# Patient Record
Sex: FEMALE | Race: WHITE | NOT HISPANIC OR LATINO | Employment: OTHER | URBAN - NONMETROPOLITAN AREA
[De-identification: names, ages, dates, MRNs, and addresses within clinical notes are randomized per-mention and may not be internally consistent; named-entity substitution may affect disease eponyms.]

---

## 2017-01-10 ENCOUNTER — TRANSCRIBE ORDERS (OUTPATIENT)
Dept: ADMINISTRATIVE | Facility: HOSPITAL | Age: 52
End: 2017-01-10

## 2017-01-10 DIAGNOSIS — R97.0 ELEVATED CEA: ICD-10-CM

## 2017-01-10 DIAGNOSIS — C50.212 MALIGNANT NEOPLASM OF UPPER-INNER QUADRANT OF LEFT FEMALE BREAST (HCC): Primary | ICD-10-CM

## 2017-01-11 ENCOUNTER — OFFICE VISIT (OUTPATIENT)
Dept: PRIMARY CARE CLINIC | Age: 52
End: 2017-01-11
Payer: MEDICARE

## 2017-01-11 ENCOUNTER — TELEPHONE (OUTPATIENT)
Dept: PRIMARY CARE CLINIC | Age: 52
End: 2017-01-11

## 2017-01-11 VITALS
BODY MASS INDEX: 32.02 KG/M2 | HEART RATE: 100 BPM | WEIGHT: 204 LBS | OXYGEN SATURATION: 98 % | HEIGHT: 67 IN | DIASTOLIC BLOOD PRESSURE: 86 MMHG | RESPIRATION RATE: 20 BRPM | SYSTOLIC BLOOD PRESSURE: 138 MMHG

## 2017-01-11 DIAGNOSIS — F33.1 MAJOR DEPRESSIVE DISORDER, RECURRENT EPISODE, MODERATE (HCC): Primary | ICD-10-CM

## 2017-01-11 DIAGNOSIS — F33.2 MAJOR DEPRESSIVE DISORDER, RECURRENT SEVERE WITHOUT PSYCHOTIC FEATURES (HCC): ICD-10-CM

## 2017-01-11 PROCEDURE — 99214 OFFICE O/P EST MOD 30 MIN: CPT | Performed by: FAMILY MEDICINE

## 2017-01-11 RX ORDER — LORAZEPAM 1 MG/1
1 TABLET ORAL EVERY 8 HOURS PRN
Qty: 90 TABLET | Refills: 1 | Status: ON HOLD | OUTPATIENT
Start: 2017-01-11 | End: 2017-01-20 | Stop reason: HOSPADM

## 2017-01-11 RX ORDER — FAMOTIDINE 20 MG/1
20 TABLET, FILM COATED ORAL 2 TIMES DAILY
COMMUNITY
End: 2018-02-16 | Stop reason: SDUPTHER

## 2017-01-11 RX ORDER — ONDANSETRON 4 MG/1
4 TABLET, FILM COATED ORAL EVERY 8 HOURS PRN
Qty: 30 TABLET | Refills: 0 | Status: SHIPPED | OUTPATIENT
Start: 2017-01-11 | End: 2017-02-03 | Stop reason: SDUPTHER

## 2017-01-16 ENCOUNTER — HOSPITAL ENCOUNTER (INPATIENT)
Age: 52
LOS: 4 days | Discharge: HOME | DRG: 885 | End: 2017-01-20
Attending: EMERGENCY MEDICINE | Admitting: PSYCHIATRY & NEUROLOGY
Payer: MEDICARE

## 2017-01-16 DIAGNOSIS — F32.A DEPRESSION, UNSPECIFIED DEPRESSION TYPE: Primary | ICD-10-CM

## 2017-01-16 LAB
ALBUMIN SERPL-MCNC: 4.6 G/DL (ref 3.5–5.2)
ALP BLD-CCNC: 118 U/L (ref 35–104)
ALT SERPL-CCNC: 16 U/L (ref 5–33)
AMPHETAMINE SCREEN, URINE: NEGATIVE
ANION GAP SERPL CALCULATED.3IONS-SCNC: 17 MMOL/L (ref 7–19)
AST SERPL-CCNC: 19 U/L (ref 5–32)
BARBITURATE SCREEN URINE: NEGATIVE
BASOPHILS ABSOLUTE: 0 K/UL (ref 0–0.2)
BASOPHILS RELATIVE PERCENT: 0.3 % (ref 0–1)
BENZODIAZEPINE SCREEN, URINE: POSITIVE
BILIRUB SERPL-MCNC: 0.4 MG/DL (ref 0.2–1.2)
BUN BLDV-MCNC: 6 MG/DL (ref 6–20)
CALCIUM SERPL-MCNC: 9.7 MG/DL (ref 8.6–10)
CANNABINOID SCREEN URINE: POSITIVE
CHLORIDE BLD-SCNC: 99 MMOL/L (ref 98–111)
CO2: 23 MMOL/L (ref 22–29)
COCAINE METABOLITE SCREEN URINE: NEGATIVE
CREAT SERPL-MCNC: 0.5 MG/DL (ref 0.5–0.9)
EOSINOPHILS ABSOLUTE: 0.1 K/UL (ref 0–0.6)
EOSINOPHILS RELATIVE PERCENT: 0.6 % (ref 0–5)
ETHANOL: <10 MG/DL (ref 0–0.08)
GFR NON-AFRICAN AMERICAN: >60
GLOBULIN: 3.3 G/DL
GLUCOSE BLD-MCNC: 138 MG/DL (ref 74–109)
HCG(URINE) PREGNANCY TEST: NEGATIVE
HCT VFR BLD CALC: 47.3 % (ref 37–47)
HEMOGLOBIN: 16.4 G/DL (ref 12–16)
LYMPHOCYTES ABSOLUTE: 3 K/UL (ref 1.1–4.5)
LYMPHOCYTES RELATIVE PERCENT: 24.9 % (ref 20–40)
Lab: ABNORMAL
MCH RBC QN AUTO: 29.9 PG (ref 27–31)
MCHC RBC AUTO-ENTMCNC: 34.7 G/DL (ref 33–37)
MCV RBC AUTO: 86.3 FL (ref 81–99)
MONOCYTES ABSOLUTE: 0.9 K/UL (ref 0–0.9)
MONOCYTES RELATIVE PERCENT: 7.7 % (ref 0–10)
NEUTROPHILS ABSOLUTE: 7.9 K/UL (ref 1.5–7.5)
NEUTROPHILS RELATIVE PERCENT: 66.5 % (ref 50–65)
OPIATE SCREEN URINE: NEGATIVE
PDW BLD-RTO: 14.4 % (ref 11.5–14.5)
PLATELET # BLD: 379 K/UL (ref 130–400)
PMV BLD AUTO: 10.2 FL (ref 7.4–10.4)
POTASSIUM SERPL-SCNC: 3.4 MMOL/L (ref 3.5–5)
RBC # BLD: 5.48 M/UL (ref 4.2–5.4)
SODIUM BLD-SCNC: 139 MMOL/L (ref 136–145)
TOTAL PROTEIN: 7.9 G/DL (ref 6.6–8.7)
WBC # BLD: 11.9 K/UL (ref 4.8–10.8)

## 2017-01-16 PROCEDURE — 93005 ELECTROCARDIOGRAM TRACING: CPT

## 2017-01-16 PROCEDURE — 36415 COLL VENOUS BLD VENIPUNCTURE: CPT

## 2017-01-16 PROCEDURE — 81025 URINE PREGNANCY TEST: CPT

## 2017-01-16 PROCEDURE — 85025 COMPLETE CBC W/AUTO DIFF WBC: CPT

## 2017-01-16 PROCEDURE — 80053 COMPREHEN METABOLIC PANEL: CPT

## 2017-01-16 PROCEDURE — 6370000000 HC RX 637 (ALT 250 FOR IP): Performed by: PSYCHIATRY & NEUROLOGY

## 2017-01-16 PROCEDURE — 6360000002 HC RX W HCPCS: Performed by: EMERGENCY MEDICINE

## 2017-01-16 PROCEDURE — 99284 EMERGENCY DEPT VISIT MOD MDM: CPT | Performed by: EMERGENCY MEDICINE

## 2017-01-16 PROCEDURE — 1240000000 HC EMOTIONAL WELLNESS R&B

## 2017-01-16 PROCEDURE — G0480 DRUG TEST DEF 1-7 CLASSES: HCPCS

## 2017-01-16 PROCEDURE — 99285 EMERGENCY DEPT VISIT HI MDM: CPT

## 2017-01-16 PROCEDURE — 96372 THER/PROPH/DIAG INJ SC/IM: CPT

## 2017-01-16 PROCEDURE — 80307 DRUG TEST PRSMV CHEM ANLYZR: CPT

## 2017-01-16 RX ORDER — ONDANSETRON 4 MG/1
4 TABLET, FILM COATED ORAL EVERY 8 HOURS PRN
Status: DISCONTINUED | OUTPATIENT
Start: 2017-01-16 | End: 2017-01-20 | Stop reason: HOSPADM

## 2017-01-16 RX ORDER — FLUTICASONE PROPIONATE 50 MCG
1 SPRAY, SUSPENSION (ML) NASAL DAILY
Status: DISCONTINUED | OUTPATIENT
Start: 2017-01-16 | End: 2017-01-20 | Stop reason: HOSPADM

## 2017-01-16 RX ORDER — CYCLOBENZAPRINE HCL 10 MG
10 TABLET ORAL 2 TIMES DAILY PRN
Status: DISCONTINUED | OUTPATIENT
Start: 2017-01-16 | End: 2017-01-20 | Stop reason: HOSPADM

## 2017-01-16 RX ORDER — HALOPERIDOL 5 MG/ML
5 INJECTION INTRAMUSCULAR EVERY 6 HOURS PRN
Status: DISCONTINUED | OUTPATIENT
Start: 2017-01-16 | End: 2017-01-18

## 2017-01-16 RX ORDER — LETROZOLE 2.5 MG/1
2.5 TABLET, FILM COATED ORAL DAILY
Status: DISCONTINUED | OUTPATIENT
Start: 2017-01-16 | End: 2017-01-20 | Stop reason: HOSPADM

## 2017-01-16 RX ORDER — GABAPENTIN 300 MG/1
300 CAPSULE ORAL 2 TIMES DAILY
Status: DISCONTINUED | OUTPATIENT
Start: 2017-01-16 | End: 2017-01-20 | Stop reason: HOSPADM

## 2017-01-16 RX ORDER — PANTOPRAZOLE SODIUM 40 MG/1
40 TABLET, DELAYED RELEASE ORAL
Status: DISCONTINUED | OUTPATIENT
Start: 2017-01-17 | End: 2017-01-20 | Stop reason: HOSPADM

## 2017-01-16 RX ORDER — ALBUTEROL SULFATE 90 UG/1
2 AEROSOL, METERED RESPIRATORY (INHALATION) EVERY 6 HOURS PRN
Status: DISCONTINUED | OUTPATIENT
Start: 2017-01-16 | End: 2017-01-20 | Stop reason: HOSPADM

## 2017-01-16 RX ORDER — MAGNESIUM HYDROXIDE/ALUMINUM HYDROXICE/SIMETHICONE 120; 1200; 1200 MG/30ML; MG/30ML; MG/30ML
30 SUSPENSION ORAL PRN
Status: DISCONTINUED | OUTPATIENT
Start: 2017-01-16 | End: 2017-01-20 | Stop reason: HOSPADM

## 2017-01-16 RX ORDER — FAMOTIDINE 20 MG/1
20 TABLET, FILM COATED ORAL 2 TIMES DAILY
Status: DISCONTINUED | OUTPATIENT
Start: 2017-01-16 | End: 2017-01-20 | Stop reason: HOSPADM

## 2017-01-16 RX ORDER — LORAZEPAM 1 MG/1
1 TABLET ORAL EVERY 8 HOURS PRN
Status: DISCONTINUED | OUTPATIENT
Start: 2017-01-16 | End: 2017-01-18

## 2017-01-16 RX ORDER — IBUPROFEN 400 MG/1
400 TABLET ORAL EVERY 6 HOURS PRN
Status: DISCONTINUED | OUTPATIENT
Start: 2017-01-16 | End: 2017-01-20 | Stop reason: HOSPADM

## 2017-01-16 RX ORDER — LORATADINE 10 MG/1
10 CAPSULE, LIQUID FILLED ORAL DAILY
Status: DISCONTINUED | OUTPATIENT
Start: 2017-01-16 | End: 2017-01-16 | Stop reason: CLARIF

## 2017-01-16 RX ORDER — ACETAMINOPHEN 325 MG/1
650 TABLET ORAL EVERY 4 HOURS PRN
Status: DISCONTINUED | OUTPATIENT
Start: 2017-01-16 | End: 2017-01-20 | Stop reason: HOSPADM

## 2017-01-16 RX ORDER — LORAZEPAM 2 MG/ML
1 INJECTION INTRAMUSCULAR EVERY 6 HOURS PRN
Status: DISCONTINUED | OUTPATIENT
Start: 2017-01-16 | End: 2017-01-18

## 2017-01-16 RX ORDER — CETIRIZINE HYDROCHLORIDE 10 MG/1
10 TABLET ORAL DAILY
Status: DISCONTINUED | OUTPATIENT
Start: 2017-01-16 | End: 2017-01-20 | Stop reason: HOSPADM

## 2017-01-16 RX ORDER — LORAZEPAM 2 MG/ML
1 INJECTION INTRAMUSCULAR ONCE
Status: COMPLETED | OUTPATIENT
Start: 2017-01-16 | End: 2017-01-16

## 2017-01-16 RX ORDER — TRAZODONE HYDROCHLORIDE 50 MG/1
50 TABLET ORAL NIGHTLY PRN
Status: DISCONTINUED | OUTPATIENT
Start: 2017-01-16 | End: 2017-01-18

## 2017-01-16 RX ADMIN — FAMOTIDINE 20 MG: 20 TABLET ORAL at 21:46

## 2017-01-16 RX ADMIN — LORAZEPAM 1 MG: 2 INJECTION INTRAMUSCULAR; INTRAVENOUS at 16:30

## 2017-01-16 RX ADMIN — GABAPENTIN 300 MG: 300 CAPSULE ORAL at 21:46

## 2017-01-16 RX ADMIN — TRAZODONE HYDROCHLORIDE 50 MG: 50 TABLET ORAL at 21:46

## 2017-01-16 RX ADMIN — ALBUTEROL SULFATE 2 PUFF: 90 AEROSOL, METERED RESPIRATORY (INHALATION) at 23:06

## 2017-01-16 ASSESSMENT — SLEEP AND FATIGUE QUESTIONNAIRES
DO YOU USE A SLEEP AID: YES
RESTFUL SLEEP: NO
DIFFICULTY ARISING: YES
AVERAGE NUMBER OF SLEEP HOURS: 5
DO YOU HAVE DIFFICULTY SLEEPING: YES
DIFFICULTY FALLING ASLEEP: YES
DIFFICULTY STAYING ASLEEP: YES
SLEEP PATTERN: DIFFICULTY FALLING ASLEEP;DIFFICULTY ARISING

## 2017-01-16 ASSESSMENT — LIFESTYLE VARIABLES: HISTORY_ALCOHOL_USE: NO

## 2017-01-16 ASSESSMENT — PAIN SCALES - GENERAL: PAINLEVEL_OUTOF10: 10

## 2017-01-16 ASSESSMENT — ENCOUNTER SYMPTOMS
RHINORRHEA: 0
SHORTNESS OF BREATH: 0
NAUSEA: 1
ABDOMINAL PAIN: 0
SORE THROAT: 0
BACK PAIN: 0
VOMITING: 1
DIARRHEA: 0

## 2017-01-16 ASSESSMENT — PAIN DESCRIPTION - PAIN TYPE: TYPE: CHRONIC PAIN

## 2017-01-17 PROBLEM — Z63.4 GRIEF AT LOSS OF CHILD: Status: ACTIVE | Noted: 2017-01-17

## 2017-01-17 PROBLEM — F43.21 GRIEF AT LOSS OF CHILD: Status: ACTIVE | Noted: 2017-01-17

## 2017-01-17 PROBLEM — R45.851 SUICIDAL IDEATION: Status: ACTIVE | Noted: 2017-01-17

## 2017-01-17 PROCEDURE — 90792 PSYCH DIAG EVAL W/MED SRVCS: CPT | Performed by: NURSE PRACTITIONER

## 2017-01-17 PROCEDURE — 6370000000 HC RX 637 (ALT 250 FOR IP): Performed by: FAMILY MEDICINE

## 2017-01-17 PROCEDURE — 1240000000 HC EMOTIONAL WELLNESS R&B

## 2017-01-17 PROCEDURE — 6370000000 HC RX 637 (ALT 250 FOR IP): Performed by: PSYCHIATRY & NEUROLOGY

## 2017-01-17 RX ORDER — CLONIDINE HYDROCHLORIDE 0.1 MG/1
0.1 TABLET ORAL EVERY 4 HOURS PRN
Status: DISCONTINUED | OUTPATIENT
Start: 2017-01-17 | End: 2017-01-18

## 2017-01-17 RX ADMIN — TRAZODONE HYDROCHLORIDE 50 MG: 50 TABLET ORAL at 21:15

## 2017-01-17 RX ADMIN — FAMOTIDINE 20 MG: 20 TABLET ORAL at 21:15

## 2017-01-17 RX ADMIN — SERTRALINE HYDROCHLORIDE 150 MG: 50 TABLET ORAL at 08:48

## 2017-01-17 RX ADMIN — GABAPENTIN 300 MG: 300 CAPSULE ORAL at 21:15

## 2017-01-17 RX ADMIN — FAMOTIDINE 20 MG: 20 TABLET ORAL at 08:48

## 2017-01-17 RX ADMIN — LETROZOLE 2.5 MG: 2.5 TABLET, FILM COATED ORAL at 11:07

## 2017-01-17 RX ADMIN — CETIRIZINE HYDROCHLORIDE 10 MG: 10 TABLET ORAL at 08:48

## 2017-01-17 RX ADMIN — PANTOPRAZOLE SODIUM 40 MG: 40 TABLET, DELAYED RELEASE ORAL at 08:48

## 2017-01-17 RX ADMIN — ALBUTEROL SULFATE 2 PUFF: 90 AEROSOL, METERED RESPIRATORY (INHALATION) at 14:22

## 2017-01-17 RX ADMIN — GABAPENTIN 300 MG: 300 CAPSULE ORAL at 08:48

## 2017-01-17 RX ADMIN — CLONIDINE HYDROCHLORIDE 0.1 MG: 0.1 TABLET ORAL at 13:28

## 2017-01-17 RX ADMIN — LORAZEPAM 1 MG: 1 TABLET ORAL at 21:48

## 2017-01-17 ASSESSMENT — PATIENT HEALTH QUESTIONNAIRE - PHQ9: SUM OF ALL RESPONSES TO PHQ QUESTIONS 1-9: 20

## 2017-01-18 LAB
ALBUMIN SERPL-MCNC: 4 G/DL (ref 3.5–5.2)
ALP BLD-CCNC: 99 U/L (ref 35–104)
ALT SERPL-CCNC: 13 U/L (ref 5–33)
ANION GAP SERPL CALCULATED.3IONS-SCNC: 18 MMOL/L (ref 7–19)
AST SERPL-CCNC: 17 U/L (ref 5–32)
BILIRUB SERPL-MCNC: 0.5 MG/DL (ref 0.2–1.2)
BUN BLDV-MCNC: 8 MG/DL (ref 6–20)
CALCIUM SERPL-MCNC: 9.3 MG/DL (ref 8.6–10)
CHLORIDE BLD-SCNC: 98 MMOL/L (ref 98–111)
CO2: 25 MMOL/L (ref 22–29)
CREAT SERPL-MCNC: 0.6 MG/DL (ref 0.5–0.9)
GFR NON-AFRICAN AMERICAN: >60
GLOBULIN: 2.8 G/DL
GLUCOSE BLD-MCNC: 134 MG/DL (ref 74–109)
HBA1C MFR BLD: 6 %
HCT VFR BLD CALC: 46.7 % (ref 37–47)
HEMOGLOBIN: 16 G/DL (ref 12–16)
MCH RBC QN AUTO: 29.9 PG (ref 27–31)
MCHC RBC AUTO-ENTMCNC: 34.3 G/DL (ref 33–37)
MCV RBC AUTO: 87.3 FL (ref 81–99)
PDW BLD-RTO: 14.3 % (ref 11.5–14.5)
PLATELET # BLD: 302 K/UL (ref 130–400)
PMV BLD AUTO: 10.3 FL (ref 7.4–10.4)
POTASSIUM SERPL-SCNC: 3.2 MMOL/L (ref 3.5–5)
RBC # BLD: 5.35 M/UL (ref 4.2–5.4)
SODIUM BLD-SCNC: 141 MMOL/L (ref 136–145)
TOTAL PROTEIN: 6.8 G/DL (ref 6.6–8.7)
TSH SERPL DL<=0.05 MIU/L-ACNC: 1.82 UIU/ML (ref 0.27–4.2)
VITAMIN B-12: 432 PG/ML (ref 211–946)
VITAMIN D 25-HYDROXY: 15.7 NG/ML
WBC # BLD: 10.3 K/UL (ref 4.8–10.8)

## 2017-01-18 PROCEDURE — 94660 CPAP INITIATION&MGMT: CPT

## 2017-01-18 PROCEDURE — 6370000000 HC RX 637 (ALT 250 FOR IP): Performed by: NURSE PRACTITIONER

## 2017-01-18 PROCEDURE — 82607 VITAMIN B-12: CPT

## 2017-01-18 PROCEDURE — 99231 SBSQ HOSP IP/OBS SF/LOW 25: CPT | Performed by: NURSE PRACTITIONER

## 2017-01-18 PROCEDURE — 83036 HEMOGLOBIN GLYCOSYLATED A1C: CPT

## 2017-01-18 PROCEDURE — 1240000000 HC EMOTIONAL WELLNESS R&B

## 2017-01-18 PROCEDURE — 82306 VITAMIN D 25 HYDROXY: CPT

## 2017-01-18 PROCEDURE — 36415 COLL VENOUS BLD VENIPUNCTURE: CPT

## 2017-01-18 PROCEDURE — 84443 ASSAY THYROID STIM HORMONE: CPT

## 2017-01-18 PROCEDURE — 6370000000 HC RX 637 (ALT 250 FOR IP): Performed by: PSYCHIATRY & NEUROLOGY

## 2017-01-18 PROCEDURE — 85027 COMPLETE CBC AUTOMATED: CPT

## 2017-01-18 PROCEDURE — 80053 COMPREHEN METABOLIC PANEL: CPT

## 2017-01-18 PROCEDURE — 6370000000 HC RX 637 (ALT 250 FOR IP): Performed by: FAMILY MEDICINE

## 2017-01-18 RX ORDER — POTASSIUM CHLORIDE 20 MEQ/1
40 TABLET, EXTENDED RELEASE ORAL ONCE
Status: COMPLETED | OUTPATIENT
Start: 2017-01-18 | End: 2017-01-18

## 2017-01-18 RX ORDER — CLONIDINE HYDROCHLORIDE 0.1 MG/1
0.1 TABLET ORAL EVERY 4 HOURS PRN
Status: DISCONTINUED | OUTPATIENT
Start: 2017-01-18 | End: 2017-01-20 | Stop reason: HOSPADM

## 2017-01-18 RX ORDER — HYDROXYZINE PAMOATE 25 MG/1
25 CAPSULE ORAL 3 TIMES DAILY PRN
Status: DISCONTINUED | OUTPATIENT
Start: 2017-01-18 | End: 2017-01-20 | Stop reason: HOSPADM

## 2017-01-18 RX ORDER — LANOLIN ALCOHOL/MO/W.PET/CERES
6 CREAM (GRAM) TOPICAL NIGHTLY PRN
Status: DISCONTINUED | OUTPATIENT
Start: 2017-01-18 | End: 2017-01-20 | Stop reason: HOSPADM

## 2017-01-18 RX ORDER — ERGOCALCIFEROL 1.25 MG/1
50000 CAPSULE ORAL WEEKLY
Status: DISCONTINUED | OUTPATIENT
Start: 2017-01-18 | End: 2017-01-20 | Stop reason: HOSPADM

## 2017-01-18 RX ADMIN — FAMOTIDINE 20 MG: 20 TABLET ORAL at 21:19

## 2017-01-18 RX ADMIN — LETROZOLE 2.5 MG: 2.5 TABLET, FILM COATED ORAL at 12:33

## 2017-01-18 RX ADMIN — SERTRALINE HYDROCHLORIDE 150 MG: 50 TABLET ORAL at 09:09

## 2017-01-18 RX ADMIN — CETIRIZINE HYDROCHLORIDE 10 MG: 10 TABLET ORAL at 09:09

## 2017-01-18 RX ADMIN — CLONIDINE HYDROCHLORIDE 0.1 MG: 0.1 TABLET ORAL at 21:19

## 2017-01-18 RX ADMIN — MELATONIN 3 MG ORAL TABLET 6 MG: 3 TABLET ORAL at 21:18

## 2017-01-18 RX ADMIN — IBUPROFEN 400 MG: 400 TABLET, FILM COATED ORAL at 12:33

## 2017-01-18 RX ADMIN — ALBUTEROL SULFATE 2 PUFF: 90 AEROSOL, METERED RESPIRATORY (INHALATION) at 07:16

## 2017-01-18 RX ADMIN — ALBUTEROL SULFATE 2 PUFF: 90 AEROSOL, METERED RESPIRATORY (INHALATION) at 22:27

## 2017-01-18 RX ADMIN — GABAPENTIN 300 MG: 300 CAPSULE ORAL at 21:19

## 2017-01-18 RX ADMIN — FAMOTIDINE 20 MG: 20 TABLET ORAL at 09:09

## 2017-01-18 RX ADMIN — ERGOCALCIFEROL 50000 UNITS: 1.25 CAPSULE ORAL at 21:19

## 2017-01-18 RX ADMIN — POTASSIUM CHLORIDE 40 MEQ: 20 TABLET, EXTENDED RELEASE ORAL at 21:19

## 2017-01-18 RX ADMIN — NAPHAZOLINE HYDROCHLORIDE AND PHENIRAMINE MALEATE 2 DROP: .25; 3 SOLUTION/ DROPS OPHTHALMIC at 12:33

## 2017-01-18 RX ADMIN — FLUTICASONE PROPIONATE 1 SPRAY: 50 SPRAY, METERED NASAL at 12:33

## 2017-01-18 RX ADMIN — PANTOPRAZOLE SODIUM 40 MG: 40 TABLET, DELAYED RELEASE ORAL at 05:15

## 2017-01-18 RX ADMIN — LORAZEPAM 1 MG: 1 TABLET ORAL at 05:15

## 2017-01-18 RX ADMIN — GABAPENTIN 300 MG: 300 CAPSULE ORAL at 09:09

## 2017-01-18 ASSESSMENT — PAIN SCALES - GENERAL: PAINLEVEL_OUTOF10: 8

## 2017-01-19 PROCEDURE — 6370000000 HC RX 637 (ALT 250 FOR IP): Performed by: PSYCHIATRY & NEUROLOGY

## 2017-01-19 PROCEDURE — 94660 CPAP INITIATION&MGMT: CPT

## 2017-01-19 PROCEDURE — 6370000000 HC RX 637 (ALT 250 FOR IP): Performed by: FAMILY MEDICINE

## 2017-01-19 PROCEDURE — 1240000000 HC EMOTIONAL WELLNESS R&B

## 2017-01-19 PROCEDURE — 6370000000 HC RX 637 (ALT 250 FOR IP): Performed by: NURSE PRACTITIONER

## 2017-01-19 PROCEDURE — 6360000002 HC RX W HCPCS: Performed by: PSYCHIATRY & NEUROLOGY

## 2017-01-19 RX ORDER — LISINOPRIL 10 MG/1
10 TABLET ORAL DAILY
Status: DISCONTINUED | OUTPATIENT
Start: 2017-01-19 | End: 2017-01-20 | Stop reason: HOSPADM

## 2017-01-19 RX ADMIN — LISINOPRIL 10 MG: 10 TABLET ORAL at 19:54

## 2017-01-19 RX ADMIN — MELATONIN 3 MG ORAL TABLET 6 MG: 3 TABLET ORAL at 22:18

## 2017-01-19 RX ADMIN — SERTRALINE HYDROCHLORIDE 150 MG: 50 TABLET ORAL at 08:59

## 2017-01-19 RX ADMIN — FAMOTIDINE 20 MG: 20 TABLET ORAL at 22:15

## 2017-01-19 RX ADMIN — PANTOPRAZOLE SODIUM 40 MG: 40 TABLET, DELAYED RELEASE ORAL at 06:34

## 2017-01-19 RX ADMIN — GABAPENTIN 300 MG: 300 CAPSULE ORAL at 08:59

## 2017-01-19 RX ADMIN — FLUTICASONE PROPIONATE 1 SPRAY: 50 SPRAY, METERED NASAL at 16:49

## 2017-01-19 RX ADMIN — HYDROXYZINE PAMOATE 25 MG: 25 CAPSULE ORAL at 02:46

## 2017-01-19 RX ADMIN — GABAPENTIN 300 MG: 300 CAPSULE ORAL at 22:15

## 2017-01-19 RX ADMIN — LETROZOLE 2.5 MG: 2.5 TABLET, FILM COATED ORAL at 09:56

## 2017-01-19 RX ADMIN — HYDROXYZINE PAMOATE 25 MG: 25 CAPSULE ORAL at 22:15

## 2017-01-19 RX ADMIN — FAMOTIDINE 20 MG: 20 TABLET ORAL at 09:00

## 2017-01-19 RX ADMIN — CETIRIZINE HYDROCHLORIDE 10 MG: 10 TABLET ORAL at 08:59

## 2017-01-19 RX ADMIN — CLONIDINE HYDROCHLORIDE 0.1 MG: 0.1 TABLET ORAL at 17:51

## 2017-01-19 RX ADMIN — CLONIDINE HYDROCHLORIDE 0.1 MG: 0.1 TABLET ORAL at 21:41

## 2017-01-19 RX ADMIN — ONDANSETRON HYDROCHLORIDE 4 MG: 4 TABLET, FILM COATED ORAL at 21:33

## 2017-01-19 RX ADMIN — ALBUTEROL SULFATE 2 PUFF: 90 AEROSOL, METERED RESPIRATORY (INHALATION) at 06:47

## 2017-01-19 RX ADMIN — NAPHAZOLINE HYDROCHLORIDE AND PHENIRAMINE MALEATE 2 DROP: .25; 3 SOLUTION/ DROPS OPHTHALMIC at 09:56

## 2017-01-20 VITALS
RESPIRATION RATE: 24 BRPM | HEART RATE: 94 BPM | WEIGHT: 198.01 LBS | TEMPERATURE: 97.6 F | DIASTOLIC BLOOD PRESSURE: 75 MMHG | BODY MASS INDEX: 31.08 KG/M2 | SYSTOLIC BLOOD PRESSURE: 150 MMHG | HEIGHT: 67 IN | OXYGEN SATURATION: 96 %

## 2017-01-20 LAB
EKG P AXIS: 63 DEGREES
EKG P-R INTERVAL: 164 MS
EKG Q-T INTERVAL: 368 MS
EKG QRS DURATION: 78 MS
EKG QTC CALCULATION (BAZETT): 441 MS
EKG T AXIS: 38 DEGREES

## 2017-01-20 PROCEDURE — 99238 HOSP IP/OBS DSCHRG MGMT 30/<: CPT | Performed by: NURSE PRACTITIONER

## 2017-01-20 PROCEDURE — 5130000000 HC BRIDGE APPOINTMENT

## 2017-01-20 PROCEDURE — 6370000000 HC RX 637 (ALT 250 FOR IP): Performed by: PSYCHIATRY & NEUROLOGY

## 2017-01-20 PROCEDURE — 6370000000 HC RX 637 (ALT 250 FOR IP): Performed by: FAMILY MEDICINE

## 2017-01-20 RX ORDER — LISINOPRIL 10 MG/1
10 TABLET ORAL DAILY
Qty: 30 TABLET | Refills: 0 | Status: SHIPPED | OUTPATIENT
Start: 2017-01-20 | End: 2017-02-14 | Stop reason: SDUPTHER

## 2017-01-20 RX ORDER — LANOLIN ALCOHOL/MO/W.PET/CERES
6 CREAM (GRAM) TOPICAL NIGHTLY PRN
Qty: 30 TABLET | Refills: 0 | Status: SHIPPED | OUTPATIENT
Start: 2017-01-20 | End: 2018-02-16 | Stop reason: ALTCHOICE

## 2017-01-20 RX ORDER — ERGOCALCIFEROL (VITAMIN D2) 1250 MCG
50000 CAPSULE ORAL WEEKLY
Qty: 5 CAPSULE | Refills: 0 | Status: SHIPPED | OUTPATIENT
Start: 2017-01-20 | End: 2017-02-06 | Stop reason: SDUPTHER

## 2017-01-20 RX ORDER — HYDROXYZINE PAMOATE 25 MG/1
25 CAPSULE ORAL 3 TIMES DAILY PRN
Qty: 42 CAPSULE | Refills: 0 | Status: SHIPPED | OUTPATIENT
Start: 2017-01-20 | End: 2017-01-25

## 2017-01-20 RX ADMIN — LETROZOLE 2.5 MG: 2.5 TABLET, FILM COATED ORAL at 09:16

## 2017-01-20 RX ADMIN — FAMOTIDINE 20 MG: 20 TABLET ORAL at 09:12

## 2017-01-20 RX ADMIN — NAPHAZOLINE HYDROCHLORIDE AND PHENIRAMINE MALEATE 2 DROP: .25; 3 SOLUTION/ DROPS OPHTHALMIC at 09:14

## 2017-01-20 RX ADMIN — CETIRIZINE HYDROCHLORIDE 10 MG: 10 TABLET ORAL at 09:12

## 2017-01-20 RX ADMIN — LISINOPRIL 10 MG: 10 TABLET ORAL at 09:12

## 2017-01-20 RX ADMIN — SERTRALINE HYDROCHLORIDE 150 MG: 50 TABLET ORAL at 09:12

## 2017-01-20 RX ADMIN — ALBUTEROL SULFATE 2 PUFF: 90 AEROSOL, METERED RESPIRATORY (INHALATION) at 09:13

## 2017-01-20 RX ADMIN — PANTOPRAZOLE SODIUM 40 MG: 40 TABLET, DELAYED RELEASE ORAL at 06:23

## 2017-01-20 RX ADMIN — GABAPENTIN 300 MG: 300 CAPSULE ORAL at 09:12

## 2017-01-23 ASSESSMENT — ENCOUNTER SYMPTOMS
SHORTNESS OF BREATH: 0
COUGH: 0

## 2017-01-25 ENCOUNTER — OFFICE VISIT (OUTPATIENT)
Dept: GASTROENTEROLOGY | Age: 52
End: 2017-01-25
Payer: MEDICARE

## 2017-01-25 ENCOUNTER — TELEPHONE (OUTPATIENT)
Dept: GASTROENTEROLOGY | Age: 52
End: 2017-01-25

## 2017-01-25 VITALS
OXYGEN SATURATION: 97 % | HEART RATE: 103 BPM | WEIGHT: 195.8 LBS | BODY MASS INDEX: 30.73 KG/M2 | HEIGHT: 67 IN | SYSTOLIC BLOOD PRESSURE: 118 MMHG | DIASTOLIC BLOOD PRESSURE: 80 MMHG

## 2017-01-25 DIAGNOSIS — Z12.11 ENCOUNTER FOR SCREENING COLONOSCOPY: Primary | ICD-10-CM

## 2017-01-25 DIAGNOSIS — Z86.010 PERSONAL HISTORY OF COLONIC POLYPS: ICD-10-CM

## 2017-01-25 PROBLEM — Z86.0100 PERSONAL HISTORY OF COLONIC POLYPS: Status: ACTIVE | Noted: 2017-01-25

## 2017-01-25 PROCEDURE — 99214 OFFICE O/P EST MOD 30 MIN: CPT | Performed by: NURSE PRACTITIONER

## 2017-01-25 RX ORDER — SERTRALINE HYDROCHLORIDE 100 MG/1
TABLET, FILM COATED ORAL
COMMUNITY
Start: 2017-01-21 | End: 2017-02-14 | Stop reason: SDUPTHER

## 2017-01-25 ASSESSMENT — ENCOUNTER SYMPTOMS
SORE THROAT: 1
COUGH: 0
PHOTOPHOBIA: 0
DIARRHEA: 1
NAUSEA: 0
ABDOMINAL DISTENTION: 0
CONSTIPATION: 0
CHOKING: 0
VOMITING: 0
WHEEZING: 0
BLOOD IN STOOL: 0
RECTAL PAIN: 0
ABDOMINAL PAIN: 0
ANAL BLEEDING: 0
BACK PAIN: 1

## 2017-02-02 ENCOUNTER — APPOINTMENT (OUTPATIENT)
Dept: NUCLEAR MEDICINE | Facility: HOSPITAL | Age: 52
End: 2017-02-02
Attending: INTERNAL MEDICINE

## 2017-02-02 ENCOUNTER — APPOINTMENT (OUTPATIENT)
Dept: CT IMAGING | Facility: HOSPITAL | Age: 52
End: 2017-02-02
Attending: INTERNAL MEDICINE

## 2017-02-03 ENCOUNTER — TELEPHONE (OUTPATIENT)
Dept: PRIMARY CARE CLINIC | Age: 52
End: 2017-02-03

## 2017-02-06 RX ORDER — ONDANSETRON 4 MG/1
TABLET, FILM COATED ORAL
Qty: 30 TABLET | Refills: 0 | Status: SHIPPED | OUTPATIENT
Start: 2017-02-06 | End: 2017-03-21 | Stop reason: SDUPTHER

## 2017-02-06 RX ORDER — ERGOCALCIFEROL (VITAMIN D2) 1250 MCG
50000 CAPSULE ORAL WEEKLY
Qty: 5 CAPSULE | Refills: 3 | Status: SHIPPED | OUTPATIENT
Start: 2017-02-06 | End: 2017-03-21 | Stop reason: SDUPTHER

## 2017-02-06 RX ORDER — HYDROXYZINE PAMOATE 25 MG/1
25 CAPSULE ORAL 3 TIMES DAILY PRN
Qty: 90 CAPSULE | Refills: 3 | Status: SHIPPED | OUTPATIENT
Start: 2017-02-06 | End: 2017-02-20

## 2017-02-14 DIAGNOSIS — Z76.0 MEDICATION REFILL: Primary | ICD-10-CM

## 2017-02-14 RX ORDER — SERTRALINE HYDROCHLORIDE 100 MG/1
50 TABLET, FILM COATED ORAL 3 TIMES DAILY
Qty: 90 TABLET | Refills: 3 | Status: SHIPPED | OUTPATIENT
Start: 2017-02-14 | End: 2018-02-16 | Stop reason: ALTCHOICE

## 2017-02-14 RX ORDER — LISINOPRIL 10 MG/1
10 TABLET ORAL DAILY
Qty: 30 TABLET | Refills: 3 | Status: SHIPPED | OUTPATIENT
Start: 2017-02-14 | End: 2017-03-21 | Stop reason: SDUPTHER

## 2017-02-28 RX ORDER — GABAPENTIN 300 MG/1
CAPSULE ORAL
Qty: 360 CAPSULE | Refills: 3 | Status: SHIPPED | OUTPATIENT
Start: 2017-02-28 | End: 2018-08-08 | Stop reason: ALTCHOICE

## 2017-03-20 ENCOUNTER — OFFICE VISIT (OUTPATIENT)
Dept: URGENT CARE | Age: 52
End: 2017-03-20
Payer: MEDICARE

## 2017-03-20 VITALS
DIASTOLIC BLOOD PRESSURE: 72 MMHG | TEMPERATURE: 97.9 F | SYSTOLIC BLOOD PRESSURE: 105 MMHG | HEART RATE: 128 BPM | OXYGEN SATURATION: 98 % | WEIGHT: 202 LBS | RESPIRATION RATE: 18 BRPM | BODY MASS INDEX: 31.64 KG/M2

## 2017-03-20 DIAGNOSIS — Z63.4 GRIEF AT LOSS OF CHILD: ICD-10-CM

## 2017-03-20 DIAGNOSIS — F33.1 MAJOR DEPRESSIVE DISORDER, RECURRENT EPISODE, MODERATE (HCC): ICD-10-CM

## 2017-03-20 DIAGNOSIS — R52 BODY ACHES: ICD-10-CM

## 2017-03-20 DIAGNOSIS — J01.40 ACUTE NON-RECURRENT PANSINUSITIS: Primary | ICD-10-CM

## 2017-03-20 DIAGNOSIS — F43.21 GRIEF AT LOSS OF CHILD: ICD-10-CM

## 2017-03-20 LAB
INFLUENZA A ANTIBODY: NEGATIVE
INFLUENZA B ANTIBODY: NEGATIVE

## 2017-03-20 PROCEDURE — 87804 INFLUENZA ASSAY W/OPTIC: CPT | Performed by: NURSE PRACTITIONER

## 2017-03-20 PROCEDURE — 99213 OFFICE O/P EST LOW 20 MIN: CPT | Performed by: NURSE PRACTITIONER

## 2017-03-20 PROCEDURE — 96372 THER/PROPH/DIAG INJ SC/IM: CPT | Performed by: NURSE PRACTITIONER

## 2017-03-20 RX ORDER — DEXAMETHASONE SODIUM PHOSPHATE 10 MG/ML
10 INJECTION INTRAMUSCULAR; INTRAVENOUS ONCE
Status: COMPLETED | OUTPATIENT
Start: 2017-03-20 | End: 2017-03-20

## 2017-03-20 RX ORDER — AMOXICILLIN AND CLAVULANATE POTASSIUM 875; 125 MG/1; MG/1
1 TABLET, FILM COATED ORAL EVERY 12 HOURS
Qty: 20 TABLET | Refills: 0 | Status: SHIPPED | OUTPATIENT
Start: 2017-03-20 | End: 2017-03-30

## 2017-03-20 RX ADMIN — DEXAMETHASONE SODIUM PHOSPHATE 10 MG: 10 INJECTION INTRAMUSCULAR; INTRAVENOUS at 14:06

## 2017-03-20 SDOH — SOCIAL STABILITY - SOCIAL INSECURITY: DISSAPEARANCE AND DEATH OF FAMILY MEMBER: Z63.4

## 2017-03-20 ASSESSMENT — ENCOUNTER SYMPTOMS
WHEEZING: 1
SORE THROAT: 0
COUGH: 1
SINUS PRESSURE: 1

## 2017-03-21 DIAGNOSIS — Z76.0 MEDICATION REFILL: ICD-10-CM

## 2017-03-21 RX ORDER — HYDROXYZINE PAMOATE 25 MG/1
25 CAPSULE ORAL 3 TIMES DAILY
Qty: 270 CAPSULE | Refills: 3 | Status: SHIPPED | OUTPATIENT
Start: 2017-03-21 | End: 2018-02-16 | Stop reason: SDUPTHER

## 2017-03-21 RX ORDER — CYCLOBENZAPRINE HCL 10 MG
10 TABLET ORAL 2 TIMES DAILY PRN
Qty: 180 TABLET | Refills: 3 | Status: SHIPPED | OUTPATIENT
Start: 2017-03-21 | End: 2018-10-29 | Stop reason: SDUPTHER

## 2017-03-21 RX ORDER — LISINOPRIL 10 MG/1
10 TABLET ORAL DAILY
Qty: 90 TABLET | Refills: 3 | Status: SHIPPED | OUTPATIENT
Start: 2017-03-21 | End: 2018-02-16 | Stop reason: SDUPTHER

## 2017-03-21 RX ORDER — LETROZOLE 2.5 MG/1
2.5 TABLET, FILM COATED ORAL DAILY
Qty: 90 TABLET | Refills: 3 | Status: SHIPPED | OUTPATIENT
Start: 2017-03-21 | End: 2018-02-16 | Stop reason: ALTCHOICE

## 2017-03-21 RX ORDER — FLUTICASONE PROPIONATE 50 MCG
1 SPRAY, SUSPENSION (ML) NASAL DAILY
Qty: 3 BOTTLE | Refills: 3 | Status: SHIPPED | OUTPATIENT
Start: 2017-03-21 | End: 2018-02-16 | Stop reason: SDUPTHER

## 2017-03-21 RX ORDER — OMEPRAZOLE 20 MG/1
20 CAPSULE, DELAYED RELEASE ORAL DAILY
Qty: 90 CAPSULE | Refills: 3 | Status: SHIPPED | OUTPATIENT
Start: 2017-03-21 | End: 2018-02-16 | Stop reason: SDUPTHER

## 2017-03-21 RX ORDER — ERGOCALCIFEROL (VITAMIN D2) 1250 MCG
50000 CAPSULE ORAL WEEKLY
Qty: 12 CAPSULE | Refills: 3 | Status: SHIPPED | OUTPATIENT
Start: 2017-03-21 | End: 2018-02-16 | Stop reason: SDUPTHER

## 2017-03-21 RX ORDER — HYDROXYZINE PAMOATE 25 MG/1
1 CAPSULE ORAL 3 TIMES DAILY
COMMUNITY
Start: 2017-03-15 | End: 2017-03-21 | Stop reason: SDUPTHER

## 2017-03-21 RX ORDER — ONDANSETRON 4 MG/1
TABLET, FILM COATED ORAL
Qty: 90 TABLET | Refills: 3 | Status: SHIPPED | OUTPATIENT
Start: 2017-03-21 | End: 2020-11-24

## 2018-02-08 ENCOUNTER — TELEPHONE (OUTPATIENT)
Dept: PRIMARY CARE CLINIC | Age: 53
End: 2018-02-08

## 2018-02-09 DIAGNOSIS — Z76.0 MEDICATION REFILL: ICD-10-CM

## 2018-02-10 RX ORDER — GABAPENTIN 300 MG/1
CAPSULE ORAL
Qty: 360 CAPSULE | Refills: 1 | OUTPATIENT
Start: 2018-02-10 | End: 2018-05-11

## 2018-02-10 RX ORDER — LISINOPRIL 10 MG/1
TABLET ORAL
Qty: 90 TABLET | Refills: 3 | OUTPATIENT
Start: 2018-02-10

## 2018-02-10 RX ORDER — LETROZOLE 2.5 MG/1
TABLET, FILM COATED ORAL
Qty: 90 TABLET | Refills: 3 | OUTPATIENT
Start: 2018-02-10

## 2018-02-10 RX ORDER — OMEPRAZOLE 20 MG/1
CAPSULE, DELAYED RELEASE ORAL
Qty: 90 CAPSULE | Refills: 3 | OUTPATIENT
Start: 2018-02-10

## 2018-02-10 RX ORDER — HYDROXYZINE PAMOATE 25 MG/1
CAPSULE ORAL
Qty: 270 CAPSULE | Refills: 3 | OUTPATIENT
Start: 2018-02-10

## 2018-02-16 ENCOUNTER — OFFICE VISIT (OUTPATIENT)
Dept: PRIMARY CARE CLINIC | Age: 53
End: 2018-02-16
Payer: MEDICARE

## 2018-02-16 ENCOUNTER — HOSPITAL ENCOUNTER (OUTPATIENT)
Dept: GENERAL RADIOLOGY | Age: 53
Discharge: HOME | End: 2018-02-16
Payer: MEDICARE

## 2018-02-16 VITALS
HEART RATE: 98 BPM | SYSTOLIC BLOOD PRESSURE: 114 MMHG | OXYGEN SATURATION: 95 % | WEIGHT: 220 LBS | RESPIRATION RATE: 18 BRPM | DIASTOLIC BLOOD PRESSURE: 84 MMHG | BODY MASS INDEX: 34.53 KG/M2 | HEIGHT: 67 IN | TEMPERATURE: 98 F

## 2018-02-16 DIAGNOSIS — G89.29 CHRONIC CHEST WALL PAIN: ICD-10-CM

## 2018-02-16 DIAGNOSIS — Z90.13 S/P MASTECTOMY, BILATERAL: Primary | ICD-10-CM

## 2018-02-16 DIAGNOSIS — M62.838 MUSCLE SPASM: ICD-10-CM

## 2018-02-16 DIAGNOSIS — Z76.0 MEDICATION REFILL: ICD-10-CM

## 2018-02-16 DIAGNOSIS — E55.9 VITAMIN D DEFICIENCY: ICD-10-CM

## 2018-02-16 DIAGNOSIS — G62.0 CHEMOTHERAPY-INDUCED NEUROPATHY (HCC): ICD-10-CM

## 2018-02-16 DIAGNOSIS — J45.20 INTERMITTENT ASTHMA WITHOUT COMPLICATION, UNSPECIFIED ASTHMA SEVERITY: ICD-10-CM

## 2018-02-16 DIAGNOSIS — J30.89 NON-SEASONAL ALLERGIC RHINITIS DUE TO OTHER ALLERGIC TRIGGER, UNSPECIFIED CHRONICITY: ICD-10-CM

## 2018-02-16 DIAGNOSIS — K21.9 GASTROESOPHAGEAL REFLUX DISEASE WITHOUT ESOPHAGITIS: ICD-10-CM

## 2018-02-16 DIAGNOSIS — F41.9 ANXIETY: ICD-10-CM

## 2018-02-16 DIAGNOSIS — J30.2 CHRONIC SEASONAL ALLERGIC RHINITIS DUE TO OTHER ALLERGEN: ICD-10-CM

## 2018-02-16 DIAGNOSIS — R07.89 CHRONIC CHEST WALL PAIN: ICD-10-CM

## 2018-02-16 DIAGNOSIS — T45.1X5A CHEMOTHERAPY-INDUCED NEUROPATHY (HCC): ICD-10-CM

## 2018-02-16 DIAGNOSIS — Z90.13 S/P MASTECTOMY, BILATERAL: ICD-10-CM

## 2018-02-16 PROCEDURE — 71101 X-RAY EXAM UNILAT RIBS/CHEST: CPT

## 2018-02-16 PROCEDURE — 1036F TOBACCO NON-USER: CPT | Performed by: NURSE PRACTITIONER

## 2018-02-16 PROCEDURE — G8417 CALC BMI ABV UP PARAM F/U: HCPCS | Performed by: NURSE PRACTITIONER

## 2018-02-16 PROCEDURE — 3017F COLORECTAL CA SCREEN DOC REV: CPT | Performed by: NURSE PRACTITIONER

## 2018-02-16 PROCEDURE — 99214 OFFICE O/P EST MOD 30 MIN: CPT | Performed by: NURSE PRACTITIONER

## 2018-02-16 PROCEDURE — G8427 DOCREV CUR MEDS BY ELIG CLIN: HCPCS | Performed by: NURSE PRACTITIONER

## 2018-02-16 PROCEDURE — G8484 FLU IMMUNIZE NO ADMIN: HCPCS | Performed by: NURSE PRACTITIONER

## 2018-02-16 RX ORDER — ALBUTEROL SULFATE 90 UG/1
1 AEROSOL, METERED RESPIRATORY (INHALATION) EVERY 6 HOURS PRN
Qty: 18 G | Refills: 2 | Status: SHIPPED | OUTPATIENT
Start: 2018-02-16 | End: 2020-06-10

## 2018-02-16 RX ORDER — HYDROXYZINE PAMOATE 25 MG/1
25 CAPSULE ORAL 3 TIMES DAILY
Qty: 270 CAPSULE | Refills: 2 | Status: SHIPPED | OUTPATIENT
Start: 2018-02-16 | End: 2018-05-17

## 2018-02-16 RX ORDER — LORATADINE 10 MG/1
10 TABLET ORAL DAILY
Qty: 90 TABLET | Refills: 2 | Status: SHIPPED | OUTPATIENT
Start: 2018-02-16 | End: 2018-05-17

## 2018-02-16 RX ORDER — ASCORBIC ACID 500 MG
500 TABLET ORAL DAILY
COMMUNITY
End: 2018-12-11

## 2018-02-16 RX ORDER — METHOCARBAMOL 500 MG/1
500 TABLET, FILM COATED ORAL 4 TIMES DAILY
Qty: 120 TABLET | Refills: 0 | Status: SHIPPED | OUTPATIENT
Start: 2018-02-16 | End: 2018-02-16 | Stop reason: SDUPTHER

## 2018-02-16 RX ORDER — ERGOCALCIFEROL (VITAMIN D2) 1250 MCG
50000 CAPSULE ORAL WEEKLY
Qty: 6 CAPSULE | Refills: 2 | Status: SHIPPED | OUTPATIENT
Start: 2018-02-16 | End: 2019-11-22 | Stop reason: ALTCHOICE

## 2018-02-16 RX ORDER — FAMOTIDINE 20 MG/1
20 TABLET, FILM COATED ORAL 2 TIMES DAILY
Qty: 90 TABLET | Refills: 2 | Status: SHIPPED | OUTPATIENT
Start: 2018-02-16 | End: 2018-05-29 | Stop reason: SDUPTHER

## 2018-02-16 RX ORDER — BUPROPION HYDROCHLORIDE 100 MG/1
100 TABLET, EXTENDED RELEASE ORAL DAILY
COMMUNITY
Start: 2018-01-11 | End: 2020-05-27 | Stop reason: DRUGHIGH

## 2018-02-16 RX ORDER — LETROZOLE 2.5 MG/1
2.5 TABLET, FILM COATED ORAL DAILY
COMMUNITY
End: 2020-03-10 | Stop reason: SDUPTHER

## 2018-02-16 RX ORDER — LISINOPRIL 10 MG/1
10 TABLET ORAL DAILY
Qty: 90 TABLET | Refills: 2 | Status: SHIPPED | OUTPATIENT
Start: 2018-02-16 | End: 2018-10-17 | Stop reason: SDUPTHER

## 2018-02-16 RX ORDER — METHOCARBAMOL 500 MG/1
500 TABLET, FILM COATED ORAL 4 TIMES DAILY
Qty: 120 TABLET | Refills: 0 | Status: SHIPPED | OUTPATIENT
Start: 2018-02-16 | End: 2018-03-18

## 2018-02-16 RX ORDER — OMEPRAZOLE 20 MG/1
20 CAPSULE, DELAYED RELEASE ORAL DAILY
Qty: 90 CAPSULE | Refills: 2 | Status: SHIPPED | OUTPATIENT
Start: 2018-02-16 | End: 2018-10-17 | Stop reason: SDUPTHER

## 2018-02-16 RX ORDER — FLUTICASONE PROPIONATE 50 MCG
1 SPRAY, SUSPENSION (ML) NASAL DAILY
Qty: 3 BOTTLE | Refills: 3 | Status: SHIPPED | OUTPATIENT
Start: 2018-02-16 | End: 2019-03-18 | Stop reason: SDUPTHER

## 2018-02-16 RX ORDER — MELOXICAM 7.5 MG/1
7.5 TABLET ORAL DAILY
COMMUNITY
Start: 2018-02-12 | End: 2019-02-14 | Stop reason: ALTCHOICE

## 2018-02-16 RX ORDER — DESVENLAFAXINE 100 MG/1
100 TABLET, EXTENDED RELEASE ORAL DAILY
COMMUNITY
End: 2021-06-01 | Stop reason: ALTCHOICE

## 2018-02-16 NOTE — PROGRESS NOTES
Comment: Smokes Ciggerelos now,1-2 a day.  Alcohol use No        Family History   Problem Relation Age of Onset    Diabetes Mother     Cancer Mother      breast    Heart Disease Mother     High Blood Pressure Mother     Colon Cancer Neg Hx     Colon Polyps Neg Hx     Esophageal Cancer Neg Hx     Liver Cancer Neg Hx     Liver Disease Neg Hx     Rectal Cancer Neg Hx     Stomach Cancer Neg Hx        Current Outpatient Prescriptions   Medication Sig Dispense Refill    letrozole (FEMARA) 2.5 MG tablet Take 2.5 mg by mouth daily      Calcium Carbonate-Vitamin D (CALCIUM 600+D PO) Take by mouth      desvenlafaxine succinate (PRISTIQ) 50 MG TB24 extended release tablet Take 50 mg by mouth daily      meloxicam (MOBIC) 7.5 MG tablet       vitamin C (ASCORBIC ACID) 500 MG tablet Take 500 mg by mouth daily      buPROPion (WELLBUTRIN SR) 100 MG extended release tablet       lisinopril (PRINIVIL;ZESTRIL) 10 MG tablet Take 1 tablet by mouth daily 90 tablet 2    ergocalciferol (ERGOCALCIFEROL) 31307 units capsule Take 1 capsule by mouth once a week for 6 days 6 capsule 2    famotidine (PEPCID) 20 MG tablet Take 1 tablet by mouth 2 times daily 90 tablet 2    fluticasone (FLONASE) 50 MCG/ACT nasal spray 1 spray by Nasal route daily 3 Bottle 3    hydrOXYzine (VISTARIL) 25 MG capsule Take 1 capsule by mouth 3 times daily 270 capsule 2    loratadine (CLARITIN) 10 MG tablet Take 1 tablet by mouth daily 90 tablet 2    omeprazole (PRILOSEC) 20 MG delayed release capsule Take 1 capsule by mouth daily 90 capsule 2    albuterol sulfate HFA (VENTOLIN HFA) 108 (90 Base) MCG/ACT inhaler Inhale 1 puff into the lungs every 6 hours as needed for Wheezing or Shortness of Breath 18 g 2    methocarbamol (ROBAXIN) 500 MG tablet Take 1 tablet by mouth 4 times daily 120 tablet 0    cyclobenzaprine (FLEXERIL) 10 MG tablet Take 1 tablet by mouth 2 times daily as needed for Muscle spasms Indications: ON HOLD SINCE OCTOBER. VERIFIED BY MOE KEENE,  tablet 3    ondansetron (ZOFRAN) 4 MG tablet TAKE ONE TABLET BY MOUTH EVERY 8 HOURS AS NEEDED FOR NAUSEA AND VOMITING 90 tablet 3    gabapentin (NEURONTIN) 300 MG capsule TAKE 1 CAPSULE IN THE MORNING, 1 CAPSULE AT NOON, AND 2 CAPSULES IN THE EVENING 360 capsule 3    naphazoline-pheniramine (NAPHCON-A) 0.025-0.3 % ophthalmic solution Place 2 drops into both eyes 4 times daily Indications: NONE ON RECORD FROM [facility]. VERIFIED BY MOE KEENE RN        No current facility-administered medications for this visit. Allergies   Allergen Reactions    Tape Cain Sane Tape]      Surgical tape       Lab Review not applicable      Subjective:      Review of Systems   Constitutional: Negative for activity change, appetite change, fatigue, fever and unexpected weight change. HENT: Negative for congestion, ear pain, nosebleeds, rhinorrhea, sore throat, trouble swallowing and voice change. Eyes: Negative for redness and visual disturbance. Respiratory: Negative for cough, chest tightness, shortness of breath and wheezing. Cardiovascular: Negative for chest pain, palpitations and leg swelling. Gastrointestinal: Negative for abdominal pain, blood in stool, constipation, diarrhea, nausea and vomiting. Endocrine: Negative for polydipsia, polyphagia and polyuria. Genitourinary: Negative for dysuria, frequency and urgency. Musculoskeletal: Negative for myalgias. Skin: Negative for rash and wound. Neurological: Negative for dizziness, speech difficulty, light-headedness and headaches. Psychiatric/Behavioral: Negative for agitation, confusion, self-injury and suicidal ideas. The patient is not nervous/anxious. Objective:     Physical Exam   Constitutional: She is oriented to person, place, and time. She appears well-developed and well-nourished. No distress. HENT:   Head: Normocephalic and atraumatic.    Right Ear: External ear normal.   Left Ear: External ear normal.   Nose: Nose normal.   Mouth/Throat: Oropharynx is clear and moist. No oropharyngeal exudate. Eyes: Conjunctivae are normal. Pupils are equal, round, and reactive to light. Right eye exhibits no discharge. Left eye exhibits no discharge. Neck: Normal range of motion. Neck supple. Cardiovascular: Normal rate, regular rhythm, normal heart sounds and intact distal pulses. No murmur heard. Pulmonary/Chest: Effort normal and breath sounds normal. No stridor. No respiratory distress. She has no wheezes. She has no rales. She exhibits no tenderness. Abdominal: Soft. Bowel sounds are normal. She exhibits no distension. There is no tenderness. Musculoskeletal: Normal range of motion. She exhibits no edema, tenderness or deformity. Neurological: She is alert and oriented to person, place, and time. She has normal reflexes. No cranial nerve deficit. Coordination normal.   Skin: Skin is warm and dry. No rash noted. She is not diaphoretic. No erythema. Psychiatric: She has a normal mood and affect. Her behavior is normal. Thought content normal.   Nursing note and vitals reviewed. /84   Pulse 98   Temp 98 °F (36.7 °C) (Temporal)   Resp 18   Ht 5' 7\" (1.702 m)   Wt 220 lb (99.8 kg)   SpO2 95%   BMI 34.46 kg/m²         Assessment:     1. S/P mastectomy, bilateral  XR RIBS RIGHT INCLUDE CHEST (MIN 3 VIEWS)   2. Medication refill  lisinopril (PRINIVIL;ZESTRIL) 10 MG tablet   3. Chronic chest wall pain  XR RIBS RIGHT INCLUDE CHEST (MIN 3 VIEWS)   4. Chemotherapy-induced neuropathy ([address]     5. Vitamin D deficiency  ergocalciferol (ERGOCALCIFEROL) 06227 units capsule   6. Gastroesophageal reflux disease without esophagitis  famotidine (PEPCID) 20 MG tablet    omeprazole (PRILOSEC) 20 MG delayed release capsule   7. Chronic seasonal allergic rhinitis due to other allergen  fluticasone (FLONASE) 50 MCG/ACT nasal spray   8. Anxiety  hydrOXYzine (VISTARIL) 25 MG capsule   9.

## 2018-02-19 ASSESSMENT — ENCOUNTER SYMPTOMS
RHINORRHEA: 0
DIARRHEA: 0
TROUBLE SWALLOWING: 0
BLOOD IN STOOL: 0
ABDOMINAL PAIN: 0
CONSTIPATION: 0
SORE THROAT: 0
SHORTNESS OF BREATH: 0
COUGH: 0
CHEST TIGHTNESS: 0
WHEEZING: 0
EYE REDNESS: 0
VOICE CHANGE: 0
NAUSEA: 0
VOMITING: 0

## 2018-02-20 ENCOUNTER — TELEPHONE (OUTPATIENT)
Dept: PRIMARY CARE CLINIC | Age: 53
End: 2018-02-20

## 2018-03-16 ENCOUNTER — OFFICE VISIT (OUTPATIENT)
Dept: PRIMARY CARE CLINIC | Age: 53
End: 2018-03-16
Payer: MEDICARE

## 2018-03-16 ENCOUNTER — CARE COORDINATION (OUTPATIENT)
Dept: CARE COORDINATION | Age: 53
End: 2018-03-16

## 2018-03-16 VITALS
HEIGHT: 67 IN | TEMPERATURE: 98.6 F | HEART RATE: 88 BPM | SYSTOLIC BLOOD PRESSURE: 130 MMHG | RESPIRATION RATE: 18 BRPM | DIASTOLIC BLOOD PRESSURE: 94 MMHG | WEIGHT: 224 LBS | BODY MASS INDEX: 35.16 KG/M2

## 2018-03-16 DIAGNOSIS — Z12.12 SCREENING FOR COLORECTAL CANCER: ICD-10-CM

## 2018-03-16 DIAGNOSIS — Z72.89 OTHER PROBLEMS RELATED TO LIFESTYLE: ICD-10-CM

## 2018-03-16 DIAGNOSIS — Z13.220 SCREENING FOR HYPERLIPIDEMIA: ICD-10-CM

## 2018-03-16 DIAGNOSIS — F39 MOOD DISORDER (HCC): ICD-10-CM

## 2018-03-16 DIAGNOSIS — Z00.00 ROUTINE GENERAL MEDICAL EXAMINATION AT A HEALTH CARE FACILITY: Primary | ICD-10-CM

## 2018-03-16 DIAGNOSIS — R45.89 DIFFICULTY COPING: ICD-10-CM

## 2018-03-16 DIAGNOSIS — F43.21 GRIEF AT LOSS OF CHILD: ICD-10-CM

## 2018-03-16 DIAGNOSIS — Z13.1 SCREENING FOR DIABETES MELLITUS (DM): ICD-10-CM

## 2018-03-16 DIAGNOSIS — Z13.6 SCREENING FOR CARDIOVASCULAR CONDITION: ICD-10-CM

## 2018-03-16 DIAGNOSIS — Z63.4 GRIEF AT LOSS OF CHILD: ICD-10-CM

## 2018-03-16 DIAGNOSIS — Z12.11 SCREENING FOR COLORECTAL CANCER: ICD-10-CM

## 2018-03-16 DIAGNOSIS — F99 MENTAL DISORDER: ICD-10-CM

## 2018-03-16 PROCEDURE — 93000 ELECTROCARDIOGRAM COMPLETE: CPT | Performed by: NURSE PRACTITIONER

## 2018-03-16 PROCEDURE — G0472 HEP C SCREEN HIGH RISK/OTHER: HCPCS | Performed by: NURSE PRACTITIONER

## 2018-03-16 PROCEDURE — G0438 PPPS, INITIAL VISIT: HCPCS | Performed by: NURSE PRACTITIONER

## 2018-03-16 SDOH — SOCIAL STABILITY - SOCIAL INSECURITY: DISSAPEARANCE AND DEATH OF FAMILY MEMBER: Z63.4

## 2018-03-16 ASSESSMENT — LIFESTYLE VARIABLES: HOW OFTEN DO YOU HAVE A DRINK CONTAINING ALCOHOL: 0

## 2018-03-16 ASSESSMENT — ANXIETY QUESTIONNAIRES
GAD7 TOTAL SCORE: 4
GAD7 TOTAL SCORE: 4

## 2018-03-16 NOTE — CARE COORDINATION
ASKED BY RADHA BYRD, TO MEET WITH HER PATIENT TO DISCUSS THE ISSUES KEEPING HER FROM SEEING HER THERAPIST AT [facility] ON A WEEKLY BASIS. PATIENT CAME TO MY OFFICE. CONVERSATION QUICKLY WENT FROM WHY SHE COULD NOT SEE A THERAPIST TO THE REASON WHY SHE CANNOT USE HER CPAP MACHINE. PATIENT STATED SHE HAS ALLERGIES AND CANNOT BREATHE THROUGH HER NOSE. SHE SAID SHE CAN'T USE HER CPAP. I TOLD THE PATIENT THAT SHE COULD BREATHE THROUGH HER MOUTH UNTIL SHE FELL ASLEEP. SHE WOULD NATURALLY START BREATHING THROUGH HER NOSE. SHE SAID SHE COULDN'T DO IT.  I INFORMED THE PATIENT THAT SLEEP APNEA CAN BE LIFE THREATENING, IN ADDITION TO BEING HARMFUL TO THE BODY AND MENTAL HEALTH.  STRONGLY SUGGESTED SHE START USING HER CPAP THIS EVENING. I TOOK CONTROL OF THE CONVERSATION. HAD A OMAR CONVERSATION ABOUT HER NEED TO SEE HER THERAPIST WEEKLY OR EVERY TWO WEEKS AT THE MOST. PATIENT HAS HAD TO LIVE WITH SEVERAL ISSUES IN HER LIFE:    - DEATH OF HER MOTHER  - DEATH OF HER SON  - 'S DEPRESSION OVER DEATH OF SON  - MOLESTATION FROM HER STEP-FATHER  - ASSUMING THE ROLE OF MOTHER TO YOUNGER SIBLINGS  - BARRIOS WITH BREAST CANCER  - CHRONIC PAIN  - DAUGHTER HAS CHRONIC PAIN  - FINANCIAL STRUGGLES    PATIENT CRIED DURING MOST OF OUR VISIT. SHE STATED THE ONLY HAPPINESS SHE FINDS IS BEING WITH HER GRANDCHILDREN AND DOG. ASKED PATIENT IF SHE HAS THOUGHT OF HURTING HERSELF OR OTHERS. SHE STATED NO.  SHE SPOKE OF Jehovah's witness AND BELIEVING IN [place]. SHE SAID SHE COULD NEVER HARM HERSELF OR OTHERS. PATIENT STATED SHE CANNOT SEE A THERAPIST OFTEN BECAUSE IT COSTS HER MONEY. SHE STATED SHE DOES NOT HAVE ANY. THE HOUSEHOLD DEBT IS TOO MUCH FOR THEIR MONTHLY INCOME. ASKED IF SHE WERE ABLE TO FREE MONEY WOULD SHE SEE A THERAPIST. SHE STATED IT WOULD DEPEND ON HOW MUCH SHE HAD.     PROVIDED THE PATIENT WITH THE NAMES AND PHONE NUMBERS TO [address] [place] [address] AND [employer] [facility]. HIGHLIGHTED THE INFORMATION IN A [facility] QUICK REFERENCE GUIDE. ASKED THE PATIENT TO COLLECT INFORMATION REGARDING THEIR MONTHLY INCOME ALONG WITH NAMES, ADDRESSES, PHONE NUMBERS, ACCOUNT NUMBERS, BALANCES DUE, MONTHLY PAYMENTS, ETC BEFORE CALL THESE AGENCIES. SHE VERBALIZED UNDERSTANDING. TOLD THE PATIENT I WANTED TO ASK [employer] TO ASSIGN A  TO WORK WITH HER. SHE SAID SHE HAD A MAN CALL HER TO CHECK ON HER BUT HE MADE HER EMOTIONS WORSE. SHE REFUSED TO TAKE HIS CALLS. ASKED THE PATIENT IF SHE WOULD ATTEND SUPPORT GROUPS. SHE SAID NO; SHE STATED SHE DOES NOT LIKE TO BE CRITICIZED BY OTHER ATTENDEES. PATIENT SAID SHE CRIES OFTEN BECAUSE SHE HAS A \"BIG HEART\". ASKED THE PATIENT ABOUT [facility]. SHE SAID SHE HAS NOT BEEN IN A COUPLE OF MONTHS AS SHE WAS TEARFUL AT [address] [facility]. SHE DOES NOT WANT TO BE JUDGED. ASKED HER IF SHE WOULD SPEAK WITH HER . SHE SAID HE IS A MAN. SHE COULD NOT DO THAT. SHE SAID THE ONLY MAN SHE WANTS IN HER LIFE IS HER , ALMAS VAZQUEZ. PATIENT STATED ALMAS'S DEPRESSION IS WORSE THAN HERS. ASKED HER IF HE WOULD GO TO THERAPY OR SUPPORT GROUPS. SHE SAID HE KNOWS ABOUT THEM AND WOULD GO IF HE WANTED TO GO, BUT HE DOES NOT. SUGGESTED VOLUNTEERING AS A WAY FOR THE PATIENT TO FEEL BETTER ABOUT LIFE. SHE SAID SHE DOESN'T LIKE TO BE WITH PEOPLE. SUGGESTED SHE LOOK AT VOLUNTEERING AT [facility]. SHE SAID SHE LIKES TO BE HOME, AND HER DOG KNOWS WHEN SHE DOESN'T FEEL GOOD. SHE WANTS TO BE WITH HER DOG. GAVE THE PATIENT MY BUSINESS CARD. ASKED HER TO CALL ME IF SHE NEEDS FURTHER ASSISTANCE. REMINDED HER TO CALL THE TWO ORGANIZATIONS LISTED ABOVE. SHE VERBALIZED UNDERSTANDING. OBSERVATION NOTE:  THE PATIENT WAS VERY QUICK TO TELL ME WHY SHE COULD NOT DO THE SUGGESTIONS I HAD FOR HER. SHE PLACED BLAME ON ALL ISSUES LISTED ABOVE.   IT WAS DIFFICULT COMMUNICATING WITH THE

## 2018-03-16 NOTE — PROGRESS NOTES
R leg & ankle fracture due to fall repair-Dr Geno Hong at [facility]    UPPER GASTROINTESTINAL ENDOSCOPY  6/3/2016    w/empiric dilation to 50 [place] (16mm)-Nafisa Galicia (-)       Family History   Problem Relation Age of Onset    Diabetes Mother     Cancer Mother      breast    Heart Disease Mother     High Blood Pressure Mother     Colon Cancer Neg Hx     Colon Polyps Neg Hx     Esophageal Cancer Neg Hx     Liver Cancer Neg Hx     Liver Disease Neg Hx     Rectal Cancer Neg Hx     Stomach Cancer Neg Hx        CareTeam (Including outside providers/suppliers regularly involved in providing care):   Patient Care Team:  Charles Blood MD as PCP - General (Family Medicine)  Yesi Davey MD as Referring Physician (Hematology and Oncology)  Nadia Vance MD as Consulting Physician (Gastroenterology)  RADHA Maya (Family Nurse Practitioner)  Charles Blood MD as Referring Physician (Family Medicine)    Wt Readings from Last 3 Encounters:   03/16/18 224 lb (101.6 kg)   02/16/18 220 lb (99.8 kg)   03/20/17 202 lb (91.6 kg)     Vitals:    03/16/18 1001   BP: (!) 130/94   Pulse: 88   Resp: 18   Temp: 98.6 °F (37 °C)   TempSrc: Temporal   Weight: 224 lb (101.6 kg)   Height: 5' 7\" (1.702 m)       General Appearance: alert and oriented to person, place and time, well developed and well- nourished, in no acute distress  Skin: warm and dry, no rash or erythema  Head: normocephalic and atraumatic  Eyes: pupils equal, round, and reactive to light, extraocular eye movements intact, conjunctivae normal  ENT: tympanic membrane, external ear and ear canal normal bilaterally, nose without deformity, nasal mucosa and turbinates normal without polyps  Neck: supple and non-tender without mass, no thyromegaly or thyroid nodules, no cervical lymphadenopathy  Pulmonary/Chest: clear to auscultation bilaterally- no wheezes, rales or rhonchi, normal air movement, no respiratory distress  Cardiovascular: normal rate, regular rhythm, normal S1 and S2, no murmurs, rubs, clicks, or gallops, distal pulses intact, no carotid bruits  Abdomen: soft, non-tender, non-distended, normal bowel sounds, no masses or organomegaly  Extremities: no cyanosis, clubbing or edema  Musculoskeletal: normal range of motion, no joint swelling, deformity or tenderness  Neurologic: reflexes normal and symmetric, no cranial nerve deficit, gait, coordination and speech normal    Patient's complete Health Risk Assessment and screening values have been reviewed and are found in Flowsheets. The following problems were reviewed today and where indicated follow up appointments were made and/or referrals ordered. Positive Risk Factor Screenings with Interventions:             General Health:  General  In general, how would you say your health is?: Fair  In the past 7 days, have you experienced any of the following?: (!) New or Increased Pain  General Health Risk Interventions:  · Poor self-assessment of health status: referred for Care Coordination  · Pain issues: patient declines any further evaluation/treatment for this issue  · Fatigue: patient declines any further evaluation/treatment for this issue  · Loneliness: referred for Care Coordination  · Social isolation: referred for Care Coordination  · Stress: counseling/psychotherapy referral ordered for further evaluation/treatment  · Inadequate social/emotional support: referred for Care Coordination    Health Habits/Nutrition:  Health Habits/Nutrition  Do you exercise for at least 20 minutes 2-3 times per week?: (!) No  Have you lost any weight without trying in the past 3 months?: (!) Yes  Have you seen a dentist within the past year?: (!) No  Body mass index is 35.08 kg/m².   Health Habits/Nutrition Interventions:  · Inadequate physical activity:  patient is not ready to increase his/her physical activity level at this time  · Nutritional issues:  educational materials to promote weight loss provided, psychotherapy referral ordered, patient advised to follow-up in this office for further evaluation and treatment within 1 month(s), patient is not ready to address his/her nutritional/weight issues at this time  · Dental exam overdue:  patient encouraged to make appointment with his/her dentist    Hearing/Vision:  Hearing/Vision  Do you or your family notice any trouble with your hearing?: No  Do you have difficulty driving, watching TV, or doing any of your daily activities because of your eyesight?: No  Have you had an eye exam within the past year?: (!) No  Hearing/Vision Interventions:  · None indicated    Safety:  Safety  Do you have working smoke detectors?: Yes  Have all throw rugs been removed or fastened?: Yes  Do you have non-slip mats in all bathtubs?: (!) No  Do all of your stairways have a railing or banister?: Yes  Are your doorways, halls and stairs free of clutter?: Yes  Do you always fasten your seatbelt when you are in a car?: Yes  Safety Interventions:  · Home safety tips provided  · Referred for Care Coordination  · Referred to [facility]    ADL:  ADLs  In the past 7 days, did you need help from others to perform any of the following everyday activities?: (!) Bathing  In the past 7 days, did you need help from others to take care of any of the following?: (!) Taking Medications, Food Preparation  ADL Interventions:  · Referred for Care Coordination  · Referred to [facility]      Personalized Preventive Plan   Current Health Maintenance Status  Immunization History   Administered Date(s) Administered    Influenza Virus Vaccine 11/01/2016        Health Maintenance   Topic Date Due    Hepatitis C screen  1965    HIV screen  03/06/1979    DTaP/Tdap/Td vaccine (1 - Tdap) 03/06/1983    Pneumococcal med risk (1 of 1 - PPSV23) 03/06/1983    Lipid screen  03/06/2004    Shingles Vaccine (1 of 2 - 2 Dose Series) 03/06/2014    Colon cancer screen colonoscopy  02/05/2017    Flu vaccine (1) 09/01/2017    A1C test (Diabetic or Prediabetic)  01/18/2018    Potassium monitoring  01/18/2018    Creatinine monitoring  01/18/2018     Recommendations for Preventive Services Due: see orders. Recommended screening schedule for the next 5-10 years is provided to the patient in written form: see Patient Instructions/AVS.    Obesity Counseling: Assessed behavioral health risks and factors affecting choice of behavior. Suggested weight control approaches, including dietary changes behavioral modification and follow up plan. Provided educational and support documentation. Time spent (minutes): 15    Cardiovascular Disease Risk Counseling: Assessed the patient's risk to develop cardiovascular disease and reviewed main risk factors. Reviewed steps to reduce disease risk including:   · Quitting tobacco use, reducing amount smoked, or not starting the habit  · Making healthy food choices  · Being physically active and gradualy increasing activity levels   · Reduce weight and determine a healthy BMI goal  · Monitor blood pressure and treat if higher than 140/90 mmHg  · Maintain blood total cholesterol levels under 5 mmol/l or 190 mg/dl  · Maintain LDL cholesterol levels under 3.0 mmol/l or 115 mg/dl   · Control blood glucose levels  · Consider taking aspirin (75 mg daily), once blood pressure is controlled   Provided a follow up plan.   Time spent (minutes): 15

## 2018-03-26 ENCOUNTER — TELEPHONE (OUTPATIENT)
Dept: PRIMARY CARE CLINIC | Age: 53
End: 2018-03-26

## 2018-04-19 ENCOUNTER — OFFICE VISIT (OUTPATIENT)
Dept: PRIMARY CARE CLINIC | Age: 53
End: 2018-04-19
Payer: MEDICARE

## 2018-04-19 VITALS
HEIGHT: 67 IN | TEMPERATURE: 97.9 F | OXYGEN SATURATION: 97 % | WEIGHT: 232.6 LBS | BODY MASS INDEX: 36.51 KG/M2 | SYSTOLIC BLOOD PRESSURE: 124 MMHG | DIASTOLIC BLOOD PRESSURE: 82 MMHG | HEART RATE: 112 BPM

## 2018-04-19 DIAGNOSIS — F32.A DEPRESSION, UNSPECIFIED DEPRESSION TYPE: ICD-10-CM

## 2018-04-19 DIAGNOSIS — F41.9 ANXIETY: Primary | ICD-10-CM

## 2018-04-19 DIAGNOSIS — Z23 NEED FOR PROPHYLACTIC VACCINATION AGAINST STREPTOCOCCUS PNEUMONIAE (PNEUMOCOCCUS): ICD-10-CM

## 2018-04-19 DIAGNOSIS — Z13.220 SCREENING FOR HYPERLIPIDEMIA: ICD-10-CM

## 2018-04-19 LAB — HBA1C MFR BLD: 6.2 %

## 2018-04-19 PROCEDURE — 1036F TOBACCO NON-USER: CPT | Performed by: NURSE PRACTITIONER

## 2018-04-19 PROCEDURE — 90732 PPSV23 VACC 2 YRS+ SUBQ/IM: CPT | Performed by: NURSE PRACTITIONER

## 2018-04-19 PROCEDURE — G8417 CALC BMI ABV UP PARAM F/U: HCPCS | Performed by: NURSE PRACTITIONER

## 2018-04-19 PROCEDURE — 99213 OFFICE O/P EST LOW 20 MIN: CPT | Performed by: NURSE PRACTITIONER

## 2018-04-19 PROCEDURE — G8427 DOCREV CUR MEDS BY ELIG CLIN: HCPCS | Performed by: NURSE PRACTITIONER

## 2018-04-19 PROCEDURE — 3017F COLORECTAL CA SCREEN DOC REV: CPT | Performed by: NURSE PRACTITIONER

## 2018-04-19 PROCEDURE — G0009 ADMIN PNEUMOCOCCAL VACCINE: HCPCS | Performed by: NURSE PRACTITIONER

## 2018-04-19 RX ORDER — AMITRIPTYLINE HYDROCHLORIDE 10 MG/1
10 TABLET, FILM COATED ORAL
COMMUNITY
Start: 2018-04-11 | End: 2019-11-22 | Stop reason: ALTCHOICE

## 2018-04-19 RX ORDER — BUSPIRONE HYDROCHLORIDE 5 MG/1
TABLET ORAL
COMMUNITY
Start: 2018-04-11 | End: 2018-08-08 | Stop reason: SDUPTHER

## 2018-04-19 ASSESSMENT — ENCOUNTER SYMPTOMS
CHEST TIGHTNESS: 0
RHINORRHEA: 0
VOMITING: 0
TROUBLE SWALLOWING: 0
EYE REDNESS: 0
ABDOMINAL PAIN: 0
WHEEZING: 0
CONSTIPATION: 0
BLOOD IN STOOL: 0
SHORTNESS OF BREATH: 0
COUGH: 0
DIARRHEA: 0
VOICE CHANGE: 0
SORE THROAT: 0
NAUSEA: 0

## 2018-04-19 ASSESSMENT — LIFESTYLE VARIABLES: HOW OFTEN DO YOU HAVE A DRINK CONTAINING ALCOHOL: 0

## 2018-04-19 ASSESSMENT — ANXIETY QUESTIONNAIRES: GAD7 TOTAL SCORE: 6

## 2018-05-29 DIAGNOSIS — K21.9 GASTROESOPHAGEAL REFLUX DISEASE WITHOUT ESOPHAGITIS: ICD-10-CM

## 2018-05-30 RX ORDER — FAMOTIDINE 20 MG/1
TABLET, FILM COATED ORAL
Qty: 180 TABLET | Refills: 2 | Status: SHIPPED | OUTPATIENT
Start: 2018-05-30 | End: 2019-08-14 | Stop reason: ALTCHOICE

## 2018-06-05 ENCOUNTER — HOSPITAL ENCOUNTER (OUTPATIENT)
Dept: ULTRASOUND IMAGING | Age: 53
Discharge: HOME | End: 2018-06-05
Payer: MEDICARE

## 2018-06-05 DIAGNOSIS — R94.5 ABNORMAL RESULTS OF LIVER FUNCTION STUDIES: ICD-10-CM

## 2018-06-05 PROCEDURE — 76705 ECHO EXAM OF ABDOMEN: CPT

## 2018-08-08 ENCOUNTER — OFFICE VISIT (OUTPATIENT)
Dept: PRIMARY CARE CLINIC | Age: 53
End: 2018-08-08
Payer: MEDICARE

## 2018-08-08 VITALS
DIASTOLIC BLOOD PRESSURE: 103 MMHG | TEMPERATURE: 96.6 F | WEIGHT: 228.4 LBS | BODY MASS INDEX: 35.85 KG/M2 | HEART RATE: 116 BPM | OXYGEN SATURATION: 98 % | HEIGHT: 67 IN | SYSTOLIC BLOOD PRESSURE: 153 MMHG

## 2018-08-08 DIAGNOSIS — Z13.31 POSITIVE DEPRESSION SCREENING: ICD-10-CM

## 2018-08-08 DIAGNOSIS — F39 MOOD DISORDER (HCC): ICD-10-CM

## 2018-08-08 DIAGNOSIS — F41.9 ANXIETY: Primary | ICD-10-CM

## 2018-08-08 DIAGNOSIS — G62.9 NEUROPATHY: ICD-10-CM

## 2018-08-08 DIAGNOSIS — F43.21 GRIEF: ICD-10-CM

## 2018-08-08 PROCEDURE — G8417 CALC BMI ABV UP PARAM F/U: HCPCS | Performed by: NURSE PRACTITIONER

## 2018-08-08 PROCEDURE — 1036F TOBACCO NON-USER: CPT | Performed by: NURSE PRACTITIONER

## 2018-08-08 PROCEDURE — 99214 OFFICE O/P EST MOD 30 MIN: CPT | Performed by: NURSE PRACTITIONER

## 2018-08-08 PROCEDURE — 3017F COLORECTAL CA SCREEN DOC REV: CPT | Performed by: NURSE PRACTITIONER

## 2018-08-08 PROCEDURE — G8431 POS CLIN DEPRES SCRN F/U DOC: HCPCS | Performed by: NURSE PRACTITIONER

## 2018-08-08 PROCEDURE — G8427 DOCREV CUR MEDS BY ELIG CLIN: HCPCS | Performed by: NURSE PRACTITIONER

## 2018-08-08 RX ORDER — GABAPENTIN 300 MG/1
300 CAPSULE ORAL 3 TIMES DAILY PRN
Qty: 90 CAPSULE | Refills: 2 | OUTPATIENT
Start: 2018-08-08 | End: 2018-12-11

## 2018-08-08 RX ORDER — BUSPIRONE HYDROCHLORIDE 10 MG/1
10 TABLET ORAL 3 TIMES DAILY PRN
Qty: 90 TABLET | Refills: 1 | Status: SHIPPED | OUTPATIENT
Start: 2018-08-08 | End: 2018-09-07

## 2018-08-08 NOTE — PROGRESS NOTES
No results found for this visit on 08/08/18. Plan:     1. Anxiety    2. Neuropathy    3. Mood disorder ([address]    4. Grief    5. Positive depression screening      No Follow-up on file. No orders of the defined types were placed in this encounter. Orders Placed This Encounter   Medications    busPIRone (BUSPAR) 10 MG tablet     Sig: Take 1 tablet by mouth 3 times daily as needed (anxiety)     Dispense:  90 tablet     Refill:  1    gabapentin (NEURONTIN) 300 MG capsule     Sig: Take 1 capsule by mouth 3 times daily as needed (nerve pain) for up to 30 days. .     Dispense:  90 capsule     Refill:  2       Patient Instructions   Continue same medications. Follow up with [address] Follow up with me in 3 months. Patient/family given educational materials - see patient instructions. Discussed use, benefit, and side effects of prescribed medications. All patient/family questions answered and voiced understanding. Instructed to continue current medications, diet and exercise. Pt/family agreed with treatment plan. Follow up as directed and sooner if needed. Patient/ family instructed that is symptoms worsen or persist they are to contact office or report to nearest ER. They voice understanding and agreement with this plan. Electronically signed by RADHA Amado on 8/8/2018 at 10:38 AM    On the basis of positive PHQ-9 screening ( ), the following plan was implemented: medication prescribed: Multiple and managed by us along with [address]. Patient is seeing for [facility] weekly or counseling.- Patient will call for any significant medication side effects or worsening symptoms of depression and Or if suicidal or homicidal thoughts develop. .  Patient will follow-up in 3 month(s) with psychologist.

## 2018-09-27 PROBLEM — Z12.11 ENCOUNTER FOR SCREENING COLONOSCOPY: Status: RESOLVED | Noted: 2017-01-25 | Resolved: 2018-09-27

## 2018-10-17 DIAGNOSIS — K21.9 GASTROESOPHAGEAL REFLUX DISEASE WITHOUT ESOPHAGITIS: ICD-10-CM

## 2018-10-17 DIAGNOSIS — Z76.0 MEDICATION REFILL: ICD-10-CM

## 2018-10-18 RX ORDER — LISINOPRIL 10 MG/1
TABLET ORAL
Qty: 90 TABLET | Refills: 2 | Status: SHIPPED | OUTPATIENT
Start: 2018-10-18 | End: 2018-12-11 | Stop reason: SDUPTHER

## 2018-10-18 RX ORDER — LORATADINE 10 MG/1
TABLET ORAL
Qty: 90 TABLET | Refills: 2 | Status: SHIPPED | OUTPATIENT
Start: 2018-10-18 | End: 2020-05-27 | Stop reason: ALTCHOICE

## 2018-10-18 RX ORDER — OMEPRAZOLE 20 MG/1
CAPSULE, DELAYED RELEASE ORAL
Qty: 90 CAPSULE | Refills: 2 | Status: SHIPPED | OUTPATIENT
Start: 2018-10-18 | End: 2019-09-12 | Stop reason: SDUPTHER

## 2018-10-29 RX ORDER — CYCLOBENZAPRINE HCL 10 MG
10 TABLET ORAL 2 TIMES DAILY PRN
Qty: 180 TABLET | Refills: 3 | Status: SHIPPED | OUTPATIENT
Start: 2018-10-29 | End: 2022-06-20

## 2018-11-21 DIAGNOSIS — T45.1X5A CHEMOTHERAPY-INDUCED NEUROPATHY (HCC): Primary | ICD-10-CM

## 2018-11-21 DIAGNOSIS — G62.0 CHEMOTHERAPY-INDUCED NEUROPATHY (HCC): Primary | ICD-10-CM

## 2018-11-21 RX ORDER — GABAPENTIN 300 MG/1
CAPSULE ORAL
Qty: 360 CAPSULE | Refills: 0 | Status: SHIPPED | OUTPATIENT
Start: 2018-11-21 | End: 2019-04-03 | Stop reason: SDUPTHER

## 2018-12-11 ENCOUNTER — OFFICE VISIT (OUTPATIENT)
Dept: PRIMARY CARE CLINIC | Age: 53
End: 2018-12-11
Payer: MEDICARE

## 2018-12-11 VITALS
SYSTOLIC BLOOD PRESSURE: 147 MMHG | HEART RATE: 107 BPM | BODY MASS INDEX: 38.14 KG/M2 | OXYGEN SATURATION: 98 % | TEMPERATURE: 96.9 F | DIASTOLIC BLOOD PRESSURE: 101 MMHG | HEIGHT: 67 IN | WEIGHT: 243 LBS

## 2018-12-11 DIAGNOSIS — Z76.0 MEDICATION REFILL: ICD-10-CM

## 2018-12-11 DIAGNOSIS — Z85.3 PERSONAL HISTORY OF BREAST CANCER: ICD-10-CM

## 2018-12-11 DIAGNOSIS — Z23 NEED FOR PROPHYLACTIC VACCINATION AGAINST DIPHTHERIA-TETANUS-PERTUSSIS (DTP): ICD-10-CM

## 2018-12-11 DIAGNOSIS — F33.1 MAJOR DEPRESSIVE DISORDER, RECURRENT EPISODE, MODERATE (HCC): Primary | ICD-10-CM

## 2018-12-11 DIAGNOSIS — Z23 NEED FOR PROPHYLACTIC VACCINATION AND INOCULATION AGAINST VARICELLA: ICD-10-CM

## 2018-12-11 DIAGNOSIS — J42 CHRONIC BRONCHITIS, UNSPECIFIED CHRONIC BRONCHITIS TYPE (HCC): ICD-10-CM

## 2018-12-11 DIAGNOSIS — I10 HYPERTENSION, ESSENTIAL, BENIGN: ICD-10-CM

## 2018-12-11 PROCEDURE — 99214 OFFICE O/P EST MOD 30 MIN: CPT | Performed by: NURSE PRACTITIONER

## 2018-12-11 PROCEDURE — 3023F SPIROM DOC REV: CPT | Performed by: NURSE PRACTITIONER

## 2018-12-11 PROCEDURE — 1036F TOBACCO NON-USER: CPT | Performed by: NURSE PRACTITIONER

## 2018-12-11 PROCEDURE — 99497 ADVNCD CARE PLAN 30 MIN: CPT | Performed by: NURSE PRACTITIONER

## 2018-12-11 PROCEDURE — G8427 DOCREV CUR MEDS BY ELIG CLIN: HCPCS | Performed by: NURSE PRACTITIONER

## 2018-12-11 PROCEDURE — G8484 FLU IMMUNIZE NO ADMIN: HCPCS | Performed by: NURSE PRACTITIONER

## 2018-12-11 PROCEDURE — G8926 SPIRO NO PERF OR DOC: HCPCS | Performed by: NURSE PRACTITIONER

## 2018-12-11 PROCEDURE — G8417 CALC BMI ABV UP PARAM F/U: HCPCS | Performed by: NURSE PRACTITIONER

## 2018-12-11 PROCEDURE — 3017F COLORECTAL CA SCREEN DOC REV: CPT | Performed by: NURSE PRACTITIONER

## 2018-12-11 RX ORDER — LISINOPRIL 10 MG/1
10 TABLET ORAL 2 TIMES DAILY
Qty: 180 TABLET | Refills: 2 | Status: SHIPPED | OUTPATIENT
Start: 2018-12-11 | End: 2019-01-17 | Stop reason: SDUPTHER

## 2018-12-11 RX ORDER — AZITHROMYCIN 250 MG/1
250 TABLET, FILM COATED ORAL SEE ADMIN INSTRUCTIONS
Qty: 6 TABLET | Refills: 0 | Status: SHIPPED | OUTPATIENT
Start: 2018-12-11 | End: 2018-12-11 | Stop reason: ALTCHOICE

## 2018-12-11 RX ORDER — IPRATROPIUM BROMIDE AND ALBUTEROL SULFATE 2.5; .5 MG/3ML; MG/3ML
1 SOLUTION RESPIRATORY (INHALATION) EVERY 6 HOURS PRN
Qty: 360 ML | Refills: 0 | Status: SHIPPED | OUTPATIENT
Start: 2018-12-11 | End: 2018-12-11 | Stop reason: ALTCHOICE

## 2018-12-11 RX ORDER — BUSPIRONE HYDROCHLORIDE 10 MG/1
10 TABLET ORAL 3 TIMES DAILY PRN
COMMUNITY

## 2018-12-11 RX ORDER — FEXOFENADINE HCL 180 MG/1
180 TABLET ORAL DAILY PRN
COMMUNITY
End: 2020-11-24

## 2018-12-11 RX ORDER — IPRATROPIUM BROMIDE AND ALBUTEROL SULFATE 2.5; .5 MG/3ML; MG/3ML
1 SOLUTION RESPIRATORY (INHALATION) EVERY 6 HOURS PRN
Qty: 360 ML | Refills: 0 | Status: SHIPPED | OUTPATIENT
Start: 2018-12-11 | End: 2020-02-26 | Stop reason: SDUPTHER

## 2018-12-11 RX ORDER — PREDNISONE 50 MG/1
50 TABLET ORAL DAILY
Qty: 6 TABLET | Refills: 0 | Status: SHIPPED | OUTPATIENT
Start: 2018-12-11 | End: 2018-12-17

## 2018-12-11 RX ORDER — PREDNISONE 50 MG/1
50 TABLET ORAL DAILY
Qty: 6 TABLET | Refills: 0 | Status: SHIPPED | OUTPATIENT
Start: 2018-12-11 | End: 2018-12-11 | Stop reason: ALTCHOICE

## 2018-12-11 RX ORDER — AZITHROMYCIN 250 MG/1
250 TABLET, FILM COATED ORAL SEE ADMIN INSTRUCTIONS
Qty: 6 TABLET | Refills: 0 | Status: SHIPPED | OUTPATIENT
Start: 2018-12-11 | End: 2018-12-16

## 2018-12-11 ASSESSMENT — ENCOUNTER SYMPTOMS
TROUBLE SWALLOWING: 0
DIARRHEA: 0
COUGH: 1
VOICE CHANGE: 0
SORE THROAT: 0
WHEEZING: 1
SHORTNESS OF BREATH: 0
EYE REDNESS: 0
NAUSEA: 1
RHINORRHEA: 0
CONSTIPATION: 0
ABDOMINAL PAIN: 0
VOMITING: 0
BLOOD IN STOOL: 0
CHEST TIGHTNESS: 0

## 2018-12-11 NOTE — PROGRESS NOTES
tablet     Sig: Take 1 tablet by mouth See Admin Instructions for 5 days 500mg on day 1 followed by 250mg on days 2 - 5     Dispense:  6 tablet     Refill:  0    fluticasone-salmeterol (ADVAIR DISKUS) 100-50 MCG/DOSE diskus inhaler     Sig: Inhale 1 puff into the lungs every 12 hours     Dispense:  60 each     Refill:  1    lisinopril (PRINIVIL;ZESTRIL) 10 MG tablet     Sig: Take 1 tablet by mouth 2 times daily     Dispense:  180 tablet     Refill:  2    azithromycin (ZITHROMAX) 250 MG tablet     Sig: Take 1 tablet by mouth See Admin Instructions for 5 days 500mg on day 1 followed by 250mg on days 2 - 5     Dispense:  6 tablet     Refill:  0    ipratropium-albuterol (DUONEB) 0.5-2.5 (3) MG/3ML SOLN nebulizer solution     Sig: Inhale 3 mLs into the lungs every 6 hours as needed for Shortness of Breath     Dispense:  360 mL     Refill:  0    predniSONE (DELTASONE) 50 MG tablet     Sig: Take 1 tablet by mouth daily for 6 days     Dispense:  6 tablet     Refill:  0       Patient Instructions   Begin advair disk one inhalation twice daily. Use duo nebs at home while on steroids. Begin antibiotics. Follow up in 1 month and we will look at b/p and heart rate. Patient/family given educational materials - see patient instructions. Discussed use, benefit, and side effects of prescribed medications. All patient/family questions answered and voiced understanding. Instructed to continue current medications, diet and exercise. Pt/family agreed with treatment plan. Follow up as directed and sooner if needed. Patient/ family instructed that is symptoms worsen or persist they are to contact office or report to nearest ER. They voice understanding and agreement with this plan.      Electronically signed by RADHA Elias on 12/11/2018 at 11:08 AM

## 2018-12-11 NOTE — PATIENT INSTRUCTIONS
Begin advair disk one inhalation twice daily. Use duo nebs at home while on steroids. Begin antibiotics. Follow up in 1 month and we will look at b/p and heart rate.

## 2019-01-17 ENCOUNTER — OFFICE VISIT (OUTPATIENT)
Dept: PRIMARY CARE CLINIC | Age: 54
End: 2019-01-17
Payer: MEDICARE

## 2019-01-17 VITALS
HEIGHT: 67 IN | OXYGEN SATURATION: 99 % | SYSTOLIC BLOOD PRESSURE: 155 MMHG | DIASTOLIC BLOOD PRESSURE: 99 MMHG | WEIGHT: 243 LBS | HEART RATE: 110 BPM | BODY MASS INDEX: 38.14 KG/M2

## 2019-01-17 DIAGNOSIS — F31.9 BIPOLAR AFFECTIVE DISORDER, REMISSION STATUS UNSPECIFIED (HCC): Primary | ICD-10-CM

## 2019-01-17 DIAGNOSIS — F41.1 GENERALIZED ANXIETY DISORDER: ICD-10-CM

## 2019-01-17 DIAGNOSIS — F33.1 MAJOR DEPRESSIVE DISORDER, RECURRENT EPISODE, MODERATE (HCC): ICD-10-CM

## 2019-01-17 DIAGNOSIS — I10 HYPERTENSION, ESSENTIAL, BENIGN: ICD-10-CM

## 2019-01-17 DIAGNOSIS — J45.40 MODERATE PERSISTENT ASTHMA, UNSPECIFIED WHETHER COMPLICATED: ICD-10-CM

## 2019-01-17 PROCEDURE — 1036F TOBACCO NON-USER: CPT | Performed by: NURSE PRACTITIONER

## 2019-01-17 PROCEDURE — 3017F COLORECTAL CA SCREEN DOC REV: CPT | Performed by: NURSE PRACTITIONER

## 2019-01-17 PROCEDURE — 99214 OFFICE O/P EST MOD 30 MIN: CPT | Performed by: NURSE PRACTITIONER

## 2019-01-17 PROCEDURE — G8484 FLU IMMUNIZE NO ADMIN: HCPCS | Performed by: NURSE PRACTITIONER

## 2019-01-17 PROCEDURE — G8427 DOCREV CUR MEDS BY ELIG CLIN: HCPCS | Performed by: NURSE PRACTITIONER

## 2019-01-17 PROCEDURE — G8417 CALC BMI ABV UP PARAM F/U: HCPCS | Performed by: NURSE PRACTITIONER

## 2019-01-17 RX ORDER — LISINOPRIL 20 MG/1
20 TABLET ORAL 2 TIMES DAILY
Qty: 60 TABLET | Refills: 1 | Status: SHIPPED | OUTPATIENT
Start: 2019-01-17 | End: 2019-03-04 | Stop reason: SDUPTHER

## 2019-01-17 RX ORDER — ARIPIPRAZOLE 2 MG/1
2 TABLET ORAL DAILY
Qty: 30 TABLET | Refills: 1 | Status: SHIPPED | OUTPATIENT
Start: 2019-01-17 | End: 2019-03-08 | Stop reason: SDUPTHER

## 2019-01-17 RX ORDER — HYDROCODONE BITARTRATE AND ACETAMINOPHEN 7.5; 325 MG/1; MG/1
1 TABLET ORAL 3 TIMES DAILY PRN
COMMUNITY
Start: 2018-12-27

## 2019-01-20 ASSESSMENT — ENCOUNTER SYMPTOMS
TROUBLE SWALLOWING: 0
ABDOMINAL PAIN: 0
CHEST TIGHTNESS: 0
VOMITING: 0
CONSTIPATION: 0
WHEEZING: 0
SORE THROAT: 0
NAUSEA: 0
EYE REDNESS: 0
COUGH: 0
BLOOD IN STOOL: 0
DIARRHEA: 0
VOICE CHANGE: 0
RHINORRHEA: 0
SHORTNESS OF BREATH: 0

## 2019-01-26 DIAGNOSIS — J42 CHRONIC BRONCHITIS, UNSPECIFIED CHRONIC BRONCHITIS TYPE (HCC): ICD-10-CM

## 2019-02-14 ENCOUNTER — OFFICE VISIT (OUTPATIENT)
Dept: PRIMARY CARE CLINIC | Age: 54
End: 2019-02-14
Payer: MEDICARE

## 2019-02-14 VITALS
HEIGHT: 67 IN | BODY MASS INDEX: 38.33 KG/M2 | RESPIRATION RATE: 20 BRPM | HEART RATE: 122 BPM | WEIGHT: 244.2 LBS | OXYGEN SATURATION: 98 % | SYSTOLIC BLOOD PRESSURE: 138 MMHG | DIASTOLIC BLOOD PRESSURE: 88 MMHG | TEMPERATURE: 98.1 F

## 2019-02-14 DIAGNOSIS — F41.1 GENERALIZED ANXIETY DISORDER: ICD-10-CM

## 2019-02-14 DIAGNOSIS — G62.0 CHEMOTHERAPY-INDUCED NEUROPATHY (HCC): Primary | ICD-10-CM

## 2019-02-14 DIAGNOSIS — T45.1X5A CHEMOTHERAPY-INDUCED NEUROPATHY (HCC): Primary | ICD-10-CM

## 2019-02-14 DIAGNOSIS — M15.9 OSTEOARTHRITIS OF MULTIPLE JOINTS, UNSPECIFIED OSTEOARTHRITIS TYPE: ICD-10-CM

## 2019-02-14 DIAGNOSIS — F33.1 MAJOR DEPRESSIVE DISORDER, RECURRENT EPISODE, MODERATE (HCC): ICD-10-CM

## 2019-02-14 PROCEDURE — G8484 FLU IMMUNIZE NO ADMIN: HCPCS | Performed by: FAMILY MEDICINE

## 2019-02-14 PROCEDURE — G8427 DOCREV CUR MEDS BY ELIG CLIN: HCPCS | Performed by: FAMILY MEDICINE

## 2019-02-14 PROCEDURE — 1036F TOBACCO NON-USER: CPT | Performed by: FAMILY MEDICINE

## 2019-02-14 PROCEDURE — G8417 CALC BMI ABV UP PARAM F/U: HCPCS | Performed by: FAMILY MEDICINE

## 2019-02-14 PROCEDURE — 99213 OFFICE O/P EST LOW 20 MIN: CPT | Performed by: FAMILY MEDICINE

## 2019-02-14 PROCEDURE — 3017F COLORECTAL CA SCREEN DOC REV: CPT | Performed by: FAMILY MEDICINE

## 2019-02-14 RX ORDER — NABUMETONE 500 MG/1
500 TABLET, FILM COATED ORAL 2 TIMES DAILY
Qty: 60 TABLET | Refills: 3 | Status: SHIPPED | OUTPATIENT
Start: 2019-02-14 | End: 2019-02-15 | Stop reason: SDUPTHER

## 2019-02-15 DIAGNOSIS — M15.9 OSTEOARTHRITIS OF MULTIPLE JOINTS, UNSPECIFIED OSTEOARTHRITIS TYPE: ICD-10-CM

## 2019-02-15 RX ORDER — NABUMETONE 500 MG/1
500 TABLET, FILM COATED ORAL 2 TIMES DAILY
Qty: 60 TABLET | Refills: 0 | Status: SHIPPED | OUTPATIENT
Start: 2019-02-15 | End: 2019-11-22 | Stop reason: ALTCHOICE

## 2019-02-15 ASSESSMENT — ENCOUNTER SYMPTOMS
SHORTNESS OF BREATH: 0
COUGH: 0

## 2019-05-20 DIAGNOSIS — J42 CHRONIC BRONCHITIS, UNSPECIFIED CHRONIC BRONCHITIS TYPE (HCC): ICD-10-CM

## 2019-06-26 ENCOUNTER — TELEPHONE (OUTPATIENT)
Dept: PRIMARY CARE CLINIC | Age: 54
End: 2019-06-26

## 2019-06-26 NOTE — TELEPHONE ENCOUNTER
Called patient,informed need appointment for evaluation. Last saw  in February.  [place] [address] [facility] keep that one

## 2019-06-26 NOTE — TELEPHONE ENCOUNTER
Patient called to let us know that we will be getting a call from [facility] in regards to wrist braces for both of her wrist.

## 2019-08-01 ENCOUNTER — HOSPITAL ENCOUNTER (OUTPATIENT)
Dept: INFUSION THERAPY | Age: 54
Discharge: HOME | End: 2019-08-01
Payer: MEDICARE

## 2019-08-01 PROCEDURE — 86301 IMMUNOASSAY TUMOR CA 19-9: CPT

## 2019-08-01 PROCEDURE — 80053 COMPREHEN METABOLIC PANEL: CPT

## 2019-08-01 PROCEDURE — 36415 COLL VENOUS BLD VENIPUNCTURE: CPT

## 2019-08-01 PROCEDURE — 85025 COMPLETE CBC W/AUTO DIFF WBC: CPT

## 2019-08-01 PROCEDURE — 82378 CARCINOEMBRYONIC ANTIGEN: CPT

## 2019-08-01 PROCEDURE — 86300 IMMUNOASSAY TUMOR CA 15-3: CPT

## 2019-08-14 ENCOUNTER — OFFICE VISIT (OUTPATIENT)
Dept: PRIMARY CARE CLINIC | Age: 54
End: 2019-08-14
Payer: MEDICARE

## 2019-08-14 VITALS
HEIGHT: 67 IN | OXYGEN SATURATION: 97 % | SYSTOLIC BLOOD PRESSURE: 141 MMHG | WEIGHT: 250 LBS | TEMPERATURE: 97.2 F | DIASTOLIC BLOOD PRESSURE: 104 MMHG | HEART RATE: 122 BPM | BODY MASS INDEX: 39.24 KG/M2

## 2019-08-14 DIAGNOSIS — E11.00 TYPE 2 DIABETES MELLITUS WITH HYPEROSMOLARITY WITHOUT COMA, WITHOUT LONG-TERM CURRENT USE OF INSULIN (HCC): ICD-10-CM

## 2019-08-14 DIAGNOSIS — T45.1X5A CHEMOTHERAPY-INDUCED NEUROPATHY (HCC): ICD-10-CM

## 2019-08-14 DIAGNOSIS — R79.9 ABNORMAL FINDING OF BLOOD CHEMISTRY: ICD-10-CM

## 2019-08-14 DIAGNOSIS — G62.0 CHEMOTHERAPY-INDUCED NEUROPATHY (HCC): ICD-10-CM

## 2019-08-14 DIAGNOSIS — E66.9 OBESITY (BMI 35.0-39.9 WITHOUT COMORBIDITY): Primary | ICD-10-CM

## 2019-08-14 DIAGNOSIS — J42 CHRONIC BRONCHITIS, UNSPECIFIED CHRONIC BRONCHITIS TYPE (HCC): ICD-10-CM

## 2019-08-14 LAB — HBA1C MFR BLD: 9.3 %

## 2019-08-14 PROCEDURE — 99214 OFFICE O/P EST MOD 30 MIN: CPT | Performed by: NURSE PRACTITIONER

## 2019-08-14 PROCEDURE — 83036 HEMOGLOBIN GLYCOSYLATED A1C: CPT | Performed by: NURSE PRACTITIONER

## 2019-08-14 RX ORDER — METFORMIN HYDROCHLORIDE 500 MG/1
TABLET, EXTENDED RELEASE ORAL
Qty: 30 TABLET | Refills: 1 | Status: SHIPPED | OUTPATIENT
Start: 2019-08-14 | End: 2019-10-14 | Stop reason: SDUPTHER

## 2019-08-14 RX ORDER — CEPHALEXIN 500 MG/1
500 CAPSULE ORAL
COMMUNITY
Start: 2019-08-01 | End: 2019-09-12

## 2019-08-14 RX ORDER — GABAPENTIN 300 MG/1
CAPSULE ORAL
Qty: 150 CAPSULE | Refills: 2 | Status: SHIPPED | OUTPATIENT
Start: 2019-08-14 | End: 2019-08-27 | Stop reason: SDUPTHER

## 2019-08-14 ASSESSMENT — ENCOUNTER SYMPTOMS
TROUBLE SWALLOWING: 0
SORE THROAT: 0
EYE REDNESS: 0
COUGH: 0
WHEEZING: 0
CHEST TIGHTNESS: 0
RHINORRHEA: 0
ABDOMINAL PAIN: 0
CONSTIPATION: 0
BLOOD IN STOOL: 0
NAUSEA: 0
SHORTNESS OF BREATH: 0
DIARRHEA: 0
VOMITING: 0
VOICE CHANGE: 0

## 2019-08-14 NOTE — PROGRESS NOTES
vomiting. Endocrine: Negative for polydipsia, polyphagia and polyuria. Genitourinary: Negative for dysuria, frequency and urgency. Musculoskeletal: Positive for arthralgias and myalgias. Skin: Negative for rash and wound. Neurological: Negative for dizziness, speech difficulty, light-headedness and headaches. Psychiatric/Behavioral: Positive for sleep disturbance. Negative for agitation, confusion, self-injury and suicidal ideas. The patient is not nervous/anxious. Objective:     Physical Exam   Constitutional: She is oriented to person, place, and time. She appears well-developed and well-nourished. No distress. HENT:   Head: Normocephalic and atraumatic. Mouth/Throat: Oropharynx is clear and moist. No oropharyngeal exudate. Eyes: Conjunctivae are normal. No scleral icterus. Neck: Normal range of motion. Neck supple. No thyromegaly present. Cardiovascular: Normal rate, regular rhythm and normal heart sounds. Exam reveals no friction rub. No murmur heard. Pulmonary/Chest: Effort normal and breath sounds normal. No respiratory distress. She has no wheezes. Bilateral mastectomy     Abdominal: Soft. She exhibits no distension and no mass. There is no tenderness. There is no rebound and no guarding. Lymphadenopathy:     She has no cervical adenopathy. Neurological: She is alert and oriented to person, place, and time. Skin: Skin is warm and dry. No rash noted. She is not diaphoretic. Psychiatric: Her mood appears anxious. She exhibits a depressed mood. Nursing note and vitals reviewed. BP (!) 141/104   Pulse 122   Temp 97.2 °F (36.2 °C)   Ht 5' 7\" (1.702 m)   Wt 250 lb (113.4 kg)   SpO2 97%   BMI 39.16 kg/m²     Assessment:      Diagnosis Orders   1. Obesity (BMI 35.0-39.9 without comorbidity)  POCT glycosylated hemoglobin (Hb A1C)   2. Chemotherapy-induced neuropathy (HCC)  gabapentin (NEURONTIN) 300 MG capsule   3.  Abnormal finding of blood chemistry   POCT glycosylated hemoglobin (Hb A1C)   4. Type 2 diabetes mellitus with hyperosmolarity without coma, without long-term current use of insulin (HCC)  metFORMIN (GLUCOPHAGE-XR) 500 MG extended release tablet   5. Chronic bronchitis, unspecified chronic bronchitis type ([address]  fluticasone-salmeterol (ADVAIR) 100-50 MCG/DOSE diskus inhaler     Results for orders placed or performed in visit on 08/14/19   POCT glycosylated hemoglobin (Hb A1C)   Result Value Ref Range    Hemoglobin A1C 9.3 %           Plan:     1. Obesity (BMI 35.0-39.9 without comorbidity)    2. Chemotherapy-induced neuropathy ([address]    3. Abnormal finding of blood chemistry     4. Type 2 diabetes mellitus with hyperosmolarity without coma, without long-term current use of insulin ([facility] [address]    5. Chronic bronchitis, unspecified chronic bronchitis type ([facility] [address]      Return in about 1 month (around 9/14/2019) for 30 min appointment, medication recheck. Orders Placed This Encounter   Procedures    POCT glycosylated hemoglobin (Hb A1C)     Orders Placed This Encounter   Medications    gabapentin (NEURONTIN) 300 MG capsule     Sig: TAKE 1 CAPSULE IN THE MORNING,  TAKE 1 CAPSULE  AT  NOON,  AND TAKE 2 CAPSULES EVERY EVENING     Dispense:  150 capsule     Refill:  2     2am, 1 noon, 2 at bedtime.  metFORMIN (GLUCOPHAGE-XR) 500 MG extended release tablet     Sig: Take with largest meal of the day. Dispense:  30 tablet     Refill:  1    fluticasone-salmeterol (ADVAIR) 100-50 MCG/DOSE diskus inhaler     Sig: Inhale 1 puff into the lungs 2 times daily     Dispense:  3 Inhaler     Refill:  2       Patient Instructions   Begin metformin xr 500 mg with largest meal of the day. We will provide number for nutritionist.     Increase amitriptyline to 30 mg at bedtime and may further increase if needed. Increase Neurontin to 600mg in the morning, 300mg at noon, and 600mg at bedtime. Take Pristiq and Wellbutrin early in the morning to prevent insomnia.

## 2019-08-20 ENCOUNTER — TELEPHONE (OUTPATIENT)
Dept: PRIMARY CARE CLINIC | Age: 54
End: 2019-08-20

## 2019-08-20 NOTE — TELEPHONE ENCOUNTER
Patient called. [facility] increased her dose of gabapentin. [facility] called her and said they need some verification of this from [facility].  Beverly VARGHESE

## 2019-08-26 ENCOUNTER — TELEPHONE (OUTPATIENT)
Dept: ADMINISTRATIVE | Age: 54
End: 2019-08-26

## 2019-08-27 DIAGNOSIS — G62.0 CHEMOTHERAPY-INDUCED NEUROPATHY (HCC): ICD-10-CM

## 2019-08-27 DIAGNOSIS — T45.1X5A CHEMOTHERAPY-INDUCED NEUROPATHY (HCC): ICD-10-CM

## 2019-08-27 RX ORDER — GABAPENTIN 300 MG/1
CAPSULE ORAL
Qty: 150 CAPSULE | Refills: 2 | OUTPATIENT
Start: 2019-08-27 | End: 2019-09-24 | Stop reason: SDUPTHER

## 2019-08-27 NOTE — TELEPHONE ENCOUNTER
Santo Cody called to request a refill on her medication. Last office visit : 8/14/2019   Next office visit : Visit date not found     Last UDS:   Amphetamine Screen, Urine   Date Value Ref Range Status   01/16/2017 Negative Negative <1000 ng/mL Final     Benzodiazepine Screen, Urine   Date Value Ref Range Status   01/16/2017 Positive (A) Negative <100 ng/mL Final     Cocaine Metabolite Screen, Urine   Date Value Ref Range Status   01/16/2017 Negative Negative <300 ng/mL Final     Opiate Scrn, Ur   Date Value Ref Range Status   01/16/2017 Negative Negative < 300 ng/mL Final             Requested Prescriptions     Signed Prescriptions Disp Refills    gabapentin (NEURONTIN) 300 MG capsule 150 capsule 2     Sig: TAKE 1 CAPSULE IN THE MORNING,  TAKE 1 CAPSULE  AT  NOON,  AND TAKE 2 CAPSULES EVERY EVENING     Authorizing Provider: Marilou Downey     Ordering User: Allyssa GRANGER Please approve or refuse this medication.    Josephine Reddy

## 2019-09-11 NOTE — PROGRESS NOTES
Progress Note      Pt Name: Opal Powers  YOB: 1965  MRN: [id number]    Date of evaluation: 9/12/2019  History Obtained From:  patient, electronic medical record    CHIEF COMPLAINT:    Chief Complaint   Patient presents with    Breast Cancer     Current active problems  Left breast carcinoma 2010   BRCA 2 positive   Iron deficiency anemia    HISTORY OF PRESENT ILLNESS:    Opal Powers is a 53 y.o.  female with stage IIb left-sided breast carcinoma dating back to 2010. She was treated with combination chemotherapy and continues on Femara daily for ER MO positive disease. She was here a couple of weeks ago and had development of possible lymphadenopathy in the right axilla. She was sent for a ultrasound of the axillary region but she did not keep that appointment. I put her on antibiotics because I thought this could be related to some folliculitis or possibly an infected sebaceous cyst.  The abnormality in her right axilla has improved to nearly resolved. She continues to have issues with anxiety, bipolar disorder and panic attacks. She has now been diagnosed with diabetes and is on oral hypoglycemics. She has hypertension and blood pressure is stable on her lisinopril. She needs omeprazole refilled for her reflux. HEMATOLOGICAL HISTORY: Iron deficient anemia  Evaluation at the time of her breast carcinoma presentation showed microcytic anemia. Iron studies were consistent with iron deficiency. She was still menstruating at the time. Stool for blood was requested x 3 was negative. She was treated with iron replacement therapy with MCV normalizing. TUMOR HISTORY: Left infiltrating stage IIB (T2 N1 M0) (ER/ MO +, Her2 Juan M negative) breast cancer, 10/25/10   BRCA-II sequencing positive for [id number] mutation  Brian Preston found a lump in her left breast. The mammogram was positive for malignancy. She also had some left axillary lymph nodes.  A fine needle aspirate of the left breast and left axillae reveals a high-grade infiltrating breast cancer that is ER-92% positive, KY-56% positive and Fab0wee 1+ positive by IHC-FISH studies were negative. The left axillary lymph node biopsy by FNA was also positive for malignancy. An MRI of the left breast on 11/02/10 reveals a primary 2.6x1.3cm enhancing mass with approximately 8 other enhancing nodules adjacent to the primary tumor like satellite lesions. BRCA 1 sequencing did not reveal mutations, however BRCA 2 sequencing was positive for an [id number] mutation. Sourav Vasques understands the implications of her malignancy with a positive BRCA 2, and for this reason her desires are for bilateral mastectomies and hysterectomy. She was referred for pre-operative neoadjuvant chemotherapy. MRI of the brain was negative. CT scan of the abdomen and pelvis and bone scan were negative. CT scan of the chest revealed a 1.7 cm left axillary lymph node and scar like density in the right upper lobe. No obvious mass. 2D echocardiogram was adequate to proceed with chemotherapy. Dose dense FAC was give 11/29/10 through 02/21/11. Bilateral mastectomies were performed on 03/14/11 at [facility] per Dr. Malcolm Mi with sentinel lymph node biopsies. The left mastectomy revealed residual invasive mammary carcinoma, grade II measuring 0.9 cm in greatest dimension. Margins were clear. The 8 left axillary lymph nodes were submitted, all negative for any evidence of any other cancer. A right simple mastectomy with right sentinel lymph node biopsies on the same date of the surgery 03/14/11 only revealed carcinoma in situ in the breast, completely resected. Following reconstructive surgery on 04/05/11 and post op healing, adjuvant weekly Taxol for 12 weeks was initiated on 05/16/11 and dose #12 was completed on 08/08/11. Radiation therapy was delivered by Dr. Monica Haji consisting of 4060 cGy plus 1000 rad boost between 09/06/11 and 10/26/11.  Tamoxifen was initiated on 11/14/11 and will continue for 5 years. Curly Sherwood was premenopausal at presentation but has not had any further menstrual cycles since initiation of chemotherapy and tamoxifen. SHARON w/ BSO has was performed on 12/14/12 with negative histopathology. Tamoxifen was discontinued and Femara was instituted on 12/4/2015. The latter will continue for a subsequent 5 years. TREATMENT SUMMARY:  1. FAC initiated 11/29/10 through 02/21/11- 6 doses in a dose dense fashion. 2. Bilateral mastectomies 03/14/11.   3. Bilateral breast reconstruction 04/05/11 with subsequent complications on the left with slow wound healing. 4. Weekly Taxol x 12 initiated 05/16/11 and completed 08/08/11 (last dose was Taxotere due to [employer] of Taxol). 5. Radiation therapy given by Dr. Cristiane Ruiz to the chest 09/06/11 through 10/26/11, 4860 cGy + 1,000 rad boost.   6. Tamoxifen initiated 11/14/11 and discontinued 12/4/2015   7. SHARON w/ BSO on 12/14/12, negative histopathology, due to her premenopausal status and the fact that she is BRCA-II mutation positive.    8. Femara 2.5 mg/day initiated 12/4/2015 and should continue for a subsequent 5 years      Past Medical History:    Past Medical History:   Diagnosis Date    Allergic rhinitis     Anxiety     Breast cancer ([facility] [address] 10/2010    Left    Bronchitis     Carpal tunnel syndrome     R and L    Chest pain     Chronic back pain     Depression     GERD (gastroesophageal reflux disease)     Headache(784.0)     Heart palpitations     Mononeuropathy     Osteoarthritis     Sleep apnea     Wears glasses      Past Surgical History:    Past Surgical History:   Procedure Laterality Date    ANKLE FRACTURE SURGERY Right 2014    BACK SURGERY  1993    BREAST RECONSTRUCTION  6/2011    BREAST SURGERY Bilateral 3/2011    bi-lat mastectomy    BREAST SURGERY  6/2011    emergency removal of Left breast expander    COLONOSCOPY N/A 2/5/2016    Dr Joyce Childers-HP x 1, serrated AP x 1, 12 month recall    EGD COLONOSCOPY N/A 6/3/2016    w/empiric dilation to 48 [place]ail (16mm)-Nafisa Ramsey (-)    HYSTERECTOMY      OTHER SURGICAL HISTORY      R leg & ankle fracture due to fall repair-Dr Curly Collado [facility]    UPPER GASTROINTESTINAL ENDOSCOPY  6/3/2016    w/empiric dilation to 48 [place] (16mm)-Nafisa Ramsey (-)     Current Medications:      Current Outpatient Medications:     omeprazole (PRILOSEC) 20 MG delayed release capsule, TAKE 1 CAPSULE EVERY DAY, Disp: 90 capsule, Rfl: 2    gabapentin (NEURONTIN) 300 MG capsule, TAKE 1 CAPSULE IN THE MORNING,  TAKE 1 CAPSULE  AT  NOON,  AND TAKE 2 CAPSULES EVERY EVENING, Disp: 150 capsule, Rfl: 2    cephALEXin (KEFLEX) 500 MG capsule, Take 500 mg by mouth, Disp: , Rfl:     metFORMIN (GLUCOPHAGE-XR) 500 MG extended release tablet, Take with largest meal of the day., Disp: 30 tablet, Rfl: 1    fluticasone-salmeterol (ADVAIR) 100-50 MCG/DOSE diskus inhaler, Inhale 1 puff into the lungs 2 times daily, Disp: 3 Inhaler, Rfl: 2    fluticasone (FLONASE) 50 MCG/ACT nasal spray, USE 1 SPRAY NASALLY DAILY (Patient taking differently: 1 spray by Nasal route daily as needed ), Disp: 32 g, Rfl: 3    ARIPiprazole (ABILIFY) 2 MG tablet, TAKE 1/2 TABLET AT BEDTIME FOR 7 DAYS THEN INCREASE TO 1 FULL TABLET DAILY, Disp: 60 tablet, Rfl: 5    lisinopril (PRINIVIL;ZESTRIL) 20 MG tablet, TAKE 1 TABLET TWICE DAILY, Disp: 120 tablet, Rfl: 5    nabumetone (RELAFEN) 500 MG tablet, Take 1 tablet by mouth 2 times daily, Disp: 60 tablet, Rfl: 0    HYDROcodone-acetaminophen (NORCO) 7.5-325 MG per tablet, Take by mouth. ., Disp: , Rfl:     Ergocalciferol (VITAMIN D2 PO), Take 50,000 Units by mouth once a week, Disp: , Rfl:     fexofenadine (ALLEGRA) 180 MG tablet, Take 180 mg by mouth daily as needed, Disp: , Rfl:     busPIRone (BUSPAR) 10 MG tablet, Take 10 mg by mouth 3 times daily as needed, Disp: , Rfl:     ipratropium-albuterol (DUONEB) 0.5-2.5 (3) MG/3ML SOLN nebulizer solution, Inhale 3 mLs into the syncope and light-headedness. Hematological: Negative for adenopathy. Does not bruise/bleed easily. Psychiatric/Behavioral: Negative for behavioral problems and suicidal ideas. The patient is nervous/anxious. All other systems reviewed and are negative. Objective   /88   Pulse 112   Ht 5' 7\" (1.702 m)   Wt 247 lb 12.8 oz (112.4 kg)   SpO2 95%   BMI 38.81 kg/m²     PHYSICAL EXAM:  Physical Exam   Constitutional: She is oriented to person, place, and time. No distress. HENT:   Head: Normocephalic and atraumatic. Mouth/Throat: Oropharynx is clear and moist.   Eyes: Conjunctivae and EOM are normal. No scleral icterus. Neck: Normal range of motion. Neck supple. No tracheal deviation present. Cardiovascular: Normal rate, regular rhythm and normal heart sounds. No murmur heard. Pulmonary/Chest: Effort normal and breath sounds normal. No respiratory distress. Abdominal: Soft. Bowel sounds are normal. She exhibits no distension. There is no tenderness. Musculoskeletal: She exhibits no edema or tenderness. Full ROM in all 4 extremities   Neurological: She is alert and oriented to person, place, and time. Coordination normal.   Skin: Skin is warm and dry. No rash noted. There is erythema (Small 1cm, nontender movable structure right axilla). Psychiatric: She has a normal mood and affect. Her behavior is normal.           ASSESSMENT/PLAN:     Diagnosis Orders   1. Elevated carcinoembryonic antigen  MRI ABDOMEN W WO CONTRAST MRCP   2. Other abnormal tumor markers  MRI ABDOMEN W WO CONTRAST MRCP   3. Abnormal serum level of alkaline phosphatase  MRI ABDOMEN W WO CONTRAST MRCP   4. Gastroesophageal reflux disease without esophagitis  omeprazole (PRILOSEC) 20 MG delayed release capsule    MRI ABDOMEN W WO CONTRAST MRCP        1. Right axillary folliculitis/infected sebaceous cyst.  Prior significant tenderness has resolved with the Keflex that was given.   She has minimal tenderness,

## 2019-09-12 ENCOUNTER — OFFICE VISIT (OUTPATIENT)
Dept: HEMATOLOGY | Age: 54
End: 2019-09-12
Payer: MEDICARE

## 2019-09-12 ENCOUNTER — HOSPITAL ENCOUNTER (OUTPATIENT)
Dept: INFUSION THERAPY | Age: 54
Discharge: HOME | End: 2019-09-12
Payer: MEDICARE

## 2019-09-12 VITALS
SYSTOLIC BLOOD PRESSURE: 128 MMHG | OXYGEN SATURATION: 95 % | BODY MASS INDEX: 38.89 KG/M2 | HEIGHT: 67 IN | WEIGHT: 247.8 LBS | DIASTOLIC BLOOD PRESSURE: 88 MMHG | HEART RATE: 112 BPM

## 2019-09-12 DIAGNOSIS — R74.8 ABNORMAL SERUM LEVEL OF ALKALINE PHOSPHATASE: ICD-10-CM

## 2019-09-12 DIAGNOSIS — K21.9 GASTROESOPHAGEAL REFLUX DISEASE WITHOUT ESOPHAGITIS: ICD-10-CM

## 2019-09-12 DIAGNOSIS — Z85.3 PERSONAL HISTORY OF BREAST CANCER: Primary | ICD-10-CM

## 2019-09-12 DIAGNOSIS — R97.0 ELEVATED CARCINOEMBRYONIC ANTIGEN: Primary | ICD-10-CM

## 2019-09-12 DIAGNOSIS — Z85.3 PERSONAL HISTORY OF BREAST CANCER: ICD-10-CM

## 2019-09-12 DIAGNOSIS — R97.8 OTHER ABNORMAL TUMOR MARKERS: ICD-10-CM

## 2019-09-12 PROCEDURE — 99213 OFFICE O/P EST LOW 20 MIN: CPT | Performed by: PHYSICIAN ASSISTANT

## 2019-09-12 PROCEDURE — 85025 COMPLETE CBC W/AUTO DIFF WBC: CPT

## 2019-09-12 RX ORDER — OMEPRAZOLE 20 MG/1
CAPSULE, DELAYED RELEASE ORAL
Qty: 90 CAPSULE | Refills: 2 | Status: SHIPPED | OUTPATIENT
Start: 2019-09-12 | End: 2020-02-26 | Stop reason: SDUPTHER

## 2019-09-12 ASSESSMENT — ENCOUNTER SYMPTOMS
EYE ITCHING: 0
ABDOMINAL PAIN: 0
VOMITING: 0
CONSTIPATION: 0
ABDOMINAL DISTENTION: 1
COUGH: 0
COLOR CHANGE: 0
SHORTNESS OF BREATH: 0
NAUSEA: 0
WHEEZING: 0
BACK PAIN: 1
SORE THROAT: 0
EYE DISCHARGE: 0
DIARRHEA: 0
PHOTOPHOBIA: 0
BLOOD IN STOOL: 0
TROUBLE SWALLOWING: 0
VOICE CHANGE: 1

## 2019-09-18 DIAGNOSIS — F40.240 CLAUSTROPHOBIA: Primary | ICD-10-CM

## 2019-09-20 ENCOUNTER — TELEPHONE (OUTPATIENT)
Dept: PRIMARY CARE CLINIC | Age: 54
End: 2019-09-20

## 2019-09-23 ENCOUNTER — HOSPITAL ENCOUNTER (OUTPATIENT)
Dept: MRI IMAGING | Age: 54
Discharge: HOME | End: 2019-09-23
Payer: MEDICARE

## 2019-09-23 DIAGNOSIS — K21.9 GASTROESOPHAGEAL REFLUX DISEASE WITHOUT ESOPHAGITIS: ICD-10-CM

## 2019-09-23 DIAGNOSIS — R97.0 ELEVATED CARCINOEMBRYONIC ANTIGEN: ICD-10-CM

## 2019-09-23 DIAGNOSIS — R97.8 OTHER ABNORMAL TUMOR MARKERS: ICD-10-CM

## 2019-09-23 DIAGNOSIS — R74.8 ABNORMAL SERUM LEVEL OF ALKALINE PHOSPHATASE: ICD-10-CM

## 2019-09-23 PROCEDURE — 74183 MRI ABD W/O CNTR FLWD CNTR: CPT

## 2019-09-23 PROCEDURE — 6360000004 HC RX CONTRAST MEDICATION: Performed by: PHYSICIAN ASSISTANT

## 2019-09-23 PROCEDURE — A9577 INJ MULTIHANCE: HCPCS | Performed by: PHYSICIAN ASSISTANT

## 2019-09-23 RX ADMIN — GADOBENATE DIMEGLUMINE 20 ML: 529 INJECTION, SOLUTION INTRAVENOUS at 11:00

## 2019-09-24 ENCOUNTER — OFFICE VISIT (OUTPATIENT)
Dept: PRIMARY CARE CLINIC | Age: 54
End: 2019-09-24
Payer: MEDICARE

## 2019-09-24 VITALS
HEART RATE: 111 BPM | BODY MASS INDEX: 38.77 KG/M2 | DIASTOLIC BLOOD PRESSURE: 84 MMHG | OXYGEN SATURATION: 98 % | WEIGHT: 247 LBS | HEIGHT: 67 IN | SYSTOLIC BLOOD PRESSURE: 128 MMHG | TEMPERATURE: 97.2 F

## 2019-09-24 DIAGNOSIS — Z91.81 AT RISK FOR FALLS: ICD-10-CM

## 2019-09-24 DIAGNOSIS — Z00.00 ROUTINE GENERAL MEDICAL EXAMINATION AT A HEALTH CARE FACILITY: Primary | ICD-10-CM

## 2019-09-24 DIAGNOSIS — Z13.31 POSITIVE DEPRESSION SCREENING: ICD-10-CM

## 2019-09-24 DIAGNOSIS — Z23 NEED FOR PROPHYLACTIC VACCINATION AGAINST DIPHTHERIA-TETANUS-PERTUSSIS (DTP): ICD-10-CM

## 2019-09-24 DIAGNOSIS — R26.89 DECREASED MOBILITY: ICD-10-CM

## 2019-09-24 DIAGNOSIS — T45.1X5A CHEMOTHERAPY-INDUCED NEUROPATHY (HCC): ICD-10-CM

## 2019-09-24 DIAGNOSIS — G62.0 CHEMOTHERAPY-INDUCED NEUROPATHY (HCC): ICD-10-CM

## 2019-09-24 PROCEDURE — G0438 PPPS, INITIAL VISIT: HCPCS | Performed by: NURSE PRACTITIONER

## 2019-09-24 PROCEDURE — G8431 POS CLIN DEPRES SCRN F/U DOC: HCPCS | Performed by: NURSE PRACTITIONER

## 2019-09-24 RX ORDER — LORAZEPAM 1 MG/1
TABLET ORAL
Qty: 2 TABLET | Refills: 0 | OUTPATIENT
Start: 2019-09-24 | End: 2019-10-18

## 2019-09-24 RX ORDER — MELOXICAM 7.5 MG/1
7.5 TABLET ORAL DAILY
Refills: 0 | COMMUNITY
Start: 2019-09-03 | End: 2020-11-24

## 2019-09-24 RX ORDER — GABAPENTIN 300 MG/1
CAPSULE ORAL
Qty: 150 CAPSULE | Refills: 5 | Status: SHIPPED | OUTPATIENT
Start: 2019-09-24 | End: 2020-03-30

## 2019-09-24 ASSESSMENT — PATIENT HEALTH QUESTIONNAIRE - PHQ9: SUM OF ALL RESPONSES TO PHQ QUESTIONS 1-9: 18

## 2019-09-24 ASSESSMENT — LIFESTYLE VARIABLES: HOW OFTEN DO YOU HAVE A DRINK CONTAINING ALCOHOL: 0

## 2019-09-24 NOTE — PROGRESS NOTES
On the basis of positive PHQ-9 screening (PHQ-9 Total Score: 18), the following plan was implemented: patient declines further evaluation/treatment for depression. Patient will follow-up in 1 month(s) with PCP. Medicare Annual Wellness Visit  Name: Sergei Valerio Date: 2019   MRN: [id number] Sex: Female   Age: 53 y.o. Ethnicity: Non-/Non    : 1965 Race: Mylene Arango is here for Medicare AWV    Screenings for behavioral, psychosocial and functional/safety risks, and cognitive dysfunction are all negative except as indicated below. These results, as well as other patient data from the [address] form, are documented in Flowsheets linked to this Encounter. Allergies   Allergen Reactions    Tape Nayeli Sides Tape]      Surgical tape       Prior to Visit Medications    Medication Sig Taking? Authorizing Provider   LORazepam (ATIVAN) 1 MG tablet Take 1 tab 30 min prior to MRI and repeat if needed Yes Breanna Green MD   meloxicam (MOBIC) 7.5 MG tablet Take 7.5 mg by mouth Daily Yes Historical Provider, MD   gabapentin (NEURONTIN) 300 MG capsule 2 caps am, 1cap noon, and 2 caps at bedtime. Yes RADHA Holcomb   omeprazole (PRILOSEC) 20 MG delayed release capsule TAKE 1 CAPSULE EVERY DAY Yes August Morris PA-C   metFORMIN (GLUCOPHAGE-XR) 500 MG extended release tablet Take with largest meal of the day.  Yes RADHA Holcomb   fluticasone-salmeterol (ADVAIR) 100-50 MCG/DOSE diskus inhaler Inhale 1 puff into the lungs 2 times daily Yes RADHA Holcomb   fluticasone (FLONASE) 50 MCG/ACT nasal spray USE 1 SPRAY NASALLY DAILY  Patient taking differently: 1 spray by Nasal route daily as needed  Yes RADHA Holcomb   ARIPiprazole (ABILIFY) 2 MG tablet TAKE 1/2 TABLET AT BEDTIME FOR 7 DAYS THEN INCREASE TO 1 FULL TABLET DAILY Yes RADHA Holcomb   lisinopril (PRINIVIL;ZESTRIL) 20 MG tablet TAKE 1 TABLET TWICE DAILY Yes RADHA Degroot   nabumetone (RELAFEN) 500 MG tablet Take 1 tablet by mouth 2 times daily Yes Josep Rivera MD   HYDROcodone-acetaminophen ([facility]) 7.5-325 MG per tablet Take by mouth. . Yes Historical Provider, MD   fexofenadine (ALLEGRA) 180 MG tablet Take 180 mg by mouth daily as needed Yes Historical Provider, MD   busPIRone (BUSPAR) 10 MG tablet Take 10 mg by mouth 3 times daily as needed Yes Historical Provider, MD   ipratropium-albuterol (DUONEB) 0.5-2.5 (3) MG/3ML SOLN nebulizer solution Inhale 3 mLs into the lungs every 6 hours as needed for Shortness of Breath Yes RADHA Degroot   cyclobenzaprine (FLEXERIL) 10 MG tablet Take 1 tablet by mouth 2 times daily as needed for Muscle spasms Yes RADHA Degroot   loratadine (CLARITIN) 10 MG tablet TAKE 1 TABLET EVERY DAY Yes RADHA Degroot   amitriptyline (ELAVIL) 10 MG tablet Take 10 mg by mouth  Yes Historical Provider, MD   letrozole ([facility]) 2.5 MG tablet Take 2.5 mg by mouth daily Yes Historical Provider, MD   desvenlafaxine succinate (PRISTIQ) 50 MG TB24 extended release tablet Take 50 mg by mouth daily Yes Historical Provider, MD   buPROPion (WELLBUTRIN SR) 100 MG extended release tablet Take 100 mg by mouth daily  Yes Historical Provider, MD   ergocalciferol (ERGOCALCIFEROL) 69096 units capsule Take 1 capsule by mouth once a week for 6 days Yes RADHA Degroot   albuterol sulfate HFA (VENTOLIN HFA) 108 (90 Base) MCG/ACT inhaler Inhale 1 puff into the lungs every 6 hours as needed for Wheezing or Shortness of Breath Yes RADHA Degroot   ondansetron (ZOFRAN) 4 MG tablet TAKE ONE TABLET BY MOUTH EVERY 8 HOURS AS NEEDED FOR NAUSEA AND VOMITING Yes Josep Rivera MD   naphazoline-pheniramine (NAPHCON-A) 0.025-0.3 % ophthalmic solution Place 2 drops into both eyes 4 times daily Indications: NONE ON RECORD FROM [facility].  VERIFIED BY MOE KEENE RN  Yes difficulty driving, watching TV, or doing any of your daily activities because of your eyesight?: No  Have you had an eye exam within the past year?: (!) No  Hearing/Vision Interventions:  · Vision concerns:  patient encouraged to make appointment with his/her eye specialist    Safety:  Safety  Do you have working smoke detectors?: Yes  Have all throw rugs been removed or fastened?: (n/a)  Do you have non-slip mats or surfaces in all bathtubs/showers?: (!) No  Do all of your stairways have a railing or banister?: (n/a)  Are your doorways, halls and stairs free of clutter?: (\"sometimes\")  Do you always fasten your seatbelt when you are in a car?: Yes  Safety Interventions:  · Home safety tips provided    ADL:  ADLs  In the past 7 days, did you need help from others to perform any of the following everyday activities? Eating, dressing, grooming, bathing, toileting, or walking/balance?: None  In the past 7 days, did you need help from others to take care of any of the following?  Laundry, housekeeping, banking/finances, shopping, telephone use, food preparation, transportation, or taking medications?: (!) Laundry, Housekeeping, Banking/Finances, Shopping  ADL Interventions:  · Patient declines any further evaluation/treatment for this issue    Personalized Preventive Plan   Current Health Maintenance Status  Immunization History   Administered Date(s) Administered    Influenza Virus Vaccine 11/01/2016    Pneumococcal Polysaccharide (Emuyoecle15) 04/19/2018    Zoster Recombinant (Shingrix) 12/27/2018, 05/06/2019        Health Maintenance   Topic Date Due    Hepatitis C screen  1965    Diabetic foot exam  03/06/1974    Diabetic retinal exam  03/06/1974    Lipid screen  03/06/1974    HIV screen  03/06/1979    Diabetic microalbuminuria test  03/06/1982    DTaP/Tdap/Td vaccine (1 - Tdap) 03/06/1983    Potassium monitoring  01/18/2018    Creatinine monitoring  01/18/2018    Colon cancer screen colonoscopy 2019    Annual Wellness Visit (AWV)  2019    Flu vaccine (1) 2019    A1C test (Diabetic or Prediabetic)  2019    Shingles Vaccine  Completed    Pneumococcal 0-64 years Vaccine  Completed     Recommendations for Preventive Services Due: see orders and patient instructions/AVS.  . Recommended screening schedule for the next 5-10 years is provided to the patient in written form: see Patient Jordan Sales was seen today for medicare awv. Diagnoses and all orders for this visit:    Routine general medical examination at a health care facility    Need for prophylactic vaccination against diphtheria-tetanus-pertussis (DTP)  -     Tdap (ADACEL) 5-2-15.5 LF-MCG/0.5 injection; Inject 0.5 mLs into the muscle once for 1 dose    Positive depression screening  -     Positive Screen for Clinical Depression with a Documented Follow-up Plan     Decreased mobility  -     Shower chair    At risk for falls  -     Shower chair    Chemotherapy-induced neuropathy (HCC)  -     gabapentin (NEURONTIN) 300 MG capsule; 2 caps am, 1cap noon, and 2 caps at bedtime. Medicare Annual Wellness Visit  Name: Diana Rios Date: 2019   MRN: [id number] Sex: Female   Age: 53 y.o. Ethnicity: Non-/Non    : 1965 Race: Jessica Epstein is here for Medicare AWV    Screenings for behavioral, psychosocial and functional/safety risks, and cognitive dysfunction are all negative except as indicated below. These results, as well as other patient data from the [address] form, are documented in Flowsheets linked to this Encounter. Allergies   Allergen Reactions    Tape Sam Case Tape]      Surgical tape       Prior to Visit Medications    Medication Sig Taking?  Authorizing Provider   LORazepam (ATIVAN) 1 MG tablet Take 1 tab 30 min prior to MRI and repeat if needed Yes Destinee Avelar MD   meloxicam (MOBIC) 7.5 MG tablet Take 7.5 mg by mouth Daily Yes Historical Provider, MD   gabapentin (NEURONTIN) 300 MG capsule 2 caps am, 1cap noon, and 2 caps at bedtime. Yes RADHA Holcomb   omeprazole (PRILOSEC) 20 MG delayed release capsule TAKE 1 CAPSULE EVERY DAY Yes August Morris PA-C   metFORMIN (GLUCOPHAGE-XR) 500 MG extended release tablet Take with largest meal of the day. Yes RADHA Holcomb   fluticasone-salmeterol (ADVAIR) 100-50 MCG/DOSE diskus inhaler Inhale 1 puff into the lungs 2 times daily Yes RADHA Holcomb   fluticasone (FLONASE) 50 MCG/ACT nasal spray USE 1 SPRAY NASALLY DAILY  Patient taking differently: 1 spray by Nasal route daily as needed  Yes RADHA Holcomb   ARIPiprazole (ABILIFY) 2 MG tablet TAKE 1/2 TABLET AT BEDTIME FOR 7 DAYS THEN INCREASE TO 1 FULL TABLET DAILY Yes RADHA Holcomb   lisinopril (PRINIVIL;ZESTRIL) 20 MG tablet TAKE 1 TABLET TWICE DAILY Yes RADHA Holcomb   nabumetone (RELAFEN) 500 MG tablet Take 1 tablet by mouth 2 times daily Yes Noel Boudreaux MD   HYDROcodone-acetaminophen (NORCO) 7.5-325 MG per tablet Take by mouth. . Yes Historical Provider, MD   fexofenadine (ALLEGRA) 180 MG tablet Take 180 mg by mouth daily as needed Yes Historical Provider, MD   busPIRone (BUSPAR) 10 MG tablet Take 10 mg by mouth 3 times daily as needed Yes Historical Provider, MD   ipratropium-albuterol (DUONEB) 0.5-2.5 (3) MG/3ML SOLN nebulizer solution Inhale 3 mLs into the lungs every 6 hours as needed for Shortness of Breath Yes RADHA Holcomb   cyclobenzaprine (FLEXERIL) 10 MG tablet Take 1 tablet by mouth 2 times daily as needed for Muscle spasms Yes RADHA Holcomb   loratadine (CLARITIN) 10 MG tablet TAKE 1 TABLET EVERY DAY Yes RADHA Holcomb   amitriptyline (ELAVIL) 10 MG tablet Take 10 mg by mouth  Yes Historical Provider, MD   letrozole (FEMARA) 2.5 MG tablet Take 2.5 mg by mouth daily Yes Historical Provider, MD

## 2019-09-30 PROBLEM — R26.89 DECREASED MOBILITY: Status: ACTIVE | Noted: 2019-09-30

## 2019-09-30 PROBLEM — Z91.81 AT RISK FOR FALLS: Status: ACTIVE | Noted: 2019-09-30

## 2019-09-30 PROBLEM — Z13.31 POSITIVE DEPRESSION SCREENING: Status: ACTIVE | Noted: 2019-09-30

## 2019-09-30 PROBLEM — Z00.00 ROUTINE GENERAL MEDICAL EXAMINATION AT A HEALTH CARE FACILITY: Status: ACTIVE | Noted: 2017-01-25

## 2019-10-14 DIAGNOSIS — I10 HYPERTENSION, ESSENTIAL, BENIGN: ICD-10-CM

## 2019-10-14 DIAGNOSIS — F31.9 BIPOLAR AFFECTIVE DISORDER, REMISSION STATUS UNSPECIFIED (HCC): ICD-10-CM

## 2019-10-14 DIAGNOSIS — J42 CHRONIC BRONCHITIS, UNSPECIFIED CHRONIC BRONCHITIS TYPE (HCC): ICD-10-CM

## 2019-10-14 DIAGNOSIS — E11.00 TYPE 2 DIABETES MELLITUS WITH HYPEROSMOLARITY WITHOUT COMA, WITHOUT LONG-TERM CURRENT USE OF INSULIN (HCC): ICD-10-CM

## 2019-10-14 RX ORDER — LISINOPRIL 20 MG/1
TABLET ORAL
Qty: 120 TABLET | Refills: 5 | Status: SHIPPED | OUTPATIENT
Start: 2019-10-14 | End: 2020-09-23

## 2019-10-14 RX ORDER — METFORMIN HYDROCHLORIDE 500 MG/1
TABLET, EXTENDED RELEASE ORAL
Qty: 30 TABLET | Refills: 1 | Status: SHIPPED | OUTPATIENT
Start: 2019-10-14 | End: 2019-12-07 | Stop reason: SDUPTHER

## 2019-10-30 ENCOUNTER — TELEPHONE (OUTPATIENT)
Dept: PRIMARY CARE CLINIC | Age: 54
End: 2019-10-30

## 2019-10-30 PROBLEM — Z00.00 ROUTINE GENERAL MEDICAL EXAMINATION AT A HEALTH CARE FACILITY: Status: RESOLVED | Noted: 2017-01-25 | Resolved: 2019-10-30

## 2019-11-22 ENCOUNTER — OFFICE VISIT (OUTPATIENT)
Dept: PRIMARY CARE CLINIC | Age: 54
End: 2019-11-22
Payer: MEDICARE

## 2019-11-22 VITALS
WEIGHT: 243 LBS | BODY MASS INDEX: 38.14 KG/M2 | OXYGEN SATURATION: 98 % | TEMPERATURE: 97.6 F | DIASTOLIC BLOOD PRESSURE: 72 MMHG | HEART RATE: 72 BPM | SYSTOLIC BLOOD PRESSURE: 118 MMHG | HEIGHT: 67 IN

## 2019-11-22 DIAGNOSIS — E55.9 VITAMIN D DEFICIENCY: ICD-10-CM

## 2019-11-22 DIAGNOSIS — J30.89 ALLERGIC RHINITIS DUE TO OTHER ALLERGIC TRIGGER, UNSPECIFIED SEASONALITY: ICD-10-CM

## 2019-11-22 DIAGNOSIS — I10 HYPERTENSION, ESSENTIAL, BENIGN: ICD-10-CM

## 2019-11-22 DIAGNOSIS — E11.00 TYPE 2 DIABETES MELLITUS WITH HYPEROSMOLARITY WITHOUT COMA, WITHOUT LONG-TERM CURRENT USE OF INSULIN (HCC): ICD-10-CM

## 2019-11-22 DIAGNOSIS — Z23 NEED FOR PROPHYLACTIC VACCINATION AGAINST DIPHTHERIA-TETANUS-PERTUSSIS (DTP): ICD-10-CM

## 2019-11-22 DIAGNOSIS — F31.9 BIPOLAR AFFECTIVE DISORDER, REMISSION STATUS UNSPECIFIED (HCC): ICD-10-CM

## 2019-11-22 DIAGNOSIS — Z78.0 POST-MENOPAUSAL: ICD-10-CM

## 2019-11-22 DIAGNOSIS — G47.30 SLEEP APNEA, UNSPECIFIED TYPE: Primary | ICD-10-CM

## 2019-11-22 DIAGNOSIS — G47.00 INSOMNIA, UNSPECIFIED TYPE: ICD-10-CM

## 2019-11-22 LAB — HBA1C MFR BLD: 7.8 %

## 2019-11-22 PROCEDURE — 83036 HEMOGLOBIN GLYCOSYLATED A1C: CPT | Performed by: NURSE PRACTITIONER

## 2019-11-22 PROCEDURE — 99214 OFFICE O/P EST MOD 30 MIN: CPT | Performed by: NURSE PRACTITIONER

## 2019-11-22 RX ORDER — DOXEPIN HYDROCHLORIDE 25 MG/1
25 CAPSULE ORAL
Refills: 3 | COMMUNITY
Start: 2019-10-23 | End: 2020-05-27 | Stop reason: DRUGHIGH

## 2019-11-22 RX ORDER — ASCORBIC ACID 500 MG
500 TABLET ORAL DAILY
COMMUNITY
End: 2021-06-01

## 2019-11-22 RX ORDER — CETIRIZINE HYDROCHLORIDE 10 MG/1
10 TABLET ORAL DAILY
COMMUNITY
End: 2020-11-24

## 2019-11-26 ASSESSMENT — ENCOUNTER SYMPTOMS
WHEEZING: 0
EYE REDNESS: 0
CHEST TIGHTNESS: 0
RHINORRHEA: 0
BLOOD IN STOOL: 0
DIARRHEA: 0
NAUSEA: 0
VOICE CHANGE: 0
TROUBLE SWALLOWING: 0
COUGH: 0
SORE THROAT: 0
ABDOMINAL PAIN: 0
CONSTIPATION: 0
VOMITING: 0
SHORTNESS OF BREATH: 0

## 2019-12-07 DIAGNOSIS — E11.00 TYPE 2 DIABETES MELLITUS WITH HYPEROSMOLARITY WITHOUT COMA, WITHOUT LONG-TERM CURRENT USE OF INSULIN (HCC): ICD-10-CM

## 2019-12-09 RX ORDER — METFORMIN HYDROCHLORIDE 500 MG/1
TABLET, EXTENDED RELEASE ORAL
Qty: 30 TABLET | Refills: 1 | Status: SHIPPED | OUTPATIENT
Start: 2019-12-09 | End: 2020-01-29

## 2019-12-10 ENCOUNTER — HOSPITAL ENCOUNTER (OUTPATIENT)
Dept: WOMENS IMAGING | Age: 54
Discharge: HOME | End: 2019-12-10
Payer: MEDICARE

## 2019-12-10 DIAGNOSIS — Z78.0 POST-MENOPAUSAL: ICD-10-CM

## 2019-12-10 PROCEDURE — 77080 DXA BONE DENSITY AXIAL: CPT

## 2020-01-10 ENCOUNTER — TELEPHONE (OUTPATIENT)
Dept: PRIMARY CARE CLINIC | Age: 55
End: 2020-01-10

## 2020-01-10 NOTE — TELEPHONE ENCOUNTER
Patient called regarding c-pap supplies, and diabetic shoe order  Please let her know when she can pick the orders up at office  Patient also request \"the piece for the nebulizer that goes on that I breath through   [facility]EN[facility] is cracked that I got from the office

## 2020-01-22 NOTE — TELEPHONE ENCOUNTER
Called patient,sleep study was done at [facility] several years ago.   Patient reports had diabetic foot exam the last appointment she was in the office  \"I don't know why it wasn't written in my chart but I had it done\"

## 2020-01-29 RX ORDER — METFORMIN HYDROCHLORIDE 500 MG/1
TABLET, EXTENDED RELEASE ORAL
Qty: 30 TABLET | Refills: 1 | Status: SHIPPED | OUTPATIENT
Start: 2020-01-29 | End: 2020-03-20

## 2020-02-26 ENCOUNTER — OFFICE VISIT (OUTPATIENT)
Dept: PRIMARY CARE CLINIC | Age: 55
End: 2020-02-26
Payer: MEDICARE

## 2020-02-26 VITALS
WEIGHT: 245 LBS | HEIGHT: 67 IN | BODY MASS INDEX: 38.45 KG/M2 | HEART RATE: 109 BPM | OXYGEN SATURATION: 96 % | DIASTOLIC BLOOD PRESSURE: 84 MMHG | TEMPERATURE: 97.9 F | SYSTOLIC BLOOD PRESSURE: 130 MMHG

## 2020-02-26 DIAGNOSIS — E11.00 TYPE 2 DIABETES MELLITUS WITH HYPEROSMOLARITY WITHOUT COMA, WITHOUT LONG-TERM CURRENT USE OF INSULIN (HCC): ICD-10-CM

## 2020-02-26 PROBLEM — G47.00 INSOMNIA: Status: ACTIVE | Noted: 2020-02-26

## 2020-02-26 PROBLEM — M15.9 OSTEOARTHRITIS OF MULTIPLE JOINTS: Status: ACTIVE | Noted: 2020-02-26

## 2020-02-26 PROBLEM — I10 HYPERTENSION, ESSENTIAL, BENIGN: Status: ACTIVE | Noted: 2020-02-26

## 2020-02-26 PROBLEM — J42 CHRONIC BRONCHITIS (HCC): Status: ACTIVE | Noted: 2020-02-26

## 2020-02-26 LAB — HBA1C MFR BLD: 7.9 %

## 2020-02-26 PROCEDURE — 83036 HEMOGLOBIN GLYCOSYLATED A1C: CPT | Performed by: NURSE PRACTITIONER

## 2020-02-26 PROCEDURE — 99214 OFFICE O/P EST MOD 30 MIN: CPT | Performed by: NURSE PRACTITIONER

## 2020-02-26 PROCEDURE — 3051F HG A1C>EQUAL 7.0%<8.0%: CPT | Performed by: NURSE PRACTITIONER

## 2020-02-26 RX ORDER — OMEPRAZOLE 20 MG/1
CAPSULE, DELAYED RELEASE ORAL
Qty: 60 CAPSULE | Refills: 1 | Status: SHIPPED | OUTPATIENT
Start: 2020-02-26 | End: 2020-05-13

## 2020-02-26 RX ORDER — IPRATROPIUM BROMIDE AND ALBUTEROL SULFATE 2.5; .5 MG/3ML; MG/3ML
1 SOLUTION RESPIRATORY (INHALATION) EVERY 6 HOURS PRN
Qty: 360 ML | Refills: 0 | Status: SHIPPED | OUTPATIENT
Start: 2020-02-26 | End: 2020-05-12

## 2020-02-26 ASSESSMENT — ENCOUNTER SYMPTOMS
VOMITING: 0
EYE REDNESS: 0
SHORTNESS OF BREATH: 0
BLOOD IN STOOL: 0
SORE THROAT: 0
WHEEZING: 0
VOICE CHANGE: 0
DIARRHEA: 0
TROUBLE SWALLOWING: 0
COUGH: 0
NAUSEA: 0
CONSTIPATION: 0
CHEST TIGHTNESS: 0
RHINORRHEA: 0
ABDOMINAL PAIN: 0

## 2020-02-26 NOTE — PROGRESS NOTES
[facility]  [address] [place] [address]  [id number]  [place] [address]  Dept: [phone]  Dept Fax: [phone]  Loc: [phone]        Zacarias Chaparro is a 53 y.o. female who presents today for her medical conditions/ complaints as noted below. Zacarias Seen is c/o 3 Month Follow-Up (sleep apnea, htn, bipolar, insomnia, allergies); Medication Check (cont same); and Diabetes        Chief Complaint   Patient presents with    3 Month Follow-Up     sleep apnea, htn, bipolar, insomnia, allergies    Medication Check     cont same    Diabetes       HPI:     Reports Throat numb after eating sugar substitute. Pt states that acid reflux is worse at night. Pt states that she is having dysphagia. She states that it feels dead in the back of her throat. She is having coughing at night. She states that she has also struggled with bronchitis and is having some wheezing. Pt states that     Diabetes   Pertinent negatives for hypoglycemia include no confusion, dizziness, headaches, nervousness/anxiousness or speech difficulty. Pertinent negatives for diabetes include no chest pain, no fatigue, no polydipsia, no polyphagia and no polyuria. 2/26/2020 7.9 HA1C. Pt states that she is taking metformin. Pt states that she is not having diarrhea or constipation. 11/22/19 9.1 to 7. 8HA1c. Begin metformin xr 500 mg with largest meal of the day. Stopped eating sugar and white fluffy stuff per dietician. Has increased exercise 30 min 5 x a week. Depression:   2/26/2020- Increased Wellbutrin to 200 mg daily. Pt states that this helps with her depression. 11/22/19 Stable taking. Pristiq 100mg in the morning and Wellbutrin 100mg  early in the morning to prevent insomnia. Patient reports that they have been compliant with taking medications as directed.      Past Medical History:   Diagnosis Date    Allergic rhinitis     Anxiety     Breast cancer ([facility] [address] 10/2010    Left    Bronchitis     Carpal tunnel syndrome     R and L    Chest pain     Chronic back pain     Depression     GERD (gastroesophageal reflux disease)     Headache(784.0)     Heart palpitations     Mononeuropathy     Osteoarthritis     Sleep apnea     Wears glasses        Past Surgical History:   Procedure Laterality Date    [address]KLE FRACTURE SURGERY Right 2014   [address]    BREAST RECONSTRUCTION  6/2011    BREAST SURGERY  6/2011    emergency removal of Left breast expander    COLONOSCOPY N/A 2/5/2016    Dr Ronan Childers-HP x 1, serrated AP x 1, 12 month recall    EGD COLONOSCOPY N/A 6/3/2016    w/empiric dilation to 50 Kh[place] (16mm)-Dr RONY Childers, Nafisa (-)    HYSTERECTOMY      MASTECTOMY, BILATERAL Bilateral 03/2011    OTHER SURGICAL HISTORY      R leg & ankle fracture due to fall repair[facility]    UPPER GASTROINTESTINAL ENDOSCOPY  6/3/2016    w/empiric dilation to 50 [place] (16mm)-Dr RONY Byrne, Nafisa (-)       Social History     Socioeconomic History    Marital status:      Spouse name: None    Number of children: None    Years of education: None    Highest education level: None   Occupational History    None   Social Needs    Financial resource strain: None    Food insecurity:     Worry: None     Inability: None    Transportation needs:     Medical: None     Non-medical: None   Tobacco Use    Smoking status: Former Smoker     Packs/day: 0.25     Years: 20.00     Pack years: 5.00     Types: Cigars     Start date: 2/3/2016     Last attempt to quit: 12/21/2016     Years since quitting: 3.1    Smokeless tobacco: Never Used    Tobacco comment: Smokes Ciggerelos now,1-2 a day.    Substance and Sexual Activity    Alcohol use: No     Alcohol/week: 0.0 standard drinks    Drug use: No    Sexual activity: None   Lifestyle    Physical activity:     Days per week: None     Minutes per session: None    Stress: None   Relationships    Social connections:     Talks on phone: None     Gets congestion, ear pain, nosebleeds, rhinorrhea, sore throat, trouble swallowing and voice change. Eyes: Negative for redness and visual disturbance. Respiratory: Negative for cough, chest tightness, shortness of breath and wheezing. Cardiovascular: Negative for chest pain, palpitations and leg swelling. Gastrointestinal: Negative for abdominal pain, blood in stool, constipation, diarrhea, nausea and vomiting. Endocrine: Negative for polydipsia, polyphagia and polyuria. Genitourinary: Negative for dysuria, frequency and urgency. Musculoskeletal: Positive for arthralgias and myalgias. Skin: Negative for rash and wound. Neurological: Negative for dizziness, speech difficulty, light-headedness and headaches. Psychiatric/Behavioral: Positive for sleep disturbance (11:00-4:00). Negative for agitation, confusion, self-injury and suicidal ideas. The patient is not nervous/anxious. Objective:     Physical Exam  Vitals signs and nursing note reviewed. Constitutional:       General: She is not in acute distress. Appearance: She is well-developed. She is not diaphoretic. HENT:      Head: Normocephalic and atraumatic. Mouth/Throat:      Pharynx: No oropharyngeal exudate. Eyes:      General: No scleral icterus. Conjunctiva/sclera: Conjunctivae normal.   Neck:      Musculoskeletal: Normal range of motion and neck supple. Thyroid: No thyromegaly. Cardiovascular:      Rate and Rhythm: Normal rate and regular rhythm. Heart sounds: Normal heart sounds. No murmur. No friction rub. Pulmonary:      Effort: Pulmonary effort is normal. No respiratory distress. Breath sounds: Normal breath sounds. No wheezing. Abdominal:      General: There is no distension. Palpations: Abdomen is soft. There is no mass. Tenderness: There is no abdominal tenderness. There is no guarding or rebound. Lymphadenopathy:      Cervical: No cervical adenopathy.    Skin:     General: Skin of 30 minutes was spent on counseling and or coordination of care type 2 DM, chronic bronchitis, HTN, insomnia, chemotherapy induced neuropathy, obesity, depression, anxiety, osteoarthritis, gerd. ofReturn in about 3 months (around 5/26/2020) for 30 min appointment, medication recheck. Orders Placed This Encounter   Procedures    POCT glycosylated hemoglobin (Hb A1C)     Orders Placed This Encounter   Medications    ipratropium-albuterol (DUONEB) 0.5-2.5 (3) MG/3ML SOLN nebulizer solution     Sig: Inhale 3 mLs into the lungs every 6 hours as needed for Shortness of Breath     Dispense:  360 mL     Refill:  0    omeprazole (PRILOSEC) 20 MG delayed release capsule     Sig: bid     Dispense:  60 capsule     Refill:  1    SITagliptin (JANUVIA) 50 MG tablet     Sig: Take 1 tablet by mouth daily     Dispense:  30 tablet     Refill:  3       Patient Instructions   Begin januvia daily. Patient/family given educational materials - see patient instructions. Discussed use, benefit, and side effects of prescribed medications. All patient/family questions answered and voiced understanding. Instructed to continue current medications, diet and exercise. Pt/family agreed with treatment plan. Follow up as directed and sooner if needed. Patient/ family instructed that is symptoms worsen or persist they are to contact office or report to nearest ER. They voice understanding and agreement with this plan.      Electronically signed by RADHA Soliz on 2/26/2020 at 9:51 AM

## 2020-03-10 RX ORDER — LETROZOLE 2.5 MG/1
2.5 TABLET, FILM COATED ORAL DAILY
Qty: 90 TABLET | Refills: 0 | Status: SHIPPED | OUTPATIENT
Start: 2020-03-10 | End: 2020-06-01

## 2020-03-31 RX ORDER — GABAPENTIN 300 MG/1
CAPSULE ORAL
Qty: 150 CAPSULE | Refills: 3 | OUTPATIENT
Start: 2020-03-31 | End: 2020-05-27 | Stop reason: SDUPTHER

## 2020-04-23 RX ORDER — GABAPENTIN 300 MG/1
CAPSULE ORAL
Qty: 150 CAPSULE | Refills: 3 | OUTPATIENT
Start: 2020-04-23 | End: 2020-05-23

## 2020-05-12 RX ORDER — IPRATROPIUM BROMIDE AND ALBUTEROL SULFATE 2.5; .5 MG/3ML; MG/3ML
1 SOLUTION RESPIRATORY (INHALATION) EVERY 6 HOURS PRN
Qty: 360 ML | Refills: 0 | Status: SHIPPED | OUTPATIENT
Start: 2020-05-12 | End: 2020-05-31

## 2020-05-13 RX ORDER — OMEPRAZOLE 20 MG/1
CAPSULE, DELAYED RELEASE ORAL
Qty: 60 CAPSULE | Refills: 1 | Status: SHIPPED | OUTPATIENT
Start: 2020-05-13 | End: 2020-05-26

## 2020-05-26 RX ORDER — OMEPRAZOLE 20 MG/1
CAPSULE, DELAYED RELEASE ORAL
Qty: 90 CAPSULE | Refills: 2 | Status: SHIPPED | OUTPATIENT
Start: 2020-05-26 | End: 2020-06-09

## 2020-05-27 ENCOUNTER — VIRTUAL VISIT (OUTPATIENT)
Dept: PRIMARY CARE CLINIC | Age: 55
End: 2020-05-27

## 2020-05-27 ENCOUNTER — TELEPHONE (OUTPATIENT)
Dept: PRIMARY CARE CLINIC | Age: 55
End: 2020-05-27

## 2020-05-27 PROCEDURE — 99443 PR PHYS/QHP TELEPHONE EVALUATION 21-30 MIN: CPT | Performed by: NURSE PRACTITIONER

## 2020-05-27 RX ORDER — FLUTICASONE PROPIONATE 50 MCG
SPRAY, SUSPENSION (ML) NASAL
Qty: 1 BOTTLE | Refills: 2 | Status: SHIPPED | OUTPATIENT
Start: 2020-05-27 | End: 2022-07-14 | Stop reason: SDUPTHER

## 2020-05-27 RX ORDER — FAMOTIDINE 20 MG/1
20 TABLET, FILM COATED ORAL 2 TIMES DAILY
Qty: 60 TABLET | Refills: 3 | Status: SHIPPED | OUTPATIENT
Start: 2020-05-27 | End: 2020-08-20

## 2020-05-27 RX ORDER — BUPROPION HYDROCHLORIDE 200 MG/1
100 TABLET, EXTENDED RELEASE ORAL DAILY
Qty: 30 TABLET | Refills: 0 | Status: SHIPPED
Start: 2020-05-27 | End: 2021-04-05 | Stop reason: SDUPTHER

## 2020-05-27 RX ORDER — DOXEPIN HYDROCHLORIDE 50 MG/1
50 CAPSULE ORAL NIGHTLY
Qty: 30 CAPSULE | Refills: 0 | Status: SHIPPED
Start: 2020-05-27 | End: 2021-06-01

## 2020-05-27 RX ORDER — GABAPENTIN 300 MG/1
CAPSULE ORAL
Qty: 150 CAPSULE | Refills: 2 | Status: SHIPPED | OUTPATIENT
Start: 2020-05-27 | End: 2020-09-30

## 2020-05-27 ASSESSMENT — ENCOUNTER SYMPTOMS
SHORTNESS OF BREATH: 0
CONSTIPATION: 0
SORE THROAT: 0
ABDOMINAL PAIN: 0
WHEEZING: 0
NAUSEA: 0
CHEST TIGHTNESS: 0
TROUBLE SWALLOWING: 0
VOICE CHANGE: 0
COUGH: 0
VOMITING: 0
BLOOD IN STOOL: 0
DIARRHEA: 0
EYE REDNESS: 0
RHINORRHEA: 1

## 2020-05-27 NOTE — PROGRESS NOTES
Constitutional: Negative for activity change, appetite change, fatigue, fever and unexpected weight change. HENT: Positive for rhinorrhea. Negative for congestion, ear pain, nosebleeds, sore throat, trouble swallowing and voice change. Eyes: Negative for redness and visual disturbance. Respiratory: Negative for cough, chest tightness, shortness of breath and wheezing. Cardiovascular: Negative for chest pain, palpitations and leg swelling. Gastrointestinal: Negative for abdominal pain, blood in stool, constipation, diarrhea, nausea and vomiting. Gerd symptoms. Endocrine: Negative for polydipsia, polyphagia and polyuria. Genitourinary: Negative for dysuria, frequency and urgency. Musculoskeletal: Positive for arthralgias and myalgias. Skin: Negative for rash and wound. Neurological: Negative for dizziness, speech difficulty, light-headedness and headaches. Psychiatric/Behavioral: Negative for agitation, confusion, self-injury, sleep disturbance and suicidal ideas. The patient is not nervous/anxious. I communicated with the patient and/or health care decision maker about take Prilosec 20 mg on empty stomach in the morning. Patient will begin Pepcid 20 mg twice daily. She will also begin using Flonase every day currently has been using it as needed. She will use 1 spray each nostril twice daily. She will also begin Zyrtec at bedtime. Details of this discussion including any medical advice provided: As discussed above      I affirm this is a Patient Initiated Episode with an Established Patient who has not had a related appointment within my department in the past 7 days or scheduled within the next 24 hours.     Total Time: minutes: 21-30 minutes    Note: not billable if this call serves to triage the patient into an appointment for the relevant concern      Jake Barnes         Pursuant to the emergency declaration under the [address]

## 2020-06-01 RX ORDER — LETROZOLE 2.5 MG/1
TABLET, FILM COATED ORAL
Qty: 30 TABLET | Refills: 0 | Status: SHIPPED | OUTPATIENT
Start: 2020-06-01 | End: 2020-06-23

## 2020-06-09 RX ORDER — OMEPRAZOLE 20 MG/1
CAPSULE, DELAYED RELEASE ORAL
Qty: 60 CAPSULE | Refills: 3 | Status: SHIPPED | OUTPATIENT
Start: 2020-06-09 | End: 2020-08-27 | Stop reason: ALTCHOICE

## 2020-06-09 NOTE — TELEPHONE ENCOUNTER
Derrek Olea called to request a refill on her medication.       Last office visit : 2/26/2020   Next office visit : 8/27/2020     Requested Prescriptions     Pending Prescriptions Disp Refills    omeprazole (PRILOSEC) 20 MG delayed release capsule [Pharmacy Med Name: OMEPRAZOLE DR 20 MG CAPSULE] 60 capsule 1     Sig: TAKE 1 CAPSULE BY MOUTH TWICE A DAY            Ania Andujar [place]

## 2020-06-09 NOTE — PROGRESS NOTES
6/10/2020    TELEHEALTH EVALUATION -- Audio/Visual (During VKBPN-69 public health emergency)    HPI:    Ekaterina Morse (:  1965) has requested an audio/video evaluation for the following concern(s):    · Left breast carcinoma    · BRCA 2 positive   · Iron deficiency anemia    Gerald Guillen has a diagnosis of stage IIb left-sided breast carcinoma dating back to . She was treated with combination chemotherapy and continues on Femara daily for ER MS positive disease. She continues taking her Femara  and denies any new nodules on her chest wall. She was in the office 2019 and was noted to have a slight elevated CEA, CA-19-9, alkaline phosphatase. Due to her being BRCA positive MRCP was requested. She has experienced some abdominal discomfort GERD issues that have improved some mildly-she is on Prilosec at night and additional medicine in the morning time that RADHA Linda put her on. She continues to have issues with anxiety, bipolar disorder and panic attacks. She continues to be followed by [address] [place] [address]. She reports that her A1c was around 7 and she feels that her blood sugars have been doing better. Blood pressure has been fairly stable recently. She has significant issues with arthritis and has not been able to get and to have her injections at the pain management clinic by Dr. Liam Russo. She has breathing issues, but cannot stop smoking about a year ago. She has gained weight. She is had bouts of bronchitis but has not been hospitalized. She has not had any issue with the COVID-19 other than anxiety. HEMATOLOGICAL HISTORY: Iron deficient anemia  Evaluation at the time of her breast carcinoma presentation showed microcytic anemia. Iron studies were consistent with iron deficiency. She was still menstruating at the time. Stool for blood was requested x 3 was negative.  She was treated with iron replacement therapy with MCV normalizing.     TUMOR HISTORY: Left extended release tablet TAKE ONE TABLET BY MOUTH ONCE A DAY WITH THE LARGEST MEAL OF THE DAY  RADHA Gonzalez   vitamin C (ASCORBIC ACID) 500 MG tablet Take 500 mg by mouth daily  Historical Provider, MD   cetirizine (ZYRTEC) 10 MG tablet Take 10 mg by mouth daily  Historical Provider, MD   Respiratory Therapy Supplies LUPE cpap supplies x 1 year  RADHA Gonzalez   lisinopril (PRINIVIL;ZESTRIL) 20 MG tablet TAKE 1 TABLET TWICE DAILY  RADHA Gonzalez   fluticasone-salmeterol (ADVAIR) 100-50 MCG/DOSE diskus inhaler Inhale 1 puff into the lungs 2 times daily  RADHA Sheffield   meloxicam (MOBIC) 7.5 MG tablet Take 7.5 mg by mouth Daily  Historical Provider, MD   HYDROcodone-acetaminophen (NORCO) 7.5-325 MG per tablet Take 1 tablet by mouth 2 times daily as needed for Pain (Dr. Rosario Crespo). Historical Provider, MD   Ergocalciferol (VITAMIN D2 PO) Take 50,000 Units by mouth once a week  Historical Provider, MD   fexofenadine (ALLEGRA) 180 MG tablet Take 180 mg by mouth daily as needed  Historical Provider, MD   busPIRone (BUSPAR) 10 MG tablet Take 10 mg by mouth 3 times daily as needed  Historical Provider, MD   cyclobenzaprine (FLEXERIL) 10 MG tablet Take 1 tablet by mouth 2 times daily as needed for Muscle spasms  RADHA Gonzalez   desvenlafaxine succinate (PRISTIQ) 100 MG TB24 extended release tablet Take 100 mg by mouth daily   Historical Provider, MD   ondansetron (ZOFRAN) 4 MG tablet TAKE ONE TABLET BY MOUTH EVERY 8 HOURS AS NEEDED FOR NAUSEA AND VOMITING  Kristin Bynum MD   naphazoline-pheniramine (NAPHCON-A) 0.025-0.3 % ophthalmic solution Place 2 drops into both eyes 4 times daily Indications: NONE ON RECORD FROM [facility].  VERIFIED BY MOE KEENE RN   Historical Provider, MD       Social History     Tobacco Use    Smoking status: Former Smoker     Packs/day: 0.25     Years: 20.00     Pack years: 5.00     Types: Cigars     Start date: emergency medical treatment and/or call 911 if deemed necessary. Patient identification was verified at the start of the visit: Yes    Total time spent on this encounter: 9 minutes face-to-face with additional 5 minutes to review chart, imaging, labs    Services were provided through a video synchronous discussion virtually to substitute for in-person clinic visit. Patient was seen in her home and provider was located in the clinic at [address] [place] [address] and hematology. Orders Placed This Encounter   Procedures    Comprehensive Metabolic Panel    CEA    Cancer Antigen 15-3    Cancer Antigen 19-9         Return in about 4 months (around 10/10/2020) for With [facility]. --Angeles Carreno PA-C on 6/10/2020 at 1:09 PM    An electronic signature was used to authenticate this note.

## 2020-06-10 ENCOUNTER — VIRTUAL VISIT (OUTPATIENT)
Dept: HEMATOLOGY | Age: 55
End: 2020-06-10

## 2020-06-10 ENCOUNTER — TELEPHONE (OUTPATIENT)
Dept: HEMATOLOGY | Age: 55
End: 2020-06-10

## 2020-06-10 PROCEDURE — 99213 OFFICE O/P EST LOW 20 MIN: CPT | Performed by: PHYSICIAN ASSISTANT

## 2020-06-10 ASSESSMENT — ENCOUNTER SYMPTOMS
VOMITING: 0
CONSTIPATION: 0
TROUBLE SWALLOWING: 0
EYE DISCHARGE: 0
ABDOMINAL DISTENTION: 0
VOICE CHANGE: 0
SHORTNESS OF BREATH: 1
EYE ITCHING: 0
PHOTOPHOBIA: 0
DIARRHEA: 0
BACK PAIN: 1
SORE THROAT: 0
BLOOD IN STOOL: 0
WHEEZING: 0
NAUSEA: 0
ABDOMINAL PAIN: 0
COUGH: 0
COLOR CHANGE: 0

## 2020-06-10 NOTE — TELEPHONE ENCOUNTER
Called and got Veronica Tran registered for a telephone visit with Angel Hanna. She accepted all insurance terms.

## 2020-06-22 ENCOUNTER — TRANSCRIBE ORDERS (OUTPATIENT)
Dept: ADMINISTRATIVE | Facility: HOSPITAL | Age: 55
End: 2020-06-22

## 2020-06-22 DIAGNOSIS — Z01.818 PREOP TESTING: Primary | ICD-10-CM

## 2020-06-26 ENCOUNTER — LAB (OUTPATIENT)
Dept: LAB | Facility: HOSPITAL | Age: 55
End: 2020-06-26

## 2020-06-26 PROCEDURE — U0003 INFECTIOUS AGENT DETECTION BY NUCLEIC ACID (DNA OR RNA); SEVERE ACUTE RESPIRATORY SYNDROME CORONAVIRUS 2 (SARS-COV-2) (CORONAVIRUS DISEASE [COVID-19]), AMPLIFIED PROBE TECHNIQUE, MAKING USE OF HIGH THROUGHPUT TECHNOLOGIES AS DESCRIBED BY CMS-2020-01-R: HCPCS | Performed by: PAIN MEDICINE

## 2020-06-26 PROCEDURE — C9803 HOPD COVID-19 SPEC COLLECT: HCPCS | Performed by: PAIN MEDICINE

## 2020-06-26 NOTE — TELEPHONE ENCOUNTER
Pt called-having trouble getting her inhaler. She said pharmacy told her that we will not approve the request. Please check into this and advise patient.

## 2020-06-27 LAB
COVID LABCORP PRIORITY: NORMAL
SARS-COV-2 RNA RESP QL NAA+PROBE: NOT DETECTED

## 2020-07-07 RX ORDER — SITAGLIPTIN 50 MG/1
TABLET, FILM COATED ORAL
Qty: 90 TABLET | Refills: 1 | Status: SHIPPED | OUTPATIENT
Start: 2020-07-07 | End: 2020-12-17

## 2020-08-02 RX ORDER — IPRATROPIUM BROMIDE AND ALBUTEROL SULFATE 2.5; .5 MG/3ML; MG/3ML
SOLUTION RESPIRATORY (INHALATION)
Qty: 360 ML | Refills: 1 | Status: SHIPPED | OUTPATIENT
Start: 2020-08-02 | End: 2020-10-02

## 2020-08-20 RX ORDER — FAMOTIDINE 20 MG/1
TABLET, FILM COATED ORAL
Qty: 180 TABLET | Refills: 1 | Status: SHIPPED | OUTPATIENT
Start: 2020-08-20 | End: 2021-04-02 | Stop reason: SDUPTHER

## 2020-08-20 NOTE — TELEPHONE ENCOUNTER
Bing Jones called to request a refill on her medication.       Last office visit : 5/27/2020   Next office visit : 8/27/2020     Requested Prescriptions     Pending Prescriptions Disp Refills    famotidine (PEPCID) 20 MG tablet [Pharmacy Med Name: FAMOTIDINE 20 MG TABLET] 180 tablet 1     Sig: TAKE 1 TABLET BY MOUTH TWICE A DAY            Ania Stephen [place]

## 2020-09-14 RX ORDER — METFORMIN HYDROCHLORIDE 500 MG/1
TABLET, EXTENDED RELEASE ORAL
Qty: 90 TABLET | Refills: 1 | Status: SHIPPED | OUTPATIENT
Start: 2020-09-14 | End: 2021-03-12

## 2020-09-14 NOTE — TELEPHONE ENCOUNTER
Loulou Stokes called to request a refill on her medication.       Last office visit : 8/27/2020   Next office visit : 9/30/2020     Requested Prescriptions     Pending Prescriptions Disp Refills    metFORMIN (GLUCOPHAGE-XR) 500 MG extended release tablet [Pharmacy Med Name: METFORMIN HCL  MG TABLET] 90 tablet 1     Sig: TAKE ONE TABLET BY MOUTH ONCE A DAY WITH THE [address] [place] [address], [address] [place] [address]

## 2020-09-21 ENCOUNTER — TRANSCRIBE ORDERS (OUTPATIENT)
Dept: ADMINISTRATIVE | Facility: HOSPITAL | Age: 55
End: 2020-09-21

## 2020-09-21 DIAGNOSIS — Z01.818 PREOPERATIVE TESTING: Primary | ICD-10-CM

## 2020-09-23 RX ORDER — LISINOPRIL 20 MG/1
TABLET ORAL
Qty: 180 TABLET | Refills: 3 | Status: SHIPPED | OUTPATIENT
Start: 2020-09-23 | End: 2021-04-05 | Stop reason: SDUPTHER

## 2020-09-24 ENCOUNTER — TRANSCRIBE ORDERS (OUTPATIENT)
Dept: ADMINISTRATIVE | Facility: HOSPITAL | Age: 55
End: 2020-09-24

## 2020-09-24 DIAGNOSIS — Z01.818 PREOP TESTING: Primary | ICD-10-CM

## 2020-09-28 ENCOUNTER — TRANSCRIBE ORDERS (OUTPATIENT)
Dept: LAB | Facility: HOSPITAL | Age: 55
End: 2020-09-28

## 2020-09-28 DIAGNOSIS — Z01.818 PREOPERATIVE TESTING: Primary | ICD-10-CM

## 2020-09-30 RX ORDER — GABAPENTIN 300 MG/1
CAPSULE ORAL
Qty: 150 CAPSULE | Refills: 2 | Status: SHIPPED | OUTPATIENT
Start: 2020-09-30 | End: 2020-12-18

## 2020-09-30 NOTE — TELEPHONE ENCOUNTER
Gela Kent called to request a refill on her medication. Last office visit : 8/27/2020   Next office visit : 10/13/2020     Last UDS:   Amphetamine Screen, Urine   Date Value Ref Range Status   01/16/2017 Negative Negative <1000 ng/mL Final     Benzodiazepine Screen, Urine   Date Value Ref Range Status   01/16/2017 Positive (A) Negative <100 ng/mL Final     Cocaine Metabolite Screen, Urine   Date Value Ref Range Status   01/16/2017 Negative Negative <300 ng/mL Final     Opiate Scrn, Ur   Date Value Ref Range Status   01/16/2017 Negative Negative < 300 ng/mL Final       Last Sirisha Lyme: UTD today, appropriate           Medication Contract: none on file  Last Fill: 8/29/20    Requested Prescriptions     Pending Prescriptions Disp Refills    gabapentin (NEURONTIN) 300 MG capsule [Pharmacy Med Name: GABAPENTIN 300 MG CAPSULE] 150 capsule 2     Sig: TAKE 2 CAPSULES BY MOUTH IN THE MORNING & 1 CAP AT NOON, THEN 2 CAPS AT BEDTIME         Please approve or refuse this medication.    Ash Lindsay [place]

## 2020-10-01 ENCOUNTER — LAB (OUTPATIENT)
Dept: LAB | Facility: HOSPITAL | Age: 55
End: 2020-10-01

## 2020-10-01 DIAGNOSIS — Z01.818 PREOPERATIVE TESTING: ICD-10-CM

## 2020-10-01 PROCEDURE — C9803 HOPD COVID-19 SPEC COLLECT: HCPCS

## 2020-10-01 PROCEDURE — U0003 INFECTIOUS AGENT DETECTION BY NUCLEIC ACID (DNA OR RNA); SEVERE ACUTE RESPIRATORY SYNDROME CORONAVIRUS 2 (SARS-COV-2) (CORONAVIRUS DISEASE [COVID-19]), AMPLIFIED PROBE TECHNIQUE, MAKING USE OF HIGH THROUGHPUT TECHNOLOGIES AS DESCRIBED BY CMS-2020-01-R: HCPCS

## 2020-10-02 LAB
COVID LABCORP PRIORITY: NORMAL
SARS-COV-2 RNA RESP QL NAA+PROBE: NOT DETECTED

## 2020-10-02 RX ORDER — IPRATROPIUM BROMIDE AND ALBUTEROL SULFATE 2.5; .5 MG/3ML; MG/3ML
SOLUTION RESPIRATORY (INHALATION)
Qty: 360 ML | Refills: 1 | Status: SHIPPED | OUTPATIENT
Start: 2020-10-02

## 2020-10-22 RX ORDER — LETROZOLE 2.5 MG/1
TABLET, FILM COATED ORAL
Qty: 90 TABLET | Refills: 1 | Status: SHIPPED | OUTPATIENT
Start: 2020-10-22 | End: 2021-06-01 | Stop reason: ALTCHOICE

## 2020-10-23 ENCOUNTER — TRANSCRIBE ORDERS (OUTPATIENT)
Dept: ADMINISTRATIVE | Facility: HOSPITAL | Age: 55
End: 2020-10-23

## 2020-10-23 DIAGNOSIS — Z01.818 PREOP TESTING: Primary | ICD-10-CM

## 2020-10-27 ENCOUNTER — LAB (OUTPATIENT)
Dept: LAB | Facility: HOSPITAL | Age: 55
End: 2020-10-27

## 2020-10-27 PROCEDURE — C9803 HOPD COVID-19 SPEC COLLECT: HCPCS | Performed by: PAIN MEDICINE

## 2020-10-27 PROCEDURE — U0003 INFECTIOUS AGENT DETECTION BY NUCLEIC ACID (DNA OR RNA); SEVERE ACUTE RESPIRATORY SYNDROME CORONAVIRUS 2 (SARS-COV-2) (CORONAVIRUS DISEASE [COVID-19]), AMPLIFIED PROBE TECHNIQUE, MAKING USE OF HIGH THROUGHPUT TECHNOLOGIES AS DESCRIBED BY CMS-2020-01-R: HCPCS | Performed by: PAIN MEDICINE

## 2020-10-28 LAB
COVID LABCORP PRIORITY: NORMAL
SARS-COV-2 RNA RESP QL NAA+PROBE: NOT DETECTED

## 2020-11-24 RX ORDER — OMEPRAZOLE 20 MG/1
20 CAPSULE, DELAYED RELEASE ORAL DAILY
COMMUNITY
End: 2021-04-02 | Stop reason: SDUPTHER

## 2020-12-08 ENCOUNTER — TELEPHONE (OUTPATIENT)
Dept: GASTROENTEROLOGY | Age: 55
End: 2020-12-08

## 2020-12-09 ENCOUNTER — VIRTUAL VISIT (OUTPATIENT)
Dept: GASTROENTEROLOGY | Age: 55
End: 2020-12-09

## 2020-12-09 PROBLEM — R19.5 ABNORMAL STOOL CALIBER: Status: ACTIVE | Noted: 2020-12-09

## 2020-12-09 PROBLEM — R63.0 LOSS OF APPETITE: Status: ACTIVE | Noted: 2020-12-09

## 2020-12-09 PROBLEM — R10.10 UPPER ABDOMINAL PAIN: Status: ACTIVE | Noted: 2020-12-09

## 2020-12-09 PROBLEM — R19.4 CHANGE IN BOWEL HABITS: Status: ACTIVE | Noted: 2020-12-09

## 2020-12-09 PROBLEM — R19.8 ALTERNATING CONSTIPATION AND DIARRHEA: Status: ACTIVE | Noted: 2020-12-09

## 2020-12-09 PROBLEM — Z86.0100 PERSONAL HISTORY OF COLONIC POLYPS: Status: ACTIVE | Noted: 2020-12-09

## 2020-12-09 PROBLEM — Z86.010 PERSONAL HISTORY OF COLONIC POLYPS: Status: ACTIVE | Noted: 2020-12-09

## 2020-12-09 PROBLEM — R63.4 UNINTENTIONAL WEIGHT LOSS: Status: ACTIVE | Noted: 2020-12-09

## 2020-12-09 PROBLEM — R14.2 BELCHING: Status: ACTIVE | Noted: 2020-12-09

## 2020-12-09 PROCEDURE — 99213 OFFICE O/P EST LOW 20 MIN: CPT | Performed by: NURSE PRACTITIONER

## 2020-12-09 ASSESSMENT — ENCOUNTER SYMPTOMS
ABDOMINAL DISTENTION: 0
VOICE CHANGE: 0
ABDOMINAL PAIN: 1
COUGH: 0
BACK PAIN: 0
BLOOD IN STOOL: 0
SHORTNESS OF BREATH: 0
VOMITING: 0
RECTAL PAIN: 0
TROUBLE SWALLOWING: 0
ANAL BLEEDING: 0
NAUSEA: 0
SORE THROAT: 0
DIARRHEA: 1
CONSTIPATION: 1

## 2020-12-09 NOTE — PATIENT INSTRUCTIONS
You are going to have an Endoscopy and here are some basic instructions:    Nothing to eat or drink after midnight EXCEPT:  PLEASE TAKE MEDICATION(S) FOR HIGH BLOOD PRESSURE, SEIZURES, HEART, AND THYROID WITH A SIP OF WATER AT LEAST 2 HOURS PRIOR TO ARRIVAL TIME.   YOU MAY ALSO TAKE ANY INHALERS YOU ARE PRESCRIBED. You will not be able to drive for 24 hours after the procedure due to sedation. Bring a  with you the day of the procedure. No aspirin, ibuprofen, naproxen, fish oil or vitamin E for 5 days before procedure. Continue current medications. If you are on blood thinners, clearance from the prescribing physician will be obtained before your procedure is scheduled. Increased [email or web address] may be associated with discontinuation of your blood thinner and include, but not limited to, stroke, TIA, or cardiac event. If biopsies are taken during the procedure they will be sent to a pathologist for analysis. You will be notified by mail of the pathology results in 2-3 weeks. Your physician may also schedule a follow up appointment with the nurse practitioner to discuss pathology, symptoms or to check if you have had any problems related to your procedure. If you prefer not to return to the office after your procedure please discuss this with your physician on the day of your procedure. You are going to have a colonoscopy and here are some instructions: You will be given specific directions regarding restrictions to diet and bowel prep instructions including laxatives. Please read these instructions one week prior to your scheduled procedure to ensure that you are prepared. Follow prep instructions provided for bowel prep. Take all of the bowel prep as directed. If you are having problems with nausea, stop your prep for 30-45 min to allow the nausea to subside before resuming your prep.      Nothing to eat or drink after midnight the day of the procedure EXCEPT:  PLEASE TAKE soups    Foods and drinks to avoid  Avoid foods that contain too much fiber. Stay clear of dark colored beverages. They can stick to the walls of the digestive tract and make it difficult to differentiate from blood. Some of these foods are:  Red meat, rice, nuts and vegetables   Milk, other milk based fluids and cream   Most fruit and puddings   Whole grain pasta   Cereals, bran and seeds   Colored beverages, especially those that are red or purple in color   Red colored Jell-O   On the day before the colonoscopy, continue to drink plenty of clear fluids. It is important   to keep yourself hydrated before the exam.     Please follow all instructions as provided for cleansing the bowel. Failure to have an adequately prepped colon may cause you to have incomplete exam with further testing required.

## 2020-12-09 NOTE — PROGRESS NOTES
2020     Pt location: pt home    TELEHEALTH EVALUATION -- Audio/Visual (During [id number]-59 public health emergency)    HPI:    Abdifatah Kam (:  1965) has requested an audio/video evaluation for the following concern(s):    Pt made an appt for c/o abdominal pain. Started 6 months ago. Upper abdomen. Waxes and wanes. Occurs with eating. Described as \"cramping. \"  If she takes prilosec BID this makes the pain worse. Also if she takes protonix, this makes the pain worse. Currently taking prilosec in morning and pepcid in the evening and this doesn't make the pain worse but also doesn't help either. Associated with belching. Has alternating constipation with diarrhea. This started about 6 months ago. Some days she has normal BM, then other times she has hard chepe, and sometimes it is diarrhea with a \"blow out. \"  Also reports she has ribbon shaped stools. Also reports she has no appetite. And has lost 27 pounds over the past 3 months because she doesn't want to eat. She forces herself to eat. GI scope reports in history per MA per OV policy, I reviewed this. Review of Systems   Constitutional: Positive for appetite change and unexpected weight change. Negative for fatigue and fever. HENT: Negative for sore throat, trouble swallowing and voice change. Respiratory: Negative for cough and shortness of breath. Cardiovascular: Negative for chest pain, palpitations and leg swelling. Gastrointestinal: Positive for abdominal pain, constipation and diarrhea. Negative for abdominal distention, anal bleeding, blood in stool, nausea, rectal pain and vomiting. Genitourinary: Negative for hematuria. Musculoskeletal: Negative for arthralgias, back pain and neck pain. Neurological: Negative for dizziness, weakness, light-headedness and headaches. Psychiatric/Behavioral: Negative for dysphoric mood and sleep disturbance. The patient is not nervous/anxious.     All other systems HYDROcodone-acetaminophen (NORCO) 7.5-325 MG per tablet Take 1 tablet by mouth 2 times daily as needed for Pain (Dr. Mariza Constantino). Historical Provider, MD   busPIRone (BUSPAR) 10 MG tablet Take 10 mg by mouth 3 times daily as needed  Historical Provider, MD   cyclobenzaprine (FLEXERIL) 10 MG tablet Take 1 tablet by mouth 2 times daily as needed for Muscle spasms  RADHA Garcias   desvenlafaxine succinate (PRISTIQ) 100 MG TB24 extended release tablet Take 100 mg by mouth daily   Historical Provider, MD       Social History     Tobacco Use    Smoking status: Former Smoker     Packs/day: 0.25     Years: 20.00     Pack years: 5.00     Types: Cigars     Start date: 2/3/2016     Last attempt to quit: 12/21/2016     Years since quitting: 3.9    Smokeless tobacco: Never Used    Tobacco comment: Smokes Raford Coho a day. Substance Use Topics    Alcohol use: No     Alcohol/week: 0.0 standard drinks    Drug use: No          PHYSICAL EXAMINATION:  [ INSTRUCTIONS:  \"[x]\" Indicates a positive item  \"[]\" Indicates a negative item  -- DELETE ALL ITEMS NOT EXAMINED]  Vital Signs: (As obtained by patient/caregiver or practitioner observation)    Blood pressure-  Heart rate-    Respiratory rate-    Temperature-  Pulse oximetry-     Constitutional: [x] Appears well-developed and well-nourished [x] No apparent distress      [] Abnormal-   Mental status  [x] Alert and awake  [x] Oriented to person/place/time [x]Able to follow commands      Eyes:  EOM    [x]  Normal  [] Abnormal-  Sclera  [x]  Normal  [] Abnormal -         Discharge [x]  None visible  [] Abnormal -    HENT:   [x] Normocephalic, atraumatic.   [] Abnormal   [x] Mouth/Throat: Mucous membranes are moist.     External Ears [x] Normal  [] Abnormal-     Neck: [x] No visualized mass     Pulmonary/Chest: [x] Respiratory effort normal.  [x] No visualized signs of difficulty breathing or respiratory distress        [] Abnormal-      Musculoskeletal:   [] guardian) has also been advised to contact this office for worsening conditions or problems, and seek emergency medical treatment and/or call 911 if deemed necessary. Patient identification was verified at the start of the visit: yes    Total time spent on this encounter: not billed based on time    Services were provided through a video synchronous discussion virtually to substitute for in-person clinic visit. Patient and provider were located at their individual homes. --RADHA Estevez on 12/9/2020 at 3:15 PM    An electronic signature was used to authenticate this note.

## 2020-12-18 RX ORDER — GABAPENTIN 300 MG/1
CAPSULE ORAL
Qty: 150 CAPSULE | Refills: 2 | Status: SHIPPED | OUTPATIENT
Start: 2020-12-18 | End: 2021-04-01

## 2020-12-18 NOTE — TELEPHONE ENCOUNTER
Janith Severe called to request a refill on her medication. Last office visit : 10/13/2020   Next office visit : Visit date not found     Last Dellis Ridin2020  Medication Contract:not on file  Last UDS: not on file  Last Rx:10/24/2020    Amphetamine Screen, Urine   Date Value Ref Range Status   2017 Negative Negative <1000 ng/mL Final     Benzodiazepine Screen, Urine   Date Value Ref Range Status   2017 Positive (A) Negative <100 ng/mL Final     Cocaine Metabolite Screen, Urine   Date Value Ref Range Status   2017 Negative Negative <300 ng/mL Final     Opiate Scrn, Ur   Date Value Ref Range Status   2017 Negative Negative < 300 ng/mL Final           Requested Prescriptions     Pending Prescriptions Disp Refills    gabapentin (NEURONTIN) 300 MG capsule [Pharmacy Med Name: GABAPENTIN 300 MG CAPSULE] 150 capsule 2     Sig: TAKE 2 CAPSULES BY MOUTH IN THE MORNING & 1 CAP AT NOON, THEN 2 CAPS AT BEDTIME         Please approve or refuse this medication.    Seferino Jimenes

## 2020-12-30 ENCOUNTER — TELEPHONE (OUTPATIENT)
Dept: GASTROENTEROLOGY | Age: 55
End: 2020-12-30

## 2020-12-30 NOTE — TELEPHONE ENCOUNTER
Called patient to remind them of their procedure on 1/6/21 and will get Covid test done on 12/31/20  - NO ANSW/NO VM

## 2020-12-31 ENCOUNTER — OFFICE VISIT (OUTPATIENT)
Age: 55
End: 2020-12-31

## 2020-12-31 VITALS — TEMPERATURE: 97.1 F | OXYGEN SATURATION: 97 % | HEART RATE: 103 BPM

## 2021-01-01 LAB — SARS-COV-2, PCR: NOT DETECTED

## 2021-01-04 ENCOUNTER — ANESTHESIA EVENT (OUTPATIENT)
Dept: ENDOSCOPY | Age: 56
End: 2021-01-04
Payer: MEDICARE

## 2021-01-06 ENCOUNTER — HOSPITAL ENCOUNTER (OUTPATIENT)
Age: 56
Setting detail: OUTPATIENT SURGERY
Discharge: HOME | End: 2021-01-06
Attending: INTERNAL MEDICINE | Admitting: INTERNAL MEDICINE
Payer: MEDICARE

## 2021-01-06 ENCOUNTER — ANESTHESIA (OUTPATIENT)
Dept: ENDOSCOPY | Age: 56
End: 2021-01-06
Payer: MEDICARE

## 2021-01-06 VITALS
SYSTOLIC BLOOD PRESSURE: 99 MMHG | TEMPERATURE: 97.4 F | BODY MASS INDEX: 31.71 KG/M2 | RESPIRATION RATE: 20 BRPM | HEART RATE: 99 BPM | DIASTOLIC BLOOD PRESSURE: 67 MMHG | OXYGEN SATURATION: 95 % | HEIGHT: 67 IN | WEIGHT: 202 LBS

## 2021-01-06 VITALS — SYSTOLIC BLOOD PRESSURE: 92 MMHG | OXYGEN SATURATION: 93 % | DIASTOLIC BLOOD PRESSURE: 44 MMHG

## 2021-01-06 LAB
GLUCOSE BLD-MCNC: 155 MG/DL (ref 70–99)
GLUCOSE BLD-MCNC: 286 MG/DL (ref 70–99)
PERFORMED ON: ABNORMAL
PERFORMED ON: ABNORMAL

## 2021-01-06 PROCEDURE — 43239 EGD BIOPSY SINGLE/MULTIPLE: CPT | Performed by: INTERNAL MEDICINE

## 2021-01-06 PROCEDURE — 3609017100 HC EGD: Performed by: INTERNAL MEDICINE

## 2021-01-06 PROCEDURE — 6370000000 HC RX 637 (ALT 250 FOR IP): Performed by: NURSE ANESTHETIST, CERTIFIED REGISTERED

## 2021-01-06 PROCEDURE — 2500000003 HC RX 250 WO HCPCS: Performed by: NURSE ANESTHETIST, CERTIFIED REGISTERED

## 2021-01-06 PROCEDURE — 2709999900 HC NON-CHARGEABLE SUPPLY: Performed by: INTERNAL MEDICINE

## 2021-01-06 PROCEDURE — 7100000010 HC PHASE II RECOVERY - FIRST 15 MIN: Performed by: INTERNAL MEDICINE

## 2021-01-06 PROCEDURE — 82947 ASSAY GLUCOSE BLOOD QUANT: CPT

## 2021-01-06 PROCEDURE — 88342 IMHCHEM/IMCYTCHM 1ST ANTB: CPT

## 2021-01-06 PROCEDURE — 2580000003 HC RX 258: Performed by: INTERNAL MEDICINE

## 2021-01-06 PROCEDURE — 6360000002 HC RX W HCPCS: Performed by: NURSE ANESTHETIST, CERTIFIED REGISTERED

## 2021-01-06 PROCEDURE — 45378 DIAGNOSTIC COLONOSCOPY: CPT | Performed by: INTERNAL MEDICINE

## 2021-01-06 PROCEDURE — 3700000000 HC ANESTHESIA ATTENDED CARE: Performed by: INTERNAL MEDICINE

## 2021-01-06 PROCEDURE — 3609027000 HC COLONOSCOPY: Performed by: INTERNAL MEDICINE

## 2021-01-06 PROCEDURE — 7100000011 HC PHASE II RECOVERY - ADDTL 15 MIN: Performed by: INTERNAL MEDICINE

## 2021-01-06 PROCEDURE — 3700000001 HC ADD 15 MINUTES (ANESTHESIA): Performed by: INTERNAL MEDICINE

## 2021-01-06 PROCEDURE — 88305 TISSUE EXAM BY PATHOLOGIST: CPT

## 2021-01-06 RX ORDER — PROPOFOL 10 MG/ML
INJECTION, EMULSION INTRAVENOUS PRN
Status: DISCONTINUED | OUTPATIENT
Start: 2021-01-06 | End: 2021-01-06 | Stop reason: SDUPTHER

## 2021-01-06 RX ORDER — MIDAZOLAM HYDROCHLORIDE 1 MG/ML
INJECTION INTRAMUSCULAR; INTRAVENOUS PRN
Status: DISCONTINUED | OUTPATIENT
Start: 2021-01-06 | End: 2021-01-06 | Stop reason: SDUPTHER

## 2021-01-06 RX ORDER — LIDOCAINE HYDROCHLORIDE 20 MG/ML
INJECTION, SOLUTION INFILTRATION; PERINEURAL PRN
Status: DISCONTINUED | OUTPATIENT
Start: 2021-01-06 | End: 2021-01-06 | Stop reason: SDUPTHER

## 2021-01-06 RX ORDER — FENTANYL CITRATE 50 UG/ML
INJECTION, SOLUTION INTRAMUSCULAR; INTRAVENOUS PRN
Status: DISCONTINUED | OUTPATIENT
Start: 2021-01-06 | End: 2021-01-06 | Stop reason: SDUPTHER

## 2021-01-06 RX ORDER — SODIUM CHLORIDE, SODIUM LACTATE, POTASSIUM CHLORIDE, CALCIUM CHLORIDE 600; 310; 30; 20 MG/100ML; MG/100ML; MG/100ML; MG/100ML
INJECTION, SOLUTION INTRAVENOUS CONTINUOUS
Status: DISCONTINUED | OUTPATIENT
Start: 2021-01-06 | End: 2021-01-06 | Stop reason: HOSPADM

## 2021-01-06 RX ADMIN — FENTANYL CITRATE 50 MCG: 50 INJECTION INTRAMUSCULAR; INTRAVENOUS at 08:16

## 2021-01-06 RX ADMIN — PROPOFOL 300 MG: 10 INJECTION, EMULSION INTRAVENOUS at 08:17

## 2021-01-06 RX ADMIN — MIDAZOLAM 2 MG: 1 INJECTION INTRAMUSCULAR; INTRAVENOUS at 08:13

## 2021-01-06 RX ADMIN — LIDOCAINE HYDROCHLORIDE 40 MG: 20 INJECTION, SOLUTION INFILTRATION; PERINEURAL at 08:17

## 2021-01-06 RX ADMIN — INSULIN HUMAN 10 UNITS: 100 INJECTION, SOLUTION PARENTERAL at 07:50

## 2021-01-06 RX ADMIN — SODIUM CHLORIDE, POTASSIUM CHLORIDE, SODIUM LACTATE AND CALCIUM CHLORIDE: 600; 310; 30; 20 INJECTION, SOLUTION INTRAVENOUS at 07:41

## 2021-01-06 ASSESSMENT — PAIN SCALES - GENERAL: PAINLEVEL_OUTOF10: 0

## 2021-01-06 NOTE — ANESTHESIA PRE PROCEDURE
Department of Anesthesiology  Preprocedure Note       Name:  Irene Coombs   Age:  63 y.o.  :  1965                                          MRN:  [id number]         Date:  2021      Surgeon: Brisa Clifford):  Galina Marion MD    Procedure: Procedure(s):  EGD ESOPHAGOGASTRODUODENOSCOPY  COLONOSCOPY DIAGNOSTIC    Medications prior to admission:   Prior to Admission medications    Medication Sig Start Date End Date Taking?  Authorizing Provider   gabapentin (NEURONTIN) 300 MG capsule TAKE 2 CAPSULES BY MOUTH IN THE MORNING & 1 CAP AT NOON, THEN 2 CAPS AT BEDTIME 20  RADHA Diaz   JANUVIA 50 MG tablet TAKE 1 TABLET BY MOUTH EVERY DAY 20   RADHA Diaz   omeprazole (PRILOSEC) 20 MG delayed release capsule Take 20 mg by mouth daily    Historical Provider, MD   VITAMIN D PO Take by mouth daily    Historical Provider, MD   guaiFENesin (MUCINEX PO) Take by mouth nightly    Historical Provider, MD   letrozole ([address]) 2.5 MG tablet TAKE 1 TABLET BY MOUTH EVERY DAY 10/22/20   Tayo Linares PA-C   ipratropium-albuterol (DUONEB) 0.5-2.5 (3) MG/3ML SOLN nebulizer solution INHALE ONE AMPULE INTO THE LUNGS EVERY 6 HOURS AS NEEDED FOR SHORTNESS OF BREATH 10/2/20   RADHA Diaz   lisinopril (PRINIVIL;ZESTRIL) 20 MG tablet TAKE 1 TABLET BY MOUTH TWICE A DAY 20   RADHA Diaz   metFORMIN (GLUCOPHAGE-XR) 500 MG extended release tablet TAKE ONE TABLET BY MOUTH ONCE A DAY WITH THE LARGEST MEAL OF THE DAY 20   RADHA Diaz   famotidine (PEPCID) 20 MG tablet TAKE 1 TABLET BY MOUTH TWICE A DAY  Patient taking differently: Indications: takes one nightly  20   RADHA Diaz   fluticasone-salmeterol (ADVAIR) 100-50 MCG/DOSE diskus inhaler Inhale 1 puff into the lungs 2 times daily 20  RADHA Diaz   doxepin (SINEQUAN) 50 MG capsule Take 1 capsule by mouth nightly Take 1 to 2 capsules by mouth at bedtime as needed for insomnia d/c amitriptyline 5/27/20   RADHA Bishop   buPROPion [facility] SR) 200 MG extended release tablet Take 1 tablet by mouth daily 5/27/20   RADHA Bishop   fluticasone [place]) 50 MCG/ACT nasal spray One spray each nostril twice daily. 5/27/20   RADHA Bishop   vitamin C (ASCORBIC ACID) 500 MG tablet Take 500 mg by mouth daily    Historical Provider, MD   Respiratory Therapy Supplies LUPE cpap supplies x 1 year 11/22/19   RADHA Bishop   HYDROcodone-acetaminophen (NORCO) 7.5-325 MG per tablet Take 1 tablet by mouth 2 times daily as needed for Pain (Dr. Blas Plaza). 12/27/18   Historical Provider, MD   busPIRone (BUSPAR) 10 MG tablet Take 10 mg by mouth 3 times daily as needed    Historical Provider, MD   cyclobenzaprine (FLEXERIL) 10 MG tablet Take 1 tablet by mouth 2 times daily as needed for Muscle spasms 10/29/18   RADHA Bishop   desvenlafaxine succinate (PRISTIQ) 100 MG TB24 extended release tablet Take 100 mg by mouth daily     Historical Provider, MD       Current medications:    Current Facility-Administered Medications   Medication Dose Route Frequency Provider Last Rate Last Admin    lactated ringers infusion   Intravenous Continuous Gavioat Stager, MD           Allergies:     Allergies   Allergen Reactions    Tape [place] Tape]      Surgical tape       Problem List:    Patient Active Problem List   Diagnosis Code    Personal history of breast cancer Z85.3    S/P breast reconstruction Z98.890    Axillary adenopathy, left R59.0    Major depressive disorder, recurrent episode, moderate (HCC) F33.1    Mononeuropathy G58.9    Globus sensation R09.89    Chronic heartburn R12    Generalized anxiety disorder F41.1    Obesity (BMI 35.0-39.9 without comorbidity) E66.9    Dysphagia R13.10    Gastritis K29.70    Suicidal ideation R45.851    Grief at loss of child F45.21, Z63.4    History of colonic polyps Z86.010    Vitamin D deficiency E55.9    Chemotherapy-induced neuropathy (HCC) G62.0, T45.1X5A    Chronic chest wall pain R07.89, G89.29    S/P mastectomy, bilateral Z90.13    Medication refill Z76.0    Positive depression screening Z13.31    Decreased mobility R26.89    At risk for falls Z91.81    Type 2 diabetes mellitus with hyperosmolarity without coma, without long-term current use of insulin (HCC) E11.00    Chronic bronchitis (HCC) J42    Hypertension, essential, benign I10    Insomnia G47.00    Osteoarthritis of multiple joints M15.9    Alternating constipation and diarrhea R19.8    Personal history of colonic polyps Z86.010    Change in bowel habits R19.4    Abnormal stool caliber R19.5    Upper abdominal pain R10.10    Belching R14.2    Unintentional weight loss R63.4    Loss of appetite R63.0       Past Medical History:        Diagnosis Date    Allergic rhinitis     Anxiety     Breast cancer ([address] 10/2010    Left    Bronchitis     Carpal tunnel syndrome     R and L    Chest pain     Chronic back pain     Chronic bronchitis (HCC)     Depression     Diabetes mellitus (HCC)     GERD (gastroesophageal reflux disease)     Headache(784.0)     Heart palpitations     Mononeuropathy     Osteoarthritis     Sleep apnea     Wears glasses        Past Surgical History:        Procedure Laterality Date    ANKLE FRACTURE SURGERY Right 2014    BACK SURGERY  1993    BREAST RECONSTRUCTION  6/2011    BREAST SURGERY  6/2011    emergency removal of Left breast expander    COLONOSCOPY N/A 2/5/2016    Dr Jesus Childers-HP x 1, serrated AP x 1, 12 month recall    EGD COLONOSCOPY N/A 6/3/2016    w/empiric dilation to 48 Greek (16mm)-Dr RONY Childers, Nafisa (-)    HYSTERECTOMY      MASTECTOMY, BILATERAL Bilateral 03/2011    OTHER SURGICAL HISTORY      R leg & ankle fracture due to fall repair-[facility]    UPPER GASTROINTESTINAL ENDOSCOPY  6/3/2016    w/empiric dilation to 48 [place] (16mm)-Nafisa Ramsey (-)       Social History:    Social History     Tobacco Use    Smoking status: Former Smoker     Packs/day: 0.25     Years: 20.00     Pack years: 5.00     Types: Cigars     Start date: 2/3/2016     Quit date: 2016     Years since quittin.0    Smokeless tobacco: Never Used    Tobacco comment: Smokes [place] a day. Substance Use Topics    Alcohol use: No     Alcohol/week: 0.0 standard drinks                                Counseling given: Not Answered  Comment: Smokes Ciggerelos now,1-2 a day. Vital Signs (Current):   Vitals:    21 0714   BP: 115/80   Pulse: 105   Resp: 20   Temp: 97.4 °F (36.3 °C)   TempSrc: Temporal   SpO2: 99%   Weight: 202 lb (91.6 kg)   Height: 5' 7\" (1.702 m)                                              BP Readings from Last 3 Encounters:   21 115/80   20 130/84   19 118/72       NPO Status: Time of last liquid consumption: 0330                        Time of last solid consumption: 1700                        Date of last liquid consumption: 21                        Date of last solid food consumption: 21    BMI:   Wt Readings from Last 3 Encounters:   21 202 lb (91.6 kg)   20 245 lb (111.1 kg)   19 243 lb (110.2 kg)     Body mass index is 31.64 kg/m².     CBC:   Lab Results   Component Value Date    WBC 10.3 2017    RBC 5.35 2017    HGB 16.0 2017    HCT 46.7 2017    MCV 87.3 2017    RDW 14.3 2017     2017       CMP:   Lab Results   Component Value Date     2017     2011    K 3.2 2017    K 4.5 2011    CL 98 2017     2011    CO2 25 2017    BUN 8 2017    CREATININE 0.6 2017    CREATININE 0.6 2011    LABGLOM >60 2017    GLUCOSE 134 2017    PROT 6.8 2017    PROT 6.6 2011    CALCIUM 9.3 2017    BILITOT 0.5 2017    ALKPHOS 99 01/18/2017    ALKPHOS 129 11/03/2011    AST 17 01/18/2017    ALT 13 01/18/2017       POC Tests: No results for input(s): POCGLU, POCNA, POCK, POCCL, POCBUN, POCHEMO, POCHCT in the last 72 hours. Coags: No results found for: PROTIME, INR, APTT    HCG (If Applicable):   Lab Results   Component Value Date    PREGTESTUR Negative 01/16/2017        ABGs: No results found for: PHART, PO2ART, DAD0LVD, HYW1FAI, BEART, Z1MKVCNC     Type & Screen (If Applicable):  No results found for: LABABO, LABRH    Drug/Infectious Status (If Applicable):  No results found for: HIV, HEPCAB    COVID-19 Screening (If Applicable):   Lab Results   Component Value Date    COVID19 Not Detected 12/31/2020         Anesthesia Evaluation  Patient summary reviewed and Nursing notes reviewed no history of anesthetic complications:   Airway: Mallampati: II  TM distance: >3 FB   Neck ROM: full  Mouth opening: < 3 FB Dental: normal exam         Pulmonary:normal exam    (+) COPD:  sleep apnea:                            ROS comment: Former smoker- quit 2016   Cardiovascular:  Exercise tolerance: poor (<4 METS),   (+) hypertension:,          Beta Blocker:  Not on Beta Blocker         Neuro/Psych:   (+) neuromuscular disease (chemotherapy induced neuropathy):, headaches:, psychiatric history:depression/anxiety              ROS comment: Chronic narc use GI/Hepatic/Renal:   (+) GERD:, bowel prep,          ROS comment: dysphagia. Endo/Other:    (+) DiabetesType II DM, using insulin, : arthritis: OA., malignancy/cancer (h/o breast cancer). Abdominal:   (+) obese,         Vascular:                                        Anesthesia Plan      general and TIVA     ASA 3       Induction: intravenous. Anesthetic plan and risks discussed with patient.                       RADHA Sheridan - CRNA   1/6/2021

## 2021-01-06 NOTE — ANESTHESIA POSTPROCEDURE EVALUATION
Department of Anesthesiology  Postprocedure Note    Patient: Paula Abdullahi  MRN: [id number]  YOB: 1965  Date of evaluation: 1/6/2021  Time:  8:53 AM     Procedure Summary     Date: 01/06/21 Room / Location: [address]    Anesthesia Start: 3321 Anesthesia Stop:     Procedures:       EGD ESOPHAGOGASTRODUODENOSCOPY (N/A )      COLONOSCOPY DIAGNOSTIC (N/A ) Diagnosis: (UPPER ABD PAIN, WT LOSS, CHANGE IN [address], WT LOSS)    Surgeons: Young Callahan MD Responsible Provider: RADHA Duncan CRNA    Anesthesia Type: general, TIVA ASA Status: 3          Anesthesia Type: general, TIVA    Keila Phase I:      Keila Phase II:      Last vitals: Reviewed and per EMR flowsheets.        Anesthesia Post Evaluation    Patient location during evaluation: bedside  Patient participation: complete - patient participated  Level of consciousness: awake and alert  Pain score: 0  Airway patency: patent  Nausea & Vomiting: no nausea and no vomiting  Complications: no  Cardiovascular status: hemodynamically stable and blood pressure returned to baseline  Respiratory status: acceptable and nasal cannula  Hydration status: stable

## 2021-01-06 NOTE — H&P
Patient Name: Beatris Ramires  : 1965  MRN: [id number]  DATE: 21    Allergies: Allergies   Allergen Reactions    Tape [place] Tape]      Surgical tape        ENDOSCOPY  History and Physical    Procedure:    [x] Diagnostic Colonoscopy       [] Screening Colonoscopy  [x] EGD      [] ERCP      [] EUS       [] Other    [x] Previous office notes/History and Physical reviewed from the patients chart. Please see EMR for further details of HPI. I have examined the patient's status immediately prior to the procedure and:      Indications/HPI:    [x]Abdominal Pain   []Cancer- GI/Lung     []Fhx of colon CA/polyps  []History of Polyps  []Barretts            []Melena  []Abnormal Imaging              []Dysphagia              []Persistent Pneumonia   []Anemia                            []Food Impaction        []History of Polyps  [] GI Bleed             []Pulmonary nodule/Mass   []Change in bowel habits []Heartburn/Reflux  []Rectal Bleed (BRBPR)  []Chest Pain - Non Cardiac []Heme (+) Stool []Ulcers  []Constipation  []Hemoptysis  []Varices  []Diarrhea  []Hypoxemia    [x]Nausea/Vomiting   []Screening   []Crohns/Colitis  []Other:     Anesthesia:   [x] MAC [] Moderate Sedation   [] General   [] None     ROS: 12 pt Review of Symptoms was negative unless mentioned above    Medications:   Prior to Admission medications    Medication Sig Start Date End Date Taking?  Authorizing Provider   gabapentin (NEURONTIN) 300 MG capsule TAKE 2 CAPSULES BY MOUTH IN THE MORNING & 1 CAP AT NOON, THEN 2 CAPS AT BEDTIME 20  RADHA Gtz   JANUVIA 50 MG tablet TAKE 1 TABLET BY MOUTH EVERY DAY 20   RADHA Gtz   omeprazole (PRILOSEC) 20 MG delayed release capsule Take 20 mg by mouth daily    Historical Provider, MD   VITAMIN D PO Take by mouth daily    Historical Provider, MD   guaiFENesin (MUCINEX PO) Take by mouth nightly    Historical Provider, MD   letrozole (FEMARA) 2.5 MG tablet TAKE 1 TABLET BY MOUTH EVERY DAY 10/22/20   Kaz Godfrey PA-C   ipratropium-albuterol (DUONEB) 0.5-2.5 (3) MG/3ML SOLN nebulizer solution INHALE ONE AMPULE INTO THE LUNGS EVERY 6 HOURS AS NEEDED FOR SHORTNESS OF BREATH 10/2/20   RADHA Call   lisinopril (PRINIVIL;ZESTRIL) 20 MG tablet TAKE 1 TABLET BY MOUTH TWICE A DAY 9/23/20   RADHA Call   metFORMIN (GLUCOPHAGE-XR) 500 MG extended release tablet TAKE ONE TABLET BY MOUTH ONCE A DAY WITH THE LARGEST MEAL OF THE DAY 9/14/20   RADHA Call   famotidine (PEPCID) 20 MG tablet TAKE 1 TABLET BY MOUTH TWICE A DAY  Patient taking differently: Indications: takes one nightly  8/20/20   RADHA Call   fluticasone-salmeterol (ADVAIR) 100-50 MCG/DOSE diskus inhaler Inhale 1 puff into the lungs 2 times daily 6/26/20 11/24/20  RADHA Call   doxepin (SINEQUAN) 50 MG capsule Take 1 capsule by mouth nightly Take 1 to 2 capsules by mouth at bedtime as needed for insomnia d/c amitriptyline 5/27/20   RADHA Call   buPROPion [facility] SR) 200 MG extended release tablet Take 1 tablet by mouth daily 5/27/20   RADHA Call   fluticasone (FLONASE) 50 MCG/ACT nasal spray One spray each nostril twice daily. 5/27/20   RADHA Call   vitamin C (ASCORBIC ACID) 500 MG tablet Take 500 mg by mouth daily    Historical Provider, MD   Respiratory Therapy Supplies LUPE cpap supplies x 1 year 11/22/19   RADHA Call   HYDROcodone-acetaminophen (NORCO) 7.5-325 MG per tablet Take 1 tablet by mouth 2 times daily as needed for Pain (Dr. Anderson Gar).   12/27/18   Historical Provider, MD   busPIRone (BUSPAR) 10 MG tablet Take 10 mg by mouth 3 times daily as needed    Historical Provider, MD   cyclobenzaprine (FLEXERIL) 10 MG tablet Take 1 tablet by mouth 2 times daily as needed for Muscle spasms 10/29/18   RADHA Call   desvenlafaxine succinate (PRISTIQ) 100 MG TB24 extended release tablet Take 100 mg by mouth daily     Historical Provider, MD       Past Medical History:  Past Medical History:   Diagnosis Date    Allergic rhinitis     Anxiety     Breast cancer ([facility] 10/2010    Left    Bronchitis     Carpal tunnel syndrome     R and L    Chest pain     Chronic back pain     Chronic bronchitis (HCC)     Depression     Diabetes mellitus (HCC)     GERD (gastroesophageal reflux disease)     Headache(784.0)     Heart palpitations     Mononeuropathy     Osteoarthritis     Sleep apnea     Wears glasses        Past Surgical History:  Past Surgical History:   Procedure Laterality Date    ANKLE FRACTURE SURGERY Right    [address] [place] [address]    BREAST RECONSTRUCTION  2011    BREAST SURGERY  2011    emergency removal of Left breast expander    COLONOSCOPY N/A 2016    Dr Jesus Childers-HP x 1, serrated AP x 1, 12 month recall    EGD COLONOSCOPY N/A 6/3/2016    w/empiric dilation to 48 Malag[place] (16mm)-Dr RONY Childers, Nafisa (-)    HYSTERECTOMY      MASTECTOMY, BILATERAL Bilateral 2011    OTHER SURGICAL HISTORY      R leg & ankle fracture due to fall repair-[facility]cita [facility] at [facility]    UPPER GASTROINTESTINAL ENDOSCOPY  6/3/2016    w/empiric dilation to 50 [place] (16mm)-Dr RONY Liz, Nafisa (-)       Social History:  Social History     Tobacco Use    Smoking status: Former Smoker     Packs/day: 0.25     Years: 20.00     Pack years: 5.00     Types: Cigars     Start date: 2/3/2016     Quit date: 2016     Years since quittin.0    Smokeless tobacco: Never Used    Tobacco comment: Smokes Kishaell Sweeney a day.    Substance Use Topics    Alcohol use: No     Alcohol/week: 0.0 standard drinks    Drug use: No       Vital Signs:   Vitals:    21 0714   BP: 115/80   Pulse: 105   Resp: 20   Temp: 97.4 °F (36.3 °C)   SpO2: 99%        Physical Exam:  Cardiac:  [x]WNL  []Comments:  Pulmonary:  [x]WNL   []Comments:  Neuro/Mental Status:  [x]WNL []Comments:  Abdominal:  [x]WNL    []Comments:  Other:   []WNL  []Comments:    Informed Consent:  The risks and benefits of the procedure have been discussed with either the patient or if they cannot consent, their representative. Assessment:  Patient examined and appropriate for planned sedation and procedure. Plan:  Proceed with planned sedation and procedure as above.          Alisa Olmstead MD

## 2021-01-06 NOTE — OP NOTE
Endoscopic Procedure Note    Patient: Génesis To : 1965  Med Rec#: [id number] Acc#: [id number]     Primary Care Provider RADHA Goddard  Referring Provider: Taye GARCIA    Endoscopist: Gurmeet Dean MD    Date of Procedure:  2021    Procedure:   1. EGD with biopsy  2. EGD with brushings of esophagus. Indications:   1. Abdominal pain  2. Reported nausea, weight loss. Anesthesia:  Sedation was administered by anesthesia who monitored the patient during the procedure. Estimated Blood Loss: minimal    Procedure:   After reviewing the patient's chart and obtaining informed consent, the patient was placed in the left lateral decubitus position. A forward-viewing Olympus endoscope was lubricated and inserted through the mouth into the oropharynx. Under direct visualization, the upper esophagus was intubated. The scope was advanced to the level of the third portion of duodenum. Scope was slowly withdrawn with careful inspection of the mucosal surfaces. The scope was retroflexed for inspection of the gastric fundus and incisura. Findings and maneuvers are listed in impression below. The patient tolerated the procedure well. The scope was removed. There were no immediate complications. Findings:   Esophagus: abnormal: throughout the entire esophagus there were multiple area of white patches that did not easily rinse/remove. This was most c/w candida infection. Brushings were obtained and sent for KOH. There is no significant hiatal hernia present. Stomach:  abnormal: mild mucosal changes suggestive of gastritis noted -  Gastric biopsies were taken from the antrum and body to rule out Helicobacter pylori infection. Duodenum: normal      IMPRESSION:  1. Likely candida esophagitis - brushed. 2. S/p gastric biopsies      RECOMMENDATIONS:    1. Await path results, the patient will be contacted in 7-10 days with biopsy results.    2.  Assuming brushings are positive for candida infection, I will plan to treat with Diflucan (200 day one, then 100mg daily x 13 days)   3. Continue current meds. The results were discussed with the patient and family. A copy of the images obtained were given to the patient.      Harvey Garcia MD  1/6/2021  8:51 AM

## 2021-01-06 NOTE — OP NOTE
Patient: Irene Coombs : 1965  Med Rec#: [id number] Acc#: [id number]   Primary Care Provider RADHA Diaz    Date of Procedure:  2021    Endoscopist: Kary Anguiano MD    Referring Provider: RADHA Diaz,     Operation Performed: Colonoscopy     Indications: abdominal pain, weight loss, h/o polyps    Anesthesia:  Sedation was administered by anesthesia who monitored the patient during the procedure. I met with Irene Coombs prior to procedure. We discussed the procedure itself, and I have discussed the risks of endoscopy (including-- but not limited to-- pain, discomfort, bleeding potentially requiring second endoscopic procedure and/or blood transfusion, organ perforation requiring operative repair, damage to organs near the colon, infection, aspiration, cardiopulmonary/allergic reaction), benefits, indications to endoscopy. Additionally, we discussed options other than colonoscopy. The patient expressed understanding. All questions answered. The patient decided to proceed with the procedure. Signed informed consent was placed on the chart. Blood Loss: minimal    Withdrawal time: > 6 minutes  Bowel Prep: adequate     Complications: no immediate complications    DESCRIPTION OF PROCEDURE:     A time out was performed. After written informed consent was obtained, the patient was placed in the left lateral position. The perianal area was inspected, and a digital rectal exam was performed. A rectal exam was performed: normal tone, no palpable lesions. At this point, a forward viewing Olympus colonoscope was inserted into the anus and carefully advanced to the cecum. The cecum was identified by the ileocecal valve and the appendiceal orifice. The colonoscope was then slowly withdrawn with careful inspection of the mucosa in a linear and circumferential fashion. The scope was retroflexed in the rectum.  Suction was utilized during the procedure to remove as much air as possible from the bowel. The colonoscope was removed from the patient, and the procedure was terminated. Findings are listed below. Findings: The mucosa appeared normal throughout the entire examined colon   No polyps or other lesions noted. Retroflexion in the rectum was normal and revealed no further abnormalities         Recommendations:  1. Repeat colonoscopy: 5 yrs for screening given h/o polyps  2. Miralax daily starting today      Findings and recommendations were discussed w/ the patient. A copy of the images was provided.     Sage Linda MD  1/6/2021  8:55 AM

## 2021-01-08 RX ORDER — FLUCONAZOLE 100 MG/1
TABLET ORAL
Qty: 15 TABLET | Refills: 0 | Status: SHIPPED | OUTPATIENT
Start: 2021-01-08 | End: 2021-06-01

## 2021-01-08 NOTE — PROGRESS NOTES
Per op note:  IMPRESSION:  1. Likely candida esophagitis - brushed. 2. S/p gastric biopsies      RECOMMENDATIONS:    1. Await path results, the patient will be contacted in 7-10 days with biopsy results. 2.  Assuming brushings are positive for candida infection, I will plan to treat with Diflucan (200 day one, then 100mg daily x 13 days)   3. Continue current meds.      The results were discussed with the patient and family. A copy of the images obtained were given to the patient.      Dorcas Cruz MD  1/6/2021  8:51 AM    I sent in the following to patient's pharmacy:  Requested Prescriptions     Signed Prescriptions Disp Refills    fluconazole (DIFLUCAN) 100 MG tablet 15 tablet 0     Sig: Take 200 mg day 1, then 100 mg for 13 more days   Tried to contact pt, pt unavailable, no vm. Pt called back and I advised of results and recommendations, how to take meds.

## 2021-02-08 ENCOUNTER — TRANSCRIBE ORDERS (OUTPATIENT)
Dept: ADMINISTRATIVE | Facility: HOSPITAL | Age: 56
End: 2021-02-08

## 2021-02-08 DIAGNOSIS — Z01.818 PREOP TESTING: Primary | ICD-10-CM

## 2021-02-09 ENCOUNTER — LAB (OUTPATIENT)
Dept: LAB | Facility: HOSPITAL | Age: 56
End: 2021-02-09

## 2021-02-09 LAB — SARS-COV-2 ORF1AB RESP QL NAA+PROBE: NOT DETECTED

## 2021-02-09 PROCEDURE — C9803 HOPD COVID-19 SPEC COLLECT: HCPCS | Performed by: PAIN MEDICINE

## 2021-02-09 PROCEDURE — U0004 COV-19 TEST NON-CDC HGH THRU: HCPCS | Performed by: PAIN MEDICINE

## 2021-02-18 ENCOUNTER — TRANSCRIBE ORDERS (OUTPATIENT)
Dept: LAB | Facility: HOSPITAL | Age: 56
End: 2021-02-18

## 2021-02-18 DIAGNOSIS — Z01.818 PREOP TESTING: Primary | ICD-10-CM

## 2021-02-20 ENCOUNTER — LAB (OUTPATIENT)
Dept: LAB | Facility: HOSPITAL | Age: 56
End: 2021-02-20

## 2021-02-20 DIAGNOSIS — Z01.818 PRE-OP TESTING: Primary | ICD-10-CM

## 2021-02-20 LAB — SARS-COV-2 RNA PNL SPEC NAA+PROBE: NOT DETECTED

## 2021-02-20 PROCEDURE — C9803 HOPD COVID-19 SPEC COLLECT: HCPCS

## 2021-02-20 PROCEDURE — 87635 SARS-COV-2 COVID-19 AMP PRB: CPT | Performed by: PAIN MEDICINE

## 2021-03-30 ENCOUNTER — TELEPHONE (OUTPATIENT)
Dept: PRIMARY CARE CLINIC | Age: 56
End: 2021-03-30

## 2021-03-30 NOTE — TELEPHONE ENCOUNTER
Pt called stating her Gabapentin was denied. I checked pt's chart and let her know it looks like it was sent in twice. I then proceeded to schedule pt for VV with RADHA Baca 4/2 @ 10am for medication recheck. Pt thanked me and VU.

## 2021-04-01 DIAGNOSIS — T45.1X5A CHEMOTHERAPY-INDUCED NEUROPATHY (HCC): ICD-10-CM

## 2021-04-01 DIAGNOSIS — G62.0 CHEMOTHERAPY-INDUCED NEUROPATHY (HCC): ICD-10-CM

## 2021-04-01 NOTE — TELEPHONE ENCOUNTER
Marisa Avery is requesting a refill of their   Requested Prescriptions     Pending Prescriptions Disp Refills    gabapentin (NEURONTIN) 300 MG capsule [Pharmacy Med Name: GABAPENTIN 300 MG CAPSULE] 150 capsule      Sig: TAKE 2 CAPSULES BY MOUTH IN THE MORNING & 1 CAP AT NOON, THEN 2 CAPS AT BEDTIME   . Please advise.       Last Appt:  10/13/2020  Next Appt:   4/2/2021  Preferred pharmacy: [facility]/pharmacy #[id number]- [address] , [address] [place] [address]

## 2021-04-02 ENCOUNTER — OFFICE VISIT (OUTPATIENT)
Dept: PRIMARY CARE CLINIC | Age: 56
End: 2021-04-02
Payer: MEDICARE

## 2021-04-02 VITALS
HEIGHT: 67 IN | RESPIRATION RATE: 18 BRPM | WEIGHT: 205 LBS | DIASTOLIC BLOOD PRESSURE: 70 MMHG | OXYGEN SATURATION: 98 % | TEMPERATURE: 95.6 F | BODY MASS INDEX: 32.18 KG/M2 | HEART RATE: 123 BPM | SYSTOLIC BLOOD PRESSURE: 128 MMHG

## 2021-04-02 DIAGNOSIS — E11.00 TYPE 2 DIABETES MELLITUS WITH HYPEROSMOLARITY WITHOUT COMA, WITHOUT LONG-TERM CURRENT USE OF INSULIN (HCC): Primary | ICD-10-CM

## 2021-04-02 DIAGNOSIS — J42 CHRONIC BRONCHITIS, UNSPECIFIED CHRONIC BRONCHITIS TYPE (HCC): ICD-10-CM

## 2021-04-02 DIAGNOSIS — K21.9 GASTROESOPHAGEAL REFLUX DISEASE WITHOUT ESOPHAGITIS: ICD-10-CM

## 2021-04-02 DIAGNOSIS — Z79.899 DRUG THERAPY: ICD-10-CM

## 2021-04-02 DIAGNOSIS — G47.00 INSOMNIA, UNSPECIFIED TYPE: ICD-10-CM

## 2021-04-02 DIAGNOSIS — I10 HYPERTENSION, ESSENTIAL, BENIGN: ICD-10-CM

## 2021-04-02 DIAGNOSIS — F31.9 BIPOLAR AFFECTIVE DISORDER, REMISSION STATUS UNSPECIFIED (HCC): ICD-10-CM

## 2021-04-02 DIAGNOSIS — T45.1X5A CHEMOTHERAPY-INDUCED NEUROPATHY (HCC): ICD-10-CM

## 2021-04-02 DIAGNOSIS — G62.0 CHEMOTHERAPY-INDUCED NEUROPATHY (HCC): ICD-10-CM

## 2021-04-02 LAB
ALCOHOL URINE: NORMAL
AMPHETAMINE SCREEN, URINE: NORMAL
BARBITURATE SCREEN, URINE: NORMAL
BENZODIAZEPINE SCREEN, URINE: NORMAL
BUPRENORPHINE URINE: NORMAL
CHP ED QC CHECK: NORMAL
COCAINE METABOLITE SCREEN URINE: NORMAL
FENTANYL SCREEN, URINE: NORMAL
GABAPENTIN SCREEN, URINE: NORMAL
GLUCOSE BLD-MCNC: 351 MG/DL
HBA1C MFR BLD: 14 %
MDMA URINE: NORMAL
METHADONE SCREEN, URINE: NORMAL
METHAMPHETAMINE, URINE: NORMAL
OPIATE SCREEN URINE: NORMAL
OXYCODONE SCREEN URINE: NORMAL
PHENCYCLIDINE SCREEN URINE: NORMAL
PROPOXYPHENE SCREEN, URINE: NORMAL
SYNTHETIC CANNABINOIDS(K2) SCREEN, URINE: NORMAL
THC SCREEN, URINE: NORMAL
TRAMADOL SCREEN URINE: NORMAL
TRICYCLIC ANTIDEPRESSANTS, UR: NORMAL

## 2021-04-02 PROCEDURE — 3046F HEMOGLOBIN A1C LEVEL >9.0%: CPT | Performed by: NURSE PRACTITIONER

## 2021-04-02 PROCEDURE — 99214 OFFICE O/P EST MOD 30 MIN: CPT | Performed by: NURSE PRACTITIONER

## 2021-04-02 PROCEDURE — G8417 CALC BMI ABV UP PARAM F/U: HCPCS | Performed by: NURSE PRACTITIONER

## 2021-04-02 PROCEDURE — 1036F TOBACCO NON-USER: CPT | Performed by: NURSE PRACTITIONER

## 2021-04-02 PROCEDURE — 2022F DILAT RTA XM EVC RTNOPTHY: CPT | Performed by: NURSE PRACTITIONER

## 2021-04-02 PROCEDURE — 83036 HEMOGLOBIN GLYCOSYLATED A1C: CPT | Performed by: NURSE PRACTITIONER

## 2021-04-02 PROCEDURE — 80305 DRUG TEST PRSMV DIR OPT OBS: CPT | Performed by: NURSE PRACTITIONER

## 2021-04-02 PROCEDURE — G8427 DOCREV CUR MEDS BY ELIG CLIN: HCPCS | Performed by: NURSE PRACTITIONER

## 2021-04-02 PROCEDURE — 82962 GLUCOSE BLOOD TEST: CPT | Performed by: NURSE PRACTITIONER

## 2021-04-02 PROCEDURE — G9708 BILAT MAST/HX BI /UNILAT MAS: HCPCS | Performed by: NURSE PRACTITIONER

## 2021-04-02 PROCEDURE — 3017F COLORECTAL CA SCREEN DOC REV: CPT | Performed by: NURSE PRACTITIONER

## 2021-04-02 PROCEDURE — 3023F SPIROM DOC REV: CPT | Performed by: NURSE PRACTITIONER

## 2021-04-02 PROCEDURE — G8926 SPIRO NO PERF OR DOC: HCPCS | Performed by: NURSE PRACTITIONER

## 2021-04-02 RX ORDER — MELOXICAM 15 MG/1
7.5 TABLET ORAL DAILY
COMMUNITY

## 2021-04-02 RX ORDER — GABAPENTIN 300 MG/1
CAPSULE ORAL
Qty: 150 CAPSULE | Refills: 2 | Status: SHIPPED | OUTPATIENT
Start: 2021-04-02 | End: 2021-06-25

## 2021-04-02 ASSESSMENT — PATIENT HEALTH QUESTIONNAIRE - PHQ9
SUM OF ALL RESPONSES TO PHQ QUESTIONS 1-9: 0
SUM OF ALL RESPONSES TO PHQ QUESTIONS 1-9: 0
SUM OF ALL RESPONSES TO PHQ9 QUESTIONS 1 & 2: 0
SUM OF ALL RESPONSES TO PHQ QUESTIONS 1-9: 0

## 2021-04-02 NOTE — PROGRESS NOTES
ChiefComplaint:   Chief Complaint   Patient presents with    Medication Check     Pt states she needs Gabapentin refilled. Pt states she is worried about her sugar. History of Present Illness:  Eun Sandy is a 61 y.o. female who presents for evaluation of refills of her medications. She reports she has been out of her medication for 2 days. Patient requests a refill of her gabapentin. Patient reports she has not been checking her blood sugar but knows she needs to check it on a daily basis. She reports she has a hard time cooking for what she needs to eat, when her  does not want to eat that type of food. Patient reports she had a diabetic eye exam around 10/2020 with Dr. Palacios. Patient reports she would like to see a diabetic educator to have a better plan of foods that are better for her to eat. She reports she has a hard time sleeping at times and her doxepin helps her sleep. She also reports she had acid reflux that flares up and prilosec and pepcid both help relieve her acid reflux. She reports she knows the foods she eats are not helping her acid reflux. Discussed avoiding spicy foods, and making sure she is sitting or standing up for at least 30 minutes after she eats. Reviwewed glucose and Hemoglobin A1C labs while in office. Reviewed urine drug screen with patient in office.      History:  Past Medical History:   Diagnosis Date    Allergic rhinitis     Anxiety     Breast cancer ([facility] [address] 10/2010    Left    Bronchitis     Carpal tunnel syndrome     R and L    Chest pain     Chronic back pain     Chronic bronchitis (HCC)     Depression     Diabetes mellitus (HCC)     GERD (gastroesophageal reflux disease)     Headache(784.0)     Heart palpitations     Mononeuropathy     Osteoarthritis     Sleep apnea     Wears glasses        Family History   Problem Relation Age of Onset    Diabetes Mother     Cancer Mother         breast    Heart Disease Mother     High Outpatient Medications on File Prior to Visit   Medication Sig Dispense Refill    meloxicam (MOBIC) 15 MG tablet Take 7.5 mg by mouth daily      guaiFENesin (MUCINEX PO) Take by mouth nightly      letrozole (FEMARA) 2.5 MG tablet TAKE 1 TABLET BY MOUTH EVERY DAY 90 tablet 1    doxepin (SINEQUAN) 50 MG capsule Take 1 capsule by mouth nightly Take 1 to 2 capsules by mouth at bedtime as needed for insomnia d/c amitriptyline 30 capsule 0    fluticasone (FLONASE) 50 MCG/ACT nasal spray One spray each nostril twice daily. 1 Bottle 2    HYDROcodone-acetaminophen (NORCO) 7.5-325 MG per tablet Take 1 tablet by mouth 2 times daily as needed for Pain (Dr. Ronna Murrell).  busPIRone (BUSPAR) 10 MG tablet Take 10 mg by mouth 3 times daily as needed      cyclobenzaprine (FLEXERIL) 10 MG tablet Take 1 tablet by mouth 2 times daily as needed for Muscle spasms 180 tablet 3    desvenlafaxine succinate (PRISTIQ) 100 MG TB24 extended release tablet Take 100 mg by mouth daily       gabapentin (NEURONTIN) 300 MG capsule TAKE 2 CAPSULES BY MOUTH IN THE MORNING & 1 CAP AT NOON, THEN 2 CAPS AT BEDTIME 150 capsule 2    fluconazole (DIFLUCAN) 100 MG tablet Take 200 mg day 1, then 100 mg for 13 more days (Patient not taking: Reported on 4/2/2021) 15 tablet 0    VITAMIN D PO Take by mouth daily      ipratropium-albuterol (DUONEB) 0.5-2.5 (3) MG/3ML SOLN nebulizer solution INHALE ONE AMPULE INTO THE LUNGS EVERY 6 HOURS AS NEEDED FOR SHORTNESS OF BREATH (Patient not taking: Reported on 4/2/2021) 360 mL 1    vitamin C (ASCORBIC ACID) 500 MG tablet Take 500 mg by mouth daily      Respiratory Therapy Supplies LUPE cpap supplies x 1 year (Patient not taking: Reported on 4/2/2021) 1 Device 0     No current facility-administered medications on file prior to visit. Review of Systems:  Review of Systems   Constitutional: Negative for activity change and fever. HENT: Negative for congestion, ear pain and sore throat.     Respiratory: Negative for cough, chest tightness and shortness of breath. Cardiovascular: Negative for chest pain. Gastrointestinal: Negative for abdominal pain, diarrhea, nausea and vomiting. Genitourinary: Negative for frequency and urgency. Musculoskeletal: Negative for arthralgias and myalgias. Skin: Negative for color change. Neurological: Negative for dizziness, weakness and numbness. Psychiatric/Behavioral: Negative for agitation. The patient is not nervous/anxious. Vital Signs:  /70 (Site: Right Upper Arm, Position: Sitting)   Pulse 123   Temp 95.6 °F (35.3 °C) (Temporal)   Resp 18   Ht 5' 7\" (1.702 m)   Wt 205 lb (93 kg)   SpO2 98%   BMI 32.11 kg/m²     Physical Exam:  Physical Exam  Vitals signs reviewed. Constitutional:       Appearance: She is obese. HENT:      Head: Normocephalic. Right Ear: Tympanic membrane normal.      Left Ear: Tympanic membrane normal.      Nose: Nose normal.      Mouth/Throat:      Mouth: Mucous membranes are moist.      Pharynx: Oropharynx is clear. Eyes:      Extraocular Movements: Extraocular movements intact. Pupils: Pupils are equal, round, and reactive to light. Neck:      Musculoskeletal: Normal range of motion. Cardiovascular:      Rate and Rhythm: Normal rate and regular rhythm. Pulses: Normal pulses. Heart sounds: Normal heart sounds. Pulmonary:      Effort: Pulmonary effort is normal.      Breath sounds: Normal breath sounds. Abdominal:      General: Bowel sounds are normal.      Palpations: Abdomen is soft. Musculoskeletal: Normal range of motion. Skin:     General: Skin is warm and dry. Neurological:      Mental Status: She is alert and oriented to person, place, and time. Psychiatric:         Mood and Affect: Mood normal.         Behavior: Behavior normal.          Assessment & Plan    1.  Type 2 diabetes mellitus with hyperosmolarity without coma, without long-term current use of insulin (HCC)  Diabetic foot exam revealed decreased sensation in feet bilaterally, in all areas. Patient agrees to check her blood sugar daily and keep a log.     - POCT Glucose  - POCT glycosylated hemoglobin (Hb A1C)  - metFORMIN (GLUMETZA) 1000 MG extended release tablet; Take one tablet in the morning and one tablet in the evening. Dispense: 60 tablet; Refill: 2  - Comprehensive Metabolic Panel; Future  - Lipid Panel; Future  - Hemoglobin A1C; Future  - Comprehensive Metabolic Panel; Future  - CBC Auto Differential; Future  - SITagliptin (JANUVIA) 50 MG tablet; Take two tablets by mouth once daily. Dispense: 60 tablet; Refill: 2  - Outpatient Diabetes Education  - Dulaglutide 0.75 MG/0.5ML SOPN; Inject 0.75 mg into the skin once a week  Dispense: 2 pen; Refill: 1    2. Insomnia, unspecified type      3. Gastroesophageal reflux disease without esophagitis    - famotidine (PEPCID) 20 MG tablet; Indications: takes one nightly Indications: takes one nightly  Dispense: 180 tablet; Refill: 1  - omeprazole (PRILOSEC) 20 MG delayed release capsule; Take 1 capsule by mouth daily  Dispense: 30 capsule; Refill: 2    4. Chronic bronchitis, unspecified chronic bronchitis type (HCC)    - fluticasone-salmeterol (ADVAIR) 100-50 MCG/DOSE diskus inhaler; Inhale 1 puff into the lungs 2 times daily  Dispense: 3 Inhaler; Refill: 2    5. Hypertension, essential, benign    - lisinopril (PRINIVIL;ZESTRIL) 20 MG tablet; TAKE 1 TABLET BY MOUTH TWICE A DAY  Dispense: 180 tablet; Refill: 3    6. Bipolar affective disorder, remission status unspecified (HCC)    - buPROPion (WELLBUTRIN SR) 100 MG extended release tablet; Take 1 tablet by mouth daily  Dispense: 30 tablet; Refill: 0  - Comprehensive Metabolic Panel; Future  - Lipid Panel; Future  - Comprehensive Metabolic Panel; Future  - CBC Auto Differential; Future  - lisinopril (PRINIVIL;ZESTRIL) 20 MG tablet; TAKE 1 TABLET BY MOUTH TWICE A DAY  Dispense: 180 tablet; Refill: 3    7.  Chemotherapy-induced

## 2021-04-02 NOTE — PATIENT INSTRUCTIONS
1. Begin metformin 500 mg one tablet by mouth twice daily today. On Monday, Take 2- 500 mg tablets in the morning and one 500 mg tablets in the evening. On Thursday take 2- 500 mg tablets in the morning and in the evening. Once 500 mg tablets are used, being taking one 1000mg tablet in the morning and one 1000 mg tablet in the evening. 2. Take truclicity 1 injection weekly- same day each week. 3. Take Januvia 2 50 mg tablets in the morning and 2 50 mg tablets in the evening until completed. Then take one 100 mg tablet in the morning and one tablet in the evening. 4. Follow up dietician about diabetic diet. 5. Check blood sugar twice daily and keep a blood sugar log. 6. Go on 2nd floor for blood work today- no lipids  7. Have fasting blood work 1 week before next appt. In 3 months  8. Make a follow up appt in 3 months. Patient Education        Learning About Metformin for Type 2 Diabetes  Introduction     Metformin is a medicine used to treat type 2 diabetes. It helps keep blood sugar levels on target. You may have tried to eat a healthy diet, lose weight, and get more exercise to keep your blood sugar in your target range. If those things do not help, you may take a medicine called metformin. It helps your body use insulin. This can help you control your blood sugar. You might take it on its own or with other medicines. When taken on its own, metformin should not cause low blood sugar or weight gain. Example  · Metformin (Glucophage)  Possible side effects  Common side effects include:  · Short-term nausea. · Not feeling hungry. · Diarrhea. · Increased gas in your belly. · A metallic taste. You may have side effects or reactions not listed here. Check the information that comes with your medicine. What to know about taking this medicine  · Metformin does not usually cause low blood sugar.  But you may get a low blood sugar when you take metformin and you exercise hard, drink alcohol, or you do not eat enough food. · Sometimes metformin is combined with other diabetes medicine. Some of these can cause low blood sugar. · If you need a test that uses a dye or you need to have surgery, be sure to tell all of your doctors that you take metformin. You may have to stop taking it before and after the test or surgery. · Over time, blood levels of vitamin B12 can decrease in some people who take metformin. Your body needs this B vitamin to make blood cells. It also keeps your nervous system healthy. If you have been taking metformin for more than a few years, ask your doctor if you need a B12 blood test to measure the amount of vitamin B12 in your blood. · Be safe with medicines. Take your medicines exactly as prescribed. Call your doctor if you think you are having a problem with your medicine. · Check with your doctor or pharmacist before you use any other medicines. This includes over-the-counter medicines. Make sure your doctor knows all of the medicines, vitamins, herbal products, and supplements you take. Taking some medicines together can cause problems. Where can you learn more? Go to [email or web address] and sign in to your [employer] account. Enter J360 in the [employer] box to learn more about \"Learning About Metformin for Type 2 Diabetes. \"     If you do not have an account, please click on the \"Sign Up Now\" link. Current as of: August 31, 2020               Content Version: 12.8  © 7558-4779 Healthwise, Incorporated. Care instructions adapted under license by [facility] ([facility]). If you have questions about a medical condition or this instruction, always ask your healthcare professional. [place] [address] any warranty or liability for your use of this information.

## 2021-04-05 RX ORDER — METFORMIN HYDROCHLORIDE 1000 MG/1
TABLET, FILM COATED, EXTENDED RELEASE ORAL
Qty: 60 TABLET | Refills: 2 | Status: SHIPPED | OUTPATIENT
Start: 2021-04-05 | End: 2022-04-12 | Stop reason: ALTCHOICE

## 2021-04-05 RX ORDER — FAMOTIDINE 20 MG/1
TABLET, FILM COATED ORAL
Qty: 180 TABLET | Refills: 1 | Status: SHIPPED | OUTPATIENT
Start: 2021-04-05 | End: 2022-01-30

## 2021-04-05 RX ORDER — GABAPENTIN 300 MG/1
CAPSULE ORAL
Qty: 150 CAPSULE | Refills: 2 | Status: SHIPPED | OUTPATIENT
Start: 2021-04-05 | End: 2021-04-05 | Stop reason: SDUPTHER

## 2021-04-05 RX ORDER — BUPROPION HYDROCHLORIDE 100 MG/1
100 TABLET, EXTENDED RELEASE ORAL DAILY
Qty: 30 TABLET | Refills: 0 | Status: SHIPPED | OUTPATIENT
Start: 2021-04-05

## 2021-04-05 RX ORDER — LISINOPRIL 20 MG/1
TABLET ORAL
Qty: 180 TABLET | Refills: 3 | Status: SHIPPED | OUTPATIENT
Start: 2021-04-05 | End: 2022-05-03

## 2021-04-05 RX ORDER — OMEPRAZOLE 20 MG/1
20 CAPSULE, DELAYED RELEASE ORAL DAILY
Qty: 30 CAPSULE | Refills: 2 | Status: SHIPPED | OUTPATIENT
Start: 2021-04-05 | End: 2021-06-27

## 2021-04-05 ASSESSMENT — ENCOUNTER SYMPTOMS
COUGH: 0
DIARRHEA: 0
VOMITING: 0
CHEST TIGHTNESS: 0
COLOR CHANGE: 0
SORE THROAT: 0
SHORTNESS OF BREATH: 0
NAUSEA: 0
ABDOMINAL PAIN: 0

## 2021-05-04 ENCOUNTER — TELEPHONE (OUTPATIENT)
Dept: PRIMARY CARE CLINIC | Age: 56
End: 2021-05-04

## 2021-05-04 NOTE — TELEPHONE ENCOUNTER
Tara from [facility] [address] [address] called stating patient is not on a statin and it best that one be added. I informed her it is the providers discretion on what medications patient should take.

## 2021-05-25 ENCOUNTER — TELEPHONE (OUTPATIENT)
Dept: ADMINISTRATIVE | Age: 56
End: 2021-05-25

## 2021-05-25 NOTE — TELEPHONE ENCOUNTER
Pt. States diabetes medicine was changed last month. Made her sick, threw up, would eat a banana and that helped to stop the vomiting. She wanted to make provider aware. Please advise.

## 2021-06-01 ENCOUNTER — OFFICE VISIT (OUTPATIENT)
Dept: HEMATOLOGY | Age: 56
End: 2021-06-01
Payer: MEDICARE

## 2021-06-01 ENCOUNTER — HOSPITAL ENCOUNTER (OUTPATIENT)
Dept: INFUSION THERAPY | Age: 56
Discharge: HOME | End: 2021-06-01
Payer: MEDICARE

## 2021-06-01 VITALS
HEIGHT: 67 IN | HEART RATE: 117 BPM | OXYGEN SATURATION: 93 % | BODY MASS INDEX: 32.94 KG/M2 | SYSTOLIC BLOOD PRESSURE: 118 MMHG | WEIGHT: 209.9 LBS | DIASTOLIC BLOOD PRESSURE: 80 MMHG

## 2021-06-01 DIAGNOSIS — R97.8 OTHER ABNORMAL TUMOR MARKERS: ICD-10-CM

## 2021-06-01 DIAGNOSIS — Z15.09 BRCA GENE MUTATION POSITIVE IN FEMALE: ICD-10-CM

## 2021-06-01 DIAGNOSIS — R97.0 ELEVATED CARCINOEMBRYONIC ANTIGEN: ICD-10-CM

## 2021-06-01 DIAGNOSIS — Z15.02 BRCA GENE MUTATION POSITIVE IN FEMALE: ICD-10-CM

## 2021-06-01 DIAGNOSIS — Z15.01 BRCA GENE MUTATION POSITIVE IN FEMALE: ICD-10-CM

## 2021-06-01 DIAGNOSIS — K21.9 GASTRO-ESOPHAGEAL REFLUX DISEASE WITHOUT ESOPHAGITIS: ICD-10-CM

## 2021-06-01 DIAGNOSIS — Z85.3 PERSONAL HISTORY OF BREAST CANCER: ICD-10-CM

## 2021-06-01 DIAGNOSIS — Z85.3 PERSONAL HISTORY OF BREAST CANCER: Primary | ICD-10-CM

## 2021-06-01 DIAGNOSIS — R74.8 ABNORMAL SERUM LEVEL OF ALKALINE PHOSPHATASE: ICD-10-CM

## 2021-06-01 LAB
BASOPHILS ABSOLUTE: 0.03 K/UL (ref 0.01–0.08)
BASOPHILS RELATIVE PERCENT: 0.3 % (ref 0.1–1.2)
EOSINOPHILS ABSOLUTE: 0.43 K/UL (ref 0.04–0.54)
EOSINOPHILS RELATIVE PERCENT: 4.4 % (ref 0.7–7)
HCT VFR BLD CALC: 48.1 % (ref 34.1–44.9)
HEMOGLOBIN: 15.3 G/DL (ref 11.2–15.7)
LYMPHOCYTES ABSOLUTE: 2.97 K/UL (ref 1.18–3.74)
LYMPHOCYTES RELATIVE PERCENT: 30.1 % (ref 19.3–53.1)
MCH RBC QN AUTO: 29.4 PG (ref 25.6–32.2)
MCHC RBC AUTO-ENTMCNC: 31.8 G/DL (ref 32.3–35.5)
MCV RBC AUTO: 92.3 FL (ref 79.4–94.8)
MONOCYTES ABSOLUTE: 0.89 K/UL (ref 0.24–0.82)
MONOCYTES RELATIVE PERCENT: 9 % (ref 4.7–12.5)
NEUTROPHILS ABSOLUTE: 5.56 K/UL (ref 1.56–6.13)
NEUTROPHILS RELATIVE PERCENT: 56.2 % (ref 34–71.1)
PDW BLD-RTO: 13.4 % (ref 11.7–14.4)
PLATELET # BLD: 168 K/UL (ref 182–369)
PMV BLD AUTO: 10.4 FL (ref 7.4–10.4)
RBC # BLD: 5.21 M/UL (ref 3.93–5.22)
WBC # BLD: 9.88 K/UL (ref 3.98–10.04)

## 2021-06-01 PROCEDURE — G9708 BILAT MAST/HX BI /UNILAT MAS: HCPCS | Performed by: PHYSICIAN ASSISTANT

## 2021-06-01 PROCEDURE — G8417 CALC BMI ABV UP PARAM F/U: HCPCS | Performed by: PHYSICIAN ASSISTANT

## 2021-06-01 PROCEDURE — 85025 COMPLETE CBC W/AUTO DIFF WBC: CPT

## 2021-06-01 PROCEDURE — 99213 OFFICE O/P EST LOW 20 MIN: CPT | Performed by: PHYSICIAN ASSISTANT

## 2021-06-01 PROCEDURE — 3017F COLORECTAL CA SCREEN DOC REV: CPT | Performed by: PHYSICIAN ASSISTANT

## 2021-06-01 PROCEDURE — 99211 OFF/OP EST MAY X REQ PHY/QHP: CPT

## 2021-06-01 PROCEDURE — G8427 DOCREV CUR MEDS BY ELIG CLIN: HCPCS | Performed by: PHYSICIAN ASSISTANT

## 2021-06-01 PROCEDURE — 1036F TOBACCO NON-USER: CPT | Performed by: PHYSICIAN ASSISTANT

## 2021-06-01 ASSESSMENT — ENCOUNTER SYMPTOMS
VOICE CHANGE: 0
COLOR CHANGE: 0
EYE ITCHING: 0
PHOTOPHOBIA: 0
ABDOMINAL PAIN: 0
TROUBLE SWALLOWING: 0
EYE DISCHARGE: 0
VOMITING: 0
SORE THROAT: 0
NAUSEA: 0
WHEEZING: 0
ABDOMINAL DISTENTION: 0
BLOOD IN STOOL: 0
BACK PAIN: 1
DIARRHEA: 0
CONSTIPATION: 0
COUGH: 0
SHORTNESS OF BREATH: 1

## 2021-06-01 NOTE — PROGRESS NOTES
normalizing. TUMOR HISTORY: Left infiltrating stage IIB (T2 N1 M0) (ER/ MN +, Her2 Juan M negative) breast cancer, 10/25/10   BRCA-II sequencing positive for [id number] mutation  Stephanie Appiah found a lump in her left breast. The mammogram was positive for malignancy. She also had some left axillary lymph nodes. A fine needle aspirate of the left breast and left axillae reveals a high-grade infiltrating breast cancer that is ER-92% positive, MN-56% positive and Nkv4eia 1+ positive by IHC-FISH studies were negative. The left axillary lymph node biopsy by FNA was also positive for malignancy. An MRI of the left breast on 11/02/10 reveals a primary 2.6x1.3cm enhancing mass with approximately 8 other enhancing nodules adjacent to the primary tumor like satellite lesions. BRCA 1 sequencing did not reveal mutations, however BRCA 2 sequencing was positive for an [id number] mutation. Stephanie Appiah understands the implications of her malignancy with a positive BRCA 2, and for this reason her desires are for bilateral mastectomies and hysterectomy. She was referred for pre-operative neoadjuvant chemotherapy. MRI of the brain was negative. CT scan of the abdomen and pelvis and bone scan were negative. CT scan of the chest revealed a 1.7 cm left axillary lymph node and scar like density in the right upper lobe. No obvious mass. 2D echocardiogram was adequate to proceed with chemotherapy. Dose dense FAC was give 11/29/10 through 02/21/11. Bilateral mastectomies were performed on 03/14/11 at [facility] per Dr. Salma Hatch with sentinel lymph node biopsies. The left mastectomy revealed residual invasive mammary carcinoma, grade II measuring 0.9 cm in greatest dimension. Margins were clear. The 8 left axillary lymph nodes were submitted, all negative for any evidence of any other cancer. A right simple mastectomy with right sentinel lymph node biopsies on the same date of the surgery 03/14/11 only revealed carcinoma in situ in the breast, completely resected. Following reconstructive surgery on 04/05/11 and post op healing, adjuvant weekly Taxol for 12 weeks was initiated on 05/16/11 and dose #12 was completed on 08/08/11. Radiation therapy was delivered by Dr. Kam Gonzalez consisting of 4060 cGy plus 1000 rad boost between 09/06/11 and 10/26/11. Tamoxifen was initiated on 11/14/11 and will continue for 5 years. Rober Nelson was premenopausal at presentation but has not had any further menstrual cycles since initiation of chemotherapy and tamoxifen. SHARON w/ BSO has was performed on 12/14/12 with negative histopathology. Tamoxifen was discontinued and Femara was instituted on 12/4/2015. The latter will continue for a subsequent 5 years. TREATMENT SUMMARY:  1. FAC initiated 11/29/10 through 02/21/11- 6 doses in a dose dense fashion. 2. Bilateral mastectomies 03/14/11.   3. Bilateral breast reconstruction 04/05/11 with subsequent complications on the left with slow wound healing. 4. Weekly Taxol x 12 initiated 05/16/11 and completed 08/08/11 (last dose was Taxotere due to [employer] of Taxol). 5. Radiation therapy given by Dr. Gerogia Umanzor to the chest 09/06/11 through 10/26/11, 4860 cGy + 1,000 rad boost.   6. Tamoxifen initiated 11/14/11 and discontinued 12/4/2015   7. SHARON w/ BSO on 12/14/12, negative histopathology, due to her premenopausal status and the fact that she is BRCA-II mutation positive.    8. Femara 2.5 mg/day initiated 12/4/2015 and continued through 12/20/2020      Past Medical History:   Diagnosis Date    Allergic rhinitis     Anxiety     Breast cancer ([facility] [address] 10/2010    Left    Bronchitis     Carpal tunnel syndrome     R and L    Chest pain     Chronic back pain     Chronic bronchitis (HCC)     Depression     Diabetes mellitus (HCC)     GERD (gastroesophageal reflux disease)     Headache(784.0)     Heart palpitations     Mononeuropathy     Osteoarthritis     Sleep apnea     Wears glasses        Past Surgical History:   Procedure Laterality Date    ANKLE FRACTURE SURGERY Right 2014    BACK SURGERY  1993    BREAST RECONSTRUCTION  06/2011    BREAST SURGERY  06/2011    emergency removal of Left breast expander    COLONOSCOPY N/A 02/05/2016    Dr Micah Posada x 1, serrated AP x 1, 12 month recall    COLONOSCOPY N/A 01/06/2021    Dr Reji Wagner yr recall    EGD COLONOSCOPY N/A 06/03/2016    w/empiric dilation to 48 Jordanian (16mm)-Dr RONY Petersen, Nafisa (-)    HYSTERECTOMY      MASTECTOMY, BILATERAL Bilateral 03/2011    OTHER SURGICAL HISTORY      R leg & ankle fracture due to fall repair-Dr Padmini Herrera [facility]    UPPER GASTROINTESTINAL ENDOSCOPY  06/03/2016    w/empiric dilation to 48 Jordanian (16mm)-Dr RONY Petersen, Nafisa (-)    UPPER GASTROINTESTINAL ENDOSCOPY N/A 01/06/2021    Dr Reji Childers-w/brushings-Candidal esophagitis-Treated with Diflucan       Current Outpatient Medications:     buPROPion (WELLBUTRIN SR) 100 MG extended release tablet, Take 1 tablet by mouth daily, Disp: 30 tablet, Rfl: 0    famotidine (PEPCID) 20 MG tablet, Indications: takes one nightly Indications: takes one nightly, Disp: 180 tablet, Rfl: 1    fluticasone-salmeterol (ADVAIR) 100-50 MCG/DOSE diskus inhaler, Inhale 1 puff into the lungs 2 times daily, Disp: 3 Inhaler, Rfl: 2    metFORMIN (GLUMETZA) 1000 MG extended release tablet, Take one tablet in the morning and one tablet in the evening., Disp: 60 tablet, Rfl: 2    omeprazole (PRILOSEC) 20 MG delayed release capsule, Take 1 capsule by mouth daily, Disp: 30 capsule, Rfl: 2    SITagliptin (JANUVIA) 50 MG tablet, Take two tablets by mouth once daily. , Disp: 60 tablet, Rfl: 2    lisinopril (PRINIVIL;ZESTRIL) 20 MG tablet, TAKE 1 TABLET BY MOUTH TWICE A DAY, Disp: 180 tablet, Rfl: 3    metFORMIN (GLUCOPHAGE-XR) 500 MG extended release tablet, TAKE 1 TABLET BY MOUTH EVERY DAY WITH LARGEST MEAL OF THE DAY, Disp: 30 tablet, Rfl: 3    gabapentin (NEURONTIN) 300 MG capsule, TAKE 2 CAPSULES BY MOUTH IN THE MORNING & 1 CAP AT NOON, THEN 2 CAPS AT BEDTIME, Disp: 150 capsule, Rfl: 2    meloxicam (MOBIC) 15 MG tablet, Take 7.5 mg by mouth daily, Disp: , Rfl:     guaiFENesin (MUCINEX PO), Take by mouth nightly, Disp: , Rfl:     ipratropium-albuterol (DUONEB) 0.5-2.5 (3) MG/3ML SOLN nebulizer solution, INHALE ONE AMPULE INTO THE LUNGS EVERY 6 HOURS AS NEEDED FOR SHORTNESS OF BREATH, Disp: 360 mL, Rfl: 1    fluticasone (FLONASE) 50 MCG/ACT nasal spray, One spray each nostril twice daily. , Disp: 1 Bottle, Rfl: 2    Respiratory Therapy Supplies LUPE, cpap supplies x 1 year, Disp: 1 Device, Rfl: 0    HYDROcodone-acetaminophen (NORCO) 7.5-325 MG per tablet, Take 1 tablet by mouth 2 times daily as needed for Pain (Dr. Jigar Blank). , Disp: , Rfl:     busPIRone (BUSPAR) 10 MG tablet, Take 10 mg by mouth 3 times daily as needed, Disp: , Rfl:     cyclobenzaprine (FLEXERIL) 10 MG tablet, Take 1 tablet by mouth 2 times daily as needed for Muscle spasms, Disp: 180 tablet, Rfl: 3       Allergies   Allergen Reactions    Tape [[id number] Tape]      Surgical tape       Social History     Tobacco Use    Smoking status: Former Smoker     Packs/day: 0.25     Years: 20.00     Pack years: 5.00     Types: Cigars     Start date: 2/3/2016     Quit date: 2016     Years since quittin.4    Smokeless tobacco: Never Used    Tobacco comment: Smokes Roxine Fulton a day.    Vaping Use    Vaping Use: Never used   Substance Use Topics    Alcohol use: No     Alcohol/week: 0.0 standard drinks    Drug use: No       Family History   Problem Relation Age of Onset    Diabetes Mother     Cancer Mother         breast    Heart Disease Mother     High Blood Pressure Mother     Breast Cancer Mother     Colon Cancer Neg Hx     Colon Polyps Neg Hx     Esophageal Cancer Neg Hx     Liver Cancer Neg Hx     Liver Disease Neg Hx     Rectal Cancer Neg Hx     Stomach Cancer Neg Hx          Subjective   REVIEW OF SYSTEMS:   Review of Systems   Constitutional: Positive for fatigue. Negative for chills, diaphoresis, fever and unexpected weight change. HENT: Negative for mouth sores, nosebleeds, sore throat, trouble swallowing and voice change. Eyes: Negative for photophobia, discharge and itching. Respiratory: Positive for shortness of breath. Negative for cough and wheezing. Cardiovascular: Negative for chest pain, palpitations and leg swelling. Gastrointestinal: Negative for abdominal distention, abdominal pain, blood in stool, constipation, diarrhea, nausea and vomiting. Endocrine: Negative for cold intolerance, heat intolerance, polydipsia and polyuria. Genitourinary: Negative for difficulty urinating, dysuria, hematuria and urgency. Musculoskeletal: Positive for arthralgias and back pain. Negative for joint swelling and myalgias. Skin: Positive for wound (Better). Negative for color change. Neurological: Negative for dizziness, tremors, seizures, syncope and light-headedness. Hematological: Negative for adenopathy. Does not bruise/bleed easily. Psychiatric/Behavioral: Negative for behavioral problems and suicidal ideas. The patient is nervous/anxious. All other systems reviewed and are negative. Objective   /80   Pulse 117   Ht 5' 7\" (1.702 m)   Wt 209 lb 14.4 oz (95.2 kg)   SpO2 93%   BMI 32.87 kg/m²     PHYSICAL EXAM:  Physical Exam  Constitutional:       General: She is not in acute distress. HENT:      Head: Normocephalic and atraumatic. Eyes:      General: No scleral icterus. Conjunctiva/sclera: Conjunctivae normal.   Neck:      Trachea: No tracheal deviation. Cardiovascular:      Rate and Rhythm: Normal rate and regular rhythm. Heart sounds: Normal heart sounds. No murmur heard. Pulmonary:      Effort: Pulmonary effort is normal. No respiratory distress. Breath sounds: Normal breath sounds. Chest:      Chest wall: No mass. Breasts:         Right: Absent. Left: Absent.    Abdominal: General: Bowel sounds are normal. There is no distension. Palpations: Abdomen is soft. There is no mass. Tenderness: There is no abdominal tenderness. Musculoskeletal:         General: No tenderness or deformity. Cervical back: Normal range of motion and neck supple. Skin:     General: Skin is warm and dry. Findings: No rash. Neurological:      Mental Status: She is alert and oriented to person, place, and time. Coordination: Coordination normal.   Psychiatric:         Behavior: Behavior normal.         CBC reviewed by me  Lab Results   Component Value Date    WBC 9.88 06/01/2021    HGB 15.3 06/01/2021    HCT 48.1 (H) 06/01/2021    MCV 92.3 06/01/2021     (L) 06/01/2021     Lab Results   Component Value Date    NEUTROABS 5.56 06/01/2021       ASSESSMENT/PLAN:     Diagnosis Orders   1. Personal history of breast cancer  CBC Auto Differential    Comprehensive Metabolic Panel    Cancer Antigen 15-3   2. BRCA gene mutation positive in female     3. Elevated carcinoembryonic antigen  CEA   4. Other abnormal tumor markers  Cancer Antigen 19-9   5. Abnormal serum level of alkaline phosphatase     6. Gastro-esophageal reflux disease without esophagitis          1. Breast carcinoma 2010    CA-15-3 on 8/1/2019 was normal at 15.8    Due to COVID-19 she has not come to the office through 2020. The last time she was seen in the office was 2019. Last office encounter was a virtual visit. Physical exam does not reveal any nodularities on the chest wall. There is no palpable clavicular or axillary lymphadenopathy. CA 15-3 will be checked. 2. Chronically elevated CEA. Serology 8/1/2019  CEA - 6.3  CA-19-9 - 49.3     She has a positive BRCA 2 mutation.         MRI abdomen with and without contrast MRCP 9/23/2019 at [facility] revealed a limited study due to respiratory motion and geographic fatty infiltration of the liver. No masses or abnormal contrast enhancement identified. MRCP images did not reveal any evidence of intrahepatic or extrahepatic biliary ductal dilatation. No pancreatic ductal dilatation is identified. No obvious pancreatic lesion detected. Small cyst within the right kidney. Scattered calcified granulomas in the liver.     Repeat CEA CA 19-9 today. 3.  History iron deficiency anemia. CBC today reveals a Hgb 15.3 with MCV 92.3. · Bone health. Bone density 12/23/2019 was normal.      · Colon cancer screening. Screening colonoscopy per Dr. Braden Souas 1/6/2021 was negative with 5-year follow-up recommended. EGD per Dr. Braden Sousa 1/6/2021 revealed white patches throughout the esophagus with biopsies consistent with Candida esophagitis. Mild mucosal changes suggestive of gastritis with biopsies revealing inflammation, H. pylori negative by [facility]        Immunization    She has not contracted COVID-19. She declines COVID-19 vaccine    Orders Placed This Encounter   Procedures    CBC Auto Differential    Comprehensive Metabolic Panel    Cancer Antigen 15-3    Cancer Antigen 19-9    CEA       Return in about 6 months (around 12/1/2021) for With Tristan File. rescheduled.   Yael Nguyen PA-C  9:40 AM  6/1/2021

## 2021-06-14 DIAGNOSIS — E11.00 TYPE 2 DIABETES MELLITUS WITH HYPEROSMOLARITY WITHOUT COMA, WITHOUT LONG-TERM CURRENT USE OF INSULIN (HCC): Primary | ICD-10-CM

## 2021-06-14 RX ORDER — LANCETS 30 GAUGE
1 EACH MISCELLANEOUS 2 TIMES DAILY
Qty: 100 EACH | Refills: 5 | Status: SHIPPED | OUTPATIENT
Start: 2021-06-14

## 2021-06-14 RX ORDER — GLUCOSAMINE HCL/CHONDROITIN SU 500-400 MG
CAPSULE ORAL
Qty: 100 STRIP | Refills: 3 | Status: SHIPPED | OUTPATIENT
Start: 2021-06-14 | End: 2021-09-16

## 2021-06-14 RX ORDER — DIPHENHYDRAMINE HYDROCHLORIDE 25 MG/1
1 CAPSULE, LIQUID FILLED ORAL 2 TIMES DAILY
Qty: 1 KIT | Refills: 0 | Status: SHIPPED | OUTPATIENT
Start: 2021-06-14 | End: 2022-08-23

## 2021-06-16 DIAGNOSIS — E11.00 TYPE 2 DIABETES MELLITUS WITH HYPEROSMOLARITY WITHOUT COMA, WITHOUT LONG-TERM CURRENT USE OF INSULIN (HCC): ICD-10-CM

## 2021-06-22 ENCOUNTER — TRANSCRIBE ORDERS (OUTPATIENT)
Dept: LAB | Facility: HOSPITAL | Age: 56
End: 2021-06-22

## 2021-06-22 DIAGNOSIS — Z01.818 PRE-OP TESTING: Primary | ICD-10-CM

## 2021-06-24 ENCOUNTER — LAB (OUTPATIENT)
Dept: LAB | Facility: HOSPITAL | Age: 56
End: 2021-06-24

## 2021-06-24 ENCOUNTER — APPOINTMENT (OUTPATIENT)
Dept: LAB | Facility: HOSPITAL | Age: 56
End: 2021-06-24

## 2021-06-24 LAB — SARS-COV-2 ORF1AB RESP QL NAA+PROBE: NOT DETECTED

## 2021-06-24 PROCEDURE — U0004 COV-19 TEST NON-CDC HGH THRU: HCPCS | Performed by: PAIN MEDICINE

## 2021-06-24 PROCEDURE — C9803 HOPD COVID-19 SPEC COLLECT: HCPCS | Performed by: PAIN MEDICINE

## 2021-06-25 DIAGNOSIS — T45.1X5A CHEMOTHERAPY-INDUCED NEUROPATHY (HCC): ICD-10-CM

## 2021-06-25 DIAGNOSIS — G62.0 CHEMOTHERAPY-INDUCED NEUROPATHY (HCC): ICD-10-CM

## 2021-06-25 RX ORDER — GABAPENTIN 300 MG/1
CAPSULE ORAL
Qty: 150 CAPSULE | Refills: 0 | Status: SHIPPED | OUTPATIENT
Start: 2021-06-25 | End: 2021-07-27

## 2021-06-25 NOTE — TELEPHONE ENCOUNTER
Angeles Ellison called to request a refill on her medication. Last office visit : 4/2/2021   Next office visit : 7/2/2021     Last UDS:   Amphetamine Screen, Urine   Date Value Ref Range Status   04/02/2021 -  Final     Barbiturate Screen, Urine   Date Value Ref Range Status   04/02/2021 -  Final     Benzodiazepine Screen, Urine   Date Value Ref Range Status   04/02/2021 -  Final     Buprenorphine Urine   Date Value Ref Range Status   04/02/2021 -  Final     Cocaine Metabolite Screen, Urine   Date Value Ref Range Status   04/02/2021 -  Final     Gabapentin Screen, Urine   Date Value Ref Range Status   04/02/2021 -  Final     MDMA, Urine   Date Value Ref Range Status   04/02/2021 -  Final     Methamphetamine, Urine   Date Value Ref Range Status   04/02/2021 -  Final     Opiate Scrn, Ur   Date Value Ref Range Status   04/02/2021 -  Final     Oxycodone Screen, Ur   Date Value Ref Range Status   04/02/2021 -  Final     PCP Screen, Urine   Date Value Ref Range Status   04/02/2021 -  Final     Propoxyphene Screen, Urine   Date Value Ref Range Status   04/02/2021 -  Final     THC Screen, Urine   Date Value Ref Range Status   04/02/2021 -  Final     Tricyclic Antidepressants, Urine   Date Value Ref Range Status   04/02/2021 -  Final       Last Huma Fruit: 6/25/2021  Medication Contract: 12/15/2016  Last Fill: 4/2/2021     Requested Prescriptions     Pending Prescriptions Disp Refills    gabapentin (NEURONTIN) 300 MG capsule [Pharmacy Med Name: GABAPENTIN 300 MG CAPSULE] 150 capsule 0     Sig: TAKE 2 CAPSULES BY MOUTH IN THE MORNING & 1 CAP AT NOON, THEN 2 CAPS AT BEDTIME         Please approve or refuse this medication.    Jordy Saravia

## 2021-06-26 DIAGNOSIS — E11.00 TYPE 2 DIABETES MELLITUS WITH HYPEROSMOLARITY WITHOUT COMA, WITHOUT LONG-TERM CURRENT USE OF INSULIN (HCC): ICD-10-CM

## 2021-06-27 DIAGNOSIS — K21.9 GASTROESOPHAGEAL REFLUX DISEASE WITHOUT ESOPHAGITIS: ICD-10-CM

## 2021-06-27 RX ORDER — OMEPRAZOLE 20 MG/1
CAPSULE, DELAYED RELEASE ORAL
Qty: 90 CAPSULE | Refills: 0 | Status: SHIPPED | OUTPATIENT
Start: 2021-06-27 | End: 2021-09-19

## 2021-06-27 RX ORDER — METFORMIN HYDROCHLORIDE 500 MG/1
TABLET, EXTENDED RELEASE ORAL
Qty: 90 TABLET | Refills: 1 | Status: SHIPPED | OUTPATIENT
Start: 2021-06-27 | End: 2021-12-21 | Stop reason: SDUPTHER

## 2021-07-27 DIAGNOSIS — G62.0 CHEMOTHERAPY-INDUCED NEUROPATHY (HCC): ICD-10-CM

## 2021-07-27 DIAGNOSIS — T45.1X5A CHEMOTHERAPY-INDUCED NEUROPATHY (HCC): ICD-10-CM

## 2021-07-27 RX ORDER — GABAPENTIN 300 MG/1
CAPSULE ORAL
Qty: 150 CAPSULE | Refills: 0 | Status: SHIPPED | OUTPATIENT
Start: 2021-07-27 | End: 2022-01-28 | Stop reason: SDUPTHER

## 2021-08-23 ENCOUNTER — HOSPITAL ENCOUNTER (EMERGENCY)
Age: 56
Discharge: HOME | End: 2021-08-23
Attending: EMERGENCY MEDICINE
Payer: MEDICARE

## 2021-08-23 ENCOUNTER — APPOINTMENT (OUTPATIENT)
Dept: GENERAL RADIOLOGY | Age: 56
End: 2021-08-23
Payer: MEDICARE

## 2021-08-23 VITALS
SYSTOLIC BLOOD PRESSURE: 115 MMHG | BODY MASS INDEX: 34.53 KG/M2 | HEART RATE: 69 BPM | WEIGHT: 220 LBS | RESPIRATION RATE: 19 BRPM | OXYGEN SATURATION: 99 % | DIASTOLIC BLOOD PRESSURE: 71 MMHG | TEMPERATURE: 98.6 F | HEIGHT: 67 IN

## 2021-08-23 DIAGNOSIS — M54.50 ACUTE LEFT-SIDED LOW BACK PAIN WITHOUT SCIATICA: ICD-10-CM
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
## 2021-08-23 PROCEDURE — 96372 THER/PROPH/DIAG INJ SC/IM: CPT

## 2021-08-23 PROCEDURE — 72100 X-RAY EXAM L-S SPINE 2/3 VWS: CPT

## 2021-08-23 PROCEDURE — 6370000000 HC RX 637 (ALT 250 FOR IP): Performed by: EMERGENCY MEDICINE

## 2021-08-23 PROCEDURE — 6360000002 HC RX W HCPCS: Performed by: EMERGENCY MEDICINE

## 2021-08-23 PROCEDURE — 99283 EMERGENCY DEPT VISIT LOW MDM: CPT

## 2021-08-23 RX ORDER — TIZANIDINE 4 MG/1
4 TABLET ORAL EVERY 6 HOURS PRN
Status: DISCONTINUED | OUTPATIENT
Start: 2021-08-23 | End: 2021-08-23 | Stop reason: HOSPADM

## 2021-08-23 RX ORDER — OXYCODONE HYDROCHLORIDE AND ACETAMINOPHEN 5; 325 MG/1; MG/1
1 TABLET ORAL ONCE
Status: DISCONTINUED | OUTPATIENT
Start: 2021-08-23 | End: 2021-08-23 | Stop reason: HOSPADM

## 2021-08-23 RX ORDER — KETOROLAC TROMETHAMINE 30 MG/ML
30 INJECTION, SOLUTION INTRAMUSCULAR; INTRAVENOUS ONCE
Status: COMPLETED | OUTPATIENT
Start: 2021-08-23 | End: 2021-08-23

## 2021-08-23 RX ADMIN — KETOROLAC TROMETHAMINE 30 MG: 30 INJECTION, SOLUTION INTRAMUSCULAR; INTRAVENOUS at 15:56

## 2021-08-23 RX ADMIN — TIZANIDINE 4 MG: 4 TABLET ORAL at 15:57

## 2021-08-23 ASSESSMENT — ENCOUNTER SYMPTOMS
VOICE CHANGE: 0
ABDOMINAL PAIN: 0
EYE REDNESS: 0
COUGH: 0
SHORTNESS OF BREATH: 0
DIARRHEA: 0
RHINORRHEA: 0
VOMITING: 0
BACK PAIN: 1
EYE PAIN: 0

## 2021-08-23 ASSESSMENT — PAIN SCALES - GENERAL
PAINLEVEL_OUTOF10: 7
PAINLEVEL_OUTOF10: 6

## 2021-08-23 NOTE — ED PROVIDER NOTES
[facility] EMERGENCY DEPT  EMERGENCY DEPARTMENT ENCOUNTER      Pt Name: Brian Choi  MRN: [id number]  [place] 1965  Date of evaluation: 8/23/2021  Provider: Quinton Montano MD    [address] [place] [address]       Chief Complaint   Patient presents with    Back Pain     presents with c/o back pain past fall 1 day ago from stool, unsure of loc         HISTORY OF PRESENT ILLNESS   (Location/Symptom, Timing/Onset,Context/Setting, Quality, Duration, Modifying Factors, Severity)  Note limiting factors. Brian Choi is a 61 y.o. female who presents to the emergency department with complaint of left lower back pain. Patient states she fell yesterday when her stool broke causing her to fall down to the floor and landed on her butt. Patient did not have any significant pain yesterday but states when she woke up today she was having significant pain. States she has had bulged lumbar disks in the past but this pain is in a different location. Denies any weakness, numbness, or tingling in extremities. HPI    NursingNotes were reviewed. REVIEW OF SYSTEMS    (2-9 systems for level 4, 10 or more for level 5)     Review of Systems   Constitutional: Negative for fatigue and fever. HENT: Negative for congestion, rhinorrhea and voice change. Eyes: Negative for pain and redness. Respiratory: Negative for cough and shortness of breath. Cardiovascular: Negative for chest pain. Gastrointestinal: Negative for abdominal pain, diarrhea and vomiting. Endocrine: Negative. Genitourinary: Negative. Musculoskeletal: Positive for back pain. Negative for arthralgias and gait problem. Skin: Negative for rash and wound. Neurological: Negative for weakness and headaches. Hematological: Negative. Psychiatric/Behavioral: Negative. All other systems reviewed and are negative. A complete review of systems was performed and is negative except as noted above in the HPI.        PAST MEDICAL HISTORY     Past Medical History:   Diagnosis Date    Allergic rhinitis     Anxiety     Breast cancer ([facility] [address] 10/2010    Left    Bronchitis     Carpal tunnel syndrome     R and L    Chest pain     Chronic back pain     Chronic bronchitis (HCC)     Depression     Diabetes mellitus (HCC)     GERD (gastroesophageal reflux disease)     Headache(784.0)     Heart palpitations     Mononeuropathy     Osteoarthritis     Sleep apnea     Wears glasses          SURGICAL HISTORY       Past Surgical History:   Procedure Laterality Date    ANKLE FRACTURE SURGERY Right 2014   [address]    BREAST RECONSTRUCTION  06/2011    BREAST SURGERY  06/2011    emergency removal of Left breast expander    COLONOSCOPY N/A 02/05/2016    Dr Manolo Childers-HP x 1, serrated AP x 1, 12 month recall    COLONOSCOPY N/A 01/06/2021    Dr Antwon Fountain yr recall    EGD COLONOSCOPY N/A 06/03/2016    w/empiric dilation to 50 Citizen [place] (16mm)-Dr RONY Childers, Nafisa (-)    HYSTERECTOMY      MASTECTOMY, BILATERAL Bilateral 03/2011    OTHER SURGICAL HISTORY      R leg & ankle fracture due to fall repair[facility]    UPPER GASTROINTESTINAL ENDOSCOPY  06/03/2016    w/empiric dilation to 48 [place] (16mm)-Dr RONY Webb, Nafisa (-)    UPPER GASTROINTESTINAL ENDOSCOPY N/A 01/06/2021    Dr Manolo Childers-w/brushings-Candidal esophagitis-Treated with Diflucan         CURRENT MEDICATIONS       Discharge Medication List as of 8/23/2021  3:54 PM      CONTINUE these medications which have NOT CHANGED    Details   gabapentin (NEURONTIN) 300 MG capsule TAKE 2 CAPSULES BY MOUTH IN THE MORNING & 1 CAP AT NOON, THEN 2 CAPS AT BEDTIME, Disp-150 capsule, R-0Normal      !! metFORMIN (GLUCOPHAGE-XR) 500 MG extended release tablet TAKE 1 TABLET BY MOUTH EVERY DAY WITH LARGEST MEAL OF THE DAY, Disp-90 tablet, R-1PT IS REQUESTING A 90 DAY SUPPLYNormal      omeprazole (PRILOSEC) 20 MG delayed release capsule TAKE 1 CAPSULE BY MOUTH EVERY DAY, Disp-90 capsule, R-0Normal      SITagliptin (JANUVIA) 50 MG tablet TAKE 1 TABLET BY MOUTH EVERY DAY, Disp-90 tablet, R-1Normal      blood glucose monitor strips Test 2 times a day & as needed for symptoms of irregular blood glucose., Disp-100 strip, R-3, Normal      Lancets MISC 2 TIMES DAILY Starting Mon 6/14/2021, Disp-100 each, R-5, Normal      Blood Glucose Monitoring Suppl (BLOOD GLUCOSE MONITOR SYSTEM) w/Device KIT 2 TIMES DAILY Starting Mon 6/14/2021, Until Wed 7/14/2021, For 30 days, Disp-1 kit, R-0, Normal      buPROPion (WELLBUTRIN SR) 100 MG extended release tablet Take 1 tablet by mouth daily, Disp-30 tablet, R-0Normal      famotidine (PEPCID) 20 MG tablet Indications: takes one nightly Indications: takes one nightly, Disp-180 tablet, R-1Normal      fluticasone-salmeterol (ADVAIR) 100-50 MCG/DOSE diskus inhaler Inhale 1 puff into the lungs 2 times daily, Disp-3 Inhaler, R-2Normal      !! metFORMIN (GLUMETZA) 1000 MG extended release tablet Take one tablet in the morning and one tablet in the evening., Disp-60 tablet, R-2No further refills must be seenNormal      lisinopril (PRINIVIL;ZESTRIL) 20 MG tablet TAKE 1 TABLET BY MOUTH TWICE A DAY, Disp-180 tablet, R-3Normal      meloxicam (MOBIC) 15 MG tablet Take 7.5 mg by mouth dailyHistorical Med      guaiFENesin (MUCINEX PO) Take by mouth nightlyHistorical Med      ipratropium-albuterol (DUONEB) 0.5-2.5 (3) MG/3ML SOLN nebulizer solution INHALE ONE AMPULE INTO THE LUNGS EVERY 6 HOURS AS NEEDED FOR SHORTNESS OF BREATH, Disp-360 mL,R-1Normal      fluticasone (FLONASE) 50 MCG/ACT nasal spray One spray each nostril twice daily. , Disp-1 Bottle, R-2Normal      Respiratory Therapy Supplies LUPE Disp-1 Device, R-0, Printcpap supplies x 1 year      HYDROcodone-acetaminophen (NORCO) 7.5-325 MG per tablet Take 1 tablet by mouth 2 times daily as needed for Pain (Dr. Ted Torres).  Historical Med      busPIRone (BUSPAR) 10 MG tablet Take 10 mg by mouth 3 times daily as neededHistorical Med      cyclobenzaprine (FLEXERIL) Frequency of Social Gatherings with Friends and Family:     Attends [employer] Services:     Active Member of Clubs or Organizations:     Attends Club or Organization Meetings:     Marital Status:    Intimate Partner Violence:     Fear of Current or Ex-Partner:     Emotionally Abused:     Physically Abused:     Sexually Abused:        SCREENINGS             PHYSICAL EXAM    (up to 7 for level 4, 8 or more for level 5)     ED Triage Vitals [08/23/21 1251]   BP Temp Temp src Pulse Resp SpO2 Height Weight   117/83 98.1 °F (36.7 °C) -- 78 18 99 % 5' 7\" (1.702 m) 220 lb (99.8 kg)       Physical Exam  Vitals and nursing note reviewed. Constitutional:       General: She is not in acute distress. Appearance: She is well-developed. She is not toxic-appearing or diaphoretic. HENT:      Head: Normocephalic and atraumatic. Eyes:      General: No scleral icterus. Right eye: No discharge. Left eye: No discharge. Pupils: Pupils are equal, round, and reactive to light. Cardiovascular:      Rate and Rhythm: Normal rate and regular rhythm. Pulmonary:      Effort: Pulmonary effort is normal. No respiratory distress. Breath sounds: No stridor. Abdominal:      General: There is no distension. Musculoskeletal:         General: No deformity. Normal range of motion. Cervical back: Normal range of motion. Skin:     General: Skin is warm and dry. Neurological:      Mental Status: She is alert and oriented to person, place, and time. GCS: GCS eye subscore is 4. GCS verbal subscore is 5. GCS motor subscore is 6. Cranial Nerves: No cranial nerve deficit. Motor: No abnormal muscle tone. Psychiatric:         Behavior: Behavior normal.         Thought Content:  Thought content normal.         Judgment: Judgment normal.         DIAGNOSTIC RESULTS     EKG: All EKG's are interpreted by the Emergency Department Physician who either signs or Co-signs this chart in the absence of a cardiologist.        RADIOLOGY:   Non-plain film images such as CT, Ultrasound and MRI are read by the radiologist. Plainradiographic images are visualized and preliminarily interpreted by the emergency physician with the below findings:        Interpretation per the Radiologist below, if available at the time of this note:    XR LUMBAR SPINE (2-3 VIEWS)   Final Result   1. Degenerative changes greatest in lower lumbar spine with no acute   radiographic abnormality. Signed by Dr Melisa Navarro            ED BEDSIDE ULTRASOUND:   Performed by ED Physician - none    LABS:  Labs Reviewed - No data to display    All other labs were within normal range or not returned as of this dictation. Medications   oxyCODONE-acetaminophen (PERCOCET) 5-325 MG per tablet 1 tablet (1 tablet Oral Not Given 8/23/21 1557)   tiZANidine (ZANAFLEX) tablet 4 mg (4 mg Oral Given 8/23/21 1557)   ketorolac (TORADOL) injection 30 mg (30 mg Intramuscular Given 8/23/21 1556)       EMERGENCY DEPARTMENT COURSE and DIFFERENTIALDIAGNOSIS/MDM:   Vitals:    Vitals:    08/23/21 1251 08/23/21 1645   BP: 117/83 115/71   Pulse: 78 69   Resp: 18 19   Temp: 98.1 °F (36.7 °C) 98.6 °F (37 °C)   SpO2: 99% 99%   Weight: 220 lb (99.8 kg)    Height: 5' 7\" (1.702 m)        MDM      Evaluation and work-up here revealed no signs of emergent or life-threatening pathology that would necessitate admission for further work-up or management at this time. Patient is felt to be stable for discharge home with return precautions for worsening of the condition or development of new concerning symptoms. Patient was encouraged to follow-up with their primary care doctor in the appropriate timeframe. Necessary prescriptions and information have been provided for treatment at home. Patient voices understanding and agreement with the plan. CONSULTS:  None    PROCEDURES:  Unless otherwise notedbelow, none     Procedures      FINAL IMPRESSION     1.  Fall from stool 2. Acute left-sided low back pain without sciatica          DISPOSITION/PLAN   DISPOSITION Decision To Discharge 08/23/2021 03:40:31 PM      PATIENT REFERRED TO:  [facility] EMERGENCY DEPT  [place]  [phone]    If symptoms worsen    RADHA Salazar  [address] [place] [address] Dr ARIZA [address] [facility] [phone]      As needed      DISCHARGE MEDICATIONS:  Discharge Medication List as of 8/23/2021  3:54 PM             (Please note that portions of this note were completed with a voice recognition program.  Efforts were made to edit the dictations butoccasionally words are mis-transcribed.)    Lea Landau, MD (electronically signed)  AttendingEmergency Physician          Lea Landau., MD  08/23/21 [phone]

## 2021-08-26 DIAGNOSIS — T45.1X5A CHEMOTHERAPY-INDUCED NEUROPATHY (HCC): ICD-10-CM

## 2021-08-26 DIAGNOSIS — G62.0 CHEMOTHERAPY-INDUCED NEUROPATHY (HCC): ICD-10-CM

## 2021-08-27 RX ORDER — GABAPENTIN 300 MG/1
CAPSULE ORAL
Qty: 150 CAPSULE | Refills: 0 | Status: SHIPPED | OUTPATIENT
Start: 2021-08-27 | End: 2021-09-22 | Stop reason: SDUPTHER

## 2021-08-27 NOTE — TELEPHONE ENCOUNTER
Varghese Hughes called to request a refill on her medication. Last office visit : 4/2/2021   Next office visit : 9/16/2021     Last UDS:   Amphetamine Screen, Urine   Date Value Ref Range Status   04/02/2021 -  Final     Barbiturate Screen, Urine   Date Value Ref Range Status   04/02/2021 -  Final     Benzodiazepine Screen, Urine   Date Value Ref Range Status   04/02/2021 -  Final     Buprenorphine Urine   Date Value Ref Range Status   04/02/2021 -  Final     Cocaine Metabolite Screen, Urine   Date Value Ref Range Status   04/02/2021 -  Final     Gabapentin Screen, Urine   Date Value Ref Range Status   04/02/2021 -  Final     MDMA, Urine   Date Value Ref Range Status   04/02/2021 -  Final     Methamphetamine, Urine   Date Value Ref Range Status   04/02/2021 -  Final     Opiate Scrn, Ur   Date Value Ref Range Status   04/02/2021 -  Final     Oxycodone Screen, Ur   Date Value Ref Range Status   04/02/2021 -  Final     PCP Screen, Urine   Date Value Ref Range Status   04/02/2021 -  Final     Propoxyphene Screen, Urine   Date Value Ref Range Status   04/02/2021 -  Final     THC Screen, Urine   Date Value Ref Range Status   04/02/2021 -  Final     Tricyclic Antidepressants, Urine   Date Value Ref Range Status   04/02/2021 -  Final       Last Court : 06/25/2021  Medication Contract: 04/02/2021   Last Fill: 07/27/2021    Requested Prescriptions     Pending Prescriptions Disp Refills    gabapentin (NEURONTIN) 300 MG capsule [Pharmacy Med Name: GABAPENTIN 300 MG CAPSULE] 150 capsule 0     Sig: TAKE 2 CAPSULES BY MOUTH IN THE MORNING & 1 CAP AT NOON, THEN 2 CAPS AT BEDTIME         Please approve or refuse this medication.    Celeste Haynes

## 2021-09-03 ENCOUNTER — TELEPHONE (OUTPATIENT)
Dept: PRIMARY CARE CLINIC | Age: 56
End: 2021-09-03

## 2021-09-03 NOTE — TELEPHONE ENCOUNTER
Danyell Barillas from [facility] would like you to add a statin to her treatment. or at least discuss the benefits w/the patient at the next visit. The recommendation is from the ACC/AHA and the ADA guildelines. For prevention of a cardiovascular event.

## 2021-09-07 ENCOUNTER — OFFICE VISIT (OUTPATIENT)
Dept: PRIMARY CARE CLINIC | Age: 56
End: 2021-09-07
Payer: MEDICARE

## 2021-09-07 VITALS
OXYGEN SATURATION: 96 % | DIASTOLIC BLOOD PRESSURE: 70 MMHG | WEIGHT: 227 LBS | HEIGHT: 67 IN | TEMPERATURE: 97.2 F | BODY MASS INDEX: 35.63 KG/M2 | RESPIRATION RATE: 18 BRPM | SYSTOLIC BLOOD PRESSURE: 98 MMHG | HEART RATE: 115 BPM

## 2021-09-07 DIAGNOSIS — G89.29 ACUTE EXACERBATION OF CHRONIC LOW BACK PAIN: ICD-10-CM

## 2021-09-07 DIAGNOSIS — F41.9 ANXIETY: ICD-10-CM

## 2021-09-07 DIAGNOSIS — M54.50 ACUTE EXACERBATION OF CHRONIC LOW BACK PAIN: ICD-10-CM

## 2021-09-07 DIAGNOSIS — R00.0 TACHYCARDIA: Primary | ICD-10-CM

## 2021-09-07 PROCEDURE — 99213 OFFICE O/P EST LOW 20 MIN: CPT | Performed by: NURSE PRACTITIONER

## 2021-09-07 RX ORDER — PROPRANOLOL HYDROCHLORIDE 10 MG/1
10 TABLET ORAL 3 TIMES DAILY
Qty: 90 TABLET | Refills: 1 | Status: SHIPPED | OUTPATIENT
Start: 2021-09-07 | End: 2021-09-30

## 2021-09-07 RX ORDER — METHYLPREDNISOLONE 4 MG/1
TABLET ORAL
Qty: 1 KIT | Refills: 2 | Status: SHIPPED | OUTPATIENT
Start: 2021-09-07 | End: 2021-09-13

## 2021-09-07 ASSESSMENT — PATIENT HEALTH QUESTIONNAIRE - PHQ9
SUM OF ALL RESPONSES TO PHQ QUESTIONS 1-9: 0
1. LITTLE INTEREST OR PLEASURE IN DOING THINGS: 0
SUM OF ALL RESPONSES TO PHQ QUESTIONS 1-9: 0
SUM OF ALL RESPONSES TO PHQ QUESTIONS 1-9: 0
2. FEELING DOWN, DEPRESSED OR HOPELESS: 0
SUM OF ALL RESPONSES TO PHQ9 QUESTIONS 1 & 2: 0

## 2021-09-07 NOTE — PROGRESS NOTES
[facility]  [address] [place] [address]  [id number]  [phone]  Dept: [phone]  Dept Fax: [phone]  Loc: [phone]        Daysi Koenig is a 61 y.o. female who presents today for her medical conditions/ complaints as noted below. Daysi Koenig is c/o 3 Month Follow-Up (Pt states she fell and hurt back and having pain in knees )        Chief Complaint   Patient presents with    3 Month Follow-Up     Pt states she fell and hurt back and having pain in knees        HPI:     HPI    Palpitations per pt report. States that her palpitations increases her anxiety. this increases her anxiety. Patient reports that they have been compliant with taking medications as directed.      Past Medical History:   Diagnosis Date    Allergic rhinitis     Anxiety     Breast cancer ([facility] [address] 10/2010    Left    Bronchitis     Carpal tunnel syndrome     R and L    Chest pain     Chronic back pain     Chronic bronchitis (HCC)     Depression     Diabetes mellitus (HCC)     GERD (gastroesophageal reflux disease)     Headache(784.0)     Heart palpitations     Mononeuropathy     Osteoarthritis     Sleep apnea     Wears glasses        Past Surgical History:   Procedure Laterality Date    ANKLE FRACTURE SURGERY Right 2014   [address] [place] [address]    BREAST RECONSTRUCTION  06/2011    BREAST SURGERY  06/2011    emergency removal of Left breast expander    COLONOSCOPY N/A 02/05/2016    Dr Kaye Childers-HP x 1, serrated AP x 1, 12 month recall    COLONOSCOPY N/A 01/06/2021    Dr Cherelle Reyes yr recall    EGD COLONOSCOPY N/A 06/03/2016    w/empiric dilation to 48 [place] (16mm)-Dr RONY Childers, Nafisa (-)    HYSTERECTOMY      MASTECTOMY, BILATERAL Bilateral 03/2011    OTHER SURGICAL HISTORY      R leg & ankle fracture due to fall repair-[facility]    UPPER GASTROINTESTINAL ENDOSCOPY  06/03/2016    w/empiric dilation to 48 [place] (16mm)-Dr RONY Childers, Nafisa (-)    UPPER GASTROINTESTINAL ENDOSCOPY N/A 2021    Dr Santana Childers-w/brushings-Candidal esophagitis-Treated with Diflucan       Social History     Socioeconomic History    Marital status:      Spouse name: None    Number of children: None    Years of education: None    Highest education level: None   Occupational History    None   Tobacco Use    Smoking status: Former Smoker     Packs/day: 0.25     Years: 20.00     Pack years: 5.00     Types: Cigars     Start date: 2/3/2016     Quit date: 2016     Years since quittin.7    Smokeless tobacco: Never Used    Tobacco comment: Smokes Cass Hammers a day. Vaping Use    Vaping Use: Never used   Substance and Sexual Activity    Alcohol use: No     Alcohol/week: 0.0 standard drinks    Drug use: No    Sexual activity: None   Other Topics Concern    None   Social History Narrative    None     Social Determinants of Health     Financial Resource Strain:     Difficulty of Paying Living Expenses:    Food Insecurity:     Worried About Running Out of Food in the Last Year:     Ran Out of Food in the Last Year:    Transportation Needs:     Lack of Transportation (Medical):      Lack of Transportation (Non-Medical):    Physical Activity:     Days of Exercise per Week:     Minutes of Exercise per Session:    Stress:     Feeling of Stress :    Social Connections:     Frequency of Communication with Friends and Family:     Frequency of Social Gatherings with Friends and Family:     Attends Worship Services:     Active Member of Clubs or Organizations:     Attends Club or Organization Meetings:     Marital Status:    Intimate Partner Violence:     Fear of Current or Ex-Partner:     Emotionally Abused:     Physically Abused:     Sexually Abused:        Family History   Problem Relation Age of Onset    Diabetes Mother     Cancer Mother         breast    Heart Disease Mother     High Blood Pressure Mother     Breast Cancer Mother     Colon (NORCO) 7.5-325 MG per tablet Take 1 tablet by mouth 2 times daily as needed for Pain (Dr. Enzo Land).  busPIRone (BUSPAR) 10 MG tablet Take 10 mg by mouth 3 times daily as needed      cyclobenzaprine (FLEXERIL) 10 MG tablet Take 1 tablet by mouth 2 times daily as needed for Muscle spasms 180 tablet 3    gabapentin (NEURONTIN) 300 MG capsule TAKE 2 CAPSULES BY MOUTH IN THE MORNING & 1 CAP AT NOON, THEN 2 CAPS AT BEDTIME 150 capsule 0    metFORMIN (GLUMETZA) 1000 MG extended release tablet Take one tablet in the morning and one tablet in the evening. (Patient not taking: Reported on 9/7/2021) 60 tablet 2    lisinopril (PRINIVIL;ZESTRIL) 20 MG tablet TAKE 1 TABLET BY MOUTH TWICE A DAY (Patient not taking: Reported on 9/7/2021) 180 tablet 3    meloxicam (MOBIC) 15 MG tablet Take 7.5 mg by mouth daily (Patient not taking: Reported on 9/7/2021)       No current facility-administered medications for this visit. Allergies   Allergen Reactions    Tape Devan Just Tape]      Surgical tape       Lab Review not applicable  notapplicable    Subjective:   Review of Systems   Constitutional: Negative for activity change, appetite change, fatigue, fever and unexpected weight change. HENT: Negative for congestion, ear pain, nosebleeds, rhinorrhea, sore throat, trouble swallowing and voice change. Eyes: Negative for redness and visual disturbance. Respiratory: Negative for cough, chest tightness, shortness of breath and wheezing. Cardiovascular: Negative for chest pain, palpitations and leg swelling. Gastrointestinal: Negative for abdominal pain, blood in stool, constipation, diarrhea, nausea and vomiting. Endocrine: Negative for polydipsia, polyphagia and polyuria. Genitourinary: Negative for dysuria, frequency and urgency. Musculoskeletal: Negative for myalgias. Skin: Negative for rash and wound. Neurological: Negative for dizziness, speech difficulty, light-headedness and headaches. Psychiatric/Behavioral: Negative for agitation, confusion, self-injury and suicidal ideas. The patient is not nervous/anxious. Objective:     Physical Exam  Vitals reviewed. Constitutional:       Appearance: She is obese. HENT:      Head: Normocephalic. Right Ear: Tympanic membrane normal.      Left Ear: Tympanic membrane normal.      Nose: Nose normal.      Mouth/Throat:      Mouth: Mucous membranes are moist.      Pharynx: Oropharynx is clear. Eyes:      Extraocular Movements: Extraocular movements intact. Pupils: Pupils are equal, round, and reactive to light. Cardiovascular:      Rate and Rhythm: Normal rate and regular rhythm. Pulses: Normal pulses. Heart sounds: Normal heart sounds. Pulmonary:      Effort: Pulmonary effort is normal.      Breath sounds: Normal breath sounds. Abdominal:      General: Bowel sounds are normal.      Palpations: Abdomen is soft. Musculoskeletal:         General: Normal range of motion. Cervical back: Normal range of motion. Skin:     General: Skin is warm and dry. Neurological:      Mental Status: She is alert and oriented to person, place, and time. Psychiatric:         Mood and Affect: Mood normal.         Behavior: Behavior normal.       BP 98/70 (Site: Right Upper Arm, Position: Sitting)   Pulse 115   Temp 97.2 °F (36.2 °C) (Temporal)   Resp 18   Ht 5' 7\" (1.702 m)   Wt 227 lb (103 kg)   SpO2 96%   BMI 35.55 kg/m²     Assessment:      Diagnosis Orders   1. Tachycardia  propranolol (INDERAL) 10 MG tablet   2. Acute exacerbation of chronic low back pain  methylPREDNISolone (MEDROL DOSEPACK) 4 MG tablet     No results found for this visit on 09/07/21. Plan:     1. Tachycardia    2. Acute exacerbation of chronic low back pain          Return in about 3 months (around 12/7/2021). No orders of the defined types were placed in this encounter.     Orders Placed This Encounter   Medications    propranolol (INDERAL) 10

## 2021-09-07 NOTE — PATIENT INSTRUCTIONS
Begin medrol dose pack. Begin propranolol 10mg three times daily for palpitations, and elevated heart rate.

## 2021-09-10 ASSESSMENT — ENCOUNTER SYMPTOMS
RHINORRHEA: 0
VOMITING: 0
DIARRHEA: 0
CONSTIPATION: 0
ABDOMINAL PAIN: 0
VOICE CHANGE: 0
WHEEZING: 0
SHORTNESS OF BREATH: 0
SORE THROAT: 0
NAUSEA: 0
CHEST TIGHTNESS: 0
BLOOD IN STOOL: 0
TROUBLE SWALLOWING: 0
EYE REDNESS: 0
COUGH: 0

## 2021-09-14 ENCOUNTER — TELEPHONE (OUTPATIENT)
Dept: PRIMARY CARE CLINIC | Age: 56
End: 2021-09-14

## 2021-09-14 NOTE — TELEPHONE ENCOUNTER
----- Message from Sam Bojorquez sent at 9/13/2021 11:18 AM CDT -----  Subject: Message to Provider    QUESTIONS  Information for Provider? PT wanted to talk to PCP regarding a program   that her insurance that covers low carb meals. ---------------------------------------------------------------------------  --------------  [place] INFO  What is the best way for the office to contact you? Do not leave any   message, patient will call back for answer  Preferred Call Back Phone Number? [phone]  ---------------------------------------------------------------------------  --------------  SCRIPT ANSWERS  Relationship to Patient?  Self

## 2021-09-16 ENCOUNTER — HOSPITAL ENCOUNTER (OUTPATIENT)
Dept: GENERAL RADIOLOGY | Age: 56
Discharge: HOME | End: 2021-09-16
Payer: MEDICARE

## 2021-09-16 ENCOUNTER — TELEPHONE (OUTPATIENT)
Dept: PRIMARY CARE CLINIC | Age: 56
End: 2021-09-16

## 2021-09-16 ENCOUNTER — HOSPITAL ENCOUNTER (OUTPATIENT)
Dept: MRI IMAGING | Age: 56
Discharge: HOME | End: 2021-09-16
Payer: MEDICARE

## 2021-09-16 DIAGNOSIS — M25.561 PAIN IN BOTH KNEES, UNSPECIFIED CHRONICITY: ICD-10-CM

## 2021-09-16 DIAGNOSIS — M25.562 PAIN IN BOTH KNEES, UNSPECIFIED CHRONICITY: ICD-10-CM

## 2021-09-16 DIAGNOSIS — E11.00 TYPE 2 DIABETES MELLITUS WITH HYPEROSMOLARITY WITHOUT COMA, WITHOUT LONG-TERM CURRENT USE OF INSULIN (HCC): ICD-10-CM

## 2021-09-16 DIAGNOSIS — M25.561 RIGHT KNEE PAIN, UNSPECIFIED CHRONICITY: ICD-10-CM

## 2021-09-16 DIAGNOSIS — M51.36 DDD (DEGENERATIVE DISC DISEASE), LUMBAR: ICD-10-CM

## 2021-09-16 DIAGNOSIS — M51.36 LUMBAR DEGENERATIVE DISC DISEASE: ICD-10-CM

## 2021-09-16 PROCEDURE — 6360000004 HC RX CONTRAST MEDICATION: Performed by: PAIN MEDICINE

## 2021-09-16 PROCEDURE — 73560 X-RAY EXAM OF KNEE 1 OR 2: CPT

## 2021-09-16 PROCEDURE — A9577 INJ MULTIHANCE: HCPCS | Performed by: PAIN MEDICINE

## 2021-09-16 PROCEDURE — 72100 X-RAY EXAM L-S SPINE 2/3 VWS: CPT

## 2021-09-16 PROCEDURE — 72158 MRI LUMBAR SPINE W/O & W/DYE: CPT

## 2021-09-16 RX ORDER — CALCIUM CITRATE/VITAMIN D3 200MG-6.25
TABLET ORAL
Qty: 50 STRIP | Refills: 3 | Status: SHIPPED | OUTPATIENT
Start: 2021-09-16 | End: 2022-09-02 | Stop reason: SDUPTHER

## 2021-09-16 RX ADMIN — GADOBENATE DIMEGLUMINE 20 ML: 529 INJECTION, SOLUTION INTRAVENOUS at 12:05

## 2021-09-16 NOTE — TELEPHONE ENCOUNTER
Patient came in and stated that she needs her low carb meals approved on her insurance so that she can get them.

## 2021-09-16 NOTE — TELEPHONE ENCOUNTER
Nereyda Charles called to request a refill on her medication. Last office visit : 9/7/2021   Next office visit : 12/7/2021     Requested Prescriptions     Pending Prescriptions Disp Refills    blood glucose test strips (TRUE METRIX BLOOD GLUCOSE TEST) strip [Pharmacy Med Name: TRUE METRIX GLUCOSE TEST STRIP] 50 strip 3     Sig: TEST 2 TIMES A DAY & AS NEEDED FOR SYMPTOMS OF IRREGULAR BLOOD GLUCOSE.             Jordy Kilpatrick

## 2021-09-16 NOTE — TELEPHONE ENCOUNTER
Attempted to contact pt. No answer. Wasn't able to leave VM.      Pt needs to contact her insurance and have them fax documentation over the office for approval.

## 2021-09-17 NOTE — TELEPHONE ENCOUNTER
Attempted to call pt 2x on 9/16/2021 and 1x 9/17/2021. Pt didn't answer any calls and wasn't able to leave a message.      Pt will need an appointment for approval.

## 2021-09-22 ENCOUNTER — TRANSCRIBE ORDERS (OUTPATIENT)
Dept: LAB | Facility: HOSPITAL | Age: 56
End: 2021-09-22

## 2021-09-22 DIAGNOSIS — G62.0 CHEMOTHERAPY-INDUCED NEUROPATHY (HCC): ICD-10-CM

## 2021-09-22 DIAGNOSIS — Z01.818 PREOPERATIVE TESTING: Primary | ICD-10-CM

## 2021-09-22 DIAGNOSIS — T45.1X5A CHEMOTHERAPY-INDUCED NEUROPATHY (HCC): ICD-10-CM

## 2021-09-22 RX ORDER — GABAPENTIN 300 MG/1
CAPSULE ORAL
Qty: 150 CAPSULE | Refills: 0 | Status: SHIPPED | OUTPATIENT
Start: 2021-09-22 | End: 2021-10-28

## 2021-09-22 NOTE — TELEPHONE ENCOUNTER
Chanell Leigh called to request a refill on her medication. Last office visit : 9/7/2021   Next office visit : 12/7/2021     Last UDS:   Amphetamine Screen, Urine   Date Value Ref Range Status   04/02/2021 -  Final     Barbiturate Screen, Urine   Date Value Ref Range Status   04/02/2021 -  Final     Benzodiazepine Screen, Urine   Date Value Ref Range Status   04/02/2021 -  Final     Buprenorphine Urine   Date Value Ref Range Status   04/02/2021 -  Final     Cocaine Metabolite Screen, Urine   Date Value Ref Range Status   04/02/2021 -  Final     Gabapentin Screen, Urine   Date Value Ref Range Status   04/02/2021 -  Final     MDMA, Urine   Date Value Ref Range Status   04/02/2021 -  Final     Methamphetamine, Urine   Date Value Ref Range Status   04/02/2021 -  Final     Opiate Scrn, Ur   Date Value Ref Range Status   04/02/2021 -  Final     Oxycodone Screen, Ur   Date Value Ref Range Status   04/02/2021 -  Final     PCP Screen, Urine   Date Value Ref Range Status   04/02/2021 -  Final     Propoxyphene Screen, Urine   Date Value Ref Range Status   04/02/2021 -  Final     THC Screen, Urine   Date Value Ref Range Status   04/02/2021 -  Final     Tricyclic Antidepressants, Urine   Date Value Ref Range Status   04/02/2021 -  Final               Last Mala Bishopher: 9/22/2021  Medication Contract: 4/2/2021  Last Fill: 8/28/2021    Requested Prescriptions     Pending Prescriptions Disp Refills    gabapentin (NEURONTIN) 300 MG capsule 150 capsule 0     Sig: TAKE 2 CAPSULES BY MOUTH IN THE MORNING & 1 CAP AT NOON, THEN 2 CAPS AT BEDTIME         Please approve or refuse this medication.    Alexandra Goodrich, [place]

## 2021-09-28 ENCOUNTER — LAB (OUTPATIENT)
Dept: LAB | Facility: HOSPITAL | Age: 56
End: 2021-09-28

## 2021-09-28 LAB — SARS-COV-2 ORF1AB RESP QL NAA+PROBE: NOT DETECTED

## 2021-09-28 PROCEDURE — C9803 HOPD COVID-19 SPEC COLLECT: HCPCS | Performed by: PAIN MEDICINE

## 2021-09-28 PROCEDURE — U0004 COV-19 TEST NON-CDC HGH THRU: HCPCS | Performed by: PAIN MEDICINE

## 2021-09-29 DIAGNOSIS — R00.0 TACHYCARDIA: ICD-10-CM

## 2021-09-30 RX ORDER — PROPRANOLOL HYDROCHLORIDE 10 MG/1
TABLET ORAL
Qty: 90 TABLET | Refills: 1 | Status: SHIPPED | OUTPATIENT
Start: 2021-09-30 | End: 2021-10-25

## 2021-10-25 DIAGNOSIS — R00.0 TACHYCARDIA: ICD-10-CM

## 2021-10-25 RX ORDER — PROPRANOLOL HYDROCHLORIDE 10 MG/1
TABLET ORAL
Qty: 90 TABLET | Refills: 1 | Status: SHIPPED | OUTPATIENT
Start: 2021-10-25 | End: 2021-11-18

## 2021-10-27 ENCOUNTER — TELEPHONE (OUTPATIENT)
Dept: PRIMARY CARE CLINIC | Age: 56
End: 2021-10-27

## 2021-10-27 DIAGNOSIS — T45.1X5A CHEMOTHERAPY-INDUCED NEUROPATHY (HCC): ICD-10-CM

## 2021-10-27 DIAGNOSIS — G62.0 CHEMOTHERAPY-INDUCED NEUROPATHY (HCC): ICD-10-CM

## 2021-10-27 NOTE — TELEPHONE ENCOUNTER
Pt was requesting her appointment time with Moinca Hurtado. I was not able to see a referral put in there for that. She is wanting one.

## 2021-10-28 ENCOUNTER — TELEPHONE (OUTPATIENT)
Dept: PRIMARY CARE CLINIC | Age: 56
End: 2021-10-28

## 2021-10-28 DIAGNOSIS — T45.1X5A CHEMOTHERAPY-INDUCED NEUROPATHY (HCC): ICD-10-CM

## 2021-10-28 DIAGNOSIS — G62.0 CHEMOTHERAPY-INDUCED NEUROPATHY (HCC): ICD-10-CM

## 2021-10-28 RX ORDER — GABAPENTIN 300 MG/1
CAPSULE ORAL
Qty: 150 CAPSULE | Refills: 0 | Status: SHIPPED | OUTPATIENT
Start: 2021-10-28 | End: 2021-11-24

## 2021-10-28 NOTE — TELEPHONE ENCOUNTER
----- Message from Kameron Rees sent at 10/27/2021  2:59 PM CDT -----  Subject: Refill Request    QUESTIONS  Name of Medication? gabapentin (NEURONTIN) 300 MG capsule  Patient-reported dosage and instructions? 2 tabs q am 1 tabs lunch 2 tabs   q pm  How many days do you have left? 5  Preferred Pharmacy? [id number]  Pharmacy phone number (if available)? [phone]  ---------------------------------------------------------------------------  --------------  CALL BACK INFO  What is the best way for the office to contact you?  OK to leave message on   voicemail  Preferred Call Back Phone Number? [phone]

## 2021-10-28 NOTE — TELEPHONE ENCOUNTER
I called patient to inform her that RADHA Gomez was sending in gabapentin today due to circumstances. Pt thanked me and MAE.

## 2021-11-18 DIAGNOSIS — R00.0 TACHYCARDIA: ICD-10-CM

## 2021-11-18 RX ORDER — PROPRANOLOL HYDROCHLORIDE 10 MG/1
TABLET ORAL
Qty: 90 TABLET | Refills: 1 | Status: SHIPPED | OUTPATIENT
Start: 2021-11-18 | End: 2021-12-13

## 2021-11-24 ENCOUNTER — TELEPHONE (OUTPATIENT)
Dept: PRIMARY CARE CLINIC | Age: 56
End: 2021-11-24

## 2021-11-24 DIAGNOSIS — T45.1X5A CHEMOTHERAPY-INDUCED NEUROPATHY (HCC): ICD-10-CM

## 2021-11-24 DIAGNOSIS — G62.0 CHEMOTHERAPY-INDUCED NEUROPATHY (HCC): ICD-10-CM

## 2021-11-24 RX ORDER — GABAPENTIN 300 MG/1
CAPSULE ORAL
Qty: 150 CAPSULE | Refills: 0 | Status: SHIPPED | OUTPATIENT
Start: 2021-11-24 | End: 2021-12-21 | Stop reason: SDUPTHER

## 2021-11-24 NOTE — TELEPHONE ENCOUNTER
Jennifer Mac called to request a refill on her medication. Last office visit : 9/7/2021   Next office visit : 12/7/2021     Last UDS:   Amphetamine Screen, Urine   Date Value Ref Range Status   04/02/2021 -  Final     Barbiturate Screen, Urine   Date Value Ref Range Status   04/02/2021 -  Final     Benzodiazepine Screen, Urine   Date Value Ref Range Status   04/02/2021 -  Final     Buprenorphine Urine   Date Value Ref Range Status   04/02/2021 -  Final     Cocaine Metabolite Screen, Urine   Date Value Ref Range Status   04/02/2021 -  Final     Gabapentin Screen, Urine   Date Value Ref Range Status   04/02/2021 -  Final     MDMA, Urine   Date Value Ref Range Status   04/02/2021 -  Final     Methamphetamine, Urine   Date Value Ref Range Status   04/02/2021 -  Final     Opiate Scrn, Ur   Date Value Ref Range Status   04/02/2021 -  Final     Oxycodone Screen, Ur   Date Value Ref Range Status   04/02/2021 -  Final     PCP Screen, Urine   Date Value Ref Range Status   04/02/2021 -  Final     Propoxyphene Screen, Urine   Date Value Ref Range Status   04/02/2021 -  Final     THC Screen, Urine   Date Value Ref Range Status   04/02/2021 -  Final     Tricyclic Antidepressants, Urine   Date Value Ref Range Status   04/02/2021 -  Final       Last Dorlene Ranulfo: 9/22/21  Medication Contract: 4/2/21   Last Fill: 10/28/21    Requested Prescriptions     Pending Prescriptions Disp Refills    gabapentin (NEURONTIN) 300 MG capsule [Pharmacy Med Name: GABAPENTIN 300 MG CAPSULE] 150 capsule 0     Sig: TAKE 2 CAPSULES BY MOUTH IN THE MORNING 1 CAPSULE AT NOON AND 2 CAPSULES AT BEDTIME         Please approve or refuse this medication.    Katrina Caraballo

## 2021-11-24 NOTE — TELEPHONE ENCOUNTER
----- Message from Bing Jackman sent at 11/24/2021 10:38 AM CST -----  Subject: Refill Request    QUESTIONS  Name of Medication? gabapentin (NEURONTIN) 300 MG capsule  Patient-reported dosage and instructions? 300 MG capsule   How many days do you have left? 0  Preferred Pharmacy? [id number]  Pharmacy phone number (if available)? [phone]  ---------------------------------------------------------------------------  --------------  CALL BACK INFO  What is the best way for the office to contact you?  Do not leave any   message, patient will call back for answer  Preferred Call Back Phone Number? [phone]

## 2021-12-07 ENCOUNTER — OFFICE VISIT (OUTPATIENT)
Dept: PRIMARY CARE CLINIC | Age: 56
End: 2021-12-07
Payer: MEDICARE

## 2021-12-07 VITALS
BODY MASS INDEX: 35.91 KG/M2 | WEIGHT: 228.8 LBS | DIASTOLIC BLOOD PRESSURE: 90 MMHG | SYSTOLIC BLOOD PRESSURE: 140 MMHG | HEART RATE: 112 BPM | OXYGEN SATURATION: 98 % | HEIGHT: 67 IN | TEMPERATURE: 98.7 F

## 2021-12-07 DIAGNOSIS — G89.29 CHRONIC LOW BACK PAIN, UNSPECIFIED BACK PAIN LATERALITY, UNSPECIFIED WHETHER SCIATICA PRESENT: ICD-10-CM

## 2021-12-07 DIAGNOSIS — G89.29 BILATERAL CHRONIC KNEE PAIN: Primary | ICD-10-CM

## 2021-12-07 DIAGNOSIS — M54.50 CHRONIC LOW BACK PAIN, UNSPECIFIED BACK PAIN LATERALITY, UNSPECIFIED WHETHER SCIATICA PRESENT: ICD-10-CM

## 2021-12-07 DIAGNOSIS — Z63.4 BEREAVEMENT: ICD-10-CM

## 2021-12-07 DIAGNOSIS — M25.562 BILATERAL CHRONIC KNEE PAIN: Primary | ICD-10-CM

## 2021-12-07 DIAGNOSIS — M25.561 BILATERAL CHRONIC KNEE PAIN: Primary | ICD-10-CM

## 2021-12-07 DIAGNOSIS — R45.86 MOOD SWINGS: ICD-10-CM

## 2021-12-07 PROCEDURE — 99214 OFFICE O/P EST MOD 30 MIN: CPT | Performed by: NURSE PRACTITIONER

## 2021-12-07 RX ORDER — ARIPIPRAZOLE 2 MG/1
2 TABLET ORAL NIGHTLY
Qty: 30 TABLET | Refills: 1 | Status: SHIPPED | OUTPATIENT
Start: 2021-12-07 | End: 2022-01-11 | Stop reason: ALTCHOICE

## 2021-12-07 SDOH — SOCIAL STABILITY - SOCIAL INSECURITY: DISSAPEARANCE AND DEATH OF FAMILY MEMBER: Z63.4

## 2021-12-07 NOTE — PROGRESS NOTES
[address] [facility] CARE  [facility]  [id number]  [address] [address]  Dept: [phone]  Dept Fax: [phone]  Loc: [phone]        Jennifer Mac is a 61 y.o. female who presents today for her medical conditions/ complaints as noted below. Jennifer Mac is c/o Anxiety (pt reports her  passed away on November 15th and she said it was the worst thing she has ever experienced.), Lower Back Pain (pt reports her back pain is not better.), and Knee Pain (pt reports she fell last month and her left knee gave out. pt said she also hit her face when she fell.)        Chief Complaint   Patient presents with    Anxiety     pt reports her  passed away on November 15th and she said it was the worst thing she has ever experienced.  Lower Back Pain     pt reports her back pain is not better.  Knee Pain     pt reports she fell last month and her left knee gave out. pt said she also hit her face when she fell. HPI:     HPI    Anxiety -pt reports her  passed away on November 15th and she said it was the worst thing she has ever experienced. Pt states that she seen therapist yesterday. She will see Dr. Gwendolyn Perez the 29th of December. Pt notes that Rick Rahman gave pt medication for her nerves. Lower Back Pain - pt reports her back pain is not better. Pain management performed MRI    Knee Pain -pt reports she fell last month and her left knee gave out. pt said she also hit her face when she fell. Patient reports that they have been compliant with taking medications as directed.      Past Medical History:   Diagnosis Date    Allergic rhinitis     Anxiety     Breast cancer ([facility] [place] [address] 10/2010    Left    Bronchitis     Carpal tunnel syndrome     R and L    Chest pain     Chronic back pain     Chronic bronchitis (HCC)     Depression     Diabetes mellitus (HCC)     GERD (gastroesophageal reflux disease)     Headache(784.0)     Heart palpitations  Mononeuropathy     Osteoarthritis     Sleep apnea     Wears glasses        Past Surgical History:   Procedure Laterality Date    ANKLE FRACTURE SURGERY Right 2014    BACK SURGERY  1993    BREAST RECONSTRUCTION  2011    BREAST SURGERY  2011    emergency removal of Left breast expander    COLONOSCOPY N/A 2016    Dr Teddy Childers-HP x 1, serrated AP x 1, 12 month recall    COLONOSCOPY N/A 2021    Dr Elis Méndez yr recall    EGD COLONOSCOPY N/A 2016    w/empiric dilation to 50 Kiswahili (16mm)-Dr RONY Childers, Nafisa (-)    HYSTERECTOMY      MASTECTOMY, BILATERAL Bilateral 2011    OTHER SURGICAL HISTORY      R leg & ankle fracture due to fall repair-[facility] M[facility]ica [facility]    UPPER GASTROINTESTINAL ENDOSCOPY  2016    w/empiric dilation to 48 Kiswahili (16mm)-Dr RONY Lawrence, Nafisa (-)    UPPER GASTROINTESTINAL ENDOSCOPY N/A 2021    Dr Teddy Childers-w/brushings-Candidal esophagitis-Treated with Diflucan       Social History     Socioeconomic History    Marital status:      Spouse name: None    Number of children: None    Years of education: None    Highest education level: None   Occupational History    None   Tobacco Use    Smoking status: Former Smoker     Packs/day: 0.25     Years: 20.00     Pack years: 5.00     Types: Cigars     Start date: 2/3/2016     Quit date: 2016     Years since quittin.0    Smokeless tobacco: Never Used    Tobacco comment: Smokes Julio Avila a day.    Vaping Use    Vaping Use: Never used   Substance and Sexual Activity    Alcohol use: No     Alcohol/week: 0.0 standard drinks    Drug use: No    Sexual activity: None   Other Topics Concern    None   Social History Narrative    None     Social Determinants of Health     Financial Resource Strain:     Difficulty of Paying Living Expenses: Not on file   Food Insecurity:     Worried About Running Out of Food in the Last Year: Not on file    Walter of Food in the Last Year: Not on file   Transportation Needs:     Lack of Transportation (Medical): Not on file    Lack of Transportation (Non-Medical):  Not on file   Physical Activity:     Days of Exercise per Week: Not on file    Minutes of Exercise per Session: Not on file   Stress:     Feeling of Stress : Not on file   Social Connections:     Frequency of Communication with Friends and Family: Not on file    Frequency of Social Gatherings with Friends and Family: Not on file    Attends Holiness Services: Not on file    Active Member of Clubs or Organizations: Not on file    Attends Club or Organization Meetings: Not on file    Marital Status: Not on file   Intimate Partner Violence:     Fear of Current or Ex-Partner: Not on file    Emotionally Abused: Not on file    Physically Abused: Not on file    Sexually Abused: Not on file   Housing Stability:     Unable to Pay for Housing in the Last Year: Not on file    Number of Places Lived in the Last Year: Not on file    Unstable Housing in the Last Year: Not on file       Family History   Problem Relation Age of Onset    Diabetes Mother     Cancer Mother         breast    Heart Disease Mother     High Blood Pressure Mother     Breast Cancer Mother     Colon Cancer Neg Hx     Colon Polyps Neg Hx     Esophageal Cancer Neg Hx     Liver Cancer Neg Hx     Liver Disease Neg Hx     Rectal Cancer Neg Hx     Stomach Cancer Neg Hx        Current Outpatient Medications   Medication Sig Dispense Refill    ARIPiprazole (ABILIFY) 2 MG tablet Take 1 tablet by mouth nightly 30 tablet 1    gabapentin (NEURONTIN) 300 MG capsule TAKE 2 CAPSULES BY MOUTH IN THE MORNING 1 CAPSULE AT NOON AND 2 CAPSULES AT BEDTIME 150 capsule 0    omeprazole (PRILOSEC) 20 MG delayed release capsule TAKE 1 CAPSULE BY MOUTH EVERY DAY 90 capsule 1    blood glucose test strips (TRUE METRIX BLOOD GLUCOSE TEST) strip TEST 2 TIMES A DAY & AS NEEDED FOR SYMPTOMS OF IRREGULAR BLOOD GLUCOSE. 50 strip 3    gabapentin (NEURONTIN) 300 MG capsule TAKE 2 CAPSULES BY MOUTH IN THE MORNING & 1 CAP AT NOON, THEN 2 CAPS AT BEDTIME 150 capsule 0    metFORMIN (GLUCOPHAGE-XR) 500 MG extended release tablet TAKE 1 TABLET BY MOUTH EVERY DAY WITH LARGEST MEAL OF THE DAY 90 tablet 1    SITagliptin (JANUVIA) 50 MG tablet TAKE 1 TABLET BY MOUTH EVERY DAY 90 tablet 1    Lancets MISC 1 each by Does not apply route 2 times daily 100 each 5    Blood Glucose Monitoring Suppl (BLOOD GLUCOSE MONITOR SYSTEM) w/Device KIT 1 Device by Does not apply route 2 times daily 1 kit 0    buPROPion (WELLBUTRIN SR) 100 MG extended release tablet Take 1 tablet by mouth daily 30 tablet 0    famotidine (PEPCID) 20 MG tablet Indications: takes one nightly Indications: takes one nightly 180 tablet 1    guaiFENesin (MUCINEX PO) Take by mouth nightly      ipratropium-albuterol (DUONEB) 0.5-2.5 (3) MG/3ML SOLN nebulizer solution INHALE ONE AMPULE INTO THE LUNGS EVERY 6 HOURS AS NEEDED FOR SHORTNESS OF BREATH 360 mL 1    fluticasone (FLONASE) 50 MCG/ACT nasal spray One spray each nostril twice daily. 1 Bottle 2    Respiratory Therapy Supplies LUPE cpap supplies x 1 year 1 Device 0    HYDROcodone-acetaminophen (NORCO) 7.5-325 MG per tablet Take 1 tablet by mouth 2 times daily as needed for Pain (Dr. Sofie Bell).  busPIRone (BUSPAR) 10 MG tablet Take 10 mg by mouth 3 times daily as needed      cyclobenzaprine (FLEXERIL) 10 MG tablet Take 1 tablet by mouth 2 times daily as needed for Muscle spasms 180 tablet 3    WIXELA INHUB 100-50 MCG/DOSE diskus inhaler TAKE 1 PUFF BY MOUTH TWICE A  each 2    propranolol (INDERAL) 10 MG tablet TAKE 1 TABLET BY MOUTH THREE TIMES A DAY 90 tablet 1    metFORMIN (GLUMETZA) 1000 MG extended release tablet Take one tablet in the morning and one tablet in the evening.  (Patient not taking: Reported on 9/7/2021) 60 tablet 2    lisinopril (PRINIVIL;ZESTRIL) 20 MG tablet TAKE 1 TABLET BY MOUTH TWICE A DAY (Patient not taking: Reported on 9/7/2021) 180 tablet 3    meloxicam (MOBIC) 15 MG tablet Take 7.5 mg by mouth daily (Patient not taking: Reported on 9/7/2021)       No current facility-administered medications for this visit. Allergies   Allergen Reactions    Tape Lakisha Schilling Tape]      Surgical tape       Lab Review not applicable  notapplicable    Subjective:   Review of Systems   Constitutional: Negative for activity change, appetite change, fatigue, fever and unexpected weight change. HENT: Negative for congestion, ear pain, nosebleeds, rhinorrhea, sore throat, trouble swallowing and voice change. Eyes: Negative for redness and visual disturbance. Respiratory: Negative for cough, chest tightness, shortness of breath and wheezing. Cardiovascular: Negative for chest pain, palpitations and leg swelling. Gastrointestinal: Negative for abdominal pain, blood in stool, constipation, diarrhea, nausea and vomiting. Endocrine: Negative for polydipsia, polyphagia and polyuria. Genitourinary: Negative for dysuria, frequency and urgency. Musculoskeletal: Positive for arthralgias (knee pain) and back pain. Negative for myalgias. Skin: Negative for rash and wound. Neurological: Negative for dizziness, speech difficulty, light-headedness and headaches. Psychiatric/Behavioral: Negative for agitation, confusion, self-injury and suicidal ideas. The patient is nervous/anxious. Objective:     Physical Exam  Vitals reviewed. Constitutional:       Appearance: She is obese. HENT:      Head: Normocephalic. Right Ear: Tympanic membrane normal.      Left Ear: Tympanic membrane normal.      Nose: Nose normal.      Mouth/Throat:      Mouth: Mucous membranes are moist.      Pharynx: Oropharynx is clear. Eyes:      Extraocular Movements: Extraocular movements intact. Pupils: Pupils are equal, round, and reactive to light. Cardiovascular:      Rate and Rhythm: Normal rate and regular rhythm. Pulses: Normal pulses. Heart sounds: Normal heart sounds. Pulmonary:      Effort: Pulmonary effort is normal.      Breath sounds: Normal breath sounds. Abdominal:      General: Bowel sounds are normal.      Palpations: Abdomen is soft. Musculoskeletal:         General: Normal range of motion. Cervical back: Normal range of motion. Skin:     General: Skin is warm and dry. Neurological:      Mental Status: She is alert and oriented to person, place, and time. Psychiatric:         Mood and Affect: Mood normal.         Behavior: Behavior normal.       BP (!) 140/90   Pulse 112   Temp 98.7 °F (37.1 °C) (Temporal)   Ht 5' 7\" (1.702 m)   Wt 228 lb 12.8 oz (103.8 kg)   SpO2 98%   BMI 35.84 kg/m²     Assessment:      Diagnosis Orders   1. Bilateral chronic knee pain  External Referral To Orthopedic Surgery   2. Chronic low back pain, unspecified back pain laterality, unspecified whether sciatica present     3. Bereavement     4. Mood swings       No results found for this visit on 12/07/21. Plan:     1. Bilateral chronic knee pain    2. Chronic low back pain, unspecified back pain laterality, unspecified whether sciatica present    3. Bereavement    4. Mood swings      Over 50% of the total visit time of 30 minutes was spent on counseling and or coordination of care of bilateral chronic knee pain, chronic low back pain, bereavement, mood swings. Return in about 1 month (around 1/7/2022).   Orders Placed This Encounter   Procedures    External Referral To Orthopedic Surgery     Referral Priority:   Routine     Referral Type:   Eval and Treat     Referral Reason:   Specialty Services Required     Requested Specialty:   Orthopedic Surgery     Number of Visits Requested:   1     Orders Placed This Encounter   Medications    ARIPiprazole (ABILIFY) 2 MG tablet     Sig: Take 1 tablet by mouth nightly     Dispense:  30 tablet     Refill:  1       Patient Instructions   Begin abilify 2mg at bedtime. I will refer you back to [address] [place] [address] for bilateral knee pain.           /family given educational materials - see patient instructions. Discussed use, benefit, and side effects of prescribed medications. All patient/family questions answered and voiced understanding. Instructed to continue current medications, diet and exercise. Pt/family agreed with treatment plan. Follow up as directed and sooner if needed. Patient/ family instructed that is symptoms worsen or persist they are to contact office or report to nearest ER. They voice understanding and agreement with this plan.   signed by Cristopher Halsted, APRN on 12/20/2021 at 10:38 AM CST    This dictation was generated by voice recognition computer software. Although all attempts are made to edit the dictation for accuracy, there may be errors in the transcription that are not intended.

## 2021-12-08 DIAGNOSIS — M15.9 OSTEOARTHRITIS OF MULTIPLE JOINTS, UNSPECIFIED OSTEOARTHRITIS TYPE: Primary | ICD-10-CM

## 2021-12-08 DIAGNOSIS — M25.561 BILATERAL CHRONIC KNEE PAIN: ICD-10-CM

## 2021-12-08 DIAGNOSIS — M25.562 BILATERAL CHRONIC KNEE PAIN: ICD-10-CM

## 2021-12-08 DIAGNOSIS — G89.29 BILATERAL CHRONIC KNEE PAIN: ICD-10-CM

## 2021-12-08 DIAGNOSIS — G89.29 CHRONIC LOW BACK PAIN, UNSPECIFIED BACK PAIN LATERALITY, UNSPECIFIED WHETHER SCIATICA PRESENT: ICD-10-CM

## 2021-12-08 DIAGNOSIS — M54.50 CHRONIC LOW BACK PAIN, UNSPECIFIED BACK PAIN LATERALITY, UNSPECIFIED WHETHER SCIATICA PRESENT: ICD-10-CM

## 2021-12-12 DIAGNOSIS — R00.0 TACHYCARDIA: ICD-10-CM

## 2021-12-13 RX ORDER — PROPRANOLOL HYDROCHLORIDE 10 MG/1
TABLET ORAL
Qty: 90 TABLET | Refills: 1 | Status: SHIPPED | OUTPATIENT
Start: 2021-12-13 | End: 2022-01-05

## 2021-12-16 DIAGNOSIS — J42 CHRONIC BRONCHITIS, UNSPECIFIED CHRONIC BRONCHITIS TYPE (HCC): ICD-10-CM

## 2021-12-17 ENCOUNTER — TRANSCRIBE ORDERS (OUTPATIENT)
Dept: LAB | Facility: HOSPITAL | Age: 56
End: 2021-12-17

## 2021-12-17 DIAGNOSIS — Z01.818 PREOP TESTING: Primary | ICD-10-CM

## 2021-12-20 PROBLEM — R45.86 MOOD SWINGS: Status: ACTIVE | Noted: 2021-12-20

## 2021-12-20 ASSESSMENT — ENCOUNTER SYMPTOMS
VOMITING: 0
NAUSEA: 0
CONSTIPATION: 0
RHINORRHEA: 0
SORE THROAT: 0
TROUBLE SWALLOWING: 0
EYE REDNESS: 0
VOICE CHANGE: 0
DIARRHEA: 0
ABDOMINAL PAIN: 0
CHEST TIGHTNESS: 0
WHEEZING: 0
SHORTNESS OF BREATH: 0
BACK PAIN: 1
BLOOD IN STOOL: 0
COUGH: 0

## 2021-12-21 DIAGNOSIS — E11.00 TYPE 2 DIABETES MELLITUS WITH HYPEROSMOLARITY WITHOUT COMA, WITHOUT LONG-TERM CURRENT USE OF INSULIN (HCC): ICD-10-CM

## 2021-12-21 DIAGNOSIS — G62.0 CHEMOTHERAPY-INDUCED NEUROPATHY (HCC): ICD-10-CM

## 2021-12-21 DIAGNOSIS — T45.1X5A CHEMOTHERAPY-INDUCED NEUROPATHY (HCC): ICD-10-CM

## 2021-12-21 RX ORDER — GABAPENTIN 300 MG/1
CAPSULE ORAL
Qty: 150 CAPSULE | Refills: 0 | Status: SHIPPED | OUTPATIENT
Start: 2021-12-21 | End: 2022-01-28 | Stop reason: ALTCHOICE

## 2021-12-21 RX ORDER — METFORMIN HYDROCHLORIDE 500 MG/1
TABLET, EXTENDED RELEASE ORAL
Qty: 90 TABLET | Refills: 1 | Status: SHIPPED | OUTPATIENT
Start: 2021-12-21 | End: 2022-02-28

## 2021-12-21 NOTE — TELEPHONE ENCOUNTER
Margaret Calles called to request a refill on her medication.       Last office visit : 12/7/2021   Next office visit : 1/11/2022     Requested Prescriptions     Pending Prescriptions Disp Refills    metFORMIN (GLUCOPHAGE-XR) 500 MG extended release tablet 90 tablet 1     Sig: TAKE 1 TABLET BY MOUTH EVERY DAY WITH LARGEST MEAL OF THE DAY            Rosa Isela Luciano

## 2021-12-21 NOTE — TELEPHONE ENCOUNTER
Nic Bai called to request a refill on her medication. Last office visit : 12/7/2021   Next office visit : 1/11/2022     Last UDS:   Amphetamine Screen, Urine   Date Value Ref Range Status   04/02/2021 -  Final     Barbiturate Screen, Urine   Date Value Ref Range Status   04/02/2021 -  Final     Benzodiazepine Screen, Urine   Date Value Ref Range Status   04/02/2021 -  Final     Buprenorphine Urine   Date Value Ref Range Status   04/02/2021 -  Final     Cocaine Metabolite Screen, Urine   Date Value Ref Range Status   04/02/2021 -  Final     Gabapentin Screen, Urine   Date Value Ref Range Status   04/02/2021 -  Final     MDMA, Urine   Date Value Ref Range Status   04/02/2021 -  Final     Methamphetamine, Urine   Date Value Ref Range Status   04/02/2021 -  Final     Opiate Scrn, Ur   Date Value Ref Range Status   04/02/2021 -  Final     Oxycodone Screen, Ur   Date Value Ref Range Status   04/02/2021 -  Final     PCP Screen, Urine   Date Value Ref Range Status   04/02/2021 -  Final     Propoxyphene Screen, Urine   Date Value Ref Range Status   04/02/2021 -  Final     THC Screen, Urine   Date Value Ref Range Status   04/02/2021 -  Final     Tricyclic Antidepressants, Urine   Date Value Ref Range Status   04/02/2021 -  Final       Last Tasneem Cousins: 12/21/21  Medication Contract: 4/2/21   Last Fill: 11/24/21    Requested Prescriptions     Pending Prescriptions Disp Refills    gabapentin (NEURONTIN) 300 MG capsule 150 capsule 0     Sig: TAKE 2 CAPSULES BY MOUTH IN THE MORNING 1 CAPSULE AT NOON AND 2 CAPSULES AT BEDTIME     Signed Prescriptions Disp Refills    metFORMIN (GLUCOPHAGE-XR) 500 MG extended release tablet 90 tablet 1     Sig: TAKE 1 TABLET BY MOUTH EVERY DAY WITH LARGEST MEAL OF THE DAY     Authorizing Provider: Theresa Soriano     Ordering User: Cynthia Johnson         Please approve or refuse this medication.    Ricky Vasques

## 2021-12-21 NOTE — TELEPHONE ENCOUNTER
----- Message from Navid Padilla sent at 12/20/2021  9:00 AM CST -----  Subject: Refill Request    QUESTIONS  Name of Medication? metFORMIN (GLUCOPHAGE-XR) 500 MG extended release   tablet  Patient-reported dosage and instructions? 500mg tab  How many days do you have left? 4  [facility]? [id number]  Pharmacy phone number (if available)? [phone]  ---------------------------------------------------------------------------  --------------,  Name of Medication? gabapentin (NEURONTIN) 300 MG capsule  Patient-reported dosage and instructions? 300mg cap  How many days do you have left? 4  [id number]  Pharmacy phone number (if available)? [phone]  ---------------------------------------------------------------------------  --------------  CALL BACK INFO  What is the best way for the office to contact you?  OK to leave message on   voicemail  Preferred Call Back Phone Number? [phone]

## 2022-01-05 DIAGNOSIS — R00.0 TACHYCARDIA: ICD-10-CM

## 2022-01-05 RX ORDER — PROPRANOLOL HYDROCHLORIDE 10 MG/1
TABLET ORAL
Qty: 90 TABLET | Refills: 1 | Status: SHIPPED | OUTPATIENT
Start: 2022-01-05 | End: 2022-03-03

## 2022-01-11 ENCOUNTER — OFFICE VISIT (OUTPATIENT)
Dept: PRIMARY CARE CLINIC | Age: 57
End: 2022-01-11
Payer: MEDICARE

## 2022-01-11 VITALS
HEART RATE: 114 BPM | HEIGHT: 67 IN | DIASTOLIC BLOOD PRESSURE: 86 MMHG | SYSTOLIC BLOOD PRESSURE: 136 MMHG | WEIGHT: 232.4 LBS | BODY MASS INDEX: 36.47 KG/M2 | OXYGEN SATURATION: 96 % | TEMPERATURE: 98.1 F

## 2022-01-11 DIAGNOSIS — Z63.4 BEREAVEMENT: ICD-10-CM

## 2022-01-11 DIAGNOSIS — G47.00 INSOMNIA, UNSPECIFIED TYPE: ICD-10-CM

## 2022-01-11 DIAGNOSIS — M19.90 OSTEOARTHRITIS, UNSPECIFIED OSTEOARTHRITIS TYPE, UNSPECIFIED SITE: ICD-10-CM

## 2022-01-11 DIAGNOSIS — I10 HYPERTENSION, ESSENTIAL, BENIGN: ICD-10-CM

## 2022-01-11 DIAGNOSIS — F41.9 ANXIETY: ICD-10-CM

## 2022-01-11 DIAGNOSIS — E11.00 TYPE 2 DIABETES MELLITUS WITH HYPEROSMOLARITY WITHOUT COMA, WITHOUT LONG-TERM CURRENT USE OF INSULIN (HCC): Primary | ICD-10-CM

## 2022-01-11 DIAGNOSIS — R45.86 MOOD SWINGS: ICD-10-CM

## 2022-01-11 PROCEDURE — 99214 OFFICE O/P EST MOD 30 MIN: CPT | Performed by: NURSE PRACTITIONER

## 2022-01-11 RX ORDER — DULAGLUTIDE 0.75 MG/.5ML
0.75 INJECTION, SOLUTION SUBCUTANEOUS WEEKLY
Qty: 1 PEN | Refills: 2 | Status: SHIPPED | OUTPATIENT
Start: 2022-01-11 | End: 2022-02-28

## 2022-01-11 RX ORDER — HYDROXYZINE HYDROCHLORIDE 25 MG/1
25 TABLET, FILM COATED ORAL NIGHTLY
Qty: 30 TABLET | Refills: 2 | Status: SHIPPED | OUTPATIENT
Start: 2022-01-11 | End: 2022-02-28

## 2022-01-11 RX ORDER — OLANZAPINE 5 MG/1
5 TABLET ORAL NIGHTLY
Qty: 30 TABLET | Refills: 2 | OUTPATIENT
Start: 2022-01-11 | End: 2022-04-17 | Stop reason: ALTCHOICE

## 2022-01-11 SDOH — SOCIAL STABILITY - SOCIAL INSECURITY: DISSAPEARANCE AND DEATH OF FAMILY MEMBER: Z63.4

## 2022-01-11 ASSESSMENT — ENCOUNTER SYMPTOMS
CHEST TIGHTNESS: 0
COUGH: 0
WHEEZING: 0
CONSTIPATION: 0
RHINORRHEA: 0
VOICE CHANGE: 0
VOMITING: 0
SHORTNESS OF BREATH: 0
NAUSEA: 0
BLOOD IN STOOL: 0
DIARRHEA: 0
EYE REDNESS: 0
SORE THROAT: 0
TROUBLE SWALLOWING: 0
ABDOMINAL PAIN: 0
BACK PAIN: 1

## 2022-01-11 NOTE — PROGRESS NOTES
[facility]  [address] [place] [address]  [id number]  [place] [address]  Dept: [phone]  Dept Fax: [phone]  Loc: [phone]        Jojo Bain is a 61 y.o. female who presents today for her medical conditions/ complaints as noted below. Jojo Bain is c/o Knee Pain (pt reports her knees are still killing her ) and Discuss Medications        Chief Complaint   Patient presents with    Knee Pain     pt reports her knees are still killing her     Discuss Medications       HPI:     HPI    Anxiety   1/11/2022: Reports that she didn't revieve any medication from [employer] ([facility] for help with her anxiety/ depression/ mood swings. She states that she was started zyprexa 2.5mg daily which she doesn't feel like is working. Pt denies SI or HI.   12/07/2021 pt reports her  passed away on November 15th and she said it was the worst thing she has ever experienced. Pt states that she seen therapist yesterday. She will see Dr. Diane Barry the 29th of December. Pt notes that Alicia Gresham gave pt medication for her nerves. Lower Back Pain    1/11/2022: Pt notes that back pain seems to be worse with change in weather. Discussed with pt that oral NSAIDS can increase blood pressure. Discussed topical diclofenac. Pt is in agreement to try topical treatment. 12/07/2021 pt reports her back pain is not better. Pain management performed MRI    Knee Pain -pt reports she fell last month and her left knee gave out. pt said she also hit her face when she fell. Patient reports that they have been compliant with taking medications as directed.      Past Medical History:   Diagnosis Date    Allergic rhinitis     Anxiety     Breast cancer ([facility] [address] 10/2010    Left    Bronchitis     Carpal tunnel syndrome     R and L    Chest pain     Chronic back pain     Chronic bronchitis (HCC)     Depression     Diabetes mellitus (HCC)     GERD (gastroesophageal reflux disease)     Headache(784.0)     Heart palpitations     Mononeuropathy     Osteoarthritis     Sleep apnea     Wears glasses        Past Surgical History:   Procedure Laterality Date    ANKLE FRACTURE SURGERY Right 2014    BACK SURGERY  1993    BREAST RECONSTRUCTION  2011    BREAST SURGERY  2011    emergency removal of Left breast expander    COLONOSCOPY N/A 2016    Dr Antwon Childers-HP x 1, serrated AP x 1, 12 month recall    COLONOSCOPY N/A 2021    Dr Kwong Grade yr recall    EGD COLONOSCOPY N/A 2016    w/empiric dilation to 50 Lao (16mm)-Dr RONY Childers, Nafisa (-)    HYSTERECTOMY      MASTECTOMY, BILATERAL Bilateral 2011    OTHER SURGICAL HISTORY      R leg & ankle fracture due to fall repair-[facility] Elin Ram[facility]rez [facility]    UPPER GASTROINTESTINAL ENDOSCOPY  2016    w/empiric dilation to 48 Lao (16mm)-Dr RONY Sheehan, Nafisa (-)    UPPER GASTROINTESTINAL ENDOSCOPY N/A 2021    Dr Antwon Childers-w/brushings-Candidal esophagitis-Treated with Diflucan       Social History     Socioeconomic History    Marital status:      Spouse name: None    Number of children: None    Years of education: None    Highest education level: None   Occupational History    None   Tobacco Use    Smoking status: Former Smoker     Packs/day: 0.25     Years: 20.00     Pack years: 5.00     Types: Cigars     Start date: 2/3/2016     Quit date: 2016     Years since quittin.0    Smokeless tobacco: Never Used    Tobacco comment: Smokes Oralee Genera a day.    Vaping Use    Vaping Use: Never used   Substance and Sexual Activity    Alcohol use: No     Alcohol/week: 0.0 standard drinks    Drug use: No    Sexual activity: None   Other Topics Concern    None   Social History Narrative    None     Social Determinants of Health     Financial Resource Strain:     Difficulty of Paying Living Expenses: Not on file   Food Insecurity:     Worried About Running Out of Food in the Last Year: Not on file    Ran Out of Food in the Last Year: Not on file   Transportation Needs:     Lack of Transportation (Medical): Not on file    Lack of Transportation (Non-Medical):  Not on file   Physical Activity:     Days of Exercise per Week: Not on file    Minutes of Exercise per Session: Not on file   Stress:     Feeling of Stress : Not on file   Social Connections:     Frequency of Communication with Friends and Family: Not on file    Frequency of Social Gatherings with Friends and Family: Not on file    Attends Scientology Services: Not on file    Active Member of Clubs or Organizations: Not on file    Attends Club or Organization Meetings: Not on file    Marital Status: Not on file   Intimate Partner Violence:     Fear of Current or Ex-Partner: Not on file    Emotionally Abused: Not on file    Physically Abused: Not on file    Sexually Abused: Not on file   Housing Stability:     Unable to Pay for Housing in the Last Year: Not on file    Number of Places Lived in the Last Year: Not on file    Unstable Housing in the Last Year: Not on file       Family History   Problem Relation Age of Onset    Diabetes Mother     Cancer Mother         breast    Heart Disease Mother     High Blood Pressure Mother     Breast Cancer Mother     Colon Cancer Neg Hx     Colon Polyps Neg Hx     Esophageal Cancer Neg Hx     Liver Cancer Neg Hx     Liver Disease Neg Hx     Rectal Cancer Neg Hx     Stomach Cancer Neg Hx        Current Outpatient Medications   Medication Sig Dispense Refill    OLANZapine (ZYPREXA) 5 MG tablet Take 1 tablet by mouth nightly 30 tablet 2    hydrOXYzine (ATARAX) 25 MG tablet Take 1 tablet by mouth nightly 30 tablet 2    Dulaglutide (TRULICITY) 8.79 TV/5.3OT SOPN Inject 0.75 mg into the skin once a week 1 pen 2    diclofenac sodium (VOLTAREN) 1 % GEL Apply 4 g topically 4 times daily 350 g 0    propranolol (INDERAL) 10 MG tablet TAKE 1 TABLET BY MOUTH THREE TIMES A DAY 90 tablet 1    metFORMIN (GLUCOPHAGE-XR) 500 MG extended release tablet TAKE 1 TABLET BY MOUTH EVERY DAY WITH LARGEST MEAL OF THE DAY 90 tablet 1    gabapentin (NEURONTIN) 300 MG capsule TAKE 2 CAPSULES BY MOUTH IN THE MORNING 1 CAPSULE AT NOON AND 2 CAPSULES AT BEDTIME 150 capsule 0    WIXELA INHUB 100-50 MCG/DOSE diskus inhaler TAKE 1 PUFF BY MOUTH TWICE A  each 2    omeprazole (PRILOSEC) 20 MG delayed release capsule TAKE 1 CAPSULE BY MOUTH EVERY DAY 90 capsule 1    blood glucose test strips (TRUE METRIX BLOOD GLUCOSE TEST) strip TEST 2 TIMES A DAY & AS NEEDED FOR SYMPTOMS OF IRREGULAR BLOOD GLUCOSE. 50 strip 3    gabapentin (NEURONTIN) 300 MG capsule TAKE 2 CAPSULES BY MOUTH IN THE MORNING & 1 CAP AT NOON, THEN 2 CAPS AT BEDTIME 150 capsule 0    SITagliptin (JANUVIA) 50 MG tablet TAKE 1 TABLET BY MOUTH EVERY DAY 90 tablet 1    Lancets MISC 1 each by Does not apply route 2 times daily 100 each 5    Blood Glucose Monitoring Suppl (BLOOD GLUCOSE MONITOR SYSTEM) w/Device KIT 1 Device by Does not apply route 2 times daily 1 kit 0    buPROPion (WELLBUTRIN SR) 100 MG extended release tablet Take 1 tablet by mouth daily 30 tablet 0    famotidine (PEPCID) 20 MG tablet Indications: takes one nightly Indications: takes one nightly 180 tablet 1    guaiFENesin (MUCINEX PO) Take by mouth nightly      ipratropium-albuterol (DUONEB) 0.5-2.5 (3) MG/3ML SOLN nebulizer solution INHALE ONE AMPULE INTO THE LUNGS EVERY 6 HOURS AS NEEDED FOR SHORTNESS OF BREATH 360 mL 1    fluticasone (FLONASE) 50 MCG/ACT nasal spray One spray each nostril twice daily. 1 Bottle 2    Respiratory Therapy Supplies LUPE cpap supplies x 1 year 1 Device 0    HYDROcodone-acetaminophen (NORCO) 7.5-325 MG per tablet Take 1 tablet by mouth 2 times daily as needed for Pain (Dr. Louise Duque).        busPIRone (BUSPAR) 10 MG tablet Take 10 mg by mouth 3 times daily as needed      cyclobenzaprine (FLEXERIL) 10 MG tablet Take 1 tablet by mouth 2 times daily as needed for Muscle spasms 180 tablet 3    metFORMIN (GLUMETZA) 1000 MG extended release tablet Take one tablet in the morning and one tablet in the evening. (Patient not taking: Reported on 9/7/2021) 60 tablet 2    lisinopril (PRINIVIL;ZESTRIL) 20 MG tablet TAKE 1 TABLET BY MOUTH TWICE A DAY (Patient not taking: Reported on 9/7/2021) 180 tablet 3    meloxicam (MOBIC) 15 MG tablet Take 7.5 mg by mouth daily (Patient not taking: Reported on 9/7/2021)       No current facility-administered medications for this visit. Allergies   Allergen Reactions    Tape Garnell  Tape]      Surgical tape       Lab Review not applicable  notapplicable    Subjective:   Review of Systems   Constitutional: Negative for activity change, appetite change, fatigue, fever and unexpected weight change. HENT: Negative for congestion, ear pain, nosebleeds, rhinorrhea, sore throat, trouble swallowing and voice change. Eyes: Negative for redness and visual disturbance. Respiratory: Negative for cough, chest tightness, shortness of breath and wheezing. Cardiovascular: Negative for chest pain, palpitations and leg swelling. Gastrointestinal: Negative for abdominal pain, blood in stool, constipation, diarrhea, nausea and vomiting. Endocrine: Negative for polydipsia, polyphagia and polyuria. Genitourinary: Negative for dysuria, frequency and urgency. Musculoskeletal: Positive for arthralgias (knee pain) and back pain. Negative for myalgias. Skin: Negative for rash and wound. Neurological: Negative for dizziness, speech difficulty, light-headedness and headaches. Psychiatric/Behavioral: Negative for agitation, confusion, self-injury and suicidal ideas. The patient is nervous/anxious. Objective:     Physical Exam  Vitals reviewed. Constitutional:       Appearance: She is obese. HENT:      Head: Normocephalic.       Right Ear: Tympanic membrane normal.      Left Ear: Tympanic membrane normal.      Nose: Nose normal. Mouth/Throat:      Mouth: Mucous membranes are moist.      Pharynx: Oropharynx is clear. Eyes:      Extraocular Movements: Extraocular movements intact. Pupils: Pupils are equal, round, and reactive to light. Cardiovascular:      Rate and Rhythm: Normal rate and regular rhythm. Pulses: Normal pulses. Heart sounds: Normal heart sounds. Pulmonary:      Effort: Pulmonary effort is normal.      Breath sounds: Normal breath sounds. Abdominal:      General: Bowel sounds are normal.      Palpations: Abdomen is soft. Musculoskeletal:         General: Normal range of motion. Cervical back: Normal range of motion. Skin:     General: Skin is warm and dry. Neurological:      Mental Status: She is alert and oriented to person, place, and time. Psychiatric:         Mood and Affect: Mood normal.         Behavior: Behavior normal.       /86   Pulse 114   Temp 98.1 °F (36.7 °C) (Temporal)   Ht 5' 7\" (1.702 m)   Wt 232 lb 6.4 oz (105.4 kg)   SpO2 96%   BMI 36.40 kg/m²     Assessment:      Diagnosis Orders   1. Type 2 diabetes mellitus with hyperosmolarity without coma, without long-term current use of insulin ([facility])  Dulaglutide (TRULICITY) 2.33 VU/4.8OY SOPN   2. Mood swings     3. Osteoarthritis, unspecified osteoarthritis type, unspecified site  diclofenac sodium (VOLTAREN) 1 % GEL   4. Anxiety     5. Bereavement     6. Insomnia, unspecified type     7. Hypertension, essential, benign       No results found for this visit on 01/11/22. Plan:     1. Type 2 diabetes mellitus with hyperosmolarity without coma, without long-term current use of insulin ([address]    2. Mood swings    3. Osteoarthritis, unspecified osteoarthritis type, unspecified site    4. Anxiety    5. Bereavement    6. Insomnia, unspecified type    7.  Hypertension, essential, benign      Over 50% of the total visit time of 30 minutes was spent on counseling and or coordination of care of DM2, mood swings, osteoarthritis, anxiety, bereavement, insomnia, HTN. Return in about 3 months (around 4/11/2022). No orders of the defined types were placed in this encounter. Orders Placed This Encounter   Medications    OLANZapine (ZYPREXA) 5 MG tablet     Sig: Take 1 tablet by mouth nightly     Dispense:  30 tablet     Refill:  2    hydrOXYzine (ATARAX) 25 MG tablet     Sig: Take 1 tablet by mouth nightly     Dispense:  30 tablet     Refill:  2    Dulaglutide (TRULICITY) 8.85 ZI/8.3ZU SOPN     Sig: Inject 0.75 mg into the skin once a week     Dispense:  1 pen     Refill:  2    diclofenac sodium (VOLTAREN) 1 % GEL     Sig: Apply 4 g topically 4 times daily     Dispense:  350 g     Refill:  0       There are no Patient Instructions on file for this visit.    /family given educational materials - see patient instructions. Discussed use, benefit, and side effects of prescribed medications. All patient/family questions answered and voiced understanding. Instructed to continue current medications, diet and exercise. Pt/family agreed with treatment plan. Follow up as directed and sooner if needed. Patient/ family instructed that is symptoms worsen or persist they are to contact office or report to nearest ER. They voice understanding and agreement with this plan.   signed by RADHA Pagan on 1/17/2022 at 10:38 AM CST    This dictation was generated by voice recognition computer software. Although all attempts are made to edit the dictation for accuracy, there may be errors in the transcription that are not intended.

## 2022-01-17 PROBLEM — M19.90 OSTEOARTHRITIS: Status: ACTIVE | Noted: 2020-02-26

## 2022-01-17 PROBLEM — F41.9 ANXIETY: Status: ACTIVE | Noted: 2022-01-17

## 2022-01-27 DIAGNOSIS — E11.00 TYPE 2 DIABETES MELLITUS WITH HYPEROSMOLARITY WITHOUT COMA, WITHOUT LONG-TERM CURRENT USE OF INSULIN (HCC): ICD-10-CM

## 2022-01-27 DIAGNOSIS — T45.1X5A CHEMOTHERAPY-INDUCED NEUROPATHY (HCC): ICD-10-CM

## 2022-01-27 DIAGNOSIS — G62.0 CHEMOTHERAPY-INDUCED NEUROPATHY (HCC): ICD-10-CM

## 2022-01-27 DIAGNOSIS — F31.9 BIPOLAR AFFECTIVE DISORDER, REMISSION STATUS UNSPECIFIED (HCC): ICD-10-CM

## 2022-01-27 DIAGNOSIS — M19.90 OSTEOARTHRITIS, UNSPECIFIED OSTEOARTHRITIS TYPE, UNSPECIFIED SITE: ICD-10-CM

## 2022-01-27 RX ORDER — BUPROPION HYDROCHLORIDE 100 MG/1
TABLET, EXTENDED RELEASE ORAL
Qty: 30 TABLET | Refills: 0 | OUTPATIENT
Start: 2022-01-27

## 2022-01-27 NOTE — TELEPHONE ENCOUNTER
Oumou Duque called to request a refill on her medication. Last office visit : 1/11/2022   Next office visit : 4/12/2022     Last UDS:   Amphetamine Screen, Urine   Date Value Ref Range Status   04/02/2021 -  Final     Barbiturate Screen, Urine   Date Value Ref Range Status   04/02/2021 -  Final     Benzodiazepine Screen, Urine   Date Value Ref Range Status   04/02/2021 -  Final     Buprenorphine Urine   Date Value Ref Range Status   04/02/2021 -  Final     Cocaine Metabolite Screen, Urine   Date Value Ref Range Status   04/02/2021 -  Final     Gabapentin Screen, Urine   Date Value Ref Range Status   04/02/2021 -  Final     MDMA, Urine   Date Value Ref Range Status   04/02/2021 -  Final     Methamphetamine, Urine   Date Value Ref Range Status   04/02/2021 -  Final     Opiate Scrn, Ur   Date Value Ref Range Status   04/02/2021 -  Final     Oxycodone Screen, Ur   Date Value Ref Range Status   04/02/2021 -  Final     PCP Screen, Urine   Date Value Ref Range Status   04/02/2021 -  Final     Propoxyphene Screen, Urine   Date Value Ref Range Status   04/02/2021 -  Final     THC Screen, Urine   Date Value Ref Range Status   04/02/2021 -  Final     Tricyclic Antidepressants, Urine   Date Value Ref Range Status   04/02/2021 -  Final       Last Metro Kussmaul: 12/21/21  Medication Contract: needs updated   Last Fill: 12/21/21    Requested Prescriptions     Pending Prescriptions Disp Refills    gabapentin (NEURONTIN) 300 MG capsule 150 capsule 0     Sig: TAKE 2 CAPSULES BY MOUTH IN THE MORNING 1 CAPSULE AT NOON AND 2 CAPSULES AT BEDTIME         Please approve or refuse this medication.    Mitra Dao

## 2022-01-27 NOTE — TELEPHONE ENCOUNTER
Allison Alexis called to request a refill on her medication.       Last office visit : 1/11/2022   Next office visit : 1/27/2022     Requested Prescriptions     Refused Prescriptions Disp Refills    buPROPion (WELLBUTRIN SR) 100 MG extended release tablet [Pharmacy Med Name: BUPROPION HCL  MG TABLET] 30 tablet 0     Sig: TAKE 1 TABLET BY MOUTH EVERY DAY     Refused By: Edna Tejeda     Reason for Refusal: Refill not appropriate            Shady Lockhart MA

## 2022-01-28 DIAGNOSIS — T45.1X5A CHEMOTHERAPY-INDUCED NEUROPATHY (HCC): ICD-10-CM

## 2022-01-28 DIAGNOSIS — G62.0 CHEMOTHERAPY-INDUCED NEUROPATHY (HCC): ICD-10-CM

## 2022-01-28 RX ORDER — GABAPENTIN 300 MG/1
CAPSULE ORAL
Qty: 150 CAPSULE | Refills: 0 | OUTPATIENT
Start: 2022-01-28 | End: 2022-02-25

## 2022-01-28 RX ORDER — GABAPENTIN 300 MG/1
CAPSULE ORAL
Qty: 150 CAPSULE | Refills: 0 | Status: SHIPPED | OUTPATIENT
Start: 2022-01-28 | End: 2022-02-28

## 2022-01-28 NOTE — TELEPHONE ENCOUNTER
Betty Chatterjee called to request a refill on her medication. Last office visit : 1/11/2022   Next office visit : 4/12/2022     Last UDS:   Amphetamine Screen, Urine   Date Value Ref Range Status   04/02/2021 -  Final     Barbiturate Screen, Urine   Date Value Ref Range Status   04/02/2021 -  Final     Benzodiazepine Screen, Urine   Date Value Ref Range Status   04/02/2021 -  Final     Buprenorphine Urine   Date Value Ref Range Status   04/02/2021 -  Final     Cocaine Metabolite Screen, Urine   Date Value Ref Range Status   04/02/2021 -  Final     Gabapentin Screen, Urine   Date Value Ref Range Status   04/02/2021 -  Final     MDMA, Urine   Date Value Ref Range Status   04/02/2021 -  Final     Methamphetamine, Urine   Date Value Ref Range Status   04/02/2021 -  Final     Opiate Scrn, Ur   Date Value Ref Range Status   04/02/2021 -  Final     Oxycodone Screen, Ur   Date Value Ref Range Status   04/02/2021 -  Final     PCP Screen, Urine   Date Value Ref Range Status   04/02/2021 -  Final     Propoxyphene Screen, Urine   Date Value Ref Range Status   04/02/2021 -  Final     THC Screen, Urine   Date Value Ref Range Status   04/02/2021 -  Final     Tricyclic Antidepressants, Urine   Date Value Ref Range Status   04/02/2021 -  Final       Last Nay Duval: 12-21-21  Medication Contract: 04-02-21   Last Fill: 07-27-21    Requested Prescriptions     Pending Prescriptions Disp Refills    gabapentin (NEURONTIN) 300 MG capsule 150 capsule 3     Sig: TAKE 2 CAPSULES BY MOUTH IN THE MORNING & 1 CAP AT NOON, THEN 2 CAPS AT BEDTIME         Please approve or refuse this medication.    Freddie Gates MA

## 2022-02-21 DIAGNOSIS — Z85.3 PERSONAL HISTORY OF BREAST CANCER: Primary | ICD-10-CM

## 2022-02-27 DIAGNOSIS — E11.00 TYPE 2 DIABETES MELLITUS WITH HYPEROSMOLARITY WITHOUT COMA, WITHOUT LONG-TERM CURRENT USE OF INSULIN (HCC): ICD-10-CM

## 2022-02-28 DIAGNOSIS — G62.0 CHEMOTHERAPY-INDUCED NEUROPATHY (HCC): ICD-10-CM

## 2022-02-28 DIAGNOSIS — T45.1X5A CHEMOTHERAPY-INDUCED NEUROPATHY (HCC): ICD-10-CM

## 2022-02-28 DIAGNOSIS — E11.00 TYPE 2 DIABETES MELLITUS WITH HYPEROSMOLARITY WITHOUT COMA, WITHOUT LONG-TERM CURRENT USE OF INSULIN (HCC): ICD-10-CM

## 2022-02-28 RX ORDER — METFORMIN HYDROCHLORIDE 500 MG/1
TABLET, EXTENDED RELEASE ORAL
Qty: 90 TABLET | Refills: 1 | Status: SHIPPED | OUTPATIENT
Start: 2022-02-28 | End: 2022-04-12 | Stop reason: SDUPTHER

## 2022-02-28 RX ORDER — HYDROXYZINE HYDROCHLORIDE 25 MG/1
25 TABLET, FILM COATED ORAL NIGHTLY
Qty: 90 TABLET | Refills: 0 | Status: SHIPPED | OUTPATIENT
Start: 2022-02-28 | End: 2022-07-11

## 2022-02-28 RX ORDER — ARIPIPRAZOLE 2 MG/1
TABLET ORAL
Qty: 30 TABLET | Refills: 1 | OUTPATIENT
Start: 2022-02-28

## 2022-02-28 RX ORDER — DULAGLUTIDE 0.75 MG/.5ML
0.75 INJECTION, SOLUTION SUBCUTANEOUS WEEKLY
Qty: 4 PEN | Refills: 2 | Status: SHIPPED | OUTPATIENT
Start: 2022-02-28 | End: 2022-05-26

## 2022-02-28 NOTE — TELEPHONE ENCOUNTER
Angela Ganser called to request a refill on her medication.       Last office visit : 1/11/2022   Next office visit : 4/12/2022     Last UDS:   Amphetamine Screen, Urine   Date Value Ref Range Status   04/02/2021 -  Final     Barbiturate Screen, Urine   Date Value Ref Range Status   04/02/2021 -  Final     Benzodiazepine Screen, Urine   Date Value Ref Range Status   04/02/2021 -  Final     Buprenorphine Urine   Date Value Ref Range Status   04/02/2021 -  Final     Cocaine Metabolite Screen, Urine   Date Value Ref Range Status   04/02/2021 -  Final     Gabapentin Screen, Urine   Date Value Ref Range Status   04/02/2021 -  Final     MDMA, Urine   Date Value Ref Range Status   04/02/2021 -  Final     Methamphetamine, Urine   Date Value Ref Range Status   04/02/2021 -  Final     Opiate Scrn, Ur   Date Value Ref Range Status   04/02/2021 -  Final     Oxycodone Screen, Ur   Date Value Ref Range Status   04/02/2021 -  Final     PCP Screen, Urine   Date Value Ref Range Status   04/02/2021 -  Final     Propoxyphene Screen, Urine   Date Value Ref Range Status   04/02/2021 -  Final     THC Screen, Urine   Date Value Ref Range Status   04/02/2021 -  Final     Tricyclic Antidepressants, Urine   Date Value Ref Range Status   04/02/2021 -  Final       Last Fabian Justin: 12/21/21  Medication Contract: 4/2/21   Last Fill: 1/28/22    Requested Prescriptions     Pending Prescriptions Disp Refills    gabapentin (NEURONTIN) 300 MG capsule [Pharmacy Med Name: GABAPENTIN 300 MG CAPSULE] 150 capsule 0     Sig: TAKE 2 CAPSULES BY MOUTH IN THE MORNING & 1 CAP AT NOON, THEN 2 CAPS AT BEDTIME     Signed Prescriptions Disp Refills    metFORMIN (GLUCOPHAGE-XR) 500 MG extended release tablet 90 tablet 1     Sig: TAKE 1 TABLET BY MOUTH DAILY WITH LARGEST MEAL OF THE DAY     Authorizing Provider: Feli More     Ordering User: Luiza Huang    hydrOXYzine (ATARAX) 25 MG tablet 90 tablet 0     Sig: TAKE 1 TABLET BY MOUTH NIGHTLY Authorizing Provider: Layla Bennett     Ordering User: Stacy Jordan         Please approve or refuse this medication.    Eun Smiley

## 2022-03-02 RX ORDER — GABAPENTIN 300 MG/1
CAPSULE ORAL
Qty: 150 CAPSULE | Refills: 0 | Status: SHIPPED | OUTPATIENT
Start: 2022-03-02 | End: 2022-04-01 | Stop reason: SDUPTHER

## 2022-03-03 DIAGNOSIS — R00.0 TACHYCARDIA: ICD-10-CM

## 2022-03-03 DIAGNOSIS — M19.90 OSTEOARTHRITIS, UNSPECIFIED OSTEOARTHRITIS TYPE, UNSPECIFIED SITE: ICD-10-CM

## 2022-03-03 RX ORDER — PROPRANOLOL HYDROCHLORIDE 10 MG/1
TABLET ORAL
Qty: 90 TABLET | Refills: 1 | Status: SHIPPED | OUTPATIENT
Start: 2022-03-03 | End: 2022-04-14

## 2022-03-03 NOTE — TELEPHONE ENCOUNTER
Retamildred Shae called to request a refill on her medication.       Last office visit : 1/11/2022   Next office visit : 4/12/2022     Requested Prescriptions     Pending Prescriptions Disp Refills    diclofenac sodium (VOLTAREN) 1 % GEL [Pharmacy Med Name: DICLOFENAC SODIUM 1% GEL] 400 g 1     Sig: APPLY 4 GRAMS TOPICALLY 4 TIMES A DAY TO AFFECTED AREAS    propranolol (INDERAL) 10 MG tablet [Pharmacy Med Name: PROPRANOLOL 10 MG TABLET] 90 tablet 1     Sig: TAKE 1 TABLET BY MOUTH THREE TIMES A DAY            Sussy Parker

## 2022-03-10 NOTE — PROGRESS NOTES
Progress Note      Pt Name: Angela Ganser  YOB: 1965  MRN: [id number]    Date of evaluation: 3/11/2022  History Obtained From:  patient, electronic medical record    CHIEF COMPLAINT:    Chief Complaint   Patient presents with    Follow-up     Personal history of breast cancer     Current active problems  Left breast carcinoma 2010   BRCA 2 positive   Iron deficiency anemia    HISTORY OF PRESENT ILLNESS:    Angela Ganser is a 61 y.o.  female with a diagnosis of stage IIb left-sided breast carcinoma dating back to 2010.  She was treated with combination chemotherapy. She also completed the planned 10 years of aromatase inhibitor. She denies feeling any new nodules on her chest wall. She has had a few nodules in her right axillary region that have drained purulent material in the past.  She has a couple other nodules that has developed. She also has a chronically elevated CEA and CA 19-9. She also has BRCA2 mutation with prior MRCP negative. She has some chronic abdominal bloating without any significant change. She had prior endoscopy colonoscopy by Dr. Suzy Zhang that was unrevealing for any ominous findings. She has not contracted COVID-19, she did take the initial 2 COVID-19 vaccinations. She continues to have issues with anxiety/bipolar disorder and continues to be followed by [address] [place] [address]. She is diabetic with prior A1c 14 but improved slightly to 8.8 at last blood draw. She continues to get injections with pain management on occasion. She previously smoked but stopped in 2016 and has not resumed. HEMATOLOGY HISTORY: Iron deficient anemia  Evaluation at the time of her breast carcinoma presentation showed microcytic anemia. Iron studies were consistent with iron deficiency. She was still menstruating at the time. Stool for blood was requested x 3 was negative. She was treated with iron replacement therapy with MCV normalizing.     TUMOR HISTORY: Left infiltrating stage IIB (T2 N1 M0) (ER/ VT +, Her2 Juan M negative) breast cancer, 10/25/10   BRCA-II sequencing positive for [id number] mutation  Cherie Ambriz found a lump in her left breast. The mammogram was positive for malignancy. She also had some left axillary lymph nodes. A fine needle aspirate of the left breast and left axillae reveals a high-grade infiltrating breast cancer that is ER-92% positive, VT-56% positive and Xqx2dhp 1+ positive by IHC-FISH studies were negative. The left axillary lymph node biopsy by FNA was also positive for malignancy. An MRI of the left breast on 11/02/10 reveals a primary 2.6x1.3cm enhancing mass with approximately 8 other enhancing nodules adjacent to the primary tumor like satellite lesions. BRCA 1 sequencing did not reveal mutations, however BRCA 2 sequencing was positive for an [id number] mutation. Cherie Ambriz understands the implications of her malignancy with a positive BRCA 2, and for this reason her desires are for bilateral mastectomies and hysterectomy. She was referred for pre-operative neoadjuvant chemotherapy. MRI of the brain was negative. CT scan of the abdomen and pelvis and bone scan were negative. CT scan of the chest revealed a 1.7 cm left axillary lymph node and scar like density in the right upper lobe. No obvious mass. 2D echocardiogram was adequate to proceed with chemotherapy. Dose dense FAC was give 11/29/10 through 02/21/11. Bilateral mastectomies were performed on 03/14/[facility] at [facility] per Dr. Libia Hart with sentinel lymph node biopsies. The left mastectomy revealed residual invasive mammary carcinoma, grade II measuring 0.9 cm in greatest dimension. Margins were clear. The 8 left axillary lymph nodes were submitted, all negative for any evidence of any other cancer. A right simple mastectomy with right sentinel lymph node biopsies on the same date of the surgery 03/14/11 only revealed carcinoma in situ in the breast, completely resected.    Following reconstructive surgery on 04/05/11 and post op healing, adjuvant weekly Taxol for 12 weeks was initiated on 05/16/11 and dose #12 was completed on 08/08/11. Radiation therapy was delivered by Dr. Carolee Mitchell consisting of 4060 cGy plus 1000 rad boost between 09/06/11 and 10/26/11. Tamoxifen was initiated on 11/14/11 and will continue for 5 years. Lucho Tyler was premenopausal at presentation but has not had any further menstrual cycles since initiation of chemotherapy and tamoxifen. SHARON w/ BSO has was performed on 12/14/12 with negative histopathology. Tamoxifen was discontinued and Femara was instituted on 12/4/2015. The latter will continue for a subsequent 5 years. TREATMENT SUMMARY:  1. FAC initiated 11/29/10 through 02/21/11- 6 doses in a dose dense fashion. 2. Bilateral mastectomies 03/14/11.   3. Bilateral breast reconstruction 04/05/11 with subsequent complications on the left with slow wound healing. 4. Weekly Taxol x 12 initiated 05/16/11 and completed 08/08/11 (last dose was Taxotere due to [employer] of Taxol). 5. Radiation therapy given by Dr. Sari Lazo to the chest 09/06/11 through 10/26/11, 4860 cGy + 1,000 rad boost.   6. Tamoxifen initiated 11/14/11 and discontinued 12/4/2015   7. SHARON w/ BSO on 12/14/12, negative histopathology, due to her premenopausal status and the fact that she is BRCA-II mutation positive.    8. Femara 2.5 mg/day initiated 12/4/2015 and continued through 12/20/2020      Past Medical History:   Diagnosis Date    Allergic rhinitis     Anxiety     Breast cancer ([facility] [address] 10/2010    Left    Bronchitis     Carpal tunnel syndrome     R and L    Chest pain     Chronic back pain     Chronic bronchitis (HCC)     Depression     Diabetes mellitus (HCC)     GERD (gastroesophageal reflux disease)     Headache(784.0)     Heart palpitations     Mononeuropathy     Osteoarthritis     Sleep apnea     Wears glasses        Past Surgical History:   Procedure Laterality Date    ANKLE FRACTURE SURGERY Right 2014   [address] BREAST RECONSTRUCTION  06/2011    BREAST SURGERY  06/2011    emergency removal of Left breast expander    COLONOSCOPY N/A 02/05/2016    Dr Garth Chopra x 1, serrated AP x 1, 12 month recall    COLONOSCOPY N/A 01/06/2021    Dr Carmen Garcia yr recall    EGD COLONOSCOPY N/A 06/03/2016    w/empiric dilation to 48 [place] (16mm)-Dr RONY Santana, Nafisa (-)    HYSTERECTOMY      MASTECTOMY, BILATERAL Bilateral 03/2011    OTHER SURGICAL HISTORY      R leg & ankle fracture due to fall repair-Dr Mayela Garcia at [facility]    UPPER GASTROINTESTINAL ENDOSCOPY  06/03/2016    w/empiric dilation to 48 [place] (16mm)-Dr RONY Santana, Nafisa (-)    UPPER GASTROINTESTINAL ENDOSCOPY N/A 01/06/2021    Dr Margoth Childers-w/brushings-Candidal esophagitis-Treated with Diflucan       Current Outpatient Medications:     cephALEXin (KEFLEX) 500 MG capsule, Take 1 capsule by mouth 3 times daily for 10 days, Disp: 30 capsule, Rfl: 0    diclofenac sodium (VOLTAREN) 1 % GEL, APPLY 4 GRAMS TOPICALLY 4 TIMES A DAY TO AFFECTED AREAS, Disp: 400 g, Rfl: 1    propranolol (INDERAL) 10 MG tablet, TAKE 1 TABLET BY MOUTH THREE TIMES A DAY, Disp: 90 tablet, Rfl: 1    gabapentin (NEURONTIN) 300 MG capsule, TAKE 2 CAPSULES BY MOUTH IN THE MORNING & 1 CAP AT NOON, THEN 2 CAPS AT BEDTIME, Disp: 150 capsule, Rfl: 0    TRULICITY 3.28 GK/5.7NP SOPN, INJECT 0.75 MG INTO THE SKIN ONCE A WEEK, Disp: 4 pen, Rfl: 2    metFORMIN (GLUCOPHAGE-XR) 500 MG extended release tablet, TAKE 1 TABLET BY MOUTH DAILY WITH LARGEST MEAL OF THE DAY, Disp: 90 tablet, Rfl: 1    hydrOXYzine (ATARAX) 25 MG tablet, TAKE 1 TABLET BY MOUTH NIGHTLY, Disp: 90 tablet, Rfl: 0    famotidine (PEPCID) 20 MG tablet, TAKE 1 TABLET BY MOUTH NIGHTLY, Disp: 90 tablet, Rfl: 3    omeprazole (PRILOSEC) 20 MG delayed release capsule, TAKE 1 CAPSULE BY MOUTH EVERY DAY, Disp: 90 capsule, Rfl: 1    SITagliptin (JANUVIA) 50 MG tablet, TAKE 1 TABLET BY MOUTH EVERY DAY, Disp: 90 tablet, Rfl: 1    WIXELA INHUB 100-50 MCG/DOSE diskus inhaler, TAKE 1 PUFF BY MOUTH TWICE A DAY, Disp: 180 each, Rfl: 2    blood glucose test strips (TRUE METRIX BLOOD GLUCOSE TEST) strip, TEST 2 TIMES A DAY & AS NEEDED FOR SYMPTOMS OF IRREGULAR BLOOD GLUCOSE., Disp: 50 strip, Rfl: 3    Lancets MISC, 1 each by Does not apply route 2 times daily, Disp: 100 each, Rfl: 5    buPROPion (WELLBUTRIN SR) 100 MG extended release tablet, Take 1 tablet by mouth daily, Disp: 30 tablet, Rfl: 0    metFORMIN (GLUMETZA) 1000 MG extended release tablet, Take one tablet in the morning and one tablet in the evening., Disp: 60 tablet, Rfl: 2    lisinopril (PRINIVIL;ZESTRIL) 20 MG tablet, TAKE 1 TABLET BY MOUTH TWICE A DAY, Disp: 180 tablet, Rfl: 3    meloxicam (MOBIC) 15 MG tablet, Take 7.5 mg by mouth daily , Disp: , Rfl:     guaiFENesin (MUCINEX PO), Take by mouth nightly, Disp: , Rfl:     ipratropium-albuterol (DUONEB) 0.5-2.5 (3) MG/3ML SOLN nebulizer solution, INHALE ONE AMPULE INTO THE LUNGS EVERY 6 HOURS AS NEEDED FOR SHORTNESS OF BREATH, Disp: 360 mL, Rfl: 1    fluticasone (FLONASE) 50 MCG/ACT nasal spray, One spray each nostril twice daily. , Disp: 1 Bottle, Rfl: 2    Respiratory Therapy Supplies LUPE, cpap supplies x 1 year, Disp: 1 Device, Rfl: 0    HYDROcodone-acetaminophen (NORCO) 7.5-325 MG per tablet, Take 1 tablet by mouth 2 times daily as needed for Pain (Dr. Rosita Leigh).  , Disp: , Rfl:     busPIRone (BUSPAR) 10 MG tablet, Take 10 mg by mouth 3 times daily as needed, Disp: , Rfl:     cyclobenzaprine (FLEXERIL) 10 MG tablet, Take 1 tablet by mouth 2 times daily as needed for Muscle spasms, Disp: 180 tablet, Rfl: 3    OLANZapine (ZYPREXA) 5 MG tablet, Take 1 tablet by mouth nightly, Disp: 30 tablet, Rfl: 2    Blood Glucose Monitoring Suppl (BLOOD GLUCOSE MONITOR SYSTEM) w/Device KIT, 1 Device by Does not apply route 2 times daily, Disp: 1 kit, Rfl: 0       Allergies   Allergen Reactions    Tape [Adhesive Tape] Surgical tape       Social History     Tobacco Use    Smoking status: Former Smoker     Packs/day: 0.25     Years: 20.00     Pack years: 5.00     Types: Cigars     Start date: 2/3/2016     Quit date: 2016     Years since quittin.2    Smokeless tobacco: Never Used    Tobacco comment: Smokes Shelah Shows a day. Vaping Use    Vaping Use: Never used   Substance Use Topics    Alcohol use: No     Alcohol/week: 0.0 standard drinks    Drug use: No       Family History   Problem Relation Age of Onset    Diabetes Mother     Cancer Mother         breast    Heart Disease Mother     High Blood Pressure Mother     Breast Cancer Mother     Colon Cancer Neg Hx     Colon Polyps Neg Hx     Esophageal Cancer Neg Hx     Liver Cancer Neg Hx     Liver Disease Neg Hx     Rectal Cancer Neg Hx     Stomach Cancer Neg Hx          Subjective   REVIEW OF SYSTEMS:   Review of Systems   Constitutional: Positive for fatigue (Mild to moderate). Negative for chills, diaphoresis, fever and unexpected weight change. HENT: Negative for mouth sores, nosebleeds, sore throat, trouble swallowing and voice change. Eyes: Negative for photophobia, discharge and itching. Respiratory: Negative for cough, shortness of breath and wheezing. Cardiovascular: Negative for chest pain, palpitations and leg swelling. Gastrointestinal: Negative for abdominal distention, abdominal pain, blood in stool, constipation, diarrhea, nausea and vomiting. Endocrine: Negative for cold intolerance, heat intolerance, polydipsia and polyuria. Genitourinary: Negative for difficulty urinating, dysuria, hematuria and urgency. Musculoskeletal: Positive for arthralgias and back pain. Negative for joint swelling and myalgias. Skin: Positive for wound (Right axillary region with purulent drainage improved). Negative for color change. Neurological: Negative for dizziness, tremors, seizures, syncope and light-headedness. Hematological: Negative for adenopathy. Does not bruise/bleed easily. Psychiatric/Behavioral: Negative for behavioral problems and suicidal ideas. The patient is nervous/anxious. All other systems reviewed and are negative. Objective   BP (!) 140/80   Pulse 113   Ht 5' 7\" (1.702 m)   Wt 234 lb 9.6 oz (106.4 kg)   SpO2 95%   BMI 36.74 kg/m²     PHYSICAL EXAM:  Physical Exam  Constitutional:       General: She is not in acute distress. HENT:      Head: Normocephalic and atraumatic. Eyes:      General: No scleral icterus. Conjunctiva/sclera: Conjunctivae normal.   Neck:      Trachea: No tracheal deviation. Cardiovascular:      Rate and Rhythm: Normal rate and regular rhythm. Heart sounds: Normal heart sounds. No murmur heard. Pulmonary:      Effort: Pulmonary effort is normal. No respiratory distress. Breath sounds: Normal breath sounds. Chest:      Chest wall: No mass. Breasts:      Right: Absent. Left: Absent. Abdominal:      General: Bowel sounds are normal. There is no distension. Palpations: Abdomen is soft. There is no mass. Tenderness: There is no abdominal tenderness. Musculoskeletal:         General: No tenderness or deformity. Cervical back: Normal range of motion and neck supple. Skin:     General: Skin is warm and dry. Findings: No rash. Neurological:      Mental Status: She is alert and oriented to person, place, and time. Coordination: Coordination normal.   Psychiatric:         Behavior: Behavior normal.       CBC reviewed by me  Lab Results   Component Value Date    WBC 11.10 (H) 03/11/2022    HGB 15.2 03/11/2022    HCT 47.9 (H) 03/11/2022    MCV 89.7 03/11/2022     03/11/2022     Lab Results   Component Value Date    NEUTROABS 4.9 06/01/2021       ASSESSMENT/PLAN:     Diagnosis Orders   1. Personal history of breast cancer  Comprehensive Metabolic Panel    Cancer Antigen 15-3   2.  Folliculitis of right axilla     3. BRCA gene mutation positive in female     4. Elevated carcinoembryonic antigen  CEA   5. Other abnormal tumor markers  Cancer Antigen 19-9        1. Breast carcinoma 2010    Serology 6/1/2021  CA 15-3 - 14.8      Physical exam does not reveal any nodularities on the chest wall. There is not any obvious lymphadenopathy in either axilla. She has 2 small nodules in the right axillathat appear to be infected follicular glands or cysts    CA 15-3 will be checked. 2.  Folliculitis or infected sebaceous cyst right axilla    WBC slightly elevated 11.1. A round of Keflex will be given. I electronically sent this to her pharmacy. I will recheck her axilla at her next visit. 3.  Chronically elevated CEA. Serology 8/1/2019  CEA - 6.3  CA-19-9 - 49.3     She has a positive BRCA 2 mutation. Serology 6/1/2021  CEA - 6.8  CA 19-9 - 38      MRI abdomen with and without contrast MRCP 9/23/2019 at [facility] revealed a limited study due to respiratory motion and geographic fatty infiltration of the liver. No masses or abnormal contrast enhancement identified. MRCP images did not reveal any evidence of intrahepatic or extrahepatic biliary ductal dilatation. No pancreatic ductal dilatation is identified. No obvious pancreatic lesion detected. Small cyst within the right kidney. Scattered calcified granulomas in the liver. I am rechecking her CEA CA 19 9 today.       3. History iron deficiency anemia. Her hemoglobin is normal at 15.2. MCV is 89.7. · Bone health. Bone density 12/23/2019 was normal.      · Colon cancer screening. Screening colonoscopy per Dr. Vanesa Garcia 1/6/2021 was negative with 5-year follow-up recommended. EGD per Dr. Vanesa Garcia 1/6/2021 revealed white patches throughout the esophagus with biopsies consistent with Candida esophagitis.   Mild mucosal changes suggestive of gastritis with biopsies revealing inflammation, H. pylori negative by [employer] History   Administered Date(s) Administered    COVID-19, Moderna, Primary or Immunocompromised, PF, 100mcg/0.5mL 10/04/2021, 11/01/2021         Orders Placed This Encounter   Procedures    Comprehensive Metabolic Panel    Cancer Antigen 15-3    CEA    Cancer Antigen 19-9       Return in about 2 months (around 5/11/2022) for With [place]. rescheduled.   Adarsh Chairez PA-C  1:16 PM  3/11/2022

## 2022-03-11 ENCOUNTER — OFFICE VISIT (OUTPATIENT)
Dept: HEMATOLOGY | Age: 57
End: 2022-03-11
Payer: MEDICARE

## 2022-03-11 ENCOUNTER — HOSPITAL ENCOUNTER (OUTPATIENT)
Dept: INFUSION THERAPY | Age: 57
Discharge: HOME | End: 2022-03-11
Payer: MEDICARE

## 2022-03-11 VITALS
HEART RATE: 113 BPM | DIASTOLIC BLOOD PRESSURE: 80 MMHG | SYSTOLIC BLOOD PRESSURE: 140 MMHG | WEIGHT: 234.6 LBS | HEIGHT: 67 IN | OXYGEN SATURATION: 95 % | BODY MASS INDEX: 36.82 KG/M2

## 2022-03-11 DIAGNOSIS — Z85.3 PERSONAL HISTORY OF BREAST CANCER: ICD-10-CM

## 2022-03-11 DIAGNOSIS — L73.9 FOLLICULITIS OF RIGHT AXILLA: ICD-10-CM

## 2022-03-11 DIAGNOSIS — R97.0 ELEVATED CARCINOEMBRYONIC ANTIGEN: ICD-10-CM

## 2022-03-11 DIAGNOSIS — Z85.3 PERSONAL HISTORY OF BREAST CANCER: Primary | ICD-10-CM

## 2022-03-11 DIAGNOSIS — Z15.01 BRCA GENE MUTATION POSITIVE IN FEMALE: ICD-10-CM

## 2022-03-11 DIAGNOSIS — Z15.09 BRCA GENE MUTATION POSITIVE IN FEMALE: ICD-10-CM

## 2022-03-11 DIAGNOSIS — R97.8 OTHER ABNORMAL TUMOR MARKERS: ICD-10-CM

## 2022-03-11 DIAGNOSIS — Z15.02 BRCA GENE MUTATION POSITIVE IN FEMALE: ICD-10-CM

## 2022-03-11 LAB
ALBUMIN SERPL-MCNC: 4.3 G/DL (ref 3.5–5.2)
ALP BLD-CCNC: 105 U/L (ref 35–104)
ALT SERPL-CCNC: 21 U/L (ref 9–52)
ANION GAP SERPL CALCULATED.3IONS-SCNC: 5 MMOL/L (ref 7–19)
AST SERPL-CCNC: 36 U/L (ref 14–36)
BILIRUB SERPL-MCNC: 0.3 MG/DL (ref 0.2–1.3)
BUN BLDV-MCNC: 13 MG/DL (ref 7–17)
CA 15-3: 15 U/ML (ref 0–25)
CA 19-9: 27 U/ML (ref 0–35)
CALCIUM SERPL-MCNC: 9 MG/DL (ref 8.4–10.2)
CEA: 6.1 NG/ML (ref 0–4.7)
CHLORIDE BLD-SCNC: 106 MMOL/L (ref 98–111)
CO2: 32 MMOL/L (ref 22–29)
CREAT SERPL-MCNC: 0.6 MG/DL (ref 0.5–1)
GFR NON-AFRICAN AMERICAN: >60
GLOBULIN: 2.9 G/DL
GLUCOSE BLD-MCNC: 156 MG/DL (ref 74–106)
HCT VFR BLD CALC: 47.9 % (ref 34.1–44.9)
HEMOGLOBIN: 15.2 G/DL (ref 11.2–15.7)
MCH RBC QN AUTO: 28.5 PG (ref 25.6–32.2)
MCHC RBC AUTO-ENTMCNC: 31.7 G/DL (ref 32.3–35.5)
MCV RBC AUTO: 89.7 FL (ref 79.4–94.8)
PDW BLD-RTO: 13.1 % (ref 11.7–14.4)
PLATELET # BLD: 334 K/UL (ref 182–369)
PMV BLD AUTO: 8.9 FL (ref 7.4–10.4)
POTASSIUM SERPL-SCNC: 4.7 MMOL/L (ref 3.5–5.1)
RBC # BLD: 5.34 M/UL (ref 3.93–5.22)
SODIUM BLD-SCNC: 143 MMOL/L (ref 137–145)
TOTAL PROTEIN: 7.2 G/DL (ref 6.3–8.2)
WBC # BLD: 11.1 K/UL (ref 3.98–10.04)

## 2022-03-11 PROCEDURE — 80053 COMPREHEN METABOLIC PANEL: CPT

## 2022-03-11 PROCEDURE — 36415 COLL VENOUS BLD VENIPUNCTURE: CPT

## 2022-03-11 PROCEDURE — 99214 OFFICE O/P EST MOD 30 MIN: CPT | Performed by: PHYSICIAN ASSISTANT

## 2022-03-11 PROCEDURE — 99211 OFF/OP EST MAY X REQ PHY/QHP: CPT

## 2022-03-11 PROCEDURE — 85027 COMPLETE CBC AUTOMATED: CPT

## 2022-03-11 RX ORDER — CEPHALEXIN 500 MG/1
500 CAPSULE ORAL 3 TIMES DAILY
Qty: 30 CAPSULE | Refills: 0 | Status: SHIPPED | OUTPATIENT
Start: 2022-03-11 | End: 2022-03-21

## 2022-03-11 ASSESSMENT — ENCOUNTER SYMPTOMS
PHOTOPHOBIA: 0
COUGH: 0
NAUSEA: 0
VOICE CHANGE: 0
CONSTIPATION: 0
DIARRHEA: 0
EYE DISCHARGE: 0
SHORTNESS OF BREATH: 0
WHEEZING: 0
COLOR CHANGE: 0
VOMITING: 0
BACK PAIN: 1
ABDOMINAL PAIN: 0
ABDOMINAL DISTENTION: 0
EYE ITCHING: 0
SORE THROAT: 0
TROUBLE SWALLOWING: 0
BLOOD IN STOOL: 0

## 2022-03-29 NOTE — ADDENDUM NOTE
Encounter addended by: Rosalino Kelsey on: 3/29/2022 1:57 PM   Actions taken: Charge Capture section accepted

## 2022-04-01 DIAGNOSIS — T45.1X5A CHEMOTHERAPY-INDUCED NEUROPATHY (HCC): ICD-10-CM

## 2022-04-01 DIAGNOSIS — G62.0 CHEMOTHERAPY-INDUCED NEUROPATHY (HCC): ICD-10-CM

## 2022-04-01 RX ORDER — GABAPENTIN 300 MG/1
CAPSULE ORAL
Qty: 150 CAPSULE | Refills: 0 | Status: SHIPPED | OUTPATIENT
Start: 2022-04-01 | End: 2022-04-30 | Stop reason: SDUPTHER

## 2022-04-01 NOTE — TELEPHONE ENCOUNTER
Patient requested refill on Aripiprazole 2mg it shows that this medication has been discontinued by you on 01-.  Sending this refill request to you for approval.

## 2022-04-01 NOTE — TELEPHONE ENCOUNTER
Liana Chong called to request a refill on her medication. Last office visit : 1/11/2022   Next office visit : 4/12/2022     Last UDS:   Amphetamine Screen, Urine   Date Value Ref Range Status   04/02/2021 -  Final     Barbiturate Screen, Urine   Date Value Ref Range Status   04/02/2021 -  Final     Benzodiazepine Screen, Urine   Date Value Ref Range Status   04/02/2021 -  Final     Buprenorphine Urine   Date Value Ref Range Status   04/02/2021 -  Final     Cocaine Metabolite Screen, Urine   Date Value Ref Range Status   04/02/2021 -  Final     Gabapentin Screen, Urine   Date Value Ref Range Status   04/02/2021 -  Final     MDMA, Urine   Date Value Ref Range Status   04/02/2021 -  Final     Methamphetamine, Urine   Date Value Ref Range Status   04/02/2021 -  Final     Opiate Scrn, Ur   Date Value Ref Range Status   04/02/2021 -  Final     Oxycodone Screen, Ur   Date Value Ref Range Status   04/02/2021 -  Final     PCP Screen, Urine   Date Value Ref Range Status   04/02/2021 -  Final     Propoxyphene Screen, Urine   Date Value Ref Range Status   04/02/2021 -  Final     THC Screen, Urine   Date Value Ref Range Status   04/02/2021 -  Final     Tricyclic Antidepressants, Urine   Date Value Ref Range Status   04/02/2021 -  Final       Last Reshma Rosenbergns: 4/1/22  Medication Contract: 4/2/21   Last Fill: 3/2/22    Requested Prescriptions     Pending Prescriptions Disp Refills    gabapentin (NEURONTIN) 300 MG capsule 150 capsule 0     Sig: TAKE 2 CAPSULES BY MOUTH IN THE MORNING & 1 CAP AT NOON, THEN 2 CAPS AT BEDTIME         Please approve or refuse this medication.    Jessenia Espinoza

## 2022-04-12 ENCOUNTER — OFFICE VISIT (OUTPATIENT)
Dept: PRIMARY CARE CLINIC | Age: 57
End: 2022-04-12
Payer: MEDICARE

## 2022-04-12 VITALS
HEART RATE: 115 BPM | SYSTOLIC BLOOD PRESSURE: 138 MMHG | OXYGEN SATURATION: 96 % | HEIGHT: 67 IN | DIASTOLIC BLOOD PRESSURE: 90 MMHG | BODY MASS INDEX: 37.83 KG/M2 | WEIGHT: 241 LBS | TEMPERATURE: 98.3 F

## 2022-04-12 DIAGNOSIS — F33.1 MAJOR DEPRESSIVE DISORDER, RECURRENT EPISODE, MODERATE (HCC): ICD-10-CM

## 2022-04-12 DIAGNOSIS — Z11.59 NEED FOR HEPATITIS C SCREENING TEST: ICD-10-CM

## 2022-04-12 DIAGNOSIS — G47.00 INSOMNIA, UNSPECIFIED TYPE: ICD-10-CM

## 2022-04-12 DIAGNOSIS — I10 HYPERTENSION, ESSENTIAL, BENIGN: ICD-10-CM

## 2022-04-12 DIAGNOSIS — Z11.4 ENCOUNTER FOR SCREENING FOR HIV: ICD-10-CM

## 2022-04-12 DIAGNOSIS — E11.00 TYPE 2 DIABETES MELLITUS WITH HYPEROSMOLARITY WITHOUT COMA, WITHOUT LONG-TERM CURRENT USE OF INSULIN (HCC): Primary | ICD-10-CM

## 2022-04-12 LAB — HBA1C MFR BLD: 8.5 %

## 2022-04-12 PROCEDURE — 99214 OFFICE O/P EST MOD 30 MIN: CPT | Performed by: NURSE PRACTITIONER

## 2022-04-12 PROCEDURE — 83036 HEMOGLOBIN GLYCOSYLATED A1C: CPT | Performed by: NURSE PRACTITIONER

## 2022-04-12 PROCEDURE — 3052F HG A1C>EQUAL 8.0%<EQUAL 9.0%: CPT | Performed by: NURSE PRACTITIONER

## 2022-04-12 RX ORDER — METFORMIN HYDROCHLORIDE 500 MG/1
TABLET, EXTENDED RELEASE ORAL
Qty: 90 TABLET | Refills: 1 | Status: SHIPPED | OUTPATIENT
Start: 2022-04-12 | End: 2022-08-23 | Stop reason: SDUPTHER

## 2022-04-12 ASSESSMENT — ENCOUNTER SYMPTOMS
BACK PAIN: 1
ABDOMINAL PAIN: 0
BLOOD IN STOOL: 0
CHEST TIGHTNESS: 0
EYE REDNESS: 0
DIARRHEA: 0
CONSTIPATION: 0
RHINORRHEA: 0
SORE THROAT: 0
SHORTNESS OF BREATH: 0
COUGH: 0
VOICE CHANGE: 0
VOMITING: 0
NAUSEA: 0
WHEEZING: 0
TROUBLE SWALLOWING: 0

## 2022-04-12 NOTE — Clinical Note
After reviewing pt chart and labs I would like to increase januvia to 100mg daily. Will you please contact pt and send in new script to the pharmacy.

## 2022-04-12 NOTE — PROGRESS NOTES
[address] [facility]  [address] [place] [address]  [id number]  [address] [id number]  Dept: [phone]  Dept Fax: [phone]  Loc: [phone]        Angeles Irvin is a 61 y.o. female who presents today for her medical conditions/ complaints as noted below. Angeles Irvin is c/o Diabetes (pt reports her blood sugar has not been good), Insomnia (pt said she is still not sleeping good), and Discuss Medications        Chief Complaint   Patient presents with    Diabetes     pt reports her blood sugar has not been good    Insomnia     pt said she is still not sleeping good    Discuss Medications       HPI:     DM 2:   4/12/2022: Pt notes compliance with januvia 50mg daily, metformin xr 500mg daily. HA1C slightly improved at 8.5. Pt denies any issues with hypoglycemia. Notes difficulty with compliance and the DM2 Diet due to cost of healthier food. Anxiety   4/12/2022: notes some improvement since increase of wellbutrin by Dr. Ash Setting at [facility]. Stopped Zyprexa due to weight gain but now having issues with insomnia. Discussed with pt that since she is under the [facility] that she needs to contact [facility] for appointment to discuss/ alter medication regimen. 1/11/2022: Reports that she didn't revieve any medication from [facility] for help with her anxiety/ depression/ mood swings. She states that she was started zyprexa 2.5mg daily which she doesn't feel like is working. Pt denies SI or HI.   12/07/2021 pt reports her  passed away on November 15th and she said it was the worst thing she has ever experienced. Pt states that she seen therapist yesterday. She will see Dr. Ash Setting the 29th of December. Pt notes that Santo Collins gave pt medication for her nerves. Patient reports that they have been compliant with taking medications as directed.      Past Medical History:   Diagnosis Date    Allergic rhinitis     Anxiety     Breast cancer ([facility] [address] 10/2010    Left  Bronchitis     Carpal tunnel syndrome     R and L    Chest pain     Chronic back pain     Chronic bronchitis (HCC)     Depression     Diabetes mellitus (HCC)     GERD (gastroesophageal reflux disease)     Headache(784.0)     Heart palpitations     Mononeuropathy     Osteoarthritis     Sleep apnea     Wears glasses        Past Surgical History:   Procedure Laterality Date    ANKLE FRACTURE SURGERY Right 2014    BACK SURGERY  1993    BREAST RECONSTRUCTION  2011    BREAST SURGERY  2011    emergency removal of Left breast expander    COLONOSCOPY N/A 2016    Dr Bakari Childers-HP x 1, serrated AP x 1, 12 month recall    COLONOSCOPY N/A 2021    Dr Shruthi Abdalla yr recall    EGD COLONOSCOPY N/A 2016    w/empiric dilation to 50 Faroese (16mm)-Dr RONY Childers, Nafisa (-)    HYSTERECTOMY      MASTECTOMY, BILATERAL Bilateral 2011    OTHER SURGICAL HISTORY      R leg & ankle fracture due to fall repair-[facility]    UPPER GASTROINTESTINAL ENDOSCOPY  2016    w/empiric dilation to 48 Faroese (16mm)-Dr RONY Childers, Nafisa (-)    UPPER GASTROINTESTINAL ENDOSCOPY N/A 2021    Dr Bakari Childers-w/brushings-Candidal esophagitis-Treated with Diflucan       Social History     Socioeconomic History    Marital status:      Spouse name: None    Number of children: None    Years of education: None    Highest education level: None   Occupational History    None   Tobacco Use    Smoking status: Former Smoker     Packs/day: 0.25     Years: 20.00     Pack years: 5.00     Types: Cigars     Start date: 2/3/2016     Quit date: 2016     Years since quittin.3    Smokeless tobacco: Never Used    Tobacco comment: Smokes Enzo Codding a day.    Vaping Use    Vaping Use: Never used   Substance and Sexual Activity    Alcohol use: No     Alcohol/week: 0.0 standard drinks    Drug use: No    Sexual activity: None   Other Topics Concern    None   Social History Narrative    None     Social Determinants of Health     Financial Resource Strain:     Difficulty of Paying Living Expenses: Not on file   Food Insecurity:     Worried About Running Out of Food in the Last Year: Not on file    Walter of Food in the Last Year: Not on file   Transportation Needs:     Lack of Transportation (Medical): Not on file    Lack of Transportation (Non-Medical):  Not on file   Physical Activity:     Days of Exercise per Week: Not on file    Minutes of Exercise per Session: Not on file   Stress:     Feeling of Stress : Not on file   Social Connections:     Frequency of Communication with Friends and Family: Not on file    Frequency of Social Gatherings with Friends and Family: Not on file    Attends [employer] Services: Not on file    Active Member of Clubs or Organizations: Not on file    Attends Club or Organization Meetings: Not on file    Marital Status: Not on file   Intimate Partner Violence:     Fear of Current or Ex-Partner: Not on file    Emotionally Abused: Not on file    Physically Abused: Not on file    Sexually Abused: Not on file   Housing Stability:     Unable to Pay for Housing in the Last Year: Not on file    Number of Places Lived in the Last Year: Not on file    Unstable Housing in the Last Year: Not on file       Family History   Problem Relation Age of Onset    Diabetes Mother     Cancer Mother         breast    Heart Disease Mother     High Blood Pressure Mother     Breast Cancer Mother     Colon Cancer Neg Hx     Colon Polyps Neg Hx     Esophageal Cancer Neg Hx     Liver Cancer Neg Hx     Liver Disease Neg Hx     Rectal Cancer Neg Hx     Stomach Cancer Neg Hx        Current Outpatient Medications   Medication Sig Dispense Refill    metFORMIN (GLUCOPHAGE-XR) 500 MG extended release tablet TAKE 1 TABLET BY MOUTH DAILY WITH LARGEST MEAL OF THE DAY 90 tablet 1    gabapentin (NEURONTIN) 300 MG capsule TAKE 2 CAPSULES BY MOUTH IN THE MORNING & 1 CAP AT NOON, THEN 2 CAPS AT BEDTIME 150 capsule 0    diclofenac sodium (VOLTAREN) 1 % GEL APPLY 4 GRAMS TOPICALLY 4 TIMES A DAY TO AFFECTED AREAS 400 g 1    famotidine (PEPCID) 20 MG tablet TAKE 1 TABLET BY MOUTH NIGHTLY 90 tablet 3    omeprazole (PRILOSEC) 20 MG delayed release capsule TAKE 1 CAPSULE BY MOUTH EVERY DAY 90 capsule 1    SITagliptin (JANUVIA) 50 MG tablet TAKE 1 TABLET BY MOUTH EVERY DAY 90 tablet 1    WIXELA INHUB 100-50 MCG/DOSE diskus inhaler TAKE 1 PUFF BY MOUTH TWICE A  each 2    blood glucose test strips (TRUE METRIX BLOOD GLUCOSE TEST) strip TEST 2 TIMES A DAY & AS NEEDED FOR SYMPTOMS OF IRREGULAR BLOOD GLUCOSE. 50 strip 3    Lancets MISC 1 each by Does not apply route 2 times daily 100 each 5    Blood Glucose Monitoring Suppl (BLOOD GLUCOSE MONITOR SYSTEM) w/Device KIT 1 Device by Does not apply route 2 times daily 1 kit 0    buPROPion (WELLBUTRIN SR) 100 MG extended release tablet Take 1 tablet by mouth daily 30 tablet 0    lisinopril (PRINIVIL;ZESTRIL) 20 MG tablet TAKE 1 TABLET BY MOUTH TWICE A DAY (Patient taking differently: TAKE 1 TABLET BY MOUTH TWICE A DAY    Patient doesn't take it daily because it messes her blood pressure up.) 180 tablet 3    meloxicam (MOBIC) 15 MG tablet Take 7.5 mg by mouth daily       guaiFENesin (MUCINEX PO) Take by mouth nightly      ipratropium-albuterol (DUONEB) 0.5-2.5 (3) MG/3ML SOLN nebulizer solution INHALE ONE AMPULE INTO THE LUNGS EVERY 6 HOURS AS NEEDED FOR SHORTNESS OF BREATH 360 mL 1    fluticasone (FLONASE) 50 MCG/ACT nasal spray One spray each nostril twice daily. 1 Bottle 2    Respiratory Therapy Supplies LUPE cpap supplies x 1 year 1 Device 0    HYDROcodone-acetaminophen (NORCO) 7.5-325 MG per tablet Take 1 tablet by mouth 2 times daily as needed for Pain (Dr. Kurt Jefferson).        busPIRone (BUSPAR) 10 MG tablet Take 10 mg by mouth 3 times daily as needed      cyclobenzaprine (FLEXERIL) 10 MG tablet Take 1 tablet by mouth 2 times daily as needed for Muscle spasms 180 tablet 3    propranolol (INDERAL) 10 MG tablet TAKE 1 TABLET BY MOUTH THREE TIMES A DAY 90 tablet 1     No current facility-administered medications for this visit. Allergies   Allergen Reactions    Tape V[place] Tape]      Surgical tape       Lab Review not applicable  notapplicable    Subjective:   Review of Systems   Constitutional: Negative for activity change, appetite change, fatigue, fever and unexpected weight change. HENT: Negative for congestion, ear pain, nosebleeds, rhinorrhea, sore throat, trouble swallowing and voice change. Eyes: Negative for redness and visual disturbance. Respiratory: Negative for cough, chest tightness, shortness of breath and wheezing. Cardiovascular: Negative for chest pain, palpitations and leg swelling. Gastrointestinal: Negative for abdominal pain, blood in stool, constipation, diarrhea, nausea and vomiting. Endocrine: Negative for polydipsia, polyphagia and polyuria. Genitourinary: Negative for dysuria, frequency and urgency. Musculoskeletal: Positive for arthralgias (knee pain) and back pain. Negative for myalgias. Skin: Negative for rash and wound. Neurological: Negative for dizziness, speech difficulty, light-headedness and headaches. Psychiatric/Behavioral: Negative for agitation, confusion, self-injury and suicidal ideas. The patient is nervous/anxious. Objective:     Physical Exam  Vitals reviewed. Constitutional:       Appearance: She is obese. HENT:      Head: Normocephalic. Right Ear: Tympanic membrane normal.      Left Ear: Tympanic membrane normal.      Nose: Nose normal.      Mouth/Throat:      Mouth: Mucous membranes are moist.      Pharynx: Oropharynx is clear. Eyes:      Extraocular Movements: Extraocular movements intact. Pupils: Pupils are equal, round, and reactive to light. Cardiovascular:      Rate and Rhythm: Normal rate and regular rhythm. Pulses: Normal pulses. Heart sounds: Normal heart sounds. Pulmonary:      Effort: Pulmonary effort is normal.      Breath sounds: Normal breath sounds. Abdominal:      General: Bowel sounds are normal.      Palpations: Abdomen is soft. Musculoskeletal:         General: Normal range of motion. Cervical back: Normal range of motion. Skin:     General: Skin is warm and dry. Neurological:      Mental Status: She is alert and oriented to person, place, and time. Psychiatric:         Mood and Affect: Mood normal.         Behavior: Behavior normal.       BP (!) 138/90   Pulse 115   Temp 98.3 °F (36.8 °C) (Temporal)   Ht 5' 7\" (1.702 m)   Wt 241 lb (109.3 kg)   SpO2 96%   BMI 37.75 kg/m²     Assessment:      Diagnosis Orders   1. Type 2 diabetes mellitus with hyperosmolarity without coma, without long-term current use of insulin (HCC)  POCT glycosylated hemoglobin (Hb A1C)    metFORMIN (GLUCOPHAGE-XR) 500 MG extended release tablet    Diabetic Foot Exam    CBC    Comprehensive Metabolic Panel    Hemoglobin A1C    Lipid Panel    TSH    Microalbumin / Creatinine Urine Ratio    Urinalysis   2. Hypertension, essential, benign     3. Major depressive disorder, recurrent episode, moderate (HCC)     4. Insomnia, unspecified type     5. Encounter for screening for HIV  HIV Screen   6. Need for hepatitis C screening test  Hepatitis C Antibody     Results for orders placed or performed in visit on 04/12/22   POCT glycosylated hemoglobin (Hb A1C)   Result Value Ref Range    Hemoglobin A1C 8.5 %           Plan:     1. Type 2 diabetes mellitus with hyperosmolarity without coma, without long-term current use of insulin ([address]    2. Hypertension, essential, benign    3. Major depressive disorder, recurrent episode, moderate (HCC)    4. Insomnia, unspecified type    5. Encounter for screening for HIV    6.  Need for hepatitis C screening test      Over 50% of the total visit time of 30 minutes was spent on counseling and or coordination of care of DM2, mood swings, osteoarthritis, anxiety, bereavement, insomnia, HTN. Return in about 3 months (around 7/12/2022). Orders Placed This Encounter   Procedures    Hepatitis C Antibody     Standing Status:   Future     Standing Expiration Date:   4/12/2023    HIV Screen     Standing Status:   Future     Standing Expiration Date:   4/12/2023    CBC     Standing Status:   Future     Standing Expiration Date:   4/12/2023    Comprehensive Metabolic Panel     Standing Status:   Future     Standing Expiration Date:   4/12/2023    Hemoglobin A1C     Standing Status:   Future     Standing Expiration Date:   4/12/2023    Lipid Panel     Standing Status:   Future     Standing Expiration Date:   4/12/2023     Order Specific Question:   Is Patient Fasting?/# of Hours     Answer:   yes, 8 hours    TSH     Standing Status:   Future     Standing Expiration Date:   4/12/2023    Microalbumin / Creatinine Urine Ratio     Standing Status:   Future     Standing Expiration Date:   4/12/2023    Urinalysis     Standing Status:   Future     Standing Expiration Date:   4/12/2023    POCT glycosylated hemoglobin (Hb A1C)    Diabetic Foot Exam     Orders Placed This Encounter   Medications    metFORMIN (GLUCOPHAGE-XR) 500 MG extended release tablet     Sig: TAKE 1 TABLET BY MOUTH DAILY WITH LARGEST MEAL OF THE DAY     Dispense:  90 tablet     Refill:  1       Patient Instructions   Fasting labs on the 4th floor.       /family given educational materials - see patient instructions. Discussed use, benefit, and side effects of prescribed medications. All patient/family questions answered and voiced understanding. Instructed to continue current medications, diet and exercise. Pt/family agreed with treatment plan. Follow up as directed and sooner if needed. Patient/ family instructed that is symptoms worsen or persist they are to contact office or report to nearest ER.   They voice understanding and agreement with this plan.   signed by RADHA Mathew on 4/17/2022 at 10:38 AM CST    This dictation was generated by voice recognition computer software. Although all attempts are made to edit the dictation for accuracy, there may be errors in the transcription that are not intended.

## 2022-04-14 DIAGNOSIS — R00.0 TACHYCARDIA: ICD-10-CM

## 2022-04-14 RX ORDER — PROPRANOLOL HYDROCHLORIDE 10 MG/1
TABLET ORAL
Qty: 90 TABLET | Refills: 1 | Status: SHIPPED | OUTPATIENT
Start: 2022-04-14 | End: 2022-05-08

## 2022-04-15 RX ORDER — ARIPIPRAZOLE 2 MG/1
TABLET ORAL
Qty: 30 TABLET | Refills: 1 | OUTPATIENT
Start: 2022-04-15

## 2022-04-17 PROBLEM — Z11.4 ENCOUNTER FOR SCREENING FOR HIV: Status: ACTIVE | Noted: 2017-01-25

## 2022-04-17 PROBLEM — Z11.59 NEED FOR HEPATITIS C SCREENING TEST: Status: ACTIVE | Noted: 2022-04-17

## 2022-04-18 DIAGNOSIS — E11.00 TYPE 2 DIABETES MELLITUS WITH HYPEROSMOLARITY WITHOUT COMA, WITHOUT LONG-TERM CURRENT USE OF INSULIN (HCC): Primary | ICD-10-CM

## 2022-04-18 NOTE — PROGRESS NOTES
I called patient and told her I have sent new script to pharmacy. She thanked me and voiced understanding.

## 2022-04-28 DIAGNOSIS — T45.1X5A CHEMOTHERAPY-INDUCED NEUROPATHY (HCC): ICD-10-CM

## 2022-04-28 DIAGNOSIS — G62.0 CHEMOTHERAPY-INDUCED NEUROPATHY (HCC): ICD-10-CM

## 2022-04-28 NOTE — TELEPHONE ENCOUNTER
Maybrook Glisson called to request a refill on her medication. Last office visit : 4/12/2022   Next office visit : 7/12/2022     Last UDS:   Amphetamine Screen, Urine   Date Value Ref Range Status   04/02/2021 -  Final     Barbiturate Screen, Urine   Date Value Ref Range Status   04/02/2021 -  Final     Benzodiazepine Screen, Urine   Date Value Ref Range Status   04/02/2021 -  Final     Buprenorphine Urine   Date Value Ref Range Status   04/02/2021 -  Final     Cocaine Metabolite Screen, Urine   Date Value Ref Range Status   04/02/2021 -  Final     Gabapentin Screen, Urine   Date Value Ref Range Status   04/02/2021 -  Final     MDMA, Urine   Date Value Ref Range Status   04/02/2021 -  Final     Methamphetamine, Urine   Date Value Ref Range Status   04/02/2021 -  Final     Opiate Scrn, Ur   Date Value Ref Range Status   04/02/2021 -  Final     Oxycodone Screen, Ur   Date Value Ref Range Status   04/02/2021 -  Final     PCP Screen, Urine   Date Value Ref Range Status   04/02/2021 -  Final     Propoxyphene Screen, Urine   Date Value Ref Range Status   04/02/2021 -  Final     THC Screen, Urine   Date Value Ref Range Status   04/02/2021 -  Final     Tricyclic Antidepressants, Urine   Date Value Ref Range Status   04/02/2021 -  Final       Last Mari Greaser: 4/1/22  Medication Contract: 4/12/22   Last Fill: 4/1/22    Requested Prescriptions     Pending Prescriptions Disp Refills    gabapentin (NEURONTIN) 300 MG capsule 150 capsule 0     Sig: TAKE 2 CAPSULES BY MOUTH IN THE MORNING & 1 CAP AT NOON, THEN 2 CAPS AT BEDTIME         Please approve or refuse this medication.    Shaun Nguyen

## 2022-04-30 RX ORDER — GABAPENTIN 300 MG/1
CAPSULE ORAL
Qty: 150 CAPSULE | Refills: 0 | Status: SHIPPED | OUTPATIENT
Start: 2022-04-30 | End: 2022-05-27 | Stop reason: SDUPTHER

## 2022-05-02 DIAGNOSIS — I10 HYPERTENSION, ESSENTIAL, BENIGN: ICD-10-CM

## 2022-05-02 DIAGNOSIS — F31.9 BIPOLAR AFFECTIVE DISORDER, REMISSION STATUS UNSPECIFIED (HCC): ICD-10-CM

## 2022-05-02 RX ORDER — ARIPIPRAZOLE 2 MG/1
TABLET ORAL
Qty: 30 TABLET | Refills: 1 | OUTPATIENT
Start: 2022-05-02

## 2022-05-03 RX ORDER — LISINOPRIL 20 MG/1
TABLET ORAL
Qty: 180 TABLET | Refills: 3 | Status: SHIPPED | OUTPATIENT
Start: 2022-05-03

## 2022-05-07 DIAGNOSIS — R00.0 TACHYCARDIA: ICD-10-CM

## 2022-05-08 RX ORDER — PROPRANOLOL HYDROCHLORIDE 10 MG/1
TABLET ORAL
Qty: 90 TABLET | Refills: 0 | Status: SHIPPED | OUTPATIENT
Start: 2022-05-08 | End: 2022-05-31

## 2022-05-12 NOTE — PROGRESS NOTES
Progress Note      Pt Name: Emelie Hodgkins  YOB: 1965  MRN: [id number]    Date of evaluation: 5/13/2022  History Obtained From:  patient, electronic medical record    CHIEF COMPLAINT:    Chief Complaint   Patient presents with    Follow-up     Personal history of breast cancer     Current active problems  Left breast carcinoma 2010   BRCA 2 positive   Iron deficiency anemia    HISTORY OF PRESENT ILLNESS:    Emelie Hodgkins is a 61 y.o.  female with a diagnosis of stage IIb left-sided breast carcinoma dating back to 2010.  She was treated with combination chemotherapy. She also completed the planned 10 years of aromatase inhibitor. She denies feeling any new nodules on her chest wall. I saw her on 3/11/2022 and she had a nodule on her right axilla that was draining and I placed her on Keflex for 10 days. She reported that it did get somewhat better but subsequently worsened again. She also has a chronically elevated CEA and CA 19-9. She also has BRCA2 mutation with prior MRCP negative. She has some chronic abdominal bloating without any significant change. She had prior endoscopy colonoscopy by Dr. Deirdre Moreno that was unrevealing for any ominous findings. She has not contracted COVID-19, she did take the initial 2 COVID-19 vaccinations. She continues to have issues with anxiety/bipolar disorder and continues to be followed by [address] [place] [address]. She is diabetic with A1c previously being 14 but improved to 8.8. She continues to get injections with pain management on occasion. She previously smoked but stopped in 2016 and has not resumed. HEMATOLOGY HISTORY: Iron deficient anemia  Evaluation at the time of her breast carcinoma presentation showed microcytic anemia. Iron studies were consistent with iron deficiency. She was still menstruating at the time. Stool for blood was requested x 3 was negative.  She was treated with iron replacement therapy with MCV normalizing. TUMOR HISTORY: Left infiltrating stage IIB (T2 N1 M0) (ER/ HI +, Her2 Juan M negative) breast cancer, 10/25/10   BRCA-II sequencing positive for [id number] mutation  Pal Wilburn found a lump in her left breast. The mammogram was positive for malignancy. She also had some left axillary lymph nodes. A fine needle aspirate of the left breast and left axillae reveals a high-grade infiltrating breast cancer that is ER-92% positive, HI-56% positive and Vca8tmq 1+ positive by IHC-FISH studies were negative. The left axillary lymph node biopsy by FNA was also positive for malignancy. An MRI of the left breast on 11/02/10 reveals a primary 2.6x1.3cm enhancing mass with approximately 8 other enhancing nodules adjacent to the primary tumor like satellite lesions. BRCA 1 sequencing did not reveal mutations, however BRCA 2 sequencing was positive for an [id number] mutation. Pal Wilburn understands the implications of her malignancy with a positive BRCA 2, and for this reason her desires are for bilateral mastectomies and hysterectomy. She was referred for pre-operative neoadjuvant chemotherapy. MRI of the brain was negative. CT scan of the abdomen and pelvis and bone scan were negative. CT scan of the chest revealed a 1.7 cm left axillary lymph node and scar like density in the right upper lobe. No obvious mass. 2D echocardiogram was adequate to proceed with chemotherapy. Dose dense FAC was give 11/29/10 through 02/21/11. Bilateral mastectomies were performed on 03/14/11 at [facility] per Dr. Sally Alva with sentinel lymph node biopsies. The left mastectomy revealed residual invasive mammary carcinoma, grade II measuring 0.9 cm in greatest dimension. Margins were clear. The 8 left axillary lymph nodes were submitted, all negative for any evidence of any other cancer. A right simple mastectomy with right sentinel lymph node biopsies on the same date of the surgery 03/14/11 only revealed carcinoma in situ in the breast, completely resected. Following reconstructive surgery on 04/05/11 and post op healing, adjuvant weekly Taxol for 12 weeks was initiated on 05/16/11 and dose #12 was completed on 08/08/11. Radiation therapy was delivered by Dr. Cherie Paz consisting of 4060 cGy plus 1000 rad boost between 09/06/11 and 10/26/11. Tamoxifen was initiated on 11/14/11 and will continue for 5 years. Conor Joseph was premenopausal at presentation but has not had any further menstrual cycles since initiation of chemotherapy and tamoxifen. SHARON w/ BSO has was performed on 12/14/12 with negative histopathology. Tamoxifen was discontinued and Femara was instituted on 12/4/2015. The latter will continue for a subsequent 5 years. TREATMENT SUMMARY:  1. FAC initiated 11/29/10 through 02/21/11- 6 doses in a dose dense fashion. 2. Bilateral mastectomies 03/14/11.   3. Bilateral breast reconstruction 04/05/11 with subsequent complications on the left with slow wound healing. 4. Weekly Taxol x 12 initiated 05/16/11 and completed 08/08/11 (last dose was Taxotere due to [employer] of Taxol). 5. Radiation therapy given by Dr. Katlin Monzon to the chest 09/06/11 through 10/26/11, 4860 cGy + 1,000 rad boost.   6. Tamoxifen initiated 11/14/11 and discontinued 12/4/2015   7. SHARON w/ BSO on 12/14/12, negative histopathology, due to her premenopausal status and the fact that she is BRCA-II mutation positive.    8. Femara 2.5 mg/day initiated 12/4/2015 and continued through 12/20/2020      Past Medical History:   Diagnosis Date    Allergic rhinitis     Anxiety     Breast cancer ([facility] [address] 10/2010    Left    Bronchitis     Carpal tunnel syndrome     R and L    Chest pain     Chronic back pain     Chronic bronchitis (HCC)     Depression     Diabetes mellitus (HCC)     GERD (gastroesophageal reflux disease)     Headache(784.0)     Heart palpitations     Mononeuropathy     Osteoarthritis     Sleep apnea     Wears glasses        Past Surgical History:   Procedure Laterality Date    ANKLE FRACTURE SURGERY Right 2014    BACK SURGERY  1993    BREAST RECONSTRUCTION  06/2011    BREAST SURGERY  06/2011    emergency removal of Left breast expander    COLONOSCOPY N/A 02/05/2016    Dr Gray Herb x 1, serrated AP x 1, 12 month recall    COLONOSCOPY N/A 01/06/2021    Dr Winter King yr recall    EGD COLONOSCOPY N/A 06/03/2016    w/empiric dilation to 48 Sinhala (16mm)-Dr RONY Saldaña, Nafisa (-)    HYSTERECTOMY      MASTECTOMY, BILATERAL Bilateral 03/2011    OTHER SURGICAL HISTORY      R leg & ankle fracture due to fall repair-[facility] Tino Caba [facility]    UPPER GASTROINTESTINAL ENDOSCOPY  06/03/2016    w/empiric dilation to 48 Sinhala (16mm)-Dr RONY Saldaña, Nafisa (-)    UPPER GASTROINTESTINAL ENDOSCOPY N/A 01/06/2021    Dr Nirav hCilders-w/brushings-Candidal esophagitis-Treated with Diflucan       Current Outpatient Medications:     propranolol (INDERAL) 10 MG tablet, TAKE 1 TABLET BY MOUTH THREE TIMES A DAY, Disp: 90 tablet, Rfl: 0    lisinopril (PRINIVIL;ZESTRIL) 20 MG tablet, TAKE 1 TABLET BY MOUTH TWICE A DAY, Disp: 180 tablet, Rfl: 3    gabapentin (NEURONTIN) 300 MG capsule, TAKE 2 CAPSULES BY MOUTH IN THE MORNING & 1 CAP AT NOON, THEN 2 CAPS AT BEDTIME, Disp: 150 capsule, Rfl: 0    SITagliptin (JANUVIA) 100 MG tablet, Take 1 tablet by mouth daily, Disp: 90 tablet, Rfl: 1    metFORMIN (GLUCOPHAGE-XR) 500 MG extended release tablet, TAKE 1 TABLET BY MOUTH DAILY WITH LARGEST MEAL OF THE DAY, Disp: 90 tablet, Rfl: 1    diclofenac sodium (VOLTAREN) 1 % GEL, APPLY 4 GRAMS TOPICALLY 4 TIMES A DAY TO AFFECTED AREAS, Disp: 400 g, Rfl: 1    famotidine (PEPCID) 20 MG tablet, TAKE 1 TABLET BY MOUTH NIGHTLY, Disp: 90 tablet, Rfl: 3    omeprazole (PRILOSEC) 20 MG delayed release capsule, TAKE 1 CAPSULE BY MOUTH EVERY DAY, Disp: 90 capsule, Rfl: 1    SITagliptin (JANUVIA) 50 MG tablet, TAKE 1 TABLET BY MOUTH EVERY DAY, Disp: 90 tablet, Rfl: 1    WIXELA INHUB 100-50 MCG/DOSE diskus inhaler, TAKE 1 PUFF BY MOUTH TWICE A DAY, Disp: 180 each, Rfl: 2    blood glucose test strips (TRUE METRIX BLOOD GLUCOSE TEST) strip, TEST 2 TIMES A DAY & AS NEEDED FOR SYMPTOMS OF IRREGULAR BLOOD GLUCOSE., Disp: 50 strip, Rfl: 3    Lancets MISC, 1 each by Does not apply route 2 times daily, Disp: 100 each, Rfl: 5    buPROPion (WELLBUTRIN SR) 100 MG extended release tablet, Take 1 tablet by mouth daily, Disp: 30 tablet, Rfl: 0    meloxicam (MOBIC) 15 MG tablet, Take 7.5 mg by mouth daily , Disp: , Rfl:     guaiFENesin (MUCINEX PO), Take by mouth nightly, Disp: , Rfl:     ipratropium-albuterol (DUONEB) 0.5-2.5 (3) MG/3ML SOLN nebulizer solution, INHALE ONE AMPULE INTO THE LUNGS EVERY 6 HOURS AS NEEDED FOR SHORTNESS OF BREATH, Disp: 360 mL, Rfl: 1    fluticasone (FLONASE) 50 MCG/ACT nasal spray, One spray each nostril twice daily. , Disp: 1 Bottle, Rfl: 2    Respiratory Therapy Supplies LUPE, cpap supplies x 1 year, Disp: 1 Device, Rfl: 0    HYDROcodone-acetaminophen (NORCO) 7.5-325 MG per tablet, Take 1 tablet by mouth 2 times daily as needed for Pain (Dr. Joseph Barajas). , Disp: , Rfl:     busPIRone (BUSPAR) 10 MG tablet, Take 10 mg by mouth 3 times daily as needed, Disp: , Rfl:     cyclobenzaprine (FLEXERIL) 10 MG tablet, Take 1 tablet by mouth 2 times daily as needed for Muscle spasms, Disp: 180 tablet, Rfl: 3    Blood Glucose Monitoring Suppl (BLOOD GLUCOSE MONITOR SYSTEM) w/Device KIT, 1 Device by Does not apply route 2 times daily, Disp: 1 kit, Rfl: 0       Allergies   Allergen Reactions    Tape [NKVGYRHC Tape]      Surgical tape       Social History     Tobacco Use    Smoking status: Former Smoker     Packs/day: 0.25     Years: 20.00     Pack years: 5.00     Types: Cigars     Start date: 2/3/2016     Quit date: 2016     Years since quittin.3    Smokeless tobacco: Never Used    Tobacco comment: Smokes Scott Ricei a day. Vaping Use    Vaping Use: Never used   Substance Use Topics    Alcohol use:  No Alcohol/week: 0.0 standard drinks    Drug use: No       Family History   Problem Relation Age of Onset    Diabetes Mother     Cancer Mother         breast    Heart Disease Mother     High Blood Pressure Mother     Breast Cancer Mother     Colon Cancer Neg Hx     Colon Polyps Neg Hx     Esophageal Cancer Neg Hx     Liver Cancer Neg Hx     Liver Disease Neg Hx     Rectal Cancer Neg Hx     Stomach Cancer Neg Hx          Subjective   REVIEW OF SYSTEMS:   Review of Systems   Constitutional: Positive for fatigue (Mild to moderate). Negative for chills, diaphoresis, fever and unexpected weight change. HENT: Negative for mouth sores, nosebleeds, sore throat, trouble swallowing and voice change. Eyes: Negative for photophobia, discharge and itching. Respiratory: Negative for cough, shortness of breath and wheezing. Cardiovascular: Negative for chest pain, palpitations and leg swelling. Gastrointestinal: Negative for abdominal distention, abdominal pain, blood in stool, constipation, diarrhea, nausea and vomiting. Endocrine: Negative for cold intolerance, heat intolerance, polydipsia and polyuria. Genitourinary: Negative for difficulty urinating, dysuria, hematuria and urgency. Musculoskeletal: Positive for arthralgias and back pain. Negative for joint swelling and myalgias. Skin: Positive for wound (Right axillary region with purulent drainage persistent). Negative for color change. Neurological: Negative for dizziness, tremors, seizures, syncope and light-headedness. Hematological: Negative for adenopathy. Does not bruise/bleed easily. Psychiatric/Behavioral: Negative for behavioral problems and suicidal ideas. The patient is nervous/anxious. All other systems reviewed and are negative.       Objective   /86   Pulse 102   Ht 5' 7\" (1.702 m)   Wt 245 lb 6.4 oz (111.3 kg)   SpO2 94%   BMI 38.44 kg/m²     PHYSICAL EXAM:  Physical Exam  Constitutional:       General: She is not in acute distress. HENT:      Head: Normocephalic and atraumatic. Eyes:      General: No scleral icterus. Conjunctiva/sclera: Conjunctivae normal.   Neck:      Trachea: No tracheal deviation. Cardiovascular:      Rate and Rhythm: Normal rate and regular rhythm. Heart sounds: Normal heart sounds. No murmur heard. Pulmonary:      Effort: Pulmonary effort is normal. No respiratory distress. Breath sounds: Normal breath sounds. Chest:      Chest wall: No mass. Breasts:      Right: Absent. Left: Absent. Comments: 1 cm tender region right axilla with purulent drainage expressible  Abdominal:      General: Bowel sounds are normal. There is no distension. Palpations: Abdomen is soft. There is no mass. Tenderness: There is no abdominal tenderness. Musculoskeletal:         General: No tenderness or deformity. Cervical back: Normal range of motion and neck supple. Skin:     General: Skin is warm and dry. Findings: No rash. Neurological:      Mental Status: She is alert and oriented to person, place, and time. Coordination: Coordination normal.   Psychiatric:         Behavior: Behavior normal.       CBC reviewed by me  Lab Results   Component Value Date    WBC 9.02 05/13/2022    HGB 13.6 05/13/2022    HCT 43.8 05/13/2022    MCV 87.8 05/13/2022     05/13/2022     Lab Results   Component Value Date    NEUTROABS 5.70 05/13/2022       ASSESSMENT/PLAN:     Diagnosis Orders   1. Folliculitis of right axilla  Culture, Aerobic and Anaerobic        1. Breast carcinoma 2010          2. Folliculitis or infected sebaceous cyst right axilla    No resolution with prior Keflex. We are going to get a culture of the purulent drainage from the right axilla. Once I know the ID and DEIDRA I will place her on the appropriate antibiotic. 3.  Chronically elevated CEA. She has a positive BRCA 2 mutation.      MRI abdomen with and without contrast MRCP 9/23/2019 at [facility] revealed a limited study due to respiratory motion and geographic fatty infiltration of the liver. No masses or abnormal contrast enhancement identified. MRCP images did not reveal any evidence of intrahepatic or extrahepatic biliary ductal dilatation. No pancreatic ductal dilatation is identified. No obvious pancreatic lesion detected. Small cyst within the right kidney. Scattered calcified granulomas in the liver. · Screening colonoscopy per Dr. Isac Marinelli 1/6/2021 was negative with 5-year follow-up recommended. EGD per Dr. Isac Marinelli 1/6/2021 revealed white patches throughout the esophagus with biopsies consistent with Candida esophagitis. Mild mucosal changes suggestive of gastritis with biopsies revealing inflammation, H. pylori negative by IHC        CEA stable. CA 19-9 normalized    4. History iron deficiency anemia. Hgb 13.6 with MCV 87.8      · Bone health. Bone density 12/23/2019 was normal.      · Colon cancer screening. Screening colonoscopy per Dr. Isac Marinelli 1/6/2021 was negative with 5-year follow-up recommended. EGD per Dr. Isac Marinelli 1/6/2021 revealed white patches throughout the esophagus with biopsies consistent with Candida esophagitis. Mild mucosal changes suggestive of gastritis with biopsies revealing inflammation, H. pylori negative by IHC      Immunization History   Administered Date(s) Administered    COVID-19, Moderna, Primary or Immunocompromised, PF, 100mcg/0.5mL 10/04/2021, 11/01/2021         Orders Placed This Encounter   Procedures    Culture, Aerobic and Anaerobic       Return in about 6 weeks (around 6/24/2022) for With Stephanie Randhawa. rescheduled.   Christiano Huang PA-C  12:31 PM  5/13/2022

## 2022-05-13 ENCOUNTER — OFFICE VISIT (OUTPATIENT)
Dept: HEMATOLOGY | Age: 57
End: 2022-05-13
Payer: MEDICARE

## 2022-05-13 ENCOUNTER — HOSPITAL ENCOUNTER (OUTPATIENT)
Dept: INFUSION THERAPY | Age: 57
Discharge: HOME | End: 2022-05-13
Payer: MEDICARE

## 2022-05-13 VITALS
WEIGHT: 245.4 LBS | DIASTOLIC BLOOD PRESSURE: 86 MMHG | OXYGEN SATURATION: 94 % | BODY MASS INDEX: 38.52 KG/M2 | SYSTOLIC BLOOD PRESSURE: 110 MMHG | HEIGHT: 67 IN | HEART RATE: 102 BPM

## 2022-05-13 DIAGNOSIS — Z85.3 PERSONAL HISTORY OF BREAST CANCER: ICD-10-CM

## 2022-05-13 DIAGNOSIS — L73.9 FOLLICULITIS OF RIGHT AXILLA: Primary | ICD-10-CM

## 2022-05-13 DIAGNOSIS — L73.9 FOLLICULITIS OF RIGHT AXILLA: ICD-10-CM

## 2022-05-13 LAB
BASOPHILS ABSOLUTE: 0.04 K/UL (ref 0.01–0.08)
BASOPHILS RELATIVE PERCENT: 0.4 % (ref 0.1–1.2)
EOSINOPHILS ABSOLUTE: 0.2 K/UL (ref 0.04–0.54)
EOSINOPHILS RELATIVE PERCENT: 2.2 % (ref 0.7–7)
HCT VFR BLD CALC: 43.8 % (ref 34.1–44.9)
HEMOGLOBIN: 13.6 G/DL (ref 11.2–15.7)
LYMPHOCYTES ABSOLUTE: 2.34 K/UL (ref 1.18–3.74)
LYMPHOCYTES RELATIVE PERCENT: 25.9 % (ref 19.3–53.1)
MCH RBC QN AUTO: 27.3 PG (ref 25.6–32.2)
MCHC RBC AUTO-ENTMCNC: 31.1 G/DL (ref 32.3–35.5)
MCV RBC AUTO: 87.8 FL (ref 79.4–94.8)
MONOCYTES ABSOLUTE: 0.63 K/UL (ref 0.24–0.82)
MONOCYTES RELATIVE PERCENT: 7 % (ref 4.7–12.5)
NEUTROPHILS ABSOLUTE: 5.7 K/UL (ref 1.56–6.13)
NEUTROPHILS RELATIVE PERCENT: 63.3 % (ref 34–71.1)
PDW BLD-RTO: 14.1 % (ref 11.7–14.4)
PLATELET # BLD: 323 K/UL (ref 182–369)
PMV BLD AUTO: 9.6 FL (ref 7.4–10.4)
RBC # BLD: 4.99 M/UL (ref 3.93–5.22)
WBC # BLD: 9.02 K/UL (ref 3.98–10.04)

## 2022-05-13 PROCEDURE — 99214 OFFICE O/P EST MOD 30 MIN: CPT | Performed by: PHYSICIAN ASSISTANT

## 2022-05-13 PROCEDURE — 99212 OFFICE O/P EST SF 10 MIN: CPT

## 2022-05-13 PROCEDURE — 85025 COMPLETE CBC W/AUTO DIFF WBC: CPT

## 2022-05-13 ASSESSMENT — ENCOUNTER SYMPTOMS
NAUSEA: 0
PHOTOPHOBIA: 0
TROUBLE SWALLOWING: 0
CONSTIPATION: 0
DIARRHEA: 0
BLOOD IN STOOL: 0
VOICE CHANGE: 0
ABDOMINAL DISTENTION: 0
EYE ITCHING: 0
ABDOMINAL PAIN: 0
COUGH: 0
SORE THROAT: 0
COLOR CHANGE: 0
VOMITING: 0
BACK PAIN: 1
EYE DISCHARGE: 0
WHEEZING: 0
SHORTNESS OF BREATH: 0

## 2022-05-16 ENCOUNTER — TELEPHONE (OUTPATIENT)
Dept: HEMATOLOGY | Age: 57
End: 2022-05-16

## 2022-05-16 DIAGNOSIS — A49.1 STREPTOCOCCUS INFECTION: Primary | ICD-10-CM

## 2022-05-16 RX ORDER — AMOXICILLIN AND CLAVULANATE POTASSIUM 875; 125 MG/1; MG/1
1 TABLET, FILM COATED ORAL 2 TIMES DAILY
Qty: 28 TABLET | Refills: 0 | Status: SHIPPED | OUTPATIENT
Start: 2022-05-16 | End: 2022-05-30

## 2022-05-16 NOTE — TELEPHONE ENCOUNTER
Called to tell Ms Belkis Pham that Trevor Butts would be called in for her infection. Patient expressed understanding that an antibiotic was being called in. She would like it to go to [facility] across from [facility].

## 2022-05-17 PROBLEM — Z11.4 ENCOUNTER FOR SCREENING FOR HIV: Status: RESOLVED | Noted: 2017-01-25 | Resolved: 2022-05-17

## 2022-05-17 PROBLEM — Z11.59 NEED FOR HEPATITIS C SCREENING TEST: Status: RESOLVED | Noted: 2022-04-17 | Resolved: 2022-05-17

## 2022-05-17 LAB
ANAEROBIC CULTURE: ABNORMAL
GRAM STAIN RESULT: ABNORMAL
ORGANISM: ABNORMAL
ORGANISM: ABNORMAL
WOUND/ABSCESS: ABNORMAL

## 2022-05-26 DIAGNOSIS — T45.1X5A CHEMOTHERAPY-INDUCED NEUROPATHY (HCC): ICD-10-CM

## 2022-05-26 DIAGNOSIS — E11.00 TYPE 2 DIABETES MELLITUS WITH HYPEROSMOLARITY WITHOUT COMA, WITHOUT LONG-TERM CURRENT USE OF INSULIN (HCC): ICD-10-CM

## 2022-05-26 DIAGNOSIS — G62.0 CHEMOTHERAPY-INDUCED NEUROPATHY (HCC): ICD-10-CM

## 2022-05-26 RX ORDER — DULAGLUTIDE 0.75 MG/.5ML
0.75 INJECTION, SOLUTION SUBCUTANEOUS WEEKLY
Qty: 2 PEN | Refills: 2 | Status: SHIPPED | OUTPATIENT
Start: 2022-05-26 | End: 2022-08-19

## 2022-05-26 NOTE — TELEPHONE ENCOUNTER
Rajwinder Wallace called to request a refill on her medication. Last office visit : 4/12/2022   Next office visit : 7/12/2022     Last UDS:   Amphetamine Screen, Urine   Date Value Ref Range Status   04/02/2021 -  Final     Barbiturate Screen, Urine   Date Value Ref Range Status   04/02/2021 -  Final     Benzodiazepine Screen, Urine   Date Value Ref Range Status   04/02/2021 -  Final     Buprenorphine Urine   Date Value Ref Range Status   04/02/2021 -  Final     Cocaine Metabolite Screen, Urine   Date Value Ref Range Status   04/02/2021 -  Final     Gabapentin Screen, Urine   Date Value Ref Range Status   04/02/2021 -  Final     MDMA, Urine   Date Value Ref Range Status   04/02/2021 -  Final     Methamphetamine, Urine   Date Value Ref Range Status   04/02/2021 -  Final     Opiate Scrn, Ur   Date Value Ref Range Status   04/02/2021 -  Final     Oxycodone Screen, Ur   Date Value Ref Range Status   04/02/2021 -  Final     PCP Screen, Urine   Date Value Ref Range Status   04/02/2021 -  Final     Propoxyphene Screen, Urine   Date Value Ref Range Status   04/02/2021 -  Final     THC Screen, Urine   Date Value Ref Range Status   04/02/2021 -  Final     Tricyclic Antidepressants, Urine   Date Value Ref Range Status   04/02/2021 -  Final       Last Marcy Showers: 4/1/22  Medication Contract: 4/12/22   Last Fill: 4/30/22    Requested Prescriptions     Pending Prescriptions Disp Refills    gabapentin (NEURONTIN) 300 MG capsule 150 capsule 0     Sig: TAKE 2 CAPSULES BY MOUTH IN THE MORNING & 1 CAP AT NOON, THEN 2 CAPS AT BEDTIME         Please approve or refuse this medication.    Iban Short

## 2022-05-27 RX ORDER — GABAPENTIN 300 MG/1
CAPSULE ORAL
Qty: 150 CAPSULE | Refills: 0 | Status: SHIPPED | OUTPATIENT
Start: 2022-05-27 | End: 2022-07-05

## 2022-05-27 NOTE — TELEPHONE ENCOUNTER
MITCH was reviewed today per office protocol. Report shows No discrepancies. Fill pattern is consistent from single provider(s) at single pharmacy(s).     Presciption Escribed

## 2022-05-28 DIAGNOSIS — R00.0 TACHYCARDIA: ICD-10-CM

## 2022-05-28 DIAGNOSIS — E11.00 TYPE 2 DIABETES MELLITUS WITH HYPEROSMOLARITY WITHOUT COMA, WITHOUT LONG-TERM CURRENT USE OF INSULIN (HCC): ICD-10-CM

## 2022-05-28 DIAGNOSIS — M19.90 OSTEOARTHRITIS, UNSPECIFIED OSTEOARTHRITIS TYPE, UNSPECIFIED SITE: ICD-10-CM

## 2022-05-28 DIAGNOSIS — K21.9 GASTROESOPHAGEAL REFLUX DISEASE WITHOUT ESOPHAGITIS: ICD-10-CM

## 2022-05-31 RX ORDER — OMEPRAZOLE 20 MG/1
CAPSULE, DELAYED RELEASE ORAL
Qty: 90 CAPSULE | Refills: 1 | Status: SHIPPED | OUTPATIENT
Start: 2022-05-31 | End: 2022-11-04

## 2022-05-31 RX ORDER — PROPRANOLOL HYDROCHLORIDE 10 MG/1
TABLET ORAL
Qty: 90 TABLET | Refills: 0 | Status: SHIPPED | OUTPATIENT
Start: 2022-05-31 | End: 2022-06-23

## 2022-05-31 RX ORDER — ARIPIPRAZOLE 2 MG/1
TABLET ORAL
Qty: 30 TABLET | Refills: 1 | Status: SHIPPED | OUTPATIENT
Start: 2022-05-31 | End: 2022-07-22

## 2022-06-02 ENCOUNTER — TELEPHONE (OUTPATIENT)
Dept: HEMATOLOGY | Age: 57
End: 2022-06-02

## 2022-06-02 DIAGNOSIS — B99.9 INFECTION: Primary | ICD-10-CM

## 2022-06-02 RX ORDER — METRONIDAZOLE 500 MG/1
500 TABLET ORAL 3 TIMES DAILY
Qty: 42 TABLET | Refills: 0 | Status: SHIPPED | OUTPATIENT
Start: 2022-06-02 | End: 2022-06-16

## 2022-06-02 NOTE — TELEPHONE ENCOUNTER
----- Message from Gini Quick PA-C sent at 6/1/2022  4:47 PM CDT -----  Add Flagyl 500 mg p.o. 3 times daily for 2 weeks

## 2022-06-20 ENCOUNTER — HOSPITAL ENCOUNTER (EMERGENCY)
Age: 57
Discharge: HOME | End: 2022-06-20
Payer: MEDICARE

## 2022-06-20 ENCOUNTER — APPOINTMENT (OUTPATIENT)
Dept: CT IMAGING | Age: 57
End: 2022-06-20
Payer: MEDICARE

## 2022-06-20 VITALS
WEIGHT: 240 LBS | HEIGHT: 67 IN | HEART RATE: 98 BPM | RESPIRATION RATE: 15 BRPM | OXYGEN SATURATION: 98 % | SYSTOLIC BLOOD PRESSURE: 119 MMHG | BODY MASS INDEX: 37.67 KG/M2 | DIASTOLIC BLOOD PRESSURE: 79 MMHG | TEMPERATURE: 98 F

## 2022-06-20 DIAGNOSIS — G89.29 ACUTE EXACERBATION OF CHRONIC LOW BACK PAIN: Primary | ICD-10-CM

## 2022-06-20 DIAGNOSIS — M51.36 BULGING LUMBAR DISC: ICD-10-CM

## 2022-06-20 DIAGNOSIS — M54.50 ACUTE EXACERBATION OF CHRONIC LOW BACK PAIN: Primary | ICD-10-CM

## 2022-06-20 PROCEDURE — 96374 THER/PROPH/DIAG INJ IV PUSH: CPT

## 2022-06-20 PROCEDURE — 96375 TX/PRO/DX INJ NEW DRUG ADDON: CPT

## 2022-06-20 PROCEDURE — 6360000002 HC RX W HCPCS: Performed by: PHYSICIAN ASSISTANT

## 2022-06-20 PROCEDURE — 2580000003 HC RX 258: Performed by: PHYSICIAN ASSISTANT

## 2022-06-20 PROCEDURE — 72131 CT LUMBAR SPINE W/O DYE: CPT

## 2022-06-20 PROCEDURE — 99284 EMERGENCY DEPT VISIT MOD MDM: CPT

## 2022-06-20 PROCEDURE — 72131 CT LUMBAR SPINE W/O DYE: CPT | Performed by: RADIOLOGY

## 2022-06-20 RX ORDER — ORPHENADRINE CITRATE 30 MG/ML
60 INJECTION INTRAMUSCULAR; INTRAVENOUS ONCE
Status: COMPLETED | OUTPATIENT
Start: 2022-06-20 | End: 2022-06-20

## 2022-06-20 RX ORDER — CYCLOBENZAPRINE HCL 10 MG
10 TABLET ORAL 3 TIMES DAILY PRN
Qty: 21 TABLET | Refills: 0 | Status: SHIPPED | OUTPATIENT
Start: 2022-06-20 | End: 2022-06-30

## 2022-06-20 RX ORDER — ONDANSETRON 2 MG/ML
4 INJECTION INTRAMUSCULAR; INTRAVENOUS ONCE
Status: COMPLETED | OUTPATIENT
Start: 2022-06-20 | End: 2022-06-20

## 2022-06-20 RX ORDER — HYDROMORPHONE HYDROCHLORIDE 1 MG/ML
0.5 INJECTION, SOLUTION INTRAMUSCULAR; INTRAVENOUS; SUBCUTANEOUS ONCE
Status: COMPLETED | OUTPATIENT
Start: 2022-06-20 | End: 2022-06-20

## 2022-06-20 RX ORDER — PREDNISONE 10 MG/1
10 TABLET ORAL DAILY
Qty: 10 TABLET | Refills: 0 | Status: SHIPPED | OUTPATIENT
Start: 2022-06-20 | End: 2022-06-30

## 2022-06-20 RX ORDER — 0.9 % SODIUM CHLORIDE 0.9 %
1000 INTRAVENOUS SOLUTION INTRAVENOUS ONCE
Status: COMPLETED | OUTPATIENT
Start: 2022-06-20 | End: 2022-06-20

## 2022-06-20 RX ADMIN — SODIUM CHLORIDE 1000 ML: 9 INJECTION, SOLUTION INTRAVENOUS at 13:35

## 2022-06-20 RX ADMIN — ORPHENADRINE CITRATE 60 MG: 30 INJECTION INTRAMUSCULAR; INTRAVENOUS at 13:36

## 2022-06-20 RX ADMIN — ONDANSETRON HYDROCHLORIDE 4 MG: 2 SOLUTION INTRAMUSCULAR; INTRAVENOUS at 13:36

## 2022-06-20 RX ADMIN — HYDROMORPHONE HYDROCHLORIDE 0.5 MG: 1 INJECTION, SOLUTION INTRAMUSCULAR; INTRAVENOUS; SUBCUTANEOUS at 13:35

## 2022-06-20 ASSESSMENT — ENCOUNTER SYMPTOMS
EYE PAIN: 0
ABDOMINAL DISTENTION: 0
RHINORRHEA: 0
SHORTNESS OF BREATH: 0
COLOR CHANGE: 0
NAUSEA: 0
COUGH: 0
EYE DISCHARGE: 0
BACK PAIN: 1
ABDOMINAL PAIN: 0
APNEA: 0
PHOTOPHOBIA: 0
SORE THROAT: 0

## 2022-06-20 ASSESSMENT — PAIN SCALES - GENERAL: PAINLEVEL_OUTOF10: 6
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
## 2022-06-22 DIAGNOSIS — R00.0 TACHYCARDIA: ICD-10-CM

## 2022-06-23 RX ORDER — PROPRANOLOL HYDROCHLORIDE 10 MG/1
TABLET ORAL
Qty: 90 TABLET | Refills: 2 | Status: SHIPPED | OUTPATIENT
Start: 2022-06-23 | End: 2022-07-22

## 2022-06-23 NOTE — TELEPHONE ENCOUNTER
Yonis Terry called requesting a refill of the below medication which has been pended for you:     Requested Prescriptions     Pending Prescriptions Disp Refills    propranolol (INDERAL) 10 MG tablet [Pharmacy Med Name: PROPRANOLOL 10 MG TABLET] 90 tablet 2     Sig: TAKE 1 TABLET BY MOUTH THREE TIMES A DAY       Last Appointment Date: 4/12/2022  Next Appointment Date: 7/12/2022    Allergies   Allergen Reactions    Tape Екатерина Tomlinson Tape]      Surgical tape

## 2022-06-23 NOTE — PROGRESS NOTES
Progress Note      Pt Name: Elsa Sauer  YOB: 1965  MRN: [id number]    Date of evaluation: 6/24/2022  History Obtained From:  patient, electronic medical record    CHIEF COMPLAINT:    Chief Complaint   Patient presents with    Follow-up     Folliculitis of right axilla     Current active problems  Left breast carcinoma 2010   BRCA 2 positive   Iron deficiency anemia    HISTORY OF PRESENT ILLNESS:    Elsa Sauer is a 61 y.o.  female with a diagnosis of stage IIb left-sided breast carcinoma dating back to 2010.  She was treated with combination chemotherapy. She also completed the planned 10 years of aromatase inhibitor. She developed a knot under her right arm with drainage and was treated with Keflex initially. She had minimal improvement on that regimen. She did have drainage noted when she was seen in follow-up and a culture was obtained. She had 2 separate species growing and she was started on Augmentin and Flagyl. She completed 14 days of that regimen. She continues to have a small abscess like region in her right axilla that continues to drain despite 3 separate oral antibiotics. She denies any fever or chills. She continues to have issues with anxiety/bipolar disorder and continues to be followed by [address] [place] [address]. She is diabetic with A1c previously being 14 but improved to 8.5. She previously smoked but stopped in 2016 and has not resumed. HEMATOLOGY HISTORY: Iron deficient anemia  Evaluation at the time of her breast carcinoma presentation showed microcytic anemia. Iron studies were consistent with iron deficiency. She was still menstruating at the time. Stool for blood was requested x 3 was negative. She was treated with iron replacement therapy with MCV normalizing.     TUMOR HISTORY: Left infiltrating stage IIB (T2 N1 M0) (ER/ IA +, Her2 Juan M negative) breast cancer, 10/25/10   BRCA-II sequencing positive for [id number] mutation  Rosalino Medina found a lump in her left breast. The mammogram was positive for malignancy. She also had some left axillary lymph nodes. A fine needle aspirate of the left breast and left axillae reveals a high-grade infiltrating breast cancer that is ER-92% positive, VT-56% positive and Knk1pra 1+ positive by IHC-FISH studies were negative. The left axillary lymph node biopsy by FNA was also positive for malignancy. An MRI of the left breast on 11/02/10 reveals a primary 2.6x1.3cm enhancing mass with approximately 8 other enhancing nodules adjacent to the primary tumor like satellite lesions. BRCA 1 sequencing did not reveal mutations, however BRCA 2 sequencing was positive for an [id number] mutation. Marilou Weinberg understands the implications of her malignancy with a positive BRCA 2, and for this reason her desires are for bilateral mastectomies and hysterectomy. She was referred for pre-operative neoadjuvant chemotherapy. MRI of the brain was negative. CT scan of the abdomen and pelvis and bone scan were negative. CT scan of the chest revealed a 1.7 cm left axillary lymph node and scar like density in the right upper lobe. No obvious mass. 2D echocardiogram was adequate to proceed with chemotherapy. Dose dense FAC was give 11/29/10 through 02/21/11. Bilateral mastectomies were performed on 03/14/11 at [facility] per Dr. Memo Malhotra with sentinel lymph node biopsies. The left mastectomy revealed residual invasive mammary carcinoma, grade II measuring 0.9 cm in greatest dimension. Margins were clear. The 8 left axillary lymph nodes were submitted, all negative for any evidence of any other cancer. A right simple mastectomy with right sentinel lymph node biopsies on the same date of the surgery 03/14/11 only revealed carcinoma in situ in the breast, completely resected. Following reconstructive surgery on 04/05/11 and post op healing, adjuvant weekly Taxol for 12 weeks was initiated on 05/16/11 and dose #12 was completed on 08/08/11.  Radiation therapy was delivered by Dr. Tara Clifford consisting of 4060 cGy plus 1000 rad boost between 09/06/11 and 10/26/11. Tamoxifen was initiated on 11/14/11 and will continue for 5 years. Christophe Wilson was premenopausal at presentation but has not had any further menstrual cycles since initiation of chemotherapy and tamoxifen. SHARON w/ BSO has was performed on 12/14/12 with negative histopathology. Tamoxifen was discontinued and Femara was instituted on 12/4/2015. The latter will continue for a subsequent 5 years. TREATMENT SUMMARY:  1. FAC initiated 11/29/10 through 02/21/11- 6 doses in a dose dense fashion. 2. Bilateral mastectomies 03/14/11.   3. Bilateral breast reconstruction 04/05/11 with subsequent complications on the left with slow wound healing. 4. Weekly Taxol x 12 initiated 05/16/11 and completed 08/08/11 (last dose was Taxotere due to [employer] of Taxol). 5. Radiation therapy given by Dr. Nayeli Ocasio to the chest 09/06/11 through 10/26/11, 4860 cGy + 1,000 rad boost.   6. Tamoxifen initiated 11/14/11 and discontinued 12/4/2015   7. SHARON w/ BSO on 12/14/12, negative histopathology, due to her premenopausal status and the fact that she is BRCA-II mutation positive.    8. Femara 2.5 mg/day initiated 12/4/2015 and continued through 12/20/2020      Past Medical History:   Diagnosis Date    Allergic rhinitis     Anxiety     Breast cancer ([address] 10/2010    Left    Bronchitis     Carpal tunnel syndrome     R and L    Chest pain     Chronic back pain     Chronic bronchitis (HCC)     Depression     Diabetes mellitus ([address]     GERD (gastroesophageal reflux disease)     Headache(784.0)     Heart palpitations     Mononeuropathy     Osteoarthritis     Sleep apnea     Wears glasses        Past Surgical History:   Procedure Laterality Date    ANKLE FRACTURE SURGERY Right 2014    BACK SURGERY  1993    BREAST RECONSTRUCTION  06/2011    BREAST SURGERY  06/2011    emergency removal of Left breast expander    SITagliptin (JANUVIA) 100 MG tablet, Take 1 tablet by mouth daily, Disp: 90 tablet, Rfl: 1    metFORMIN (GLUCOPHAGE-XR) 500 MG extended release tablet, TAKE 1 TABLET BY MOUTH DAILY WITH LARGEST MEAL OF THE DAY, Disp: 90 tablet, Rfl: 1    famotidine (PEPCID) 20 MG tablet, TAKE 1 TABLET BY MOUTH NIGHTLY, Disp: 90 tablet, Rfl: 3    WIXELA INHUB 100-50 MCG/DOSE diskus inhaler, TAKE 1 PUFF BY MOUTH TWICE A DAY, Disp: 180 each, Rfl: 2    blood glucose test strips (TRUE METRIX BLOOD GLUCOSE TEST) strip, TEST 2 TIMES A DAY & AS NEEDED FOR SYMPTOMS OF IRREGULAR BLOOD GLUCOSE., Disp: 50 strip, Rfl: 3    Lancets MISC, 1 each by Does not apply route 2 times daily, Disp: 100 each, Rfl: 5    buPROPion (WELLBUTRIN SR) 100 MG extended release tablet, Take 1 tablet by mouth daily, Disp: 30 tablet, Rfl: 0    meloxicam (MOBIC) 15 MG tablet, Take 7.5 mg by mouth daily , Disp: , Rfl:     guaiFENesin (MUCINEX PO), Take by mouth nightly, Disp: , Rfl:     ipratropium-albuterol (DUONEB) 0.5-2.5 (3) MG/3ML SOLN nebulizer solution, INHALE ONE AMPULE INTO THE LUNGS EVERY 6 HOURS AS NEEDED FOR SHORTNESS OF BREATH, Disp: 360 mL, Rfl: 1    fluticasone (FLONASE) 50 MCG/ACT nasal spray, One spray each nostril twice daily. , Disp: 1 Bottle, Rfl: 2    Respiratory Therapy Supplies LUPE, cpap supplies x 1 year, Disp: 1 Device, Rfl: 0    HYDROcodone-acetaminophen (NORCO) 7.5-325 MG per tablet, Take 1 tablet by mouth 3 times daily as needed for Pain (Dr. Monik Langston).  , Disp: , Rfl:     busPIRone (BUSPAR) 10 MG tablet, Take 10 mg by mouth 3 times daily as needed, Disp: , Rfl:     Blood Glucose Monitoring Suppl (BLOOD GLUCOSE MONITOR SYSTEM) w/Device KIT, 1 Device by Does not apply route 2 times daily, Disp: 1 kit, Rfl: 0       Allergies   Allergen Reactions    Tape [PMECKROZ Tape]      Surgical tape       Social History     Tobacco Use    Smoking status: Former Smoker     Packs/day: 0.25     Years: 20.00     Pack years: 5.00     Types: Cigars Start date: 2/3/2016     Quit date: 2016     Years since quittin.5    Smokeless tobacco: Never Used    Tobacco comment: Smokes Lela Geovanni a day. Vaping Use    Vaping Use: Never used   Substance Use Topics    Alcohol use: No     Alcohol/week: 0.0 standard drinks    Drug use: No       Family History   Problem Relation Age of Onset    Diabetes Mother     Cancer Mother         breast    Heart Disease Mother     High Blood Pressure Mother     Breast Cancer Mother     Colon Cancer Neg Hx     Colon Polyps Neg Hx     Esophageal Cancer Neg Hx     Liver Cancer Neg Hx     Liver Disease Neg Hx     Rectal Cancer Neg Hx     Stomach Cancer Neg Hx          Subjective   REVIEW OF SYSTEMS:   Review of Systems   Constitutional: Positive for fatigue (Mild to moderate). Negative for chills, diaphoresis, fever and unexpected weight change. HENT: Negative for mouth sores, nosebleeds, sore throat, trouble swallowing and voice change. Eyes: Negative for photophobia, discharge and itching. Respiratory: Negative for cough, shortness of breath and wheezing. Cardiovascular: Negative for chest pain, palpitations and leg swelling. Gastrointestinal: Negative for abdominal distention, abdominal pain, blood in stool, constipation, diarrhea, nausea and vomiting. Endocrine: Negative for cold intolerance, heat intolerance, polydipsia and polyuria. Genitourinary: Negative for difficulty urinating, dysuria, hematuria and urgency. Musculoskeletal: Positive for arthralgias and back pain. Negative for joint swelling and myalgias. Skin: Positive for wound (Right axillary region with purulent drainage persistent). Negative for color change. Neurological: Negative for dizziness, tremors, seizures, syncope and light-headedness. Hematological: Negative for adenopathy. Does not bruise/bleed easily. Psychiatric/Behavioral: Negative for behavioral problems and suicidal ideas.  The patient is nervous/anxious. All other systems reviewed and are negative. Objective   /80   Pulse (!) 104   Ht 5' 7\" (1.702 m)   Wt 240 lb (108.9 kg)   SpO2 95%   BMI 37.59 kg/m²     PHYSICAL EXAM:  Physical Exam  Constitutional:       General: She is not in acute distress. HENT:      Head: Normocephalic and atraumatic. Eyes:      General: No scleral icterus. Conjunctiva/sclera: Conjunctivae normal.   Neck:      Trachea: No tracheal deviation. Cardiovascular:      Rate and Rhythm: Normal rate and regular rhythm. Heart sounds: Normal heart sounds. No murmur heard. Pulmonary:      Effort: Pulmonary effort is normal. No respiratory distress. Breath sounds: Normal breath sounds. Chest:      Chest wall: No mass. Breasts:      Right: Absent. Left: Absent. Comments: 1 cm tender region right axilla with purulent drainage expressible  Abdominal:      General: Bowel sounds are normal. There is no distension. Palpations: Abdomen is soft. There is no mass. Tenderness: There is no abdominal tenderness. Musculoskeletal:         General: No tenderness or deformity. Cervical back: Normal range of motion and neck supple. Skin:     General: Skin is warm and dry. Findings: No rash. Neurological:      Mental Status: She is alert and oriented to person, place, and time. Coordination: Coordination normal.   Psychiatric:         Behavior: Behavior normal.       CBC reviewed by me  Lab Results   Component Value Date    WBC 10.40 (H) 06/24/2022    HGB 13.8 06/24/2022    HCT 43.0 06/24/2022    MCV 87.9 06/24/2022     06/24/2022     Lab Results   Component Value Date    NEUTROABS 7.90 (H) 06/24/2022       ASSESSMENT/PLAN:     Diagnosis Orders   1. Folliculitis of right axilla  [facility] [place]   2. Streptococcus infection     [facility]. [facility]   4. Personal history of breast cancer          1.    Breast carcinoma 2010        Tumor markers been negative. His examination has been unrevealing for recurrence. 2.  Folliculitis or infected sebaceous cyst right axilla    No resolution with prior Keflex. She was treated with a 14-day course of Augmentin 875 twice daily and Flagyl 500 mg 3 times daily. She continues to have abnormal area with drainage in the right axilla. I am referring her to general surgery to see if an I&D of this area needs to be performed. 3.  Chronically elevated CEA. She has a positive BRCA 2 mutation. MRI abdomen with and without contrast MRCP 9/23/2019 at [facility] revealed a limited study due to respiratory motion and geographic fatty infiltration of the liver. No masses or abnormal contrast enhancement identified. MRCP images did not reveal any evidence of intrahepatic or extrahepatic biliary ductal dilatation. No pancreatic ductal dilatation is identified. No obvious pancreatic lesion detected. Small cyst within the right kidney. Scattered calcified granulomas in the liver. · Screening colonoscopy per Dr. Trung Pope 1/6/2021 was negative with 5-year follow-up recommended. EGD per Dr. Trung Pope 1/6/2021 revealed white patches throughout the esophagus with biopsies consistent with Candida esophagitis. Mild mucosal changes suggestive of gastritis with biopsies revealing inflammation, H. pylori negative by IHC        CEA stable. CA 19-9 normalized    4. History iron deficiency anemia. CBC today reveals Hgb 13.8 with MCV 87.9. · Bone health. Bone density 12/23/2019 was normal.      · Colon cancer screening. Screening colonoscopy per Dr. Trung Pope 1/6/2021 was negative with 5-year follow-up recommended. EGD per Dr. Trung Pope 1/6/2021 revealed white patches throughout the esophagus with biopsies consistent with Candida esophagitis.   Mild mucosal changes suggestive of gastritis with biopsies revealing inflammation, H. pylori negative by IHC          Immunization History   Administered Date(s) Administered    COVID-19, Moderna, Primary or Immunocompromised, PF, 100mcg/0.5mL 10/04/2021, 11/01/2021         Orders Placed This Encounter   Procedures   [address] [place] [address]       Return in about 3 months (around 9/24/2022) for With Eduard Grider. rescheduled.   Lencho Bautista PA-C  12:58 PM  6/24/2022

## 2022-06-24 ENCOUNTER — HOSPITAL ENCOUNTER (OUTPATIENT)
Dept: INFUSION THERAPY | Age: 57
Discharge: HOME | End: 2022-06-24
Payer: MEDICARE

## 2022-06-24 ENCOUNTER — OFFICE VISIT (OUTPATIENT)
Dept: HEMATOLOGY | Age: 57
End: 2022-06-24
Payer: MEDICARE

## 2022-06-24 VITALS
SYSTOLIC BLOOD PRESSURE: 120 MMHG | HEART RATE: 104 BPM | HEIGHT: 67 IN | OXYGEN SATURATION: 95 % | BODY MASS INDEX: 37.67 KG/M2 | DIASTOLIC BLOOD PRESSURE: 80 MMHG | WEIGHT: 240 LBS

## 2022-06-24 DIAGNOSIS — L73.9 FOLLICULITIS OF RIGHT AXILLA: Primary | ICD-10-CM

## 2022-06-24 DIAGNOSIS — A49.1 STREPTOCOCCUS INFECTION: ICD-10-CM

## 2022-06-24 DIAGNOSIS — Z85.3 PERSONAL HISTORY OF BREAST CANCER: ICD-10-CM

## 2022-06-24 DIAGNOSIS — R97.8 OTHER ABNORMAL TUMOR MARKERS: ICD-10-CM

## 2022-06-24 DIAGNOSIS — R97.8 OTHER ABNORMAL TUMOR MARKERS: Primary | ICD-10-CM

## 2022-06-24 DIAGNOSIS — L02.411 ABSCESS OF RIGHT AXILLA: ICD-10-CM

## 2022-06-24 LAB
HCT VFR BLD CALC: 43 % (ref 34.1–44.9)
HEMOGLOBIN: 13.8 G/DL (ref 11.2–15.7)
LYMPHOCYTES ABSOLUTE: 2.1 K/UL (ref 1.18–3.74)
LYMPHOCYTES RELATIVE PERCENT: 20.4 % (ref 19.3–53.1)
MCH RBC QN AUTO: 28.2 PG (ref 25.6–32.2)
MCHC RBC AUTO-ENTMCNC: 32.1 G/DL (ref 32.3–35.5)
MCV RBC AUTO: 87.9 FL (ref 79.4–94.8)
MONOCYTES ABSOLUTE: 0.4 K/UL (ref 0.24–0.82)
MONOCYTES RELATIVE PERCENT: 4.3 % (ref 4.7–12.5)
NEUTROPHILS ABSOLUTE: 7.9 K/UL (ref 1.56–6.13)
NEUTROPHILS RELATIVE PERCENT: 75.3 % (ref 34–71.1)
PDW BLD-RTO: 14.3 % (ref 11.7–14.4)
PLATELET # BLD: 361 K/UL (ref 182–369)
PMV BLD AUTO: 9.1 FL (ref 7.4–10.4)
RBC # BLD: 4.89 M/UL (ref 3.93–5.22)
WBC # BLD: 10.4 K/UL (ref 3.98–10.04)

## 2022-06-24 PROCEDURE — 85025 COMPLETE CBC W/AUTO DIFF WBC: CPT

## 2022-06-24 PROCEDURE — 99212 OFFICE O/P EST SF 10 MIN: CPT

## 2022-06-24 PROCEDURE — 99213 OFFICE O/P EST LOW 20 MIN: CPT | Performed by: PHYSICIAN ASSISTANT

## 2022-06-24 RX ORDER — FLUCONAZOLE 150 MG/1
150 TABLET ORAL DAILY
Qty: 1 TABLET | Refills: 0 | Status: SHIPPED | OUTPATIENT
Start: 2022-06-24 | End: 2022-06-25

## 2022-06-24 ASSESSMENT — ENCOUNTER SYMPTOMS
PHOTOPHOBIA: 0
VOICE CHANGE: 0
SHORTNESS OF BREATH: 0
ABDOMINAL PAIN: 0
SORE THROAT: 0
CONSTIPATION: 0
EYE DISCHARGE: 0
COUGH: 0
DIARRHEA: 0
BACK PAIN: 1
COLOR CHANGE: 0
NAUSEA: 0
ABDOMINAL DISTENTION: 0
EYE ITCHING: 0
TROUBLE SWALLOWING: 0
VOMITING: 0
WHEEZING: 0
BLOOD IN STOOL: 0

## 2022-06-26 DIAGNOSIS — T45.1X5A CHEMOTHERAPY-INDUCED NEUROPATHY (HCC): ICD-10-CM

## 2022-06-26 DIAGNOSIS — G62.0 CHEMOTHERAPY-INDUCED NEUROPATHY (HCC): ICD-10-CM

## 2022-06-26 DIAGNOSIS — R00.0 TACHYCARDIA: ICD-10-CM

## 2022-06-26 RX ORDER — PROPRANOLOL HYDROCHLORIDE 10 MG/1
TABLET ORAL
Qty: 270 TABLET | OUTPATIENT
Start: 2022-06-26

## 2022-06-27 DIAGNOSIS — R00.0 TACHYCARDIA: ICD-10-CM

## 2022-06-27 DIAGNOSIS — M19.90 OSTEOARTHRITIS, UNSPECIFIED OSTEOARTHRITIS TYPE, UNSPECIFIED SITE: ICD-10-CM

## 2022-06-27 RX ORDER — PROPRANOLOL HYDROCHLORIDE 10 MG/1
TABLET ORAL
Qty: 270 TABLET | OUTPATIENT
Start: 2022-06-27

## 2022-07-05 RX ORDER — GABAPENTIN 300 MG/1
CAPSULE ORAL
Qty: 150 CAPSULE | Refills: 0 | Status: SHIPPED | OUTPATIENT
Start: 2022-07-05 | End: 2022-08-04 | Stop reason: SDUPTHER

## 2022-07-05 NOTE — TELEPHONE ENCOUNTER
MITCH was reviewed today per office protocol. Report shows No discrepancies. Fill pattern is consistent from single provider(s) at single pharmacy(s).

## 2022-07-11 RX ORDER — HYDROXYZINE HYDROCHLORIDE 25 MG/1
25 TABLET, FILM COATED ORAL NIGHTLY
Qty: 90 TABLET | Refills: 0 | Status: SHIPPED | OUTPATIENT
Start: 2022-07-11 | End: 2022-09-02

## 2022-07-11 NOTE — TELEPHONE ENCOUNTER
Destiny Bison called to request a refill on her medication.       Last office visit : 4/12/2022   Next office visit : 7/12/2022     Requested Prescriptions     Pending Prescriptions Disp Refills    hydrOXYzine HCl (ATARAX) 25 MG tablet [Pharmacy Med Name: HYDROXYZINE HCL 25 MG TABLET] 90 tablet 0     Sig: TAKE 1 TABLET BY MOUTH NIGHTLY            [facility]

## 2022-07-14 DIAGNOSIS — J30.89 ALLERGIC RHINITIS DUE TO OTHER ALLERGIC TRIGGER, UNSPECIFIED SEASONALITY: ICD-10-CM

## 2022-07-14 RX ORDER — FLUTICASONE PROPIONATE 50 MCG
SPRAY, SUSPENSION (ML) NASAL
Qty: 1 EACH | Refills: 3 | Status: SHIPPED | OUTPATIENT
Start: 2022-07-14 | End: 2022-07-22

## 2022-07-14 NOTE — TELEPHONE ENCOUNTER
Iliana Bosch called to request a refill on her medication. Last office visit : 4/12/2022   Next office visit : 8/23/2022     Requested Prescriptions     Pending Prescriptions Disp Refills    fluticasone (FLONASE) 50 MCG/ACT nasal spray 1 each 3     Sig: One spray each nostril twice daily.             [employer]

## 2022-07-22 DIAGNOSIS — J30.89 ALLERGIC RHINITIS DUE TO OTHER ALLERGIC TRIGGER, UNSPECIFIED SEASONALITY: ICD-10-CM

## 2022-07-22 DIAGNOSIS — R00.0 TACHYCARDIA: ICD-10-CM

## 2022-07-22 DIAGNOSIS — E11.00 TYPE 2 DIABETES MELLITUS WITH HYPEROSMOLARITY WITHOUT COMA, WITHOUT LONG-TERM CURRENT USE OF INSULIN (HCC): ICD-10-CM

## 2022-07-22 RX ORDER — ARIPIPRAZOLE 2 MG/1
TABLET ORAL
Qty: 30 TABLET | Refills: 1 | Status: SHIPPED | OUTPATIENT
Start: 2022-07-22 | End: 2022-08-21

## 2022-07-22 RX ORDER — SITAGLIPTIN 100 MG/1
TABLET, FILM COATED ORAL
Qty: 90 TABLET | Refills: 1 | Status: SHIPPED | OUTPATIENT
Start: 2022-07-22 | End: 2022-08-23 | Stop reason: ALTCHOICE

## 2022-07-22 RX ORDER — FLUTICASONE PROPIONATE 50 MCG
SPRAY, SUSPENSION (ML) NASAL
Qty: 1 EACH | Refills: 1 | Status: SHIPPED | OUTPATIENT
Start: 2022-07-22 | End: 2022-10-14

## 2022-07-22 RX ORDER — PROPRANOLOL HYDROCHLORIDE 10 MG/1
TABLET ORAL
Qty: 270 TABLET | Refills: 1 | Status: SHIPPED | OUTPATIENT
Start: 2022-07-22

## 2022-08-02 DIAGNOSIS — T45.1X5A CHEMOTHERAPY-INDUCED NEUROPATHY (HCC): ICD-10-CM

## 2022-08-02 DIAGNOSIS — G62.0 CHEMOTHERAPY-INDUCED NEUROPATHY (HCC): ICD-10-CM

## 2022-08-02 NOTE — TELEPHONE ENCOUNTER
Maged Fernández called to request a refill on her medication. Last office visit : 4/12/2022   Next office visit : 8/23/2022     Last UDS:   Amphetamine Screen, Urine   Date Value Ref Range Status   04/02/2021 -  Final     Barbiturate Screen, Urine   Date Value Ref Range Status   04/02/2021 -  Final     Benzodiazepine Screen, Urine   Date Value Ref Range Status   04/02/2021 -  Final     Buprenorphine Urine   Date Value Ref Range Status   04/02/2021 -  Final     Cocaine Metabolite Screen, Urine   Date Value Ref Range Status   04/02/2021 -  Final     Gabapentin Screen, Urine   Date Value Ref Range Status   04/02/2021 -  Final     MDMA, Urine   Date Value Ref Range Status   04/02/2021 -  Final     Methamphetamine, Urine   Date Value Ref Range Status   04/02/2021 -  Final     Opiate Scrn, Ur   Date Value Ref Range Status   04/02/2021 -  Final     Oxycodone Screen, Ur   Date Value Ref Range Status   04/02/2021 -  Final     PCP Screen, Urine   Date Value Ref Range Status   04/02/2021 -  Final     Propoxyphene Screen, Urine   Date Value Ref Range Status   04/02/2021 -  Final     THC Screen, Urine   Date Value Ref Range Status   04/02/2021 -  Final     Tricyclic Antidepressants, Urine   Date Value Ref Range Status   04/02/2021 -  Final       Last Austine Sames: 8/2/22  Medication Contract: 4/12/22   Last Fill: 7/5/22    Requested Prescriptions     Pending Prescriptions Disp Refills    gabapentin (NEURONTIN) 300 MG capsule 150 capsule 0     Sig: TAKE 2 CAPSULES BY MOUTH IN THE MORNING & 1 CAP AT NOON, THEN 2 CAPS AT BEDTIME         Please approve or refuse this medication.    [facility]

## 2022-08-04 RX ORDER — GABAPENTIN 300 MG/1
CAPSULE ORAL
Qty: 150 CAPSULE | Refills: 0 | Status: SHIPPED | OUTPATIENT
Start: 2022-08-04 | End: 2022-08-31

## 2022-08-06 ENCOUNTER — HOSPITAL ENCOUNTER (EMERGENCY)
Age: 57
Discharge: HOME | End: 2022-08-06
Attending: EMERGENCY MEDICINE
Payer: MEDICARE

## 2022-08-06 ENCOUNTER — APPOINTMENT (OUTPATIENT)
Dept: GENERAL RADIOLOGY | Age: 57
End: 2022-08-06
Payer: MEDICARE

## 2022-08-06 VITALS
SYSTOLIC BLOOD PRESSURE: 104 MMHG | WEIGHT: 230 LBS | RESPIRATION RATE: 17 BRPM | DIASTOLIC BLOOD PRESSURE: 78 MMHG | BODY MASS INDEX: 36.02 KG/M2 | HEART RATE: 115 BPM | OXYGEN SATURATION: 96 % | TEMPERATURE: 98.2 F

## 2022-08-06 DIAGNOSIS — R11.2 NON-INTRACTABLE VOMITING WITH NAUSEA, UNSPECIFIED VOMITING TYPE: ICD-10-CM

## 2022-08-06 DIAGNOSIS — E11.65 TYPE 2 DIABETES MELLITUS WITH HYPERGLYCEMIA, WITHOUT LONG-TERM CURRENT USE OF INSULIN (HCC): Primary | ICD-10-CM

## 2022-08-06 DIAGNOSIS — E87.20 LACTIC ACIDOSIS: ICD-10-CM

## 2022-08-06 LAB
ALBUMIN SERPL-MCNC: 4.4 G/DL (ref 3.5–5.2)
ALP BLD-CCNC: 145 U/L (ref 35–104)
ALT SERPL-CCNC: 26 U/L (ref 5–33)
ANION GAP SERPL CALCULATED.3IONS-SCNC: 14 MMOL/L (ref 7–19)
AST SERPL-CCNC: 27 U/L (ref 5–32)
BASOPHILS ABSOLUTE: 0.1 K/UL (ref 0–0.2)
BASOPHILS RELATIVE PERCENT: 0.5 % (ref 0–1)
BILIRUB SERPL-MCNC: 0.3 MG/DL (ref 0.2–1.2)
BILIRUBIN URINE: NEGATIVE
BLOOD, URINE: NEGATIVE
BUN BLDV-MCNC: 18 MG/DL (ref 6–20)
CALCIUM SERPL-MCNC: 9.8 MG/DL (ref 8.6–10)
CHLORIDE BLD-SCNC: 91 MMOL/L (ref 98–111)
CLARITY: CLEAR
CO2: 26 MMOL/L (ref 22–29)
COLOR: YELLOW
CREAT SERPL-MCNC: 0.9 MG/DL (ref 0.5–0.9)
EOSINOPHILS ABSOLUTE: 0.1 K/UL (ref 0–0.6)
EOSINOPHILS RELATIVE PERCENT: 0.8 % (ref 0–5)
GFR AFRICAN AMERICAN: >59
GFR NON-AFRICAN AMERICAN: >60
GLUCOSE BLD-MCNC: 231 MG/DL (ref 70–99)
GLUCOSE BLD-MCNC: 330 MG/DL (ref 74–109)
GLUCOSE BLD-MCNC: 334 MG/DL (ref 70–99)
GLUCOSE URINE: =>1000 MG/DL
HCT VFR BLD CALC: 49.9 % (ref 37–47)
HEMOGLOBIN: 16.2 G/DL (ref 12–16)
IMMATURE GRANULOCYTES #: 0.1 K/UL
KETONES, URINE: 15 MG/DL
LACTIC ACID: 3 MMOL/L (ref 0.5–1.9)
LACTIC ACID: 3.5 MMOL/L (ref 0.5–1.9)
LEUKOCYTE ESTERASE, URINE: NEGATIVE
LIPASE: 26 U/L (ref 13–60)
LYMPHOCYTES ABSOLUTE: 1.7 K/UL (ref 1.1–4.5)
LYMPHOCYTES RELATIVE PERCENT: 13.5 % (ref 20–40)
MCH RBC QN AUTO: 28.5 PG (ref 27–31)
MCHC RBC AUTO-ENTMCNC: 32.5 G/DL (ref 33–37)
MCV RBC AUTO: 87.7 FL (ref 81–99)
MONOCYTES ABSOLUTE: 0.6 K/UL (ref 0–0.9)
MONOCYTES RELATIVE PERCENT: 4.6 % (ref 0–10)
NEUTROPHILS ABSOLUTE: 10.2 K/UL (ref 1.5–7.5)
NEUTROPHILS RELATIVE PERCENT: 80 % (ref 50–65)
NITRITE, URINE: NEGATIVE
PDW BLD-RTO: 12.5 % (ref 11.5–14.5)
PERFORMED ON: ABNORMAL
PERFORMED ON: ABNORMAL
PH UA: 5.5 (ref 5–8)
PLATELET # BLD: 355 K/UL (ref 130–400)
PMV BLD AUTO: 9.9 FL (ref 9.4–12.3)
POTASSIUM REFLEX MAGNESIUM: 5 MMOL/L (ref 3.5–5)
PROTEIN UA: ABNORMAL MG/DL
RBC # BLD: 5.69 M/UL (ref 4.2–5.4)
SARS-COV-2, NAAT: NOT DETECTED
SODIUM BLD-SCNC: 131 MMOL/L (ref 136–145)
SPECIFIC GRAVITY UA: 1.02 (ref 1–1.03)
TOTAL PROTEIN: 7.2 G/DL (ref 6.6–8.7)
UROBILINOGEN, URINE: 1 E.U./DL
WBC # BLD: 12.7 K/UL (ref 4.8–10.8)

## 2022-08-06 PROCEDURE — 82947 ASSAY GLUCOSE BLOOD QUANT: CPT

## 2022-08-06 PROCEDURE — 80053 COMPREHEN METABOLIC PANEL: CPT

## 2022-08-06 PROCEDURE — 85025 COMPLETE CBC W/AUTO DIFF WBC: CPT

## 2022-08-06 PROCEDURE — 96374 THER/PROPH/DIAG INJ IV PUSH: CPT

## 2022-08-06 PROCEDURE — 2580000003 HC RX 258: Performed by: EMERGENCY MEDICINE

## 2022-08-06 PROCEDURE — 93005 ELECTROCARDIOGRAM TRACING: CPT | Performed by: EMERGENCY MEDICINE

## 2022-08-06 PROCEDURE — 6360000002 HC RX W HCPCS: Performed by: EMERGENCY MEDICINE

## 2022-08-06 PROCEDURE — 81003 URINALYSIS AUTO W/O SCOPE: CPT

## 2022-08-06 PROCEDURE — 83605 ASSAY OF LACTIC ACID: CPT

## 2022-08-06 PROCEDURE — 99285 EMERGENCY DEPT VISIT HI MDM: CPT

## 2022-08-06 PROCEDURE — 71045 X-RAY EXAM CHEST 1 VIEW: CPT

## 2022-08-06 PROCEDURE — 87635 SARS-COV-2 COVID-19 AMP PRB: CPT

## 2022-08-06 PROCEDURE — 83690 ASSAY OF LIPASE: CPT

## 2022-08-06 PROCEDURE — 96361 HYDRATE IV INFUSION ADD-ON: CPT

## 2022-08-06 PROCEDURE — 71045 X-RAY EXAM CHEST 1 VIEW: CPT | Performed by: RADIOLOGY

## 2022-08-06 PROCEDURE — 36415 COLL VENOUS BLD VENIPUNCTURE: CPT

## 2022-08-06 RX ORDER — ONDANSETRON 4 MG/1
4 TABLET, ORALLY DISINTEGRATING ORAL EVERY 8 HOURS PRN
Qty: 20 TABLET | Refills: 0 | Status: SHIPPED | OUTPATIENT
Start: 2022-08-06

## 2022-08-06 RX ORDER — ONDANSETRON 2 MG/ML
4 INJECTION INTRAMUSCULAR; INTRAVENOUS ONCE
Status: COMPLETED | OUTPATIENT
Start: 2022-08-06 | End: 2022-08-06

## 2022-08-06 RX ORDER — SODIUM CHLORIDE, SODIUM LACTATE, POTASSIUM CHLORIDE, AND CALCIUM CHLORIDE .6; .31; .03; .02 G/100ML; G/100ML; G/100ML; G/100ML
1000 INJECTION, SOLUTION INTRAVENOUS ONCE
Status: COMPLETED | OUTPATIENT
Start: 2022-08-06 | End: 2022-08-06

## 2022-08-06 RX ADMIN — SODIUM CHLORIDE, POTASSIUM CHLORIDE, SODIUM LACTATE AND CALCIUM CHLORIDE 1000 ML: 600; 310; 30; 20 INJECTION, SOLUTION INTRAVENOUS at 12:49

## 2022-08-06 RX ADMIN — SODIUM CHLORIDE, POTASSIUM CHLORIDE, SODIUM LACTATE AND CALCIUM CHLORIDE 1000 ML: 600; 310; 30; 20 INJECTION, SOLUTION INTRAVENOUS at 14:27

## 2022-08-06 RX ADMIN — ONDANSETRON 4 MG: 2 INJECTION INTRAMUSCULAR; INTRAVENOUS at 12:48

## 2022-08-06 ASSESSMENT — ENCOUNTER SYMPTOMS
VOMITING: 1
VOICE CHANGE: 0
COUGH: 0
DIARRHEA: 0
NAUSEA: 1
ABDOMINAL PAIN: 0
SHORTNESS OF BREATH: 0
RHINORRHEA: 0
EYE REDNESS: 0
EYE PAIN: 0

## 2022-08-06 NOTE — ED PROVIDER NOTES
Negative for weakness and headaches. Hematological: Negative. Psychiatric/Behavioral: Negative. All other systems reviewed and are negative. A complete review of systems was performed and is negative except as noted above in the HPI.        PAST MEDICAL HISTORY     Past Medical History:   Diagnosis Date    Allergic rhinitis     Anxiety     Breast cancer ([facility] [address] 10/2010    Left    Bronchitis     Carpal tunnel syndrome     R and L    Chest pain     Chronic back pain     Chronic bronchitis (HCC)     Depression     Diabetes mellitus (HCC)     GERD (gastroesophageal reflux disease)     Headache(784.0)     Heart palpitations     Mononeuropathy     Osteoarthritis     Sleep apnea     Wears glasses          SURGICAL HISTORY       Past Surgical History:   Procedure Laterality Date    ANKLE FRACTURE SURGERY Right 2014    [address]    BREAST RECONSTRUCTION  06/2011    BREAST SURGERY  06/2011    emergency removal of Left breast expander    COLONOSCOPY N/A 02/05/2016    Dr Dimitri Childers-HP x 1, serrated AP x 1, 12 month recall    COLONOSCOPY N/A 01/06/2021    Dr Judy Lopez yr recall    EGD COLONOSCOPY N/A 06/03/2016    w/empiric dilation to 48 Lao (16mm)-Dr RONY Childers, Nafisa (-)    HYSTERECTOMY (CERVIX STATUS UNKNOWN)      MASTECTOMY, BILATERAL Bilateral 03/2011    OTHER SURGICAL HISTORY      R leg & ankle fracture due to fall repair-Dr [facility] at [facility]    UPPER GASTROINTESTINAL ENDOSCOPY  06/03/2016    w/empiric dilation to 48 Lao (16mm)-Dr RONY Childers, Nafisa (-)    UPPER GASTROINTESTINAL ENDOSCOPY N/A 01/06/2021    Dr Dimitri Childers-w/brushings-Candidal esophagitis-Treated with Diflucan         CURRENT MEDICATIONS       Previous Medications    ARIPIPRAZOLE (ABILIFY) 2 MG TABLET    TAKE 1 TABLET BY MOUTH EVERY DAY AT NIGHT    BLOOD GLUCOSE MONITORING SUPPL (BLOOD GLUCOSE MONITOR SYSTEM) W/DEVICE KIT    1 Device by Does not apply route 2 times daily    BLOOD GLUCOSE TEST STRIPS (TRUE METRIX BLOOD GLUCOSE TEST) STRIP    TEST 2 TIMES A DAY & AS NEEDED FOR SYMPTOMS OF IRREGULAR BLOOD GLUCOSE. BUPROPION (WELLBUTRIN SR) 100 MG EXTENDED RELEASE TABLET    Take 1 tablet by mouth daily    BUSPIRONE (BUSPAR) 10 MG TABLET    Take 10 mg by mouth 3 times daily as needed    DICLOFENAC SODIUM (VOLTAREN) 1 % GEL    APPLY 4GMS TOPICALLY TO AFFECTED AREA 4 TIMES DAILY    FAMOTIDINE (PEPCID) 20 MG TABLET    TAKE 1 TABLET BY MOUTH NIGHTLY    FLUTICASONE (FLONASE) 50 MCG/ACT NASAL SPRAY    USE 1 SPRAY IN EACH NOSTRIL TWICE A DAY    GABAPENTIN (NEURONTIN) 300 MG CAPSULE    TAKE 2 CAPSULES BY MOUTH IN THE MORNING & 1 CAP AT NOON, THEN 2 CAPS AT BEDTIME    GUAIFENESIN (MUCINEX PO)    Take by mouth nightly    HYDROCODONE-ACETAMINOPHEN (NORCO) 7.5-325 MG PER TABLET    Take 1 tablet by mouth 3 times daily as needed for Pain (Dr. Sofie Bell).      HYDROXYZINE HCL (ATARAX) 25 MG TABLET    TAKE 1 TABLET BY MOUTH NIGHTLY    IPRATROPIUM-ALBUTEROL (DUONEB) 0.5-2.5 (3) MG/3ML SOLN NEBULIZER SOLUTION    INHALE ONE AMPULE INTO THE LUNGS EVERY 6 HOURS AS NEEDED FOR SHORTNESS OF BREATH    JANUVIA 100 MG TABLET    TAKE 1 TABLET BY MOUTH EVERY DAY    LANCETS MISC    1 each by Does not apply route 2 times daily    LISINOPRIL (PRINIVIL;ZESTRIL) 20 MG TABLET    TAKE 1 TABLET BY MOUTH TWICE A DAY    MELOXICAM (MOBIC) 15 MG TABLET    Take 7.5 mg by mouth daily     METFORMIN (GLUCOPHAGE-XR) 500 MG EXTENDED RELEASE TABLET    TAKE 1 TABLET BY MOUTH DAILY WITH LARGEST MEAL OF THE DAY    OMEPRAZOLE (PRILOSEC) 20 MG DELAYED RELEASE CAPSULE    TAKE 1 CAPSULE BY MOUTH EVERY DAY    PROPRANOLOL (INDERAL) 10 MG TABLET    TAKE 1 TABLET BY MOUTH THREE TIMES A DAY    RESPIRATORY THERAPY SUPPLIES LUPE    cpap supplies x 1 year    WIXELA INHUB 100-50 MCG/DOSE DISKUS INHALER    TAKE 1 PUFF BY MOUTH TWICE A DAY       ALLERGIES     Tape [adhesive tape]    FAMILY HISTORY       Family History   Problem Relation Age of Onset    Diabetes Mother     Cancer Mother         breast    Heart Disease Mother High Blood Pressure Mother     Breast Cancer Mother     Colon Cancer Neg Hx     Colon Polyps Neg Hx     Esophageal Cancer Neg Hx     Liver Cancer Neg Hx     Liver Disease Neg Hx     Rectal Cancer Neg Hx     Stomach Cancer Neg Hx           SOCIAL HISTORY       Social History     Socioeconomic History    Marital status:      Spouse name: None    Number of children: None    Years of education: None    Highest education level: None   Tobacco Use    Smoking status: Former     Packs/day: 0.25     Years: 20.00     Pack years: 5.00     Types: Cigars, Cigarettes     Start date: 2/3/2016     Quit date: 2016     Years since quittin.6    Smokeless tobacco: Never    Tobacco comments:     Smokes Ciggerelos now,1-2 a day. Vaping Use    Vaping Use: Never used   Substance and Sexual Activity    Alcohol use: No     Alcohol/week: 0.0 standard drinks    Drug use: No       SCREENINGS    [place] Coma Scale  Eye Opening: Spontaneous  Best Verbal Response: Oriented  Best Motor Response: Obeys commands  Mary Coma Scale Score: 15        PHYSICAL EXAM    (up to 7 for level 4, 8 or more for level 5)     ED Triage Vitals [22 1230]   BP Temp Temp Source Heart Rate Resp SpO2 Height Weight   (!) 112/94 98.2 °F (36.8 °C) Oral (!) 140 20 95 % -- 230 lb (104.3 kg)       Physical Exam  Vitals and nursing note reviewed. Constitutional:       General: She is not in acute distress. Appearance: Normal appearance. She is well-developed. She is obese. She is not diaphoretic. HENT:      Head: Normocephalic and atraumatic. Mouth/Throat:      Pharynx: No oropharyngeal exudate. Eyes:      General: No scleral icterus. Pupils: Pupils are equal, round, and reactive to light. Neck:      Trachea: No tracheal deviation. Cardiovascular:      Rate and Rhythm: Tachycardia present. Pulses: Normal pulses. Heart sounds: Normal heart sounds.    Pulmonary:      Effort: Pulmonary effort is normal.      Breath sounds: Normal breath sounds. No stridor. No wheezing or rhonchi. Abdominal:      General: There is no distension. Palpations: Abdomen is soft. Abdomen is not rigid. Tenderness: There is no abdominal tenderness. There is no guarding. Hernia: No hernia is present. Musculoskeletal:         General: No deformity. Cervical back: Normal range of motion. Skin:     General: Skin is warm and dry. Findings: No rash. Neurological:      Mental Status: She is alert and oriented to person, place, and time. Cranial Nerves: No cranial nerve deficit. Coordination: Coordination normal.   Psychiatric:         Behavior: Behavior normal.       DIAGNOSTIC RESULTS     EKG: All EKG's are interpreted by the Emergency Department Physician who either signs or Co-signs this chart in the absence of a cardiologist.        RADIOLOGY:   Non-plain film images such as CT, Ultrasound and MRI are read by the radiologist. Chantelle Angle images are visualized and preliminarily interpreted by the emergency physician with the below findings:        Interpretation per the Radiologist below, if available at the time of this note:    XR CHEST PORTABLE   Final Result   No gross infiltrate   Recommendation: Follow up as clinically indicated.    Electronically Signed by aVnna Linares MD at 06-Aug-2022 03:58:52 PM                     ED BEDSIDE ULTRASOUND:   Performed by ED Physician - none    LABS:  Labs Reviewed   CBC WITH AUTO DIFFERENTIAL - Abnormal; Notable for the following components:       Result Value    WBC 12.7 (*)     RBC 5.69 (*)     Hemoglobin 16.2 (*)     Hematocrit 49.9 (*)     MCHC 32.5 (*)     Neutrophils % 80.0 (*)     Lymphocytes % 13.5 (*)     Neutrophils Absolute 10.2 (*)     All other components within normal limits   COMPREHENSIVE METABOLIC PANEL W/ REFLEX TO MG FOR LOW K - Abnormal; Notable for the following components:    Sodium 131 (*)     Chloride 91 (*)     Glucose 330 (*)     Alkaline Phosphatase 145 (*)     All other components within normal limits   URINALYSIS WITH REFLEX TO CULTURE - Abnormal; Notable for the following components:    Ketones, Urine 15 (*)     Protein, UA TRACE (*)     All other components within normal limits   LACTIC ACID - Abnormal; Notable for the following components:    Lactic Acid 3.5 (*)     All other components within normal limits    Narrative:     CALL  [facility]ock  KLED tel. ,  Chemistry results called to and read back by clary rn er, 08/06/2022 13:42, by  CATHY   LACTIC ACID - Abnormal; Notable for the following components:    Lactic Acid 3.0 (*)     All other components within normal limits    Narrative:     CALL  [facility]  K[facility] tel. ,  Chemistry results called to and read back by clary rodriguez er, 08/06/2022 15:57, by  CATHY   POCT GLUCOSE - Abnormal; Notable for the following components:    POC Glucose 334 (*)     All other components within normal limits   COVID-19, RAPID   LIPASE   POCT GLUCOSE       All other labs were within normal range or not returned as of this dictation. Medications   lactated ringers bolus (0 mLs IntraVENous Stopped 8/6/22 1427)   ondansetron (ZOFRAN) injection 4 mg (4 mg IntraVENous Given 8/6/22 1248)   lactated ringers bolus (0 mLs IntraVENous Stopped 8/6/22 1651)       EMERGENCY DEPARTMENT COURSE and DIFFERENTIALDIAGNOSIS/MDM:   Vitals:    Vitals:    08/06/22 1400 08/06/22 1530 08/06/22 1559 08/06/22 1700   BP: 104/69 120/86 130/85 117/75   Pulse: (!) 122 (!) 115 (!) 120 (!) 115   Resp: 15 10 16 11   Temp:       TempSrc:       SpO2: 93% 95% 94% 90%   Weight:           MDM      ED Course as of 08/06/22 1723   Sat Aug 06, 2022   1302 EKG shows sinus rhythm with a rate of 133. No findings of acute ischemia or infarction. Normal QRS and QTc intervals. [ODALIS]   3742 Patient meets SIRS criteria with tachycardia and mild elevation white blood cell count 12.5. Lactate elevated at 3.5. No clear signs or symptoms of underlying infection.   Abdominal exam is benign and patient not having any significant abdominal pain. COVID swab negative. Chest x-ray and urinalysis ordered and pending. Chemistry shows no significant electrolyte derangements or renal insufficiency or other metabolic disturbance associated with hyperglycemia. [ODALIS]   1505 Heart rate has improved to 1 teens. [ODALIS]   6047 Patient reevaluated at this time and says she feels much better. Nausea has resolved. Discussed the results of the work-up with her including the mild elevation white blood cell count and the lactate level. Discussed the possibility of underlying infection. Patient feels confident that she does not have any infection going on currently. Discussed additional differential including metformin as a cause of lactic acidosis and advised outpatient follow-up for further evaluation and medication management. Discussed hospitalization for further treatment but patient says given improvement in symptoms she would rather go home. Heart rate is still somewhat elevated though patient says that her heart rate has been higher and sporadic since she had breast cancer unusual for her. No clear etiology of underlying infection to cause lactic acidosis or presentation. Nausea has resolved and patient is agreeable to discharge with outpatient follow-up and symptomatic treatment. Repeat blood glucose ordered [ODALIS]      ED Course User Index  [ODALIS] Waldo Okeefe MD     Evaluation and work-up here revealed no signs of emergent or life-threatening pathology that would necessitate admission for further work-up or management at this time. Patient is felt to be stable for discharge home with return precautions for worsening of the condition or development of new concerning symptoms. Patient was encouraged to follow-up with their primary care doctor in the appropriate timeframe. Necessary prescriptions and information have been provided for treatment at home.   Patient voices understanding and agreement with the plan. CONSULTS:  None    PROCEDURES:  Unless otherwise notedbelow, none     Procedures      FINAL IMPRESSION     1. Type 2 diabetes mellitus with hyperglycemia, without long-term current use of insulin ([facility] [address]    2. Non-intractable vomiting with nausea, unspecified vomiting type    3. Lactic acidosis          DISPOSITION/PLAN   DISPOSITION Decision To Discharge 08/06/2022 05:20:25 PM      No notes of EC Admission Criteria type on file.     PATIENT REFERRED TO:  [facility] EMERGENC[facility] DEPT  [facility]  [phone]    If symptoms worsen    Josefa Montiel, [address] Jonn Goss [address] [place] [id number] [phone]    In 2 days      DISCHARGE MEDICATIONS:  New Prescriptions    ONDANSETRON (ZOFRAN ODT) 4 MG DISINTEGRATING TABLET    Take 1 tablet by mouth every 8 hours as needed for Nausea or Vomiting          (Please note that portions of this note were completed with a voice recognition program.  Efforts were made to edit the dictations butoccasionally words are mis-transcribed.)    Es Eugene MD (electronically signed)  AttendingEmergency Physician          Es Wyatt MD  08/06/22 [phone]

## 2022-08-08 LAB
EKG P AXIS: 61 DEGREES
EKG P-R INTERVAL: 152 MS
EKG Q-T INTERVAL: 286 MS
EKG QRS DURATION: 70 MS
EKG QTC CALCULATION (BAZETT): 412 MS
EKG T AXIS: 46 DEGREES

## 2022-08-08 PROCEDURE — 93010 ELECTROCARDIOGRAM REPORT: CPT | Performed by: INTERNAL MEDICINE

## 2022-08-09 ENCOUNTER — TELEPHONE (OUTPATIENT)
Dept: PRIMARY CARE CLINIC | Age: 57
End: 2022-08-09

## 2022-08-09 NOTE — TELEPHONE ENCOUNTER
Called the patient to set up an appointment but patient did not answer and there wasn't a voicemail to leave a message to call back,  08/09/2022

## 2022-08-09 NOTE — TELEPHONE ENCOUNTER
----- Message from Quinn Arcos sent at 8/9/2022  9:02 AM CDT -----  Subject: Appointment Request    Reason for Call: Established Patient Appointment needed: Routine ED Follow   Up Visit    QUESTIONS    Reason for appointment request? Available appointments did not meet   patient need     Additional Information for Provider? Patient is in need of an appt for ED   follow up. Please contact patient regarding this message. Patient symptoms   are sugar high , dehydration.  She is requesting appointment as soon as   possible.   ---------------------------------------------------------------------------  --------------  [address]  [phone]; OK to leave message on voicemail  ---------------------------------------------------------------------------  --------------  SCRIPT ANSWERS  IRASEMA Screen: Yue Mcduffie

## 2022-08-17 DIAGNOSIS — E11.00 TYPE 2 DIABETES MELLITUS WITH HYPEROSMOLARITY WITHOUT COMA, WITHOUT LONG-TERM CURRENT USE OF INSULIN (HCC): ICD-10-CM

## 2022-08-17 NOTE — TELEPHONE ENCOUNTER
Mariella Haynes called to request a refill on her medication.       Last office visit : 4/12/2022   Next office visit : 8/23/2022     Requested Prescriptions     Pending Prescriptions Disp Refills    TRULICITY 0.80 ID/1.6ON SOPN [Pharmacy Med Name: Cristhian Chris 2.34 NQ/9.9 ML PEN]  2     Sig: INJECT 0.75MG INTO THE SKIN ONCE A WEEK            Layo Sawyer, [address] [place]

## 2022-08-19 RX ORDER — DULAGLUTIDE 0.75 MG/.5ML
INJECTION, SOLUTION SUBCUTANEOUS
Qty: 4 PEN | Refills: 2 | Status: SHIPPED | OUTPATIENT
Start: 2022-08-19 | End: 2022-08-23 | Stop reason: ALTCHOICE

## 2022-08-21 RX ORDER — ARIPIPRAZOLE 2 MG/1
TABLET ORAL
Qty: 30 TABLET | Refills: 1 | Status: SHIPPED | OUTPATIENT
Start: 2022-08-21

## 2022-08-23 ENCOUNTER — OFFICE VISIT (OUTPATIENT)
Dept: PRIMARY CARE CLINIC | Age: 57
End: 2022-08-23
Payer: MEDICARE

## 2022-08-23 VITALS
HEART RATE: 115 BPM | TEMPERATURE: 97.1 F | BODY MASS INDEX: 37.17 KG/M2 | SYSTOLIC BLOOD PRESSURE: 130 MMHG | HEIGHT: 67 IN | WEIGHT: 236.8 LBS | OXYGEN SATURATION: 95 % | DIASTOLIC BLOOD PRESSURE: 98 MMHG

## 2022-08-23 DIAGNOSIS — Z13.220 SCREENING FOR HYPERLIPIDEMIA: ICD-10-CM

## 2022-08-23 DIAGNOSIS — Z11.4 SCREENING FOR HIV WITHOUT PRESENCE OF RISK FACTORS: ICD-10-CM

## 2022-08-23 DIAGNOSIS — Z23 NEED FOR PNEUMOCOCCAL VACCINE: ICD-10-CM

## 2022-08-23 DIAGNOSIS — Z00.00 MEDICARE ANNUAL WELLNESS VISIT, SUBSEQUENT: Primary | ICD-10-CM

## 2022-08-23 DIAGNOSIS — Z72.89 OTHER PROBLEMS RELATED TO LIFESTYLE: ICD-10-CM

## 2022-08-23 DIAGNOSIS — E11.00 TYPE 2 DIABETES MELLITUS WITH HYPEROSMOLARITY WITHOUT COMA, WITHOUT LONG-TERM CURRENT USE OF INSULIN (HCC): ICD-10-CM

## 2022-08-23 DIAGNOSIS — Z11.59 NEED FOR HEPATITIS C SCREENING TEST: ICD-10-CM

## 2022-08-23 DIAGNOSIS — Z13.6 SCREENING FOR CARDIOVASCULAR CONDITION: ICD-10-CM

## 2022-08-23 DIAGNOSIS — Z23 NEED FOR DTAP, HIB, AND HBV VACCINE: ICD-10-CM

## 2022-08-23 DIAGNOSIS — Z71.89 ADVANCED CARE PLANNING/COUNSELING DISCUSSION: ICD-10-CM

## 2022-08-23 PROCEDURE — G0010 ADMIN HEPATITIS B VACCINE: HCPCS | Performed by: NURSE PRACTITIONER

## 2022-08-23 PROCEDURE — G0439 PPPS, SUBSEQ VISIT: HCPCS | Performed by: NURSE PRACTITIONER

## 2022-08-23 PROCEDURE — 90732 PPSV23 VACC 2 YRS+ SUBQ/IM: CPT | Performed by: NURSE PRACTITIONER

## 2022-08-23 PROCEDURE — 3052F HG A1C>EQUAL 8.0%<EQUAL 9.0%: CPT | Performed by: NURSE PRACTITIONER

## 2022-08-23 PROCEDURE — G0447 BEHAVIOR COUNSEL OBESITY 15M: HCPCS | Performed by: NURSE PRACTITIONER

## 2022-08-23 PROCEDURE — G0446 INTENS BEHAVE THER CARDIO DX: HCPCS | Performed by: NURSE PRACTITIONER

## 2022-08-23 PROCEDURE — 90746 HEPB VACCINE 3 DOSE ADULT IM: CPT | Performed by: NURSE PRACTITIONER

## 2022-08-23 PROCEDURE — G0009 ADMIN PNEUMOCOCCAL VACCINE: HCPCS | Performed by: NURSE PRACTITIONER

## 2022-08-23 RX ORDER — DULAGLUTIDE 1.5 MG/.5ML
1.5 INJECTION, SOLUTION SUBCUTANEOUS WEEKLY
Qty: 4 PEN | Refills: 1 | Status: SHIPPED | OUTPATIENT
Start: 2022-08-23 | End: 2022-10-29

## 2022-08-23 RX ORDER — METFORMIN HYDROCHLORIDE 500 MG/1
TABLET, EXTENDED RELEASE ORAL
Qty: 90 TABLET | Refills: 1 | Status: SHIPPED | OUTPATIENT
Start: 2022-08-23

## 2022-08-23 ASSESSMENT — PATIENT HEALTH QUESTIONNAIRE - PHQ9
10. IF YOU CHECKED OFF ANY PROBLEMS, HOW DIFFICULT HAVE THESE PROBLEMS MADE IT FOR YOU TO DO YOUR WORK, TAKE CARE OF THINGS AT HOME, OR GET ALONG WITH OTHER PEOPLE: 1
SUM OF ALL RESPONSES TO PHQ QUESTIONS 1-9: 16
1. LITTLE INTEREST OR PLEASURE IN DOING THINGS: 2
SUM OF ALL RESPONSES TO PHQ QUESTIONS 1-9: 16
2. FEELING DOWN, DEPRESSED OR HOPELESS: 2
5. POOR APPETITE OR OVEREATING: 1
SUM OF ALL RESPONSES TO PHQ QUESTIONS 1-9: 16
3. TROUBLE FALLING OR STAYING ASLEEP: 3
SUM OF ALL RESPONSES TO PHQ QUESTIONS 1-9: 16
6. FEELING BAD ABOUT YOURSELF - OR THAT YOU ARE A FAILURE OR HAVE LET YOURSELF OR YOUR FAMILY DOWN: 0
8. MOVING OR SPEAKING SO SLOWLY THAT OTHER PEOPLE COULD HAVE NOTICED. OR THE OPPOSITE, BEING SO FIGETY OR RESTLESS THAT YOU HAVE BEEN MOVING AROUND A LOT MORE THAN USUAL: 3
SUM OF ALL RESPONSES TO PHQ9 QUESTIONS 1 & 2: 4
9. THOUGHTS THAT YOU WOULD BE BETTER OFF DEAD, OR OF HURTING YOURSELF: 0
4. FEELING TIRED OR HAVING LITTLE ENERGY: 2
7. TROUBLE CONCENTRATING ON THINGS, SUCH AS READING THE NEWSPAPER OR WATCHING TELEVISION: 3

## 2022-08-23 ASSESSMENT — LIFESTYLE VARIABLES
HOW MANY STANDARD DRINKS CONTAINING ALCOHOL DO YOU HAVE ON A TYPICAL DAY: PATIENT DOES NOT DRINK
HOW OFTEN DO YOU HAVE A DRINK CONTAINING ALCOHOL: NEVER

## 2022-08-23 NOTE — PROGRESS NOTES
evaluation/treatment for this issue     Depression:  PHQ-2 Score: 4  PHQ-9 Total Score: 16    Severity:1-4 = minimal depression, 5-9 = mild depression, 10-14 = moderate depression, 15-19 = moderately severe depression, 20-27 = severe depression  Depression Interventions:  LPN INTERVENTION GUIDE: POSITIVE ANSWER TO SELF-HARM OR SUICIDE QUESTION: PCP CONSULTED DURING VISIT          General Health and ACP:  General  In general, how would you say your health is?: Fair  In the past 7 days, have you experienced any of the following: New or Increased Pain, New or Increased Fatigue, Loneliness, Social Isolation, Stress or Anger?: (!) Yes  Select all that apply: (!) Anger  Do you get the social and emotional support that you need?: Yes  Do you have a Living Will?: (!) No    Advance Directives       Power of  Living Will ACP-Advance Directive ACP-Power of     Not on File Not on File Not on File Not on File          General Health Risk Interventions:  No Living Will: Patient referred to [place] Habits/Nutrition:  Physical Activity: Insufficiently Active    Days of Exercise per Week: 7 days    Minutes of Exercise per Session: 20 min     Have you lost any weight without trying in the past 3 months?: No  Body mass index: (!) 37.08  Have you seen the dentist within the past year?: Yes  Health Habits/Nutrition Interventions:  Inadequate physical activity:  patient is not ready to increase his/her physical activity level at this time    Hearing/Vision:  Do you or your family notice any trouble with your hearing that hasn't been managed with hearing aids?: (!) Yes  Do you have difficulty driving, watching TV, or doing any of your daily activities because of your eyesight?: No  Have you had an eye exam within the past year?: Appointment is scheduled  No results found.   Hearing/Vision Interventions:  Hearing concerns:  patient declines any further evaluation/treatment for hearing issues    Safety:  Do you have working smoke detectors?: Yes  Do you have any tripping hazards - loose or unsecured carpets or rugs?: No  Do you have any tripping hazards - clutter in doorways, halls, or stairs?: No  Do you have either shower bars, grab bars, non-slip mats or non-slip surfaces in your shower or bathtub?: Yes  Do all of your stairways have a railing or banister?: (!) No  Do you always fasten your seatbelt when you are in a car?: Yes  Safety Interventions:  Home safety tips provided    ADLs:  In the past 7 days, did you need help from others to perform any of the following everyday activities: Eating, dressing, grooming, bathing, toileting, or walking/balance?: (!) Yes  Select all that apply: (!) Walking/Balance  In the past 7 days, did you need help from others to take care of any of the following: Laundry, housekeeping, banking/finances, shopping, telephone use, food preparation, transportation, or taking medications?: (!) Yes  Select all that apply: (!) Food Preparation  ADL Interventions:  Referred for Care Coordination          Objective   Vitals:    08/23/22 0952   BP: (!) 130/98   Pulse: (!) 115   Temp: 97.1 °F (36.2 °C)   TempSrc: Temporal   SpO2: 95%   Weight: 236 lb 12.8 oz (107.4 kg)   Height: 5' 7\" (1.702 m)      Body mass index is 37.09 kg/m².       General Appearance: alert and oriented to person, place and time, well developed and well- nourished, in no acute distress  Skin: warm and dry, no rash or erythema  Head: normocephalic and atraumatic  Eyes: pupils equal, round, and reactive to light, extraocular eye movements intact, conjunctivae normal  ENT: tympanic membrane, external ear and ear canal normal bilaterally, nose without deformity, nasal mucosa and turbinates normal without polyps  Neck: supple and non-tender without mass, no thyromegaly or thyroid nodules, no cervical lymphadenopathy  Pulmonary/Chest: clear to auscultation bilaterally- no wheezes, rales or rhonchi, normal air movement, no respiratory distress  Cardiovascular: normal rate, regular rhythm, normal S1 and S2, no murmurs, rubs, clicks, or gallops, distal pulses intact, no carotid bruits  Abdomen: soft, non-tender, non-distended, normal bowel sounds, no masses or organomegaly  Extremities: no cyanosis, clubbing or edema  Musculoskeletal: normal range of motion, no joint swelling, deformity or tenderness  Neurologic: reflexes normal and symmetric, no cranial nerve deficit, gait, coordination and speech normal       Allergies   Allergen Reactions    Tape [Adhesive Tape]      Surgical tape     Prior to Visit Medications    Medication Sig Taking?  Authorizing Provider   metFORMIN (GLUCOPHAGE-XR) 500 MG extended release tablet TAKE 1 TABLET BY MOUTH DAILY WITH LARGEST MEAL OF THE DAY Yes RADHA Bradley   ARIPiprazole (ABILIFY) 2 MG tablet TAKE 1 TABLET BY MOUTH EVERY DAY AT NIGHT Yes RADHA Bradley   TRULICITY 1.51 TI/4.0NB SOPN INJECT 0.75MG INTO THE SKIN ONCE A WEEK Yes RADHA Bradley   ondansetron (ZOFRAN ODT) 4 MG disintegrating tablet Take 1 tablet by mouth every 8 hours as needed for Nausea or Vomiting Yes Juan Acevedo MD   gabapentin (NEURONTIN) 300 MG capsule TAKE 2 CAPSULES BY MOUTH IN THE MORNING & 1 CAP AT NOON, THEN 2 CAPS AT BEDTIME Yes RADHA Bradley   JANUVIA 100 MG tablet TAKE 1 TABLET BY MOUTH EVERY DAY Yes RADHA Bradley   fluticasone (FLONASE) 50 MCG/ACT nasal spray USE 1 SPRAY IN EACH NOSTRIL TWICE A DAY Yes RADHA Bradley   propranolol (INDERAL) 10 MG tablet TAKE 1 TABLET BY MOUTH THREE TIMES A DAY Yes RADHA Bradley   diclofenac sodium (VOLTAREN) 1 % GEL APPLY 4GMS TOPICALLY TO AFFECTED AREA 4 TIMES DAILY Yes RADHA Bradley   omeprazole (PRILOSEC) 20 MG delayed release capsule TAKE 1 CAPSULE BY MOUTH EVERY DAY Yes RADHA Bradley   lisinopril (PRINIVIL;ZESTRIL) 20 MG tablet TAKE 1 TABLET BY MOUTH TWICE A DAY Yes RADHA Dale Arm, CNP   famotidine (PEPCID) 20 MG tablet TAKE 1 TABLET BY MOUTH NIGHTLY Yes RADHA Duke   WIXELA INHUB 100-50 MCG/DOSE diskus inhaler TAKE 1 PUFF BY MOUTH TWICE A DAY Yes RADHA Dale Arm, CNP   blood glucose test strips (TRUE METRIX BLOOD GLUCOSE TEST) strip TEST 2 TIMES A DAY & AS NEEDED FOR SYMPTOMS OF IRREGULAR BLOOD GLUCOSE. Yes RADHA Duke   Lancets MISC 1 each by Does not apply route 2 times daily Yes RADHA Duke   Blood Glucose Monitoring Suppl (BLOOD GLUCOSE MONITOR SYSTEM) w/Device KIT 1 Device by Does not apply route 2 times daily Yes RADHA Duke   buPROPion [facility]) 100 MG extended release tablet Take 1 tablet by mouth daily Yes RADHA Dale Arm, CNP   meloxicam (MOBIC) 15 MG tablet Take 7.5 mg by mouth daily  Yes Historical Provider, MD   guaiFENesin (MUCINEX PO) Take by mouth nightly Yes Historical Provider, MD   ipratropium-albuterol (DUONEB) 0.5-2.5 (3) MG/3ML SOLN nebulizer solution INHALE ONE AMPULE INTO THE LUNGS EVERY 6 HOURS AS NEEDED FOR SHORTNESS OF BREATH Yes RADHA Duke   Respiratory Therapy Supplies LUPE cpap supplies x 1 year Yes RADHA Duke   HYDROcodone-acetaminophen (NORCO) 7.5-325 MG per tablet Take 1 tablet by mouth 3 times daily as needed for Pain (Dr. Darron Caro).   Yes Historical Provider, MD   busPIRone (BUSPAR) 10 MG tablet Take 10 mg by mouth 3 times daily as needed Yes Historical Provider, MD Garcia (Including outside providers/suppliers regularly involved in providing care):   Patient Care Team:  RADAH Duke as PCP - General (Family Nurse Practitioner)  RADHA Duke as PCP - [facility] Empaneled Provider  Sanchez Borrego MD as Referring Physician (Hematology and Oncology)  Omaira Calvillo MD as Consulting Physician (Gastroenterology)  RADHA Gold Si (Family Nurse Practitioner)  Carrie Briseno MD as Referring Physician (Family Medicine)     Reviewed and updated this visit:  Allergies  Meds  Med Hx  Surg Hx  Soc Hx  Fam Hx               Cardiovascular Disease Risk Counseling: Assessed the patient's risk to develop cardiovascular disease and reviewed main risk factors. Reviewed steps to reduce disease risk including:   Quitting tobacco use, reducing amount smoked, or not starting the habit  Making healthy food choices  Being physically active and gradualy increasing activity levels   Reduce weight and determine a healthy BMI goal  Monitor blood pressure and treat if higher than 140/90 mmHg  Maintain blood total cholesterol levels under 5 mmol/l or 190 mg/dl  Maintain LDL cholesterol levels under 3.0 mmol/l or 115 mg/dl   Control blood glucose levels  Consider taking aspirin (75 mg daily), once blood pressure is controlled   Provided a follow up plan. Time spent (minutes): 15    Obesity Counseling: Assessed behavioral health risks and factors affecting choice of behavior. Suggested weight control approaches, including dietary changes behavioral modification and follow up plan. Provided educational and support documentation.   Time spent (minutes): 15

## 2022-08-23 NOTE — PATIENT INSTRUCTIONS
Increase trulicity to 1.5 weekly. Body Mass Index: Care Instructions  Your Care Instructions     Body mass index (BMI) can help you see if your weight is raising your risk for health problems. It uses a formula to compare how much you weigh with how tallyou are. A BMI lower than 18.5 is considered underweight. A BMI between 18.5 and 24.9 is considered healthy. A BMI between 25 and 29.9 is considered overweight. A BMI of 30 or higher is considered obese. If your BMI is in the normal range, it means that you have a lower risk for weight-related health problems. If your BMI is in the overweight or obese range, you may be at increased risk for weight-related health problems, such as high blood pressure, heart disease, stroke, arthritis or joint pain, and diabetes. If your BMI is in the underweight range, you may be at increased risk for health problems such as fatigue, lower protection (immunity) againstillness, muscle loss, bone loss, hair loss, and hormone problems. BMI is just one measure of your risk for weight-related health problems. You may be at higher risk for health problems if you are not active, you eat anunhealthy diet, or you drink too much alcohol or use tobacco products. Follow-up care is a key part of your treatment and safety. Be sure to make and go to all appointments, and call your doctor if you are having problems. It's also a good idea to know your test results and keep alist of the medicines you take. How can you care for yourself at home? Practice healthy eating habits. This includes eating plenty of fruits, vegetables, whole grains, lean protein, and low-fat dairy. If your doctor recommends it, get more exercise. Walking is a good choice. Bit by bit, increase the amount you walk every day. Try for at least 30 minutes on most days of the week. Do not smoke. Smoking can increase your risk for health problems.  If you need help quitting, talk to your doctor about stop-smoking Instructions  Your Care Instructions     Eating healthy foods can help lower your risk for disease. Healthy food gives you energy and keeps your heart strong, your brain active, your musclesworking, and your bones strong. A healthy diet includes a variety of foods from the basic food groups: grains, vegetables, fruits, milk and milk products, and meat and beans. Some people may eat more of their favorite foods from only one food group and, as a result, miss getting the nutrients they need. So, it is important to pay attention not only to what you eat but also to what you are missing from your diet. You caneat a healthy, balanced diet by making a few small changes. Follow-up care is a key part of your treatment and safety. Be sure to make and go to all appointments, and call your doctor if you are having problems. It's also a good idea to know your test results and keep alist of the medicines you take. How can you care for yourself at home? Look at what you eat  Keep a food diary for a week or two and record everything you eat or drink. Track the number of servings you eat from each food group. For a balanced diet every day, eat a variety of:  6 or more ounce-equivalents of grains, such as cereals, breads, crackers, rice, or pasta, every day. An ounce-equivalent is 1 slice of bread, 1 cup of ready-to-eat cereal, or ½ cup of cooked rice, cooked pasta, or cooked cereal.  2½ cups of vegetables, especially:  Dark-green vegetables such as broccoli and spinach. Orange vegetables such as carrots and sweet potatoes. Dry beans (such as lorenzo and kidney beans) and peas (such as lentils). 2 cups of fresh, frozen, or canned fruit. A small apple or 1 banana or orange equals 1 cup.  3 cups of nonfat or low-fat milk, yogurt, or other milk products. 5½ ounces of meat and beans, such as chicken, fish, lean meat, beans, nuts, and seeds.  One egg, 1 tablespoon of peanut butter, ½ ounce nuts or seeds, or ¼ cup of cooked beans fried or breaded items. Make healthy choices easy  Buy packaged, prewashed, ready-to-eat fresh vegetables and fruits, such as baby carrots, salad mixes, and chopped or shredded broccoli and cauliflower. Buy packaged, presliced fruits, such as melon or pineapple. Choose 100% fruit or vegetable juice instead of soda. Limit juice intake to 4 to 6 oz (½ to ¾ cup) a day. Blend low-fat yogurt, fruit juice, and canned or frozen fruit to make a smoothie for breakfast or a snack. Where can you learn more? Go to https://[email or web address].[email or web address]. org and sign in to your [employer] account. Enter R869 in the [employer] box to learn more about \"Eating Healthy Foods: Care Instructions. \"     If you do not have an account, please click on the \"Sign Up Now\" link. Current as of: September 8, 2021               Content Version: 13.3  © 2006-2022 Healthwise, [employer]. Care instructions adapted under license by [facility] ([facility]). If you have questions about a medical condition or this instruction, always ask your healthcare professional. [place] [address] any warranty or liability for your use of this information. Personalized Preventive Plan for Xochitl Kaufman - 8/23/2022  Medicare offers a range of preventive health benefits. Some of the tests and screenings are paid in full while other may be subject to a deductible, co-insurance, and/or copay. Some of these benefits include a comprehensive review of your medical history including lifestyle, illnesses that may run in your family, and various assessments and screenings as appropriate. After reviewing your medical record and screening and assessments performed today your provider may have ordered immunizations, labs, imaging, and/or referrals for you. A list of these orders (if applicable) as well as your Preventive Care list are included within your After Visit Summary for your review.     Other Preventive Recommendations:    A preventive eye exam performed by an eye specialist is recommended every 1-2 years to screen for glaucoma; cataracts, macular degeneration, and other eye disorders. A preventive dental visit is recommended every 6 months. Try to get at least 150 minutes of exercise per week or 10,000 steps per day on a pedometer . Order or download the FREE \"Exercise & Physical Activity: Your Everyday Guide\" from [employer]. Call 0-680.308.2901 or search [employer] online. You need 3337-1660 mg of calcium and 8746-9842 IU of vitamin D per day. It is possible to meet your calcium requirement with diet alone, but a vitamin D supplement is usually necessary to meet this goal.  When exposed to the sun, use a sunscreen that protects against both UVA and UVB radiation with an SPF of 30 or greater. Reapply every 2 to 3 hours or after sweating, drying off with a towel, or swimming. Always wear a seat belt when traveling in a car. Always wear a helmet when riding a bicycle or motorcycle.

## 2022-08-23 NOTE — PROGRESS NOTES
After obtaining consent, and per orders of RADHA Amado, injection of hep b and pneumovax 23 given in Right deltoid by [facility]. Patient instructed to remain in clinic for 20 minutes afterwards, and to report any adverse reaction to me immediately.

## 2022-08-27 DIAGNOSIS — T45.1X5A CHEMOTHERAPY-INDUCED NEUROPATHY (HCC): ICD-10-CM

## 2022-08-27 DIAGNOSIS — G62.0 CHEMOTHERAPY-INDUCED NEUROPATHY (HCC): ICD-10-CM

## 2022-08-29 NOTE — TELEPHONE ENCOUNTER
Edson Hale called to request a refill on her medication. Last office visit : 8/23/2022   Next office visit : 9/27/2022     Last UDS:   Amphetamine Screen, Urine   Date Value Ref Range Status   04/02/2021 -  Final     Barbiturate Screen, Urine   Date Value Ref Range Status   04/02/2021 -  Final     Benzodiazepine Screen, Urine   Date Value Ref Range Status   04/02/2021 -  Final     Buprenorphine Urine   Date Value Ref Range Status   04/02/2021 -  Final     Cocaine Metabolite Screen, Urine   Date Value Ref Range Status   04/02/2021 -  Final     Gabapentin Screen, Urine   Date Value Ref Range Status   04/02/2021 -  Final     MDMA, Urine   Date Value Ref Range Status   04/02/2021 -  Final     Methamphetamine, Urine   Date Value Ref Range Status   04/02/2021 -  Final     Opiate Scrn, Ur   Date Value Ref Range Status   04/02/2021 -  Final     Oxycodone Screen, Ur   Date Value Ref Range Status   04/02/2021 -  Final     PCP Screen, Urine   Date Value Ref Range Status   04/02/2021 -  Final     Propoxyphene Screen, Urine   Date Value Ref Range Status   04/02/2021 -  Final     THC Screen, Urine   Date Value Ref Range Status   04/02/2021 -  Final     Tricyclic Antidepressants, Urine   Date Value Ref Range Status   04/02/2021 -  Final       Last [employer]: 8/2/22  Medication Contract: 4/12/22   Last Fill: 8/4/22    Requested Prescriptions     Pending Prescriptions Disp Refills    gabapentin (NEURONTIN) 300 MG capsule [Pharmacy Med Name: GABAPENTIN 300 MG CAPSULE] 150 capsule 0     Sig: TAKE 2 CAPSULES BY MOUTH IN THE MORNING & 1 CAP AT NOON, THEN 2 CAPS AT BEDTIME         Please approve or refuse this medication.    [employer]

## 2022-08-31 RX ORDER — GABAPENTIN 300 MG/1
CAPSULE ORAL
Qty: 150 CAPSULE | Refills: 0 | Status: SHIPPED | OUTPATIENT
Start: 2022-08-31 | End: 2022-11-04

## 2022-09-01 DIAGNOSIS — E11.00 TYPE 2 DIABETES MELLITUS WITH HYPEROSMOLARITY WITHOUT COMA, WITHOUT LONG-TERM CURRENT USE OF INSULIN (HCC): ICD-10-CM

## 2022-09-01 DIAGNOSIS — M19.90 OSTEOARTHRITIS, UNSPECIFIED OSTEOARTHRITIS TYPE, UNSPECIFIED SITE: ICD-10-CM

## 2022-09-01 NOTE — TELEPHONE ENCOUNTER
Kerline Hart called requesting a refill of the below medication which has been pended for you:     Requested Prescriptions     Pending Prescriptions Disp Refills    diclofenac sodium (VOLTAREN) 1 % GEL [Pharmacy Med Name: DICLOFENAC SODIUM 1% GEL] 400 g 1     Sig: APPLY 4GMS TOPICALLY TO AFFECTED AREA 4 TIMES DAILY    hydrOXYzine HCl (ATARAX) 25 MG tablet [Pharmacy Med Name: HYDROXYZINE HCL 25 MG TABLET] 90 tablet 0     Sig: TAKE 1 TABLET BY MOUTH NIGHTLY       Last Appointment Date: 8/23/2022  Next Appointment Date: 9/27/2022    Allergies   Allergen Reactions    Tape Tommy Standing Tape]      Surgical tape

## 2022-09-01 NOTE — TELEPHONE ENCOUNTER
Almena Robpeggy called to request a refill on her medication. Last office visit : 8/23/2022   Next office visit : 9/27/2022     Requested Prescriptions     Pending Prescriptions Disp Refills    blood glucose test strips (TRUE METRIX BLOOD GLUCOSE TEST) strip 50 strip 3     Sig: TEST 2 TIMES A DAY & AS NEEDED FOR SYMPTOMS OF IRREGULAR BLOOD GLUCOSE.             [employer]

## 2022-09-02 RX ORDER — HYDROXYZINE HYDROCHLORIDE 25 MG/1
25 TABLET, FILM COATED ORAL NIGHTLY
Qty: 90 TABLET | Refills: 0 | Status: SHIPPED | OUTPATIENT
Start: 2022-09-02 | End: 2022-10-02

## 2022-09-02 RX ORDER — CALCIUM CITRATE/VITAMIN D3 200MG-6.25
TABLET ORAL
Qty: 50 STRIP | Refills: 3 | Status: SHIPPED | OUTPATIENT
Start: 2022-09-02

## 2022-09-16 ENCOUNTER — TELEPHONE (OUTPATIENT)
Dept: PRIMARY CARE CLINIC | Age: 57
End: 2022-09-16

## 2022-09-16 DIAGNOSIS — E11.00 TYPE 2 DIABETES MELLITUS WITH HYPEROSMOLARITY WITHOUT COMA, WITHOUT LONG-TERM CURRENT USE OF INSULIN (HCC): Primary | ICD-10-CM

## 2022-09-16 NOTE — TELEPHONE ENCOUNTER
Sania Panda called to request a refill on her medication. Last office visit : 2022   Next office visit : 2022     Requested Prescriptions     Pending Prescriptions Disp Refills    Blood Glucose Monitoring Suppl (ONE TOUCH ULTRA 2) w/Device KIT 1 kit 0     Si kit by Does not apply route daily    blood glucose test strips (ASCENSIA AUTODISC VI;ONE TOUCH ULTRA TEST VI) strip 100 each 3     Si each by In Vitro route daily As needed.             [employer]

## 2022-09-16 NOTE — TELEPHONE ENCOUNTER
Patient called back and I informed her that she would need to call her insurance to see which machine/device they will cover. I said that once she did that to let me know so I can pend it to RADHA العراقي. Patient thanked me and verbalized understanding.

## 2022-09-16 NOTE — TELEPHONE ENCOUNTER
I attempted to call patient to see which machine she needed. I was unable to leave a message on her v/m because it had not been set up yet.

## 2022-09-16 NOTE — TELEPHONE ENCOUNTER
Patient called in stating she needed more tests strips, however they are costly. She said pharmacy recommended getting a new machine/ one touch? They named another however she can not remember? ?  Please call and advise      [facility] from [place]

## 2022-09-19 RX ORDER — BLOOD-GLUCOSE METER
1 EACH MISCELLANEOUS DAILY
Qty: 1 KIT | Refills: 0 | Status: SHIPPED | OUTPATIENT
Start: 2022-09-19

## 2022-09-21 RX ORDER — GLUCOSAMINE HCL/CHONDROITIN SU 500-400 MG
CAPSULE ORAL
Qty: 100 STRIP | Refills: 1 | Status: SHIPPED | OUTPATIENT
Start: 2022-09-21

## 2022-09-21 RX ORDER — LANCETS 30 GAUGE
1 EACH MISCELLANEOUS DAILY
Qty: 100 EACH | Refills: 1 | Status: SHIPPED | OUTPATIENT
Start: 2022-09-21

## 2022-09-21 NOTE — TELEPHONE ENCOUNTER
Mariella Haynes called to request a refill on her medication. Last office visit : 2022   Next office visit : 2022     Requested Prescriptions     Pending Prescriptions Disp Refills    blood glucose monitor kit and supplies 1 kit 0     Sig: Dispense sufficient amount for indicated testing frequency plus additional to accommodate PRN testing needs. Dispense all needed supplies to include: monitor, strips, lancing device, lancets, control solutions, alcohol swabs. blood glucose monitor strips 100 strip 1     Sig: Test 2 times a day & as needed for symptoms of irregular blood glucose. Dispense sufficient amount for indicated testing frequency plus additional to accommodate PRN testing needs.     Lancets MISC 100 each 1     Si each by Does not apply route daily     Pended in note to pharmacy patient is requesting one touch ultra 2        Cottie Cockayne, MA

## 2022-09-26 ENCOUNTER — CLINICAL DOCUMENTATION (OUTPATIENT)
Dept: SPIRITUAL SERVICES | Age: 57
End: 2022-09-26

## 2022-09-26 NOTE — ACP (ADVANCE CARE PLANNING)
Advance Care Planning   Advance Care Planning Note  Ambulatory Spiritual Care Services    Date:  9/26/2022    Received request from Luke Masters. Consultation conversation participants:   Patient who understands ACP conversation     Goals of ACP Conversation:  Discuss advance care planning documents  Complete Living Will    Health Care Decision Makers:      Primary Decision MakeMarc Murillo - [phone]   Summary:  Completed [facility]    Advance Care Planning Documents (Patient Wishes)  Currently on file:   Living Will/Advance Directive    Assessment:   Patient is troubled woman who deals with pain and lives with the experience of her 's death, which traumatized her. She finds song in her daughter and grandchildren, and is confident that her daughter would take care of her if necessary. Her physical pain and emotional wounds are a burden which likely influenced her decision to reject a vent or CPR if the situation arose. Provided education on documents for clarity and greater understanding  Assisted in the completion of documents according to patient's wishes at this time  Encouraged ongoing ACP conversation with future decision makers and loved ones    Care Preferences Communicated:     Hospitalization:  If the patient's health worsens and it becomes clear that the chance of recovery is unlikely,     the patient prefers comfort-focused treatment without hospitalization. Ventilation:   If the patient, in their present state of health, suddenly became very ill and unable to breathe on their own,     the patient would NOT desire the use of a ventilator (breathing machine). If their health worsens and it becomes clear that the change of recovery is unlikely,     the patient would NOT desire the use of a ventilator (breathing machine).     Resuscitation:  In the event the patient's heart stopped as a result of an underlying serious health condition, the patient communicates a preference for      a natural death (no CPR). Outcomes:  ACP Discussion: Completed  New advance directive completed. Returned original document(s) to patient, as well as copies for distribution to appointed agents  Copy of advance directive given to staff to scan into medical record. Routed ACP note to attending provider or other IDT member.     Patient / Healthcare Decision Maker Instructions:  Review completed ACP document(s) and update, if needed, with changes health or future preferences    Electronically signed by Chaplain Kenzie on 9/26/2022 at 6:56 PM.

## 2022-09-29 DIAGNOSIS — L73.9 FOLLICULITIS OF RIGHT AXILLA: Primary | ICD-10-CM

## 2022-09-29 DIAGNOSIS — L02.411 ABSCESS OF RIGHT AXILLA: ICD-10-CM

## 2022-10-01 ENCOUNTER — HOSPITAL ENCOUNTER (EMERGENCY)
Age: 57
Discharge: HOME | End: 2022-10-01
Payer: MEDICARE

## 2022-10-01 VITALS
DIASTOLIC BLOOD PRESSURE: 86 MMHG | TEMPERATURE: 98.6 F | HEART RATE: 118 BPM | RESPIRATION RATE: 17 BRPM | SYSTOLIC BLOOD PRESSURE: 122 MMHG | OXYGEN SATURATION: 95 %

## 2022-10-01 DIAGNOSIS — L02.411 ABSCESS OF RIGHT AXILLA: Primary | ICD-10-CM

## 2022-10-01 PROCEDURE — 99282 EMERGENCY DEPT VISIT SF MDM: CPT

## 2022-10-01 PROCEDURE — 87075 CULTR BACTERIA EXCEPT BLOOD: CPT

## 2022-10-01 PROCEDURE — 87077 CULTURE AEROBIC IDENTIFY: CPT

## 2022-10-01 PROCEDURE — 10060 I&D ABSCESS SIMPLE/SINGLE: CPT

## 2022-10-01 PROCEDURE — 87205 SMEAR GRAM STAIN: CPT

## 2022-10-01 PROCEDURE — 87070 CULTURE OTHR SPECIMN AEROBIC: CPT

## 2022-10-01 ASSESSMENT — ENCOUNTER SYMPTOMS
SHORTNESS OF BREATH: 0
ABDOMINAL PAIN: 0

## 2022-10-01 NOTE — ED PROVIDER NOTES
[facility] EMERGENCY DEPT  eMERGENCY dEPARTMENT eNCOUnter      Pt Name: Traci Dean  MRN: [id number]  [place] 1965  Date of evaluation: 10/1/2022  Provider: Mary Rob Che, Dr       Chief Complaint   Patient presents with    Abscess     Abscess under right arm x3-4 months         HISTORY OF PRESENT ILLNESS   (Location/Symptom, Timing/Onset,Context/Setting, Quality, Duration, Modifying Factors, Severity)  Note limiting factors. Traci Dean is a 61 y.o. female with history including obesity, breast cancer status post bilateral breast reconstruction, depression, anxiety, type 2 diabetes, mood swings, osteoarthritis who presents to the emergency department with complaint of abscess. Patient states she is been dealing with an abscess under the right axilla for the last 3 to 4 months. She is taken 2 different courses of antibiotic which have not improved her symptoms. She also notes that her daughter has \"poked it with a pen a few times. \"  She denies any associated fever, chills, shoulder pain or decrease in range of motion, chest pain, or shortness of breath. She denies any history of abscesses. NursingNotes were reviewed. REVIEW OF SYSTEMS    (2-9 systems for level 4, 10 or more for level 5)     Review of Systems   Constitutional:  Negative for chills and fever. Respiratory:  Negative for shortness of breath. Cardiovascular:  Negative for chest pain. Gastrointestinal:  Negative for abdominal pain. Musculoskeletal:  Negative for arthralgias and joint swelling. Skin:  Positive for wound. All other systems reviewed and are negative.          PAST MEDICALHISTORY     Past Medical History:   Diagnosis Date    Allergic rhinitis     Anxiety     Breast cancer ([facility] [address] 10/2010    Left    Bronchitis     Carpal tunnel syndrome     R and L    Chest pain     Chronic back pain     Chronic bronchitis (HCC)     Depression     Diabetes mellitus (HCC)     GERD (gastroesophageal reflux disease) Headache(784.0)     Heart palpitations     Mononeuropathy     Osteoarthritis     Sleep apnea     Wears glasses          SURGICAL HISTORY       Past Surgical History:   Procedure Laterality Date    ANKLE FRACTURE SURGERY Right 2014    [address]    BREAST RECONSTRUCTION  06/2011    BREAST SURGERY  06/2011    emergency removal of Left breast expander    COLONOSCOPY N/A 02/05/2016    Dr Franklyn Gentile Awa x 1, serrated AP x 1, 12 month recall    COLONOSCOPY N/A 01/06/2021    Dr Reji Sheikh yr recall    EGD COLONOSCOPY N/A 06/03/2016    w/empiric dilation to 48 French (16mm)-Dr RONY Childers, Nafisa (-)    HYSTERECTOMY (CERVIX STATUS UNKNOWN)      MASTECTOMY, BILATERAL Bilateral 03/2011    OTHER SURGICAL HISTORY      R leg & ankle fracture due to fall repair-Dr Dakota Jimenez at [facility]    UPPER GASTROINTESTINAL ENDOSCOPY  06/03/2016    w/empiric dilation to 48 French (16mm)-Dr RONY Childers, Nafisa (-)    UPPER GASTROINTESTINAL ENDOSCOPY N/A 01/06/2021    Dr Franklyn Childers-w/brushings-Candidal esophagitis-Treated with Diflucan         CURRENT MEDICATIONS     Discharge Medication List as of 10/1/2022  6:00 PM        CONTINUE these medications which have NOT CHANGED    Details   blood glucose monitor kit and supplies Dispense sufficient amount for indicated testing frequency plus additional to accommodate PRN testing needs. Dispense all needed supplies to include: monitor, strips, lancing device, lancets, control solutions, alcohol swabs., Disp-1 kit, R-0, Normal      !! blood glucose monitor strips Test 2 times a day & as needed for symptoms of irregular blood glucose. Dispense sufficient amount for indicated testing frequency plus additional to accommodate PRN testing needs. , Disp-100 strip, R-1, Normal      !!  Lancets MISC DAILY Starting Wed 9/21/2022, Disp-100 each, R-1, Normal      !! blood glucose test strips (TRUE METRIX BLOOD GLUCOSE TEST) strip TEST 2 TIMES A DAY & AS NEEDED FOR SYMPTOMS OF IRREGULAR BLOOD GLUCOSE., Disp-50 strip, R-3Normal      Blood Glucose Monitoring Suppl (ONE TOUCH ULTRA 2) w/Device KIT DAILY Starting Mon 9/19/2022, Disp-1 kit, R-0, Normal      !! blood glucose test strips (ASCENSIA AUTODISC VI;ONE TOUCH ULTRA TEST VI) strip DAILY Starting Mon 9/19/2022, Disp-100 each, R-3, NormalAs needed. diclofenac sodium (VOLTAREN) 1 % GEL APPLY 4GMS TOPICALLY TO AFFECTED AREA 4 TIMES DAILY, Disp-400 g, R-1, Normal      hydrOXYzine HCl (ATARAX) 25 MG tablet TAKE 1 TABLET BY MOUTH NIGHTLY, Disp-90 tablet, R-0Normal      gabapentin (NEURONTIN) 300 MG capsule TAKE 2 CAPSULES BY MOUTH IN THE MORNING & 1 CAP AT NOON, THEN 2 CAPS AT BEDTIME, Disp-150 capsule, R-0Normal      metFORMIN (GLUCOPHAGE-XR) 500 MG extended release tablet TAKE 1 TABLET BY MOUTH DAILY WITH LARGEST MEAL OF THE DAY, Disp-90 tablet, R-1Normal      ARIPiprazole (ABILIFY) 2 MG tablet TAKE 1 TABLET BY MOUTH EVERY DAY AT NIGHT, Disp-30 tablet, R-1Normal      ondansetron (ZOFRAN ODT) 4 MG disintegrating tablet Take 1 tablet by mouth every 8 hours as needed for Nausea or Vomiting, Disp-20 tablet, R-0Normal      fluticasone (FLONASE) 50 MCG/ACT nasal spray USE 1 SPRAY IN EACH NOSTRIL TWICE A DAY, Disp-1 each, R-1Normal      propranolol (INDERAL) 10 MG tablet TAKE 1 TABLET BY MOUTH THREE TIMES A DAY, Disp-270 tablet, R-1Normal      omeprazole (PRILOSEC) 20 MG delayed release capsule TAKE 1 CAPSULE BY MOUTH EVERY DAY, Disp-90 capsule, R-1Normal      lisinopril (PRINIVIL;ZESTRIL) 20 MG tablet TAKE 1 TABLET BY MOUTH TWICE A DAY, Disp-180 tablet, R-3Normal      famotidine (PEPCID) 20 MG tablet TAKE 1 TABLET BY MOUTH NIGHTLY, Disp-90 tablet, R-3Normal      WIXELA INHUB 100-50 MCG/DOSE diskus inhaler TAKE 1 PUFF BY MOUTH TWICE A DAY, Disp-180 each, R-2Normal      !!  Lancets MISC 2 TIMES DAILY Starting Mon 6/14/2021, Disp-100 each, R-5, Normal      buPROPion (WELLBUTRIN SR) 100 MG extended release tablet Take 1 tablet by mouth daily, Disp-30 tablet, R-0Normal      meloxicam (MOBIC) 15 MG tablet Take 7.5 mg by mouth daily Historical Med      guaiFENesin (MUCINEX PO) Take by mouth nightlyHistorical Med      ipratropium-albuterol (DUONEB) 0.5-2.5 (3) MG/3ML SOLN nebulizer solution INHALE ONE AMPULE INTO THE LUNGS EVERY 6 HOURS AS NEEDED FOR SHORTNESS OF BREATH, Disp-360 mL,R-1Normal      Respiratory Therapy Supplies LUPE Disp-1 Device, R-0, [employer] supplies x 1 year      HYDROcodone-acetaminophen (NORCO) 7.5-325 MG per tablet Take 1 tablet by mouth 3 times daily as needed for Pain (Dr. Cass Julien). Historical Med      busPIRone (BUSPAR) 10 MG tablet Take 10 mg by mouth 3 times daily as neededHistorical Med       !! - Potential duplicate medications found. Please discuss with provider. ALLERGIES     Tape Leticia Santizo tape]    FAMILY HISTORY       Family History   Problem Relation Age of Onset    Diabetes Mother     Cancer Mother         breast    Heart Disease Mother     High Blood Pressure Mother     Breast Cancer Mother     Colon Cancer Neg Hx     Colon Polyps Neg Hx     Esophageal Cancer Neg Hx     Liver Cancer Neg Hx     Liver Disease Neg Hx     Rectal Cancer Neg Hx     Stomach Cancer Neg Hx           SOCIAL HISTORY       Social History     Socioeconomic History    Marital status:      Spouse name: None    Number of children: None    Years of education: None    Highest education level: None   Tobacco Use    Smoking status: Former     Packs/day: 0.25     Years: 20.00     Pack years: 5.00     Types: Cigars, Cigarettes     Start date: 2/3/2016     Quit date: 2016     Years since quittin.7    Smokeless tobacco: Never    Tobacco comments:     Smokes Ciggerelos now,1-2 a day.    Vaping Use    Vaping Use: Never used   Substance and Sexual Activity    Alcohol use: No     Alcohol/week: 0.0 standard drinks    Drug use: No     Social Determinants of Health     Physical Activity: Insufficiently Active    Days of Exercise per Week: 7 days    Minutes of Exercise per Session: 20 min       SCREENINGS    [place] Coma Scale  Eye Opening: Spontaneous  Best Verbal Response: Oriented  Best Motor Response: Obeys commands  Mary Coma Scale Score: 15        PHYSICAL EXAM    (up to 7 for level 4, 8 or more for level 5)     ED Triage Vitals [10/01/22 1652]   BP Temp Temp src Heart Rate Resp SpO2 Height Weight   122/86 98.6 °F (37 °C) -- (!) 118 17 95 % -- --       Physical Exam  Vitals and nursing note reviewed. Constitutional:       General: She is not in acute distress. Appearance: Normal appearance. She is normal weight. She is not ill-appearing, toxic-appearing or diaphoretic. HENT:      Head: Normocephalic and atraumatic. Cardiovascular:      Rate and Rhythm: Normal rate and regular rhythm. Pulses: Normal pulses. Pulmonary:      Effort: Pulmonary effort is normal. No respiratory distress. Skin:     General: Skin is warm and dry. Findings: Abscess present. Comments: Right axilla abscess, approx 4 cm, small amount of active drainage, tender, mild surrounding erythema, fluctuance noted, wound culture collected. No associated lymphadenoapthy or joint swelling. Neurological:      General: No focal deficit present. Mental Status: She is alert and oriented to person, place, and time. DIAGNOSTIC RESULTS     LABS:  Labs Reviewed - No data to display    All other labs were within normal range or not returned as of this dictation. EMERGENCY DEPARTMENT COURSE and DIFFERENTIAL DIAGNOSIS/MDM:   Vitals:    Vitals:    10/01/22 1652   BP: 122/86   Pulse: (!) 118   Resp: 17   Temp: 98.6 °F (37 °C)   SpO2: 95%       MDM  Patient is a 70-year-old female who presents the ER with complaint of right axillary abscess. On physical exam, there was fluctuance noted. She has not had an I&D procedure throughout the time of her abscess. She tolerated the procedure well. There was a moderate amount of drainage noted. At this time, I will not restart her on any antibiotic. If she has no significant cellulitis or fever, I do not feel that she would benefit from IV antibiotics or admission. I have stressed that she needs to follow-up in the next 2 to 3 days with her primary care provider for wound check and to pull the packing. We also discussed return precautions and she verbalized understanding. All of her questions were answered. She is agreeable to this treatment plan. PROCEDURES:  Unless otherwise noted below, none     Incision/Drainage    Date/Time: 10/1/2022 6:19 PM  Performed by: ALONSO Okeefe  Authorized by: ALONSO Okeefe     Consent:     Consent obtained:  Verbal    Consent given by:  Patient    Risks, benefits, and alternatives were discussed: yes    Universal protocol:     Patient identity confirmed:  Verbally with patient  Location:     Type:  Abscess    Size:  4 cm    Location: Right axilla. Pre-procedure details:     Skin preparation:  Chlorhexidine with alcohol  Anesthesia:     Anesthesia method:  Local infiltration    Local anesthetic:  Lidocaine 1% w/o epi  Procedure type:     Complexity:  Simple  Procedure details:     Incision types:  Single straight    Incision depth:  Dermal    Wound management:  Probed and deloculated and irrigated with saline    Drainage:  Bloody, purulent and serosanguinous    Drainage amount: Moderate    Packing materials:  1/4 in gauze  Post-procedure details:     Procedure completion:  Tolerated well, no immediate complications    FINAL IMPRESSION      1.  Abscess of right axilla          DISPOSITION/PLAN   DISPOSITION Decision To Discharge 10/01/2022 05:42:01 PM      PATIENT REFERRED TO:  Cookie Schwab, APRN  [address] [place] [address] Dr Alejandro [address] [place] [address] [phone]    In 3 days  For wound re-check         (Please note that portions of this note were completed with a voice recognition program.  Efforts were made to edit thedictations but occasionally words are mis-transcribed.)    ALONSO Okeefe (electronically signed)     Brenda Cardona [facility]  10/01/22 Joe Lopez

## 2022-10-01 NOTE — Clinical Note
Austin Pham was seen and treated in our emergency department on 10/1/2022. She may return to work on 10/04/2022. If you have any questions or concerns, please don't hesitate to call.       Shanthi Keating

## 2022-10-04 ENCOUNTER — TELEPHONE (OUTPATIENT)
Dept: SURGERY | Facility: CLINIC | Age: 57
End: 2022-10-04

## 2022-10-13 DIAGNOSIS — J30.89 ALLERGIC RHINITIS DUE TO OTHER ALLERGIC TRIGGER, UNSPECIFIED SEASONALITY: ICD-10-CM

## 2022-10-13 NOTE — TELEPHONE ENCOUNTER
Bestchip Stark called to request a refill on her medication.       Last office visit : 8/23/2022   Next office visit : Visit date not found     Requested Prescriptions     Pending Prescriptions Disp Refills    fluticasone (FLONASE) 50 MCG/ACT nasal spray [Pharmacy Med Name: FLUTICASONE PROP 50 MCG SPRAY]  1     Sig: SPRAY 1 SPRAY INTO EACH NOSTRIL TWICE A DAY            VAMSHI Shankar

## 2022-10-14 RX ORDER — FLUTICASONE PROPIONATE 50 MCG
SPRAY, SUSPENSION (ML) NASAL
Qty: 1 EACH | Refills: 2 | Status: SHIPPED | OUTPATIENT
Start: 2022-10-14

## 2022-10-23 DIAGNOSIS — M19.90 OSTEOARTHRITIS, UNSPECIFIED OSTEOARTHRITIS TYPE, UNSPECIFIED SITE: ICD-10-CM

## 2022-10-29 DIAGNOSIS — E11.00 TYPE 2 DIABETES MELLITUS WITH HYPEROSMOLARITY WITHOUT COMA, WITHOUT LONG-TERM CURRENT USE OF INSULIN (HCC): ICD-10-CM

## 2022-10-29 RX ORDER — DULAGLUTIDE 1.5 MG/.5ML
INJECTION, SOLUTION SUBCUTANEOUS
Qty: 2 ADJUSTABLE DOSE PRE-FILLED PEN SYRINGE | Refills: 2 | Status: SHIPPED | OUTPATIENT
Start: 2022-10-29

## 2022-11-02 DIAGNOSIS — G62.0 CHEMOTHERAPY-INDUCED NEUROPATHY (HCC): ICD-10-CM

## 2022-11-02 DIAGNOSIS — T45.1X5A CHEMOTHERAPY-INDUCED NEUROPATHY (HCC): ICD-10-CM

## 2022-11-02 NOTE — TELEPHONE ENCOUNTER
Harley Perez called to request a refill on her medication. Last office visit : 8/23/2022   Next office visit : Visit date not found     Last UDS: needs updated   Amphetamine Screen, Urine   Date Value Ref Range Status   04/02/2021 -  Final     Barbiturate Screen, Urine   Date Value Ref Range Status   04/02/2021 -  Final     Benzodiazepine Screen, Urine   Date Value Ref Range Status   04/02/2021 -  Final     Buprenorphine Urine   Date Value Ref Range Status   04/02/2021 -  Final     Cocaine Metabolite Screen, Urine   Date Value Ref Range Status   04/02/2021 -  Final     Gabapentin Screen, Urine   Date Value Ref Range Status   04/02/2021 -  Final     MDMA, Urine   Date Value Ref Range Status   04/02/2021 -  Final     Methamphetamine, Urine   Date Value Ref Range Status   04/02/2021 -  Final     Opiate Scrn, Ur   Date Value Ref Range Status   04/02/2021 -  Final     Oxycodone Screen, Ur   Date Value Ref Range Status   04/02/2021 -  Final     PCP Screen, Urine   Date Value Ref Range Status   04/02/2021 -  Final     Propoxyphene Screen, Urine   Date Value Ref Range Status   04/02/2021 -  Final     THC Screen, Urine   Date Value Ref Range Status   04/02/2021 -  Final     Tricyclic Antidepressants, Urine   Date Value Ref Range Status   04/02/2021 -  Final       Last Zollimildred Tomeka: 11/02/22  Medication Contract: 04/12/22   Last Fill: 09/06/22    Requested Prescriptions     Pending Prescriptions Disp Refills    gabapentin (NEURONTIN) 300 MG capsule [Pharmacy Med Name: GABAPENTIN 300 MG CAPSULE] 150 capsule 0     Sig: TAKE 2 CAPSULES BY MOUTH IN THE MORNING, 1 CAPSULE AT NOON AND TAKE 2 CAPSULES AT BEDTIME         Please approve or refuse this medication.    Kieran Blackmon MA

## 2022-11-03 DIAGNOSIS — K21.9 GASTROESOPHAGEAL REFLUX DISEASE WITHOUT ESOPHAGITIS: ICD-10-CM

## 2022-11-04 RX ORDER — GABAPENTIN 300 MG/1
CAPSULE ORAL
Qty: 150 CAPSULE | Refills: 0 | Status: SHIPPED | OUTPATIENT
Start: 2022-11-04 | End: 2022-12-03

## 2022-11-04 RX ORDER — OMEPRAZOLE 20 MG/1
CAPSULE, DELAYED RELEASE ORAL
Qty: 90 CAPSULE | Refills: 1 | Status: SHIPPED | OUTPATIENT
Start: 2022-11-04

## 2022-11-04 NOTE — TELEPHONE ENCOUNTER
Alisha Baron called to request a refill on her medication.       Last office visit : 8/23/2022   Next office visit : Visit date not found     Requested Prescriptions     Pending Prescriptions Disp Refills    omeprazole (PRILOSEC) 20 MG delayed release capsule [Pharmacy Med Name: OMEPRAZOLE DR 20 MG CAPSULE] 90 capsule 1     Sig: TAKE 1 CAPSULE BY MOUTH EVERY DAY            Shauna Helm MA

## 2022-11-28 DIAGNOSIS — G62.0 CHEMOTHERAPY-INDUCED NEUROPATHY (HCC): ICD-10-CM

## 2022-11-28 DIAGNOSIS — T45.1X5A CHEMOTHERAPY-INDUCED NEUROPATHY (HCC): ICD-10-CM

## 2022-12-02 DIAGNOSIS — K21.9 GASTROESOPHAGEAL REFLUX DISEASE WITHOUT ESOPHAGITIS: ICD-10-CM

## 2022-12-02 DIAGNOSIS — J30.89 ALLERGIC RHINITIS DUE TO OTHER ALLERGIC TRIGGER, UNSPECIFIED SEASONALITY: ICD-10-CM

## 2022-12-02 RX ORDER — GABAPENTIN 300 MG/1
CAPSULE ORAL
Qty: 150 CAPSULE | Refills: 0 | Status: SHIPPED | OUTPATIENT
Start: 2022-12-04 | End: 2023-01-04

## 2022-12-02 RX ORDER — FLUTICASONE PROPIONATE 50 MCG
SPRAY, SUSPENSION (ML) NASAL
OUTPATIENT
Start: 2022-12-02

## 2022-12-02 RX ORDER — FAMOTIDINE 20 MG/1
TABLET, FILM COATED ORAL
Qty: 90 TABLET | Refills: 3 | OUTPATIENT
Start: 2022-12-02

## 2022-12-02 NOTE — TELEPHONE ENCOUNTER
Jyotsna Moss called to request a refill on her medication. Patient has a NP appt on 1/4/23 with [facility]    Last office visit : 8/23/2022   Next office visit : 12/2/2022     Last Hannah Alstrom: 12/2/22  Medication Contract: 4/12/22   Last UDS: needs updated ( no UDS Cups)  Last Rx: 11/4/22                  Amphetamine Screen, Urine   Date Value Ref Range Status   04/02/2021 -  Final     Barbiturate Screen, Urine   Date Value Ref Range Status   04/02/2021 -  Final     Benzodiazepine Screen, Urine   Date Value Ref Range Status   04/02/2021 -  Final     Buprenorphine Urine   Date Value Ref Range Status   04/02/2021 -  Final     Cocaine Metabolite Screen, Urine   Date Value Ref Range Status   04/02/2021 -  Final     Gabapentin Screen, Urine   Date Value Ref Range Status   04/02/2021 -  Final     MDMA, Urine   Date Value Ref Range Status   04/02/2021 -  Final     Methamphetamine, Urine   Date Value Ref Range Status   04/02/2021 -  Final     Opiate Scrn, Ur   Date Value Ref Range Status   04/02/2021 -  Final     Oxycodone Screen, Ur   Date Value Ref Range Status   04/02/2021 -  Final     PCP Screen, Urine   Date Value Ref Range Status   04/02/2021 -  Final     Propoxyphene Screen, Urine   Date Value Ref Range Status   04/02/2021 -  Final     THC Screen, Urine   Date Value Ref Range Status   04/02/2021 -  Final     Tricyclic Antidepressants, Urine   Date Value Ref Range Status   04/02/2021 -  Final           Requested Prescriptions     Pending Prescriptions Disp Refills    gabapentin (NEURONTIN) 300 MG capsule [Pharmacy Med Name: GABAPENTIN 300 MG CAPSULE] 150 capsule 0     Sig: TAKE 2 CAPSULES BY MOUTH IN THE MORNING 1 CAPSULE AT NOON AND 2 CAPSULES AT BEDTIME         Please approve or refuse this medication.    Jordy Hurley

## 2022-12-15 ENCOUNTER — TELEPHONE (OUTPATIENT)
Dept: SURGERY | Facility: CLINIC | Age: 57
End: 2022-12-15

## 2022-12-15 DIAGNOSIS — L73.9 FOLLICULITIS OF RIGHT AXILLA: Primary | ICD-10-CM

## 2022-12-15 DIAGNOSIS — L02.411 ABSCESS OF RIGHT AXILLA: ICD-10-CM

## 2022-12-15 NOTE — TELEPHONE ENCOUNTER
"Patient called stated, \"These places are back under my arm and I need to see the doctor.\"   Patient declined original referral on October 4, 2022 stated, \"I went to [facility] and they lanced and packed it.\" I notified referring physicians office of patient decision and closed referral.    12/15/2022- Spoke with Shonda from Dr. Hardin office who spoke with Mook Stein's Nurse, she will fax new referral for patient and we will schedule her an appointment.-TH    "

## 2022-12-16 ENCOUNTER — OFFICE VISIT (OUTPATIENT)
Dept: SURGERY | Facility: CLINIC | Age: 57
End: 2022-12-16

## 2022-12-16 ENCOUNTER — PREP FOR SURGERY (OUTPATIENT)
Dept: OTHER | Facility: HOSPITAL | Age: 57
End: 2022-12-16

## 2022-12-16 ENCOUNTER — PRE-ADMISSION TESTING (OUTPATIENT)
Dept: PREADMISSION TESTING | Facility: HOSPITAL | Age: 57
End: 2022-12-16

## 2022-12-16 VITALS
DIASTOLIC BLOOD PRESSURE: 65 MMHG | BODY MASS INDEX: 34.53 KG/M2 | SYSTOLIC BLOOD PRESSURE: 93 MMHG | HEART RATE: 120 BPM | TEMPERATURE: 96.9 F | OXYGEN SATURATION: 99 % | HEIGHT: 67 IN | WEIGHT: 220 LBS

## 2022-12-16 VITALS
SYSTOLIC BLOOD PRESSURE: 100 MMHG | RESPIRATION RATE: 20 BRPM | HEIGHT: 66 IN | BODY MASS INDEX: 35.47 KG/M2 | DIASTOLIC BLOOD PRESSURE: 74 MMHG | HEART RATE: 122 BPM | OXYGEN SATURATION: 99 % | WEIGHT: 220.68 LBS

## 2022-12-16 DIAGNOSIS — L02.411 CUTANEOUS ABSCESS OF RIGHT AXILLA: Primary | ICD-10-CM

## 2022-12-16 DIAGNOSIS — R22.31 MASS OF RIGHT AXILLA: Primary | ICD-10-CM

## 2022-12-16 LAB
ANION GAP SERPL CALCULATED.3IONS-SCNC: 12 MMOL/L (ref 5–15)
BUN SERPL-MCNC: 19 MG/DL (ref 6–20)
BUN/CREAT SERPL: 20 (ref 7–25)
CALCIUM SPEC-SCNC: 8.9 MG/DL (ref 8.6–10.5)
CHLORIDE SERPL-SCNC: 98 MMOL/L (ref 98–107)
CO2 SERPL-SCNC: 25 MMOL/L (ref 22–29)
CREAT SERPL-MCNC: 0.95 MG/DL (ref 0.57–1)
DEPRECATED RDW RBC AUTO: 41.6 FL (ref 37–54)
EGFRCR SERPLBLD CKD-EPI 2021: 69.6 ML/MIN/1.73
ERYTHROCYTE [DISTWIDTH] IN BLOOD BY AUTOMATED COUNT: 12.9 % (ref 12.3–15.4)
GLUCOSE SERPL-MCNC: 239 MG/DL (ref 65–99)
HCT VFR BLD AUTO: 46.7 % (ref 34–46.6)
HGB BLD-MCNC: 15.3 G/DL (ref 12–15.9)
MCH RBC QN AUTO: 28.7 PG (ref 26.6–33)
MCHC RBC AUTO-ENTMCNC: 32.8 G/DL (ref 31.5–35.7)
MCV RBC AUTO: 87.6 FL (ref 79–97)
PLATELET # BLD AUTO: 314 10*3/MM3 (ref 140–450)
PMV BLD AUTO: 9.7 FL (ref 6–12)
POTASSIUM SERPL-SCNC: 4.4 MMOL/L (ref 3.5–5.2)
RBC # BLD AUTO: 5.33 10*6/MM3 (ref 3.77–5.28)
SODIUM SERPL-SCNC: 135 MMOL/L (ref 136–145)
WBC NRBC COR # BLD: 8.72 10*3/MM3 (ref 3.4–10.8)

## 2022-12-16 PROCEDURE — 36415 COLL VENOUS BLD VENIPUNCTURE: CPT

## 2022-12-16 PROCEDURE — 93010 ELECTROCARDIOGRAM REPORT: CPT | Performed by: INTERNAL MEDICINE

## 2022-12-16 PROCEDURE — 85027 COMPLETE CBC AUTOMATED: CPT

## 2022-12-16 PROCEDURE — 93005 ELECTROCARDIOGRAM TRACING: CPT

## 2022-12-16 PROCEDURE — 80048 BASIC METABOLIC PNL TOTAL CA: CPT

## 2022-12-16 PROCEDURE — 99203 OFFICE O/P NEW LOW 30 MIN: CPT | Performed by: SPECIALIST

## 2022-12-16 RX ORDER — DULAGLUTIDE 1.5 MG/.5ML
INJECTION, SOLUTION SUBCUTANEOUS TAKE AS DIRECTED
COMMUNITY
Start: 2022-12-08

## 2022-12-16 RX ORDER — FLUTICASONE PROPIONATE 50 MCG
SPRAY, SUSPENSION (ML) NASAL
COMMUNITY
Start: 2022-11-22

## 2022-12-16 RX ORDER — DESVENLAFAXINE 100 MG/1
100 TABLET, EXTENDED RELEASE ORAL DAILY
COMMUNITY
Start: 2022-10-08

## 2022-12-16 RX ORDER — LISINOPRIL 20 MG/1
20 TABLET ORAL 2 TIMES DAILY
COMMUNITY
Start: 2022-11-16

## 2022-12-16 RX ORDER — DIPHENHYDRAMINE HYDROCHLORIDE 25 MG/1
1 CAPSULE, LIQUID FILLED ORAL TAKE AS DIRECTED
COMMUNITY
Start: 2021-06-14

## 2022-12-16 RX ORDER — DOXEPIN HYDROCHLORIDE 100 MG/1
200 CAPSULE ORAL
COMMUNITY
Start: 2022-12-04

## 2022-12-16 RX ORDER — METFORMIN HYDROCHLORIDE 500 MG/1
TABLET, EXTENDED RELEASE ORAL
COMMUNITY
Start: 2021-06-27

## 2022-12-16 RX ORDER — BUPROPION HYDROCHLORIDE 300 MG/1
300 TABLET ORAL EVERY MORNING
COMMUNITY
Start: 2022-11-14

## 2022-12-16 RX ORDER — BLOOD SUGAR DIAGNOSTIC
STRIP MISCELLANEOUS
COMMUNITY
Start: 2022-09-19 | End: 2022-12-16

## 2022-12-16 RX ORDER — FAMOTIDINE 20 MG/1
20 TABLET, FILM COATED ORAL NIGHTLY
COMMUNITY
Start: 2022-11-14

## 2022-12-16 RX ORDER — PROPRANOLOL HYDROCHLORIDE 10 MG/1
10 TABLET ORAL 2 TIMES DAILY
COMMUNITY

## 2022-12-16 RX ORDER — LANCETS 30 GAUGE
EACH MISCELLANEOUS
COMMUNITY
Start: 2022-09-21 | End: 2022-12-16

## 2022-12-16 RX ORDER — HYDROXYZINE HYDROCHLORIDE 25 MG/1
25 TABLET, FILM COATED ORAL EVERY 12 HOURS
COMMUNITY
Start: 2022-12-02

## 2022-12-16 RX ORDER — GUAIFENESIN 600 MG/1
1200 TABLET, EXTENDED RELEASE ORAL 2 TIMES DAILY
COMMUNITY

## 2022-12-16 RX ORDER — HYDROCODONE BITARTRATE AND ACETAMINOPHEN 7.5; 325 MG/1; MG/1
TABLET ORAL
COMMUNITY
Start: 2022-12-02

## 2022-12-16 NOTE — H&P (VIEW-ONLY)
Nieves Fernandez MD [facility] History and Physical     Referring Provider: Mook Lopez PA      Chief complaint   Chief Complaint   Patient presents with   • Abscess     Mrs. Sylvester is here today for a Folliculitis, Right Axillary Abscess        Subjective .     History of present illness:  Cathie Sylvester is a 57 y.o. female who presents complaining of an inflamed cyst at the right axilla.  She has been placed on several rounds of oral antibiotics.  They have helped, but the inflammatory changes have not completely resolved.  She denies any associated fevers or chills..    History  Past Medical History:   Diagnosis Date   • Allergic rhinitis    • Anemia    • Anxiety disorder    • Breast cancer (HCC)    • Bronchitis    • Carpal tunnel syndrome    • Depression    • Diabetes mellitus (HCC)    • GERD (gastroesophageal reflux disease)    • Hearing loss    • Hypertension    • Irregular heart beat    • Migraine    • Osteoarthritis    • Panic attack    • PONV (postoperative nausea and vomiting)    • Sleep apnea    • Tachycardia    ,   Past Surgical History:   Procedure Laterality Date   • ANKLE SURGERY     • BACK SURGERY     • BREAST SURGERY      breast cancer surgery with reconstruction   • COLONOSCOPY     • HYSTERECTOMY     , History reviewed. No pertinent family history.,   Social History     Tobacco Use   • Smoking status: Former     Packs/day: 0.50     Types: Cigarettes   • Smokeless tobacco: Never   Vaping Use   • Vaping Use: Never used   Substance Use Topics   • Alcohol use: No   • Drug use: No   , (Not in a hospital admission)   and Allergies:  Tape    Current Outpatient Medications:   •  Blood Glucose Monitoring Suppl (Blood Glucose Monitor System) w/Device kit, 1 Device Take As Directed. Daily, Disp: , Rfl:   •  buPROPion XL (WELLBUTRIN XL) 300 MG 24 hr tablet, Take 300 mg by mouth Every Morning., Disp: , Rfl:   •  cyclobenzaprine (FLEXERIL) 10 MG tablet, Take 10 mg by mouth 2 (Two) Times a Day As Needed for muscle  "spasms., Disp: , Rfl:   •  desvenlafaxine (PRISTIQ) 100 MG 24 hr tablet, Take 100 mg by mouth Daily., Disp: , Rfl:   •  doxepin (SINEquan) 100 MG capsule, Take 200 mg by mouth every night at bedtime., Disp: , Rfl:   •  famotidine (PEPCID) 20 MG tablet, Take 20 mg by mouth Every Night., Disp: , Rfl:   •  fluticasone (FLONASE) 50 MCG/ACT nasal spray, USE 1 SPRAY INTO EACH NOSTRIL TWICE A DAY, Disp: , Rfl:   •  gabapentin (NEURONTIN) 300 MG capsule, Take 300 mg by mouth 3 (Three) Times a Day., Disp: , Rfl:   •  HYDROcodone-acetaminophen (NORCO) 7.5-325 MG per tablet, TAKE 1 TABLET BY MOUTH 3 TIMES A DAY DO NOT FILL BEFORE 12-2-22, Disp: , Rfl:   •  hydrOXYzine (ATARAX) 25 MG tablet, Take 25 mg by mouth Every 12 (Twelve) Hours., Disp: , Rfl:   •  lisinopril (PRINIVIL,ZESTRIL) 20 MG tablet, Take 20 mg by mouth 2 (Two) Times a Day., Disp: , Rfl:   •  metFORMIN ER (GLUCOPHAGE-XR) 500 MG 24 hr tablet, TAKE 1 TABLET BY MOUTH EVERY DAY WITH LARGEST MEAL OF THE DAY, Disp: , Rfl:   •  omeprazole (priLOSEC) 20 MG capsule, Take 20 mg by mouth Daily., Disp: , Rfl:   •  Trulicity 1.5 MG/0.5ML solution pen-injector, Take As Directed., Disp: , Rfl:   •  guaiFENesin (MUCINEX) 600 MG 12 hr tablet, Take 1,200 mg by mouth 2 (Two) Times a Day., Disp: , Rfl:   •  propranolol (INDERAL) 10 MG tablet, Take 10 mg by mouth 2 (Two) Times a Day., Disp: , Rfl:     Review of Systems:    All organ systems were evaluated and found negative except those which are mentioned in the History of Present Illness.      Objective     Physical Exam:    Vital Signs   BP 93/65   Pulse 120   Temp 96.9 °F (36.1 °C)   Ht 170.2 cm (67\")   Wt 99.8 kg (220 lb)   SpO2 99%   BMI 34.46 kg/m²        Constitutional:    Well-developed, well-nourished in no acute distress  Eyes:     Extraocular movements intact; pupils equal, round, and reactive  Ears, Nose, Mouth, Throat:  Hearing intact, nose midline, no mucosal lesions  Cardiovascular:   Regular rate and rhythm "   Respiratory:    Clear to auscultation bilaterally  Gastrointestinal:   Soft, nontender, nondistended, no masses, bowel sounds intact  Genitourinary:    Deferred  Musculoskeletal:   Full range of motion, no muscle wasting, no weakness  Skin:     No rashes or excoriations  Neurological:    Moves all extremities, sensation intact  Psychiatric:    Alert and oriented to person, place, and time  Hematologic/Lymphatic/Immune: No lymphadenopathy, right axilla 3cm area of purple discoloration with induration      Results Review:    Class 2 Severe Obesity (BMI >=35 and <=39.9). Obesity-related health conditions include the following: none. Obesity is newly identified. BMI is is above average; BMI management plan is completed. We discussed portion control and increasing exercise.    Assessment & Plan       Diagnoses and all orders for this visit:    1. Cutaneous abscess of right axilla (Primary)           The patient will be scheduled for incision and drainage of the right axilla.    The patient will be scheduled for prework testing including:  COVID; cbc, cmp, EKG, and CXR will be performed dependent upon anesthesia requirements.    An extensive review of patient intake forms, referring physician documents, laboratories, and imaging was performed in the medical decision making and surgical planning of this patient.      The risks including:  bleeding, infection, and injury to surrounding structures were discussed as well as the benefits, complications, and possible alternatives of the above procedures and the patient agreed to proceed.    The patient understands that the wound likely will be left open and dressing changes will be required.  VAC placement may be performed.        Nieves Fernandez MD

## 2022-12-16 NOTE — PATIENT INSTRUCTIONS
Surgery is scheduled for 12/19/2022 at 1130 am arrival .  Prework is scheduled for 12/16/2022.  Nothing to eat or drink after midnight before surgery.  No Aspirin, Vitamins or Blood Thinners 5 days prior to surgery.  Please report to the hospital main registration for check in on both days.

## 2022-12-16 NOTE — PROGRESS NOTES
Nieves Fernandez MD [facility] History and Physical     Referring Provider: Mook Lopez PA      Chief complaint   Chief Complaint   Patient presents with   • Abscess     Mrs. Sylvester is here today for a Folliculitis, Right Axillary Abscess        Subjective .     History of present illness:  Cathie Sylvester is a 57 y.o. female who presents complaining of an inflamed cyst at the right axilla.  She has been placed on several rounds of oral antibiotics.  They have helped, but the inflammatory changes have not completely resolved.  She denies any associated fevers or chills..    History  Past Medical History:   Diagnosis Date   • Allergic rhinitis    • Anemia    • Anxiety disorder    • Breast cancer (HCC)    • Bronchitis    • Carpal tunnel syndrome    • Depression    • Diabetes mellitus (HCC)    • GERD (gastroesophageal reflux disease)    • Hearing loss    • Hypertension    • Irregular heart beat    • Migraine    • Osteoarthritis    • Panic attack    • PONV (postoperative nausea and vomiting)    • Sleep apnea    • Tachycardia    ,   Past Surgical History:   Procedure Laterality Date   • ANKLE SURGERY     • BACK SURGERY     • BREAST SURGERY      breast cancer surgery with reconstruction   • COLONOSCOPY     • HYSTERECTOMY     , History reviewed. No pertinent family history.,   Social History     Tobacco Use   • Smoking status: Former     Packs/day: 0.50     Types: Cigarettes   • Smokeless tobacco: Never   Vaping Use   • Vaping Use: Never used   Substance Use Topics   • Alcohol use: No   • Drug use: No   , (Not in a hospital admission)   and Allergies:  Tape    Current Outpatient Medications:   •  Blood Glucose Monitoring Suppl (Blood Glucose Monitor System) w/Device kit, 1 Device Take As Directed. Daily, Disp: , Rfl:   •  buPROPion XL (WELLBUTRIN XL) 300 MG 24 hr tablet, Take 300 mg by mouth Every Morning., Disp: , Rfl:   •  cyclobenzaprine (FLEXERIL) 10 MG tablet, Take 10 mg by mouth 2 (Two) Times a Day As Needed for muscle  "spasms., Disp: , Rfl:   •  desvenlafaxine (PRISTIQ) 100 MG 24 hr tablet, Take 100 mg by mouth Daily., Disp: , Rfl:   •  doxepin (SINEquan) 100 MG capsule, Take 200 mg by mouth every night at bedtime., Disp: , Rfl:   •  famotidine (PEPCID) 20 MG tablet, Take 20 mg by mouth Every Night., Disp: , Rfl:   •  fluticasone (FLONASE) 50 MCG/ACT nasal spray, USE 1 SPRAY INTO EACH NOSTRIL TWICE A DAY, Disp: , Rfl:   •  gabapentin (NEURONTIN) 300 MG capsule, Take 300 mg by mouth 3 (Three) Times a Day., Disp: , Rfl:   •  HYDROcodone-acetaminophen (NORCO) 7.5-325 MG per tablet, TAKE 1 TABLET BY MOUTH 3 TIMES A DAY DO NOT FILL BEFORE 12-2-22, Disp: , Rfl:   •  hydrOXYzine (ATARAX) 25 MG tablet, Take 25 mg by mouth Every 12 (Twelve) Hours., Disp: , Rfl:   •  lisinopril (PRINIVIL,ZESTRIL) 20 MG tablet, Take 20 mg by mouth 2 (Two) Times a Day., Disp: , Rfl:   •  metFORMIN ER (GLUCOPHAGE-XR) 500 MG 24 hr tablet, TAKE 1 TABLET BY MOUTH EVERY DAY WITH LARGEST MEAL OF THE DAY, Disp: , Rfl:   •  omeprazole (priLOSEC) 20 MG capsule, Take 20 mg by mouth Daily., Disp: , Rfl:   •  Trulicity 1.5 MG/0.5ML solution pen-injector, Take As Directed., Disp: , Rfl:   •  guaiFENesin (MUCINEX) 600 MG 12 hr tablet, Take 1,200 mg by mouth 2 (Two) Times a Day., Disp: , Rfl:   •  propranolol (INDERAL) 10 MG tablet, Take 10 mg by mouth 2 (Two) Times a Day., Disp: , Rfl:     Review of Systems:    All organ systems were evaluated and found negative except those which are mentioned in the History of Present Illness.      Objective     Physical Exam:    Vital Signs   BP 93/65   Pulse 120   Temp 96.9 °F (36.1 °C)   Ht 170.2 cm (67\")   Wt 99.8 kg (220 lb)   SpO2 99%   BMI 34.46 kg/m²        Constitutional:    Well-developed, well-nourished in no acute distress  Eyes:     Extraocular movements intact; pupils equal, round, and reactive  Ears, Nose, Mouth, Throat:  Hearing intact, nose midline, no mucosal lesions  Cardiovascular:   Regular rate and rhythm "   Respiratory:    Clear to auscultation bilaterally  Gastrointestinal:   Soft, nontender, nondistended, no masses, bowel sounds intact  Genitourinary:    Deferred  Musculoskeletal:   Full range of motion, no muscle wasting, no weakness  Skin:     No rashes or excoriations  Neurological:    Moves all extremities, sensation intact  Psychiatric:    Alert and oriented to person, place, and time  Hematologic/Lymphatic/Immune: No lymphadenopathy, right axilla 3cm area of purple discoloration with induration      Results Review:    Class 2 Severe Obesity (BMI >=35 and <=39.9). Obesity-related health conditions include the following: none. Obesity is newly identified. BMI is is above average; BMI management plan is completed. We discussed portion control and increasing exercise.    Assessment & Plan       Diagnoses and all orders for this visit:    1. Cutaneous abscess of right axilla (Primary)           The patient will be scheduled for incision and drainage of the right axilla.    The patient will be scheduled for prework testing including:  COVID; cbc, cmp, EKG, and CXR will be performed dependent upon anesthesia requirements.    An extensive review of patient intake forms, referring physician documents, laboratories, and imaging was performed in the medical decision making and surgical planning of this patient.      The risks including:  bleeding, infection, and injury to surrounding structures were discussed as well as the benefits, complications, and possible alternatives of the above procedures and the patient agreed to proceed.    The patient understands that the wound likely will be left open and dressing changes will be required.  VAC placement may be performed.        Nieves Fernandez MD

## 2022-12-16 NOTE — DISCHARGE INSTRUCTIONS
Before you come to the hospital        Arrival time: AS DIRECTED BY OFFICE     YOU MAY TAKE THE FOLLOWING MEDICATION(S) THE MORNING OF SURGERY WITH A SIP OF WATER: ok to take gabapentin, propanolol and norco morning of surgery with a sip of water; hold lisinopril 24 hours prior to surgery            ALL OTHER HOME MEDICATION CHECK WITH YOUR PHYSICIAN (especially if   you are taking diabetes medicines or blood thinners)    Do not take any Erectile Dysfunction medications (EX: CIALIS, VIAGRA) 24 hours prior to surgery.      If you were given and instructed to use a germ- killing soap, use as directed the night before surgery and again the morning of surgery or as directed by your surgeon. (Use one-half of the bottle with each shower.)   See attached information for How to Use Chlorhexidine for Bathing if applicable.            Eating and drinking restrictions prior to scheduled arrival time    2 Hours before arrival time STOP   Drinking Clear liquids (water, apple juice-no pulp)     6 Hours before arrival time STOP   Milk or drinks that contain milk, full liquids    6 Hours before arrival time STOP   Light meals or foods, such as toast or cereal    8 Hours before arrival time STOP   Heavy foods, such as meat, fried foods, or fatty foods    (It is extremely important that you follow these guidelines to prevent delay or cancelation of your procedure)     Clear Liquids  Water and flavored water                                                                      Clear Fruit juices, such as cranberry juice and apple juice.  Black coffee (NO cream of any kind, including powdered).  Plain tea  Clear bouillon or broth.  Flavored gelatin.  Soda.  Gatorade or Powerade.  Full liquid examples  Juices that have pulp.  Frozen ice pops that contain fruit pieces.  Coffee with creamer  Milk.  Yogurt.                MANAGING PAIN AFTER SURGERY    We know you are probably wondering what your pain will be like after surgery.  Following  surgery it is unrealistic to expect you will not have pain.   Pain is how our bodies let us know that something is wrong or cautions us to be careful.  That said, our goal is to make your pain tolerable.    Methods we may use to treat your pain include (oral or IV medications, PCAs, epidurals, nerve blocks, etc.)   While some procedures require IV pain medications for a short time after surgery, transitioning to pain medications by mouth allows for better management of pain.   Your nurse will encourage you to take oral pain medications whenever possible.  IV medications work almost immediately, but only last a short while.  Taking medications by mouth allows for a more constant level of medication in your blood stream for a longer period of time.      Once your pain is out of control it is harder to get back under control.  It is important you are aware when your next dose of pain medication is due.  If you are admitted, your nurse may write the time of your next dose on the white board in your room to help you remember.      We are interested in your pain and encourage you to inform us about aggravating factors during your visit.   Many times a simple repositioning every few hours can make a big difference.    If your physician says it is okay, do not let your pain prevent you from getting out of bed. Be sure to call your nurse for assistance prior to getting up so you do not fall.      Before surgery, please decide your tolerable pain goal.  These faces help describe the pain ratings we use on a 0-10 scale.   Be prepared to tell us your goal and whether or not you take pain or anxiety medications at home.          Preparing for Surgery  Preparing for surgery is an important part of your care. It can make things go more smoothly and help you avoid complications. The steps leading up to surgery may vary among hospitals. Follow all instructions given to you by your health care providers. Ask questions if you do not  understand something. Talk about any concerns that you have.  Here are some questions to consider asking before your surgery:  If my surgery is not an emergency (is elective), when would be the best time to have the surgery?  What arrangements do I need to make for work, home, or school?  What will my recovery be like? How long will it be before I can return to normal activities?  Will I need to prepare my home? Will I need to arrange care for me or my children?  Should I expect to have pain after surgery? What are my pain management options? Are there nonmedical options that I can try for pain?  Tell a health care provider about:  Any allergies you have.  All medicines you are taking, including vitamins, herbs, eye drops, creams, and over-the-counter medicines.  Any problems you or family members have had with anesthetic medicines.  Any blood disorders you have.  Any surgeries you have had.  Any medical conditions you have.  Whether you are pregnant or may be pregnant.  What are the risks?  The risks and complications of surgery depend on the specific procedure that you have. Discuss all the risks with your health care providers before your surgery. Ask about common surgical complications, which may include:  Infection.  Bleeding or a need for blood replacement (transfusion).  Allergic reactions to medicines.  Damage to surrounding nerves, tissues, or structures.  A blood clot.  Scarring.  Failure of the surgery to correct the problem.  Follow these instructions before the procedure:  Several days or weeks before your procedure  You may have a physical exam by your primary health care provider to make sure it is safe for you to have surgery.  You may have testing. This may include a chest X-ray, blood and urine tests, electrocardiogram (ECG), or other testing.  Ask your health care provider about:  Changing or stopping your regular medicines. This is especially important if you are taking diabetes medicines or  blood thinners.  Taking medicines such as aspirin and ibuprofen. These medicines can thin your blood. Do not take these medicines unless your health care provider tells you to take them.  Taking over-the-counter medicines, vitamins, herbs, and supplements.  Do not use any products that contain nicotine or tobacco, such as cigarettes and e-cigarettes. If you need help quitting, ask your health care provider.  Avoid alcohol.  Ask your health care provider if there are exercises you can do to prepare for surgery.  Eat a healthy diet.   Plan to have someone take you home from the hospital or clinic.  Plan to have a responsible adult care for you for at least 24 hours after you leave the hospital or clinic. This is important.  The day before your procedure  You may be given antibiotic medicine to take by mouth to help prevent infection. Take it as told by your health care provider.  You may be asked to shower with a germ-killing soap.  Follow instructions from your health care provider about eating and drinking restrictions. This includes gum, mints and hard candy.  Pack comfortable clothes according to your procedure.   The day of your procedure  You may need to take another shower with a germ-killing soap before you leave home in the morning.  With a small sip of water, take only the medicines that you are told to take.  Remove all jewelry including rings.   Leave anything you consider valuable at home except hearing aids if needed.  You do not need to bring your home medications into the hospital.   Do not wear any makeup, nail polish, powder, deodorant, lotion, hair accessories, or anything on your skin or body except your clothes.  If you will be staying in the hospital, bring a case to hold your glasses, contacts, or dentures. You may also want to bring your robe and non-skid footwear.       (Do not use denture adhesives since you will be asked to remove them during  surgery).   If you wear oxygen at home, bring it  with you the day of surgery.  If instructed by your health care provider, bring your sleep apnea device with you on the day of your surgery (if this applies to you).  You may want to leave your suitcase and sleep apnea device in the car until after surgery.   Arrive at the hospital as scheduled.  Bring a friend or family member with you who can help to answer questions and be present while you meet with your health care provider.  At the hospital  When you arrive at the hospital:  Go to registration located at the main entrance of the hospital. You will be registered and given a beeper and a sticker sheet. Take the stickers to the Outpatient nurses desk and place in the black tray. This is to notify staff that you have arrived. Then return to the lobby to wait.   When your beeper lights up and vibrates proceed through the double doors, under the stairs, and a member of the Outpatient Surgery staff will escort you to your preoperative room.  You may have to wear compression sleeves. These help to prevent blood clots and reduce swelling in your legs.  An IV may be inserted into one of your veins.              In the operating room, you may be given one or more of the following:        A medicine to help you relax (sedative).        A medicine to numb the area (local anesthetic).        A medicine to make you fall asleep (general anesthetic).        A medicine that is injected into an area of your body to numb everything below the                      injection site (regional anesthetic).  You may be given an antibiotic through your IV to help prevent infection.  Your surgical site will be marked or identified.    Contact a health care provider if you:  Develop a fever of more than 100.4°F (38°C) or other feelings of illness during the 48 hours before your surgery.  Have symptoms that get worse.  Have questions or concerns about your surgery.  Summary  Preparing for surgery can make the procedure go more smoothly and  lower your risk of complications.  Before surgery, make a list of questions and concerns to discuss with your surgeon. Ask about the risks and possible complications.  In the days or weeks before your surgery, follow all instructions from your health care provider. You may need to stop smoking, avoid alcohol, follow eating restrictions, and change or stop your regular medicines.  Contact your surgeon if you develop a fever or other signs of illness during the few days before your surgery.  This information is not intended to replace advice given to you by your health care provider. Make sure you discuss any questions you have with your health care provider.  Document Revised: 12/21/2018 Document Reviewed: 10/23/2018  Elsevier Patient Education © 2021 [employer]

## 2022-12-19 ENCOUNTER — ANESTHESIA (OUTPATIENT)
Dept: PERIOP | Facility: HOSPITAL | Age: 57
End: 2022-12-19

## 2022-12-19 ENCOUNTER — ANESTHESIA EVENT (OUTPATIENT)
Dept: PERIOP | Facility: HOSPITAL | Age: 57
End: 2022-12-19

## 2022-12-19 ENCOUNTER — HOSPITAL ENCOUNTER (OUTPATIENT)
Facility: HOSPITAL | Age: 57
Setting detail: HOSPITAL OUTPATIENT SURGERY
Discharge: HOME | End: 2022-12-19
Attending: SPECIALIST | Admitting: SPECIALIST

## 2022-12-19 VITALS
OXYGEN SATURATION: 95 % | HEART RATE: 96 BPM | RESPIRATION RATE: 18 BRPM | SYSTOLIC BLOOD PRESSURE: 106 MMHG | DIASTOLIC BLOOD PRESSURE: 85 MMHG | TEMPERATURE: 97 F

## 2022-12-19 DIAGNOSIS — R22.31 MASS OF RIGHT AXILLA: ICD-10-CM

## 2022-12-19 LAB
GLUCOSE BLDC GLUCOMTR-MCNC: 147 MG/DL (ref 70–130)
GLUCOSE BLDC GLUCOMTR-MCNC: 179 MG/DL (ref 70–130)
QT INTERVAL: 340 MS
QTC INTERVAL: 466 MS

## 2022-12-19 PROCEDURE — 25010000002 ERTAPENEM PER 500 MG: Performed by: SPECIALIST

## 2022-12-19 PROCEDURE — 25010000002 FENTANYL CITRATE (PF) 100 MCG/2ML SOLUTION: Performed by: NURSE ANESTHETIST, CERTIFIED REGISTERED

## 2022-12-19 PROCEDURE — 11403 EXC TR-EXT B9+MARG 2.1-3CM: CPT | Performed by: SPECIALIST

## 2022-12-19 PROCEDURE — 82962 GLUCOSE BLOOD TEST: CPT

## 2022-12-19 PROCEDURE — 25010000002 ONDANSETRON PER 1 MG: Performed by: NURSE ANESTHETIST, CERTIFIED REGISTERED

## 2022-12-19 PROCEDURE — 25010000002 MIDAZOLAM PER 1 MG: Performed by: ANESTHESIOLOGY

## 2022-12-19 PROCEDURE — 25010000002 PROPOFOL 10 MG/ML EMULSION: Performed by: NURSE ANESTHETIST, CERTIFIED REGISTERED

## 2022-12-19 RX ORDER — PROPOFOL 10 MG/ML
VIAL (ML) INTRAVENOUS AS NEEDED
Status: DISCONTINUED | OUTPATIENT
Start: 2022-12-19 | End: 2022-12-19 | Stop reason: SURG

## 2022-12-19 RX ORDER — LIDOCAINE HYDROCHLORIDE 10 MG/ML
0.5 INJECTION, SOLUTION EPIDURAL; INFILTRATION; INTRACAUDAL; PERINEURAL ONCE AS NEEDED
Status: DISCONTINUED | OUTPATIENT
Start: 2022-12-19 | End: 2022-12-19 | Stop reason: HOSPADM

## 2022-12-19 RX ORDER — LABETALOL HYDROCHLORIDE 5 MG/ML
5 INJECTION, SOLUTION INTRAVENOUS
Status: DISCONTINUED | OUTPATIENT
Start: 2022-12-19 | End: 2022-12-19 | Stop reason: HOSPADM

## 2022-12-19 RX ORDER — BUPIVACAINE HCL/0.9 % NACL/PF 0.125 %
PLASTIC BAG, INJECTION (ML) EPIDURAL AS NEEDED
Status: DISCONTINUED | OUTPATIENT
Start: 2022-12-19 | End: 2022-12-19 | Stop reason: SURG

## 2022-12-19 RX ORDER — SODIUM CHLORIDE, SODIUM LACTATE, POTASSIUM CHLORIDE, CALCIUM CHLORIDE 600; 310; 30; 20 MG/100ML; MG/100ML; MG/100ML; MG/100ML
1000 INJECTION, SOLUTION INTRAVENOUS CONTINUOUS
Status: DISCONTINUED | OUTPATIENT
Start: 2022-12-19 | End: 2022-12-19 | Stop reason: HOSPADM

## 2022-12-19 RX ORDER — ONDANSETRON 2 MG/ML
4 INJECTION INTRAMUSCULAR; INTRAVENOUS ONCE AS NEEDED
Status: DISCONTINUED | OUTPATIENT
Start: 2022-12-19 | End: 2022-12-19 | Stop reason: HOSPADM

## 2022-12-19 RX ORDER — FLUMAZENIL 0.1 MG/ML
0.2 INJECTION INTRAVENOUS AS NEEDED
Status: DISCONTINUED | OUTPATIENT
Start: 2022-12-19 | End: 2022-12-19 | Stop reason: HOSPADM

## 2022-12-19 RX ORDER — SODIUM CHLORIDE 0.9 % (FLUSH) 0.9 %
3 SYRINGE (ML) INJECTION EVERY 12 HOURS SCHEDULED
Status: DISCONTINUED | OUTPATIENT
Start: 2022-12-19 | End: 2022-12-19 | Stop reason: HOSPADM

## 2022-12-19 RX ORDER — MIDAZOLAM HYDROCHLORIDE 1 MG/ML
2 INJECTION INTRAMUSCULAR; INTRAVENOUS ONCE
Status: COMPLETED | OUTPATIENT
Start: 2022-12-19 | End: 2022-12-19

## 2022-12-19 RX ORDER — SODIUM CHLORIDE 0.9 % (FLUSH) 0.9 %
3 SYRINGE (ML) INJECTION AS NEEDED
Status: DISCONTINUED | OUTPATIENT
Start: 2022-12-19 | End: 2022-12-19 | Stop reason: SDUPTHER

## 2022-12-19 RX ORDER — DROPERIDOL 2.5 MG/ML
0.62 INJECTION, SOLUTION INTRAMUSCULAR; INTRAVENOUS ONCE AS NEEDED
Status: DISCONTINUED | OUTPATIENT
Start: 2022-12-19 | End: 2022-12-19 | Stop reason: HOSPADM

## 2022-12-19 RX ORDER — OXYCODONE AND ACETAMINOPHEN 10; 325 MG/1; MG/1
1 TABLET ORAL ONCE AS NEEDED
Status: COMPLETED | OUTPATIENT
Start: 2022-12-19 | End: 2022-12-19

## 2022-12-19 RX ORDER — LIDOCAINE HYDROCHLORIDE 20 MG/ML
INJECTION, SOLUTION EPIDURAL; INFILTRATION; INTRACAUDAL; PERINEURAL AS NEEDED
Status: DISCONTINUED | OUTPATIENT
Start: 2022-12-19 | End: 2022-12-19 | Stop reason: SURG

## 2022-12-19 RX ORDER — OXYCODONE HYDROCHLORIDE AND ACETAMINOPHEN 5; 325 MG/1; MG/1
1 TABLET ORAL ONCE AS NEEDED
Status: DISCONTINUED | OUTPATIENT
Start: 2022-12-19 | End: 2022-12-19 | Stop reason: HOSPADM

## 2022-12-19 RX ORDER — IBUPROFEN 600 MG/1
600 TABLET ORAL ONCE AS NEEDED
Status: DISCONTINUED | OUTPATIENT
Start: 2022-12-19 | End: 2022-12-19 | Stop reason: HOSPADM

## 2022-12-19 RX ORDER — MAGNESIUM HYDROXIDE 1200 MG/15ML
LIQUID ORAL AS NEEDED
Status: DISCONTINUED | OUTPATIENT
Start: 2022-12-19 | End: 2022-12-19 | Stop reason: HOSPADM

## 2022-12-19 RX ORDER — SODIUM CHLORIDE 0.9 % (FLUSH) 0.9 %
3-10 SYRINGE (ML) INJECTION AS NEEDED
Status: DISCONTINUED | OUTPATIENT
Start: 2022-12-19 | End: 2022-12-19 | Stop reason: HOSPADM

## 2022-12-19 RX ORDER — MIDAZOLAM HYDROCHLORIDE 1 MG/ML
1 INJECTION INTRAMUSCULAR; INTRAVENOUS
Status: DISCONTINUED | OUTPATIENT
Start: 2022-12-19 | End: 2022-12-19 | Stop reason: HOSPADM

## 2022-12-19 RX ORDER — FENTANYL CITRATE 50 UG/ML
INJECTION, SOLUTION INTRAMUSCULAR; INTRAVENOUS AS NEEDED
Status: DISCONTINUED | OUTPATIENT
Start: 2022-12-19 | End: 2022-12-19 | Stop reason: SURG

## 2022-12-19 RX ORDER — ONDANSETRON 2 MG/ML
INJECTION INTRAMUSCULAR; INTRAVENOUS AS NEEDED
Status: DISCONTINUED | OUTPATIENT
Start: 2022-12-19 | End: 2022-12-19 | Stop reason: SURG

## 2022-12-19 RX ORDER — SODIUM CHLORIDE, SODIUM LACTATE, POTASSIUM CHLORIDE, CALCIUM CHLORIDE 600; 310; 30; 20 MG/100ML; MG/100ML; MG/100ML; MG/100ML
100 INJECTION, SOLUTION INTRAVENOUS CONTINUOUS
Status: DISCONTINUED | OUTPATIENT
Start: 2022-12-19 | End: 2022-12-19 | Stop reason: HOSPADM

## 2022-12-19 RX ORDER — ACETAMINOPHEN 500 MG
1000 TABLET ORAL ONCE
Status: COMPLETED | OUTPATIENT
Start: 2022-12-19 | End: 2022-12-19

## 2022-12-19 RX ORDER — OXYCODONE HYDROCHLORIDE AND ACETAMINOPHEN 5; 325 MG/1; MG/1
1 TABLET ORAL EVERY 4 HOURS PRN
Qty: 20 TABLET | Refills: 0 | Status: SHIPPED | OUTPATIENT
Start: 2022-12-19

## 2022-12-19 RX ORDER — NALOXONE HCL 0.4 MG/ML
0.4 VIAL (ML) INJECTION AS NEEDED
Status: DISCONTINUED | OUTPATIENT
Start: 2022-12-19 | End: 2022-12-19 | Stop reason: HOSPADM

## 2022-12-19 RX ORDER — SODIUM CHLORIDE 9 MG/ML
40 INJECTION, SOLUTION INTRAVENOUS AS NEEDED
Status: DISCONTINUED | OUTPATIENT
Start: 2022-12-19 | End: 2022-12-19 | Stop reason: HOSPADM

## 2022-12-19 RX ORDER — FENTANYL CITRATE 50 UG/ML
25 INJECTION, SOLUTION INTRAMUSCULAR; INTRAVENOUS
Status: DISCONTINUED | OUTPATIENT
Start: 2022-12-19 | End: 2022-12-19 | Stop reason: HOSPADM

## 2022-12-19 RX ORDER — OXYCODONE AND ACETAMINOPHEN 7.5; 325 MG/1; MG/1
2 TABLET ORAL EVERY 4 HOURS PRN
Status: DISCONTINUED | OUTPATIENT
Start: 2022-12-19 | End: 2022-12-19 | Stop reason: HOSPADM

## 2022-12-19 RX ORDER — BUPIVACAINE HYDROCHLORIDE AND EPINEPHRINE 2.5; 5 MG/ML; UG/ML
INJECTION, SOLUTION INFILTRATION; PERINEURAL AS NEEDED
Status: DISCONTINUED | OUTPATIENT
Start: 2022-12-19 | End: 2022-12-19 | Stop reason: HOSPADM

## 2022-12-19 RX ADMIN — FENTANYL CITRATE 100 MCG: 50 INJECTION INTRAMUSCULAR; INTRAVENOUS at 14:08

## 2022-12-19 RX ADMIN — Medication 200 MCG: at 14:40

## 2022-12-19 RX ADMIN — Medication 200 MCG: at 14:26

## 2022-12-19 RX ADMIN — OXYCODONE HYDROCHLORIDE AND ACETAMINOPHEN 1 TABLET: 10; 325 TABLET ORAL at 15:15

## 2022-12-19 RX ADMIN — SODIUM CHLORIDE, POTASSIUM CHLORIDE, SODIUM LACTATE AND CALCIUM CHLORIDE 1000 ML: 600; 310; 30; 20 INJECTION, SOLUTION INTRAVENOUS at 12:18

## 2022-12-19 RX ADMIN — LIDOCAINE HYDROCHLORIDE 100 MG: 20 INJECTION, SOLUTION EPIDURAL; INFILTRATION; INTRACAUDAL; PERINEURAL at 14:08

## 2022-12-19 RX ADMIN — ONDANSETRON 4 MG: 2 INJECTION INTRAMUSCULAR; INTRAVENOUS at 14:48

## 2022-12-19 RX ADMIN — Medication 100 MCG: at 14:13

## 2022-12-19 RX ADMIN — MIDAZOLAM HYDROCHLORIDE 2 MG: 2 INJECTION, SOLUTION INTRAMUSCULAR; INTRAVENOUS at 13:43

## 2022-12-19 RX ADMIN — Medication 100 MCG: at 14:19

## 2022-12-19 RX ADMIN — ACETAMINOPHEN 1000 MG: 500 TABLET ORAL at 13:33

## 2022-12-19 RX ADMIN — SODIUM CHLORIDE 1 G: 9 INJECTION, SOLUTION INTRAVENOUS at 14:14

## 2022-12-19 RX ADMIN — PROPOFOL 180 MG: 10 INJECTION, EMULSION INTRAVENOUS at 14:08

## 2022-12-19 NOTE — OP NOTE
Preoperative diagnosis:     Recurrent infected cysts right axilla  Postoperative diagnosis:    same  Surgeon:     Nieves Fernandez MD FACS  Procedure:    Excision cysts right axilla  Anesthesia:    Get loc   EBL:     minimal  IVF:     See anesthesia notes  Indications:     The patient is a 57-year-old female who has chronically inflamed cysts at the right axilla.  She presents for excision.  The risks, benefits, complications, and possible alternatives of the procedure were discussed with the patient who agreed to proceed.  Description of procedure:  The patient was laid supine.  The right axilla was prepped and draped in the usual sterile fashion.  An elliptical incision was created around the inflamed area of the skin.  bovie was used to dissect to noninflamed adipose tissue.  The wound was copiously irrigated with sterile saline.  The skin was closed with 3-0 and 4-0 vicryl.  Mastisol, steri strips, and dry dressings were applied.  The sponge, needle, and instrument counts were correct at the end of the case.  Findings:    3 x 2 cm area of skin   Complications:   None  Disposition:    Good to PACU

## 2022-12-19 NOTE — ANESTHESIA PROCEDURE NOTES
Airway  Urgency: elective    Date/Time: 12/19/2022 2:10 PM  Airway not difficult    General Information and Staff    Patient location during procedure: OR  CRNA/CAA: Cheyenne Allen CRNA    Indications and Patient Condition  Indications for airway management: airway protection    Preoxygenated: yes  Mask difficulty assessment: 1 - vent by mask    Final Airway Details  Final airway type: supraglottic airway      Successful airway: I-gel  Size 4     Number of attempts at approach: 1  Assessment: lips, teeth, and gum same as pre-op and atraumatic intubation

## 2022-12-19 NOTE — ANESTHESIA PREPROCEDURE EVALUATION
Anesthesia Evaluation     Patient summary reviewed   history of anesthetic complications: PONV  NPO Solid Status: > 6 hours             Airway   Mallampati: II  TM distance: >3 FB  Large neck circumference  Dental    (+) poor dentition        Pulmonary    (+) a smoker Former, sleep apnea,   Cardiovascular   Exercise tolerance: good (4-7 METS)    Patient on routine beta blocker    (+) hypertension, dysrhythmias Tachycardia,   (-) pacemaker, cardiac stents, CABG      Neuro/Psych  GI/Hepatic/Renal/Endo    (+) obesity,  GERD,  diabetes mellitus,     Musculoskeletal     Abdominal   (+) obese,    Substance History      OB/GYN          Other   arthritis,    history of cancer                    Anesthesia Plan    ASA 3     general     intravenous induction     Anesthetic plan, risks, benefits, and alternatives have been provided, discussed and informed consent has been obtained with: patient.        CODE STATUS:

## 2022-12-19 NOTE — ANESTHESIA POSTPROCEDURE EVALUATION
Patient: Cathie Sylvester    Procedure Summary     Date: 12/19/22 Room / Location: [facility] OR  /  [facility] OR    Anesthesia Start: 1404 Anesthesia Stop: 1454    Procedure: excision right axillary mass  (Right: Axilla) Diagnosis:       Mass of right axilla      (Mass of right axilla [R22.31])    Surgeons: Nieves Fernandez MD Provider: Cheyenne Allen CRNA    Anesthesia Type: general ASA Status: 3          Anesthesia Type: general    Vitals  Vitals Value Taken Time   /90 12/19/22 1515   Temp 97 °F (36.1 °C) 12/19/22 1510   Pulse 96 12/19/22 1519   Resp 15 12/19/22 1510   SpO2 95 % 12/19/22 1519   Vitals shown include unvalidated device data.        Post Anesthesia Care and Evaluation    Patient location during evaluation: PACU  Patient participation: complete - patient participated  Level of consciousness: awake and alert  Pain management: adequate    Airway patency: patent  Anesthetic complications: No anesthetic complications    Cardiovascular status: acceptable  Respiratory status: acceptable  Hydration status: acceptable    Comments: Blood pressure 106/85, pulse 96, temperature 97 °F (36.1 °C), temperature source Temporal, resp. rate 18, SpO2 95 %, not currently breastfeeding.    Pt discharged from PACU based on joanne score >8

## 2023-01-04 ENCOUNTER — OFFICE VISIT (OUTPATIENT)
Dept: PRIMARY CARE CLINIC | Age: 58
End: 2023-01-04
Payer: MEDICARE

## 2023-01-04 VITALS
HEIGHT: 67 IN | HEART RATE: 112 BPM | SYSTOLIC BLOOD PRESSURE: 130 MMHG | TEMPERATURE: 97.3 F | DIASTOLIC BLOOD PRESSURE: 80 MMHG | BODY MASS INDEX: 35.12 KG/M2 | WEIGHT: 223.8 LBS

## 2023-01-04 DIAGNOSIS — K21.9 GASTROESOPHAGEAL REFLUX DISEASE WITHOUT ESOPHAGITIS: ICD-10-CM

## 2023-01-04 DIAGNOSIS — M19.90 OSTEOARTHRITIS, UNSPECIFIED OSTEOARTHRITIS TYPE, UNSPECIFIED SITE: ICD-10-CM

## 2023-01-04 DIAGNOSIS — Z76.89 ENCOUNTER TO ESTABLISH CARE: Primary | ICD-10-CM

## 2023-01-04 DIAGNOSIS — E11.00 TYPE 2 DIABETES MELLITUS WITH HYPEROSMOLARITY WITHOUT COMA, WITHOUT LONG-TERM CURRENT USE OF INSULIN (HCC): ICD-10-CM

## 2023-01-04 DIAGNOSIS — R00.0 TACHYCARDIA: ICD-10-CM

## 2023-01-04 DIAGNOSIS — Z51.81 THERAPEUTIC DRUG MONITORING: ICD-10-CM

## 2023-01-04 LAB
ALCOHOL URINE: NORMAL
AMPHETAMINE SCREEN, URINE: NORMAL
BARBITURATE SCREEN, URINE: NORMAL
BENZODIAZEPINE SCREEN, URINE: NORMAL
BUPRENORPHINE URINE: NORMAL
COCAINE METABOLITE SCREEN URINE: NORMAL
FENTANYL SCREEN, URINE: NORMAL
GABAPENTIN SCREEN, URINE: NORMAL
HBA1C MFR BLD: 12.4 %
MDMA URINE: NORMAL
METHADONE SCREEN, URINE: NORMAL
METHAMPHETAMINE, URINE: NORMAL
OPIATE SCREEN URINE: NORMAL
OXYCODONE SCREEN URINE: NORMAL
PHENCYCLIDINE SCREEN URINE: NORMAL
PROPOXYPHENE SCREEN, URINE: NORMAL
SYNTHETIC CANNABINOIDS(K2) SCREEN, URINE: NORMAL
THC SCREEN, URINE: NORMAL
TRAMADOL SCREEN URINE: NORMAL
TRICYCLIC ANTIDEPRESSANTS, UR: NORMAL

## 2023-01-04 PROCEDURE — 83036 HEMOGLOBIN GLYCOSYLATED A1C: CPT | Performed by: NURSE PRACTITIONER

## 2023-01-04 PROCEDURE — 3046F HEMOGLOBIN A1C LEVEL >9.0%: CPT | Performed by: NURSE PRACTITIONER

## 2023-01-04 PROCEDURE — 3074F SYST BP LT 130 MM HG: CPT | Performed by: NURSE PRACTITIONER

## 2023-01-04 PROCEDURE — 3078F DIAST BP <80 MM HG: CPT | Performed by: NURSE PRACTITIONER

## 2023-01-04 PROCEDURE — 80305 DRUG TEST PRSMV DIR OPT OBS: CPT | Performed by: NURSE PRACTITIONER

## 2023-01-04 PROCEDURE — 99214 OFFICE O/P EST MOD 30 MIN: CPT | Performed by: NURSE PRACTITIONER

## 2023-01-04 RX ORDER — OMEPRAZOLE 20 MG/1
20 CAPSULE, DELAYED RELEASE ORAL
Qty: 60 CAPSULE | Refills: 0 | Status: SHIPPED | OUTPATIENT
Start: 2023-01-04

## 2023-01-04 RX ORDER — DESVENLAFAXINE 100 MG/1
1 TABLET, EXTENDED RELEASE ORAL DAILY
COMMUNITY

## 2023-01-04 RX ORDER — DOXEPIN HYDROCHLORIDE 100 MG/1
200 CAPSULE ORAL NIGHTLY
COMMUNITY

## 2023-01-04 RX ORDER — CYCLOBENZAPRINE HCL 10 MG
10 TABLET ORAL 2 TIMES DAILY PRN
COMMUNITY

## 2023-01-04 RX ORDER — HYDROXYZINE HYDROCHLORIDE 25 MG/1
25 TABLET, FILM COATED ORAL 2 TIMES DAILY
COMMUNITY

## 2023-01-04 SDOH — ECONOMIC STABILITY: FOOD INSECURITY: WITHIN THE PAST 12 MONTHS, THE FOOD YOU BOUGHT JUST DIDN'T LAST AND YOU DIDN'T HAVE MONEY TO GET MORE.: OFTEN TRUE

## 2023-01-04 SDOH — ECONOMIC STABILITY: FOOD INSECURITY: WITHIN THE PAST 12 MONTHS, YOU WORRIED THAT YOUR FOOD WOULD RUN OUT BEFORE YOU GOT MONEY TO BUY MORE.: OFTEN TRUE

## 2023-01-04 ASSESSMENT — ENCOUNTER SYMPTOMS
COUGH: 0
NAUSEA: 0
ABDOMINAL PAIN: 0
CHEST TIGHTNESS: 0
VOMITING: 0
SORE THROAT: 0
COLOR CHANGE: 0
DIARRHEA: 0
SHORTNESS OF BREATH: 0

## 2023-01-04 ASSESSMENT — PATIENT HEALTH QUESTIONNAIRE - PHQ9
6. FEELING BAD ABOUT YOURSELF - OR THAT YOU ARE A FAILURE OR HAVE LET YOURSELF OR YOUR FAMILY DOWN: 1
SUM OF ALL RESPONSES TO PHQ QUESTIONS 1-9: 10
8. MOVING OR SPEAKING SO SLOWLY THAT OTHER PEOPLE COULD HAVE NOTICED. OR THE OPPOSITE, BEING SO FIGETY OR RESTLESS THAT YOU HAVE BEEN MOVING AROUND A LOT MORE THAN USUAL: 1
9. THOUGHTS THAT YOU WOULD BE BETTER OFF DEAD, OR OF HURTING YOURSELF: 0
5. POOR APPETITE OR OVEREATING: 1
2. FEELING DOWN, DEPRESSED OR HOPELESS: 2
10. IF YOU CHECKED OFF ANY PROBLEMS, HOW DIFFICULT HAVE THESE PROBLEMS MADE IT FOR YOU TO DO YOUR WORK, TAKE CARE OF THINGS AT HOME, OR GET ALONG WITH OTHER PEOPLE: 2
3. TROUBLE FALLING OR STAYING ASLEEP: 1
SUM OF ALL RESPONSES TO PHQ QUESTIONS 1-9: 10
7. TROUBLE CONCENTRATING ON THINGS, SUCH AS READING THE NEWSPAPER OR WATCHING TELEVISION: 3
1. LITTLE INTEREST OR PLEASURE IN DOING THINGS: 0
4. FEELING TIRED OR HAVING LITTLE ENERGY: 1
SUM OF ALL RESPONSES TO PHQ QUESTIONS 1-9: 10
SUM OF ALL RESPONSES TO PHQ9 QUESTIONS 1 & 2: 2
SUM OF ALL RESPONSES TO PHQ QUESTIONS 1-9: 10

## 2023-01-04 ASSESSMENT — SOCIAL DETERMINANTS OF HEALTH (SDOH): HOW HARD IS IT FOR YOU TO PAY FOR THE VERY BASICS LIKE FOOD, HOUSING, MEDICAL CARE, AND HEATING?: VERY HARD

## 2023-01-04 NOTE — PATIENT INSTRUCTIONS
Stop pepcid/famotidine  Take omeprazole twice daily  Take trulicity 3mg once weekly  Eat a diabetic diet  Exercise 150 minutes weekly

## 2023-01-04 NOTE — PROGRESS NOTES
[facility] [address]     Phone:  [phone]  Fax:  [phone]      Shireen Arnold is a 61 y.o. female who presents today for her medical conditions/complaints as noted below. Shireen Arnold is c/o of [facility] (Former Cortney Andrade), Diabetes, and Hypertension      Chief Complaint   Patient presents with    [facility]     Former Cortney Castros    Diabetes    Hypertension       HPI:     HPI     Patient presents today to [facility]-former patient Cortney Andrade, APRN. Patient has history of diabetes mellitus type 2, hypertension, GERD, osteoarthritis, axillary adenopathy, left, major depressive disorder, anxiety, history of colon polyps, and history of breast cancer. Reports she fell years ago which contributed to her back pain. She sees Dr. Francia Plaza for pain management and takes lortab 7.5 three times daily prn. Takes gapabentin with relief of neuropathy. Is unable to tolerate metformin twice daily. Sees Dr. Randa Verde for anxiety and depression. Denies any other concerns.        Past Medical History:   Diagnosis Date    Allergic rhinitis     Anxiety     Breast cancer ([address] 10/2010    Left    Bronchitis     Carpal tunnel syndrome     R and L    Chest pain     Chronic back pain     Chronic bronchitis (HCC)     Depression     Diabetes mellitus ([address]     GERD (gastroesophageal reflux disease)     Headache(784.0)     Heart palpitations     Mononeuropathy     Osteoarthritis     Sleep apnea     Wears glasses         Past Surgical History:   Procedure Laterality Date    ANKLE FRACTURE SURGERY Right 2014    [address]    BREAST RECONSTRUCTION  06/2011    BREAST SURGERY  06/2011    emergency removal of Left breast expander    COLONOSCOPY N/A 02/05/2016    Dr Sunitha Maier x 1, serrated AP x 1, 12 month recall    COLONOSCOPY N/A 01/06/2021    Dr Radha Gomez yr recall    EGD COLONOSCOPY N/A 06/03/2016    w/empiric dilation to 48 Jordanian (16mm)-Dr Apple Rinaldi, Nafisa (-)    HYSTERECTOMY (CERVIX STATUS UNKNOWN)      MASTECTOMY, BILATERAL Bilateral 2011    OTHER SURGICAL HISTORY      R leg & ankle fracture due to fall repair-Dr Kaylin Irizarry at [facility]    UPPER GASTROINTESTINAL ENDOSCOPY  2016    w/empiric dilation to 48 French (16mm)-Dr RONY Henry, Nafisa (-)    UPPER GASTROINTESTINAL ENDOSCOPY N/A 2021    Dr Aleah Childers-w/brushings-Candidal esophagitis-Treated with Diflucan       Social History     Tobacco Use    Smoking status: Former     Packs/day: 0.25     Years: 20.00     Pack years: 5.00     Types: Cigars, Cigarettes     Start date: 2/3/2016     Quit date: 2016     Years since quittin.0    Smokeless tobacco: Never    Tobacco comments:     Smokes Ciggerelos now,1-2 a day.    Substance Use Topics    Alcohol use: No     Alcohol/week: 0.0 standard drinks        Current Outpatient Medications   Medication Sig Dispense Refill    desvenlafaxine succinate (PRISTIQ) 100 MG TB24 extended release tablet Take 1 tablet by mouth daily      cyclobenzaprine (FLEXERIL) 10 MG tablet Take 10 mg by mouth 2 times daily as needed      hydrOXYzine HCl (ATARAX) 25 MG tablet Take 25 mg by mouth 2 times daily      doxepin (SINEQUAN) 100 MG capsule Take 200 mg by mouth nightly      Dulaglutide 3 MG/0.5ML SOPN Inject 3 mg into the skin once a week 2 mL 2    dapagliflozin (FARXIGA) 5 MG tablet Take 1 tablet by mouth every morning 30 tablet 2    omeprazole (PRILOSEC) 20 MG delayed release capsule Take 1 capsule by mouth 2 times daily (before meals) 60 capsule 0    gabapentin (NEURONTIN) 300 MG capsule TAKE 2 CAPSULES BY MOUTH IN THE MORNING 1 CAPSULE AT NOON AND 2 CAPSULES AT BEDTIME 150 capsule 0    diclofenac sodium (VOLTAREN) 1 % GEL APPLY 4GMS TOPICALLY TO AFFECTED AREA 4 TIMES DAILY 400 g 5    fluticasone (FLONASE) 50 MCG/ACT nasal spray SPRAY 1 SPRAY INTO EACH NOSTRIL TWICE A DAY 1 each 2    blood glucose monitor kit and supplies Dispense sufficient amount for indicated testing frequency plus additional to accommodate PRN testing needs. Dispense all needed supplies to include: monitor, strips, lancing device, lancets, control solutions, alcohol swabs. 1 kit 0    blood glucose monitor strips Test 2 times a day & as needed for symptoms of irregular blood glucose. Dispense sufficient amount for indicated testing frequency plus additional to accommodate PRN testing needs. 100 strip 1    Blood Glucose Monitoring Suppl (ONE TOUCH ULTRA 2) w/Device KIT 1 kit by Does not apply route daily 1 kit 0    blood glucose test strips (ASCENSIA AUTODISC VI;ONE TOUCH ULTRA TEST VI) strip 1 each by In Vitro route daily As needed.  100 each 3    blood glucose test strips (TRUE METRIX BLOOD GLUCOSE TEST) strip TEST 2 TIMES A DAY & AS NEEDED FOR SYMPTOMS OF IRREGULAR BLOOD GLUCOSE. 50 strip 3    metFORMIN (GLUCOPHAGE-XR) 500 MG extended release tablet TAKE 1 TABLET BY MOUTH DAILY WITH LARGEST MEAL OF THE DAY 90 tablet 1    ARIPiprazole (ABILIFY) 2 MG tablet TAKE 1 TABLET BY MOUTH EVERY DAY AT NIGHT 30 tablet 1    ondansetron (ZOFRAN ODT) 4 MG disintegrating tablet Take 1 tablet by mouth every 8 hours as needed for Nausea or Vomiting 20 tablet 0    propranolol (INDERAL) 10 MG tablet TAKE 1 TABLET BY MOUTH THREE TIMES A  tablet 1    lisinopril (PRINIVIL;ZESTRIL) 20 MG tablet TAKE 1 TABLET BY MOUTH TWICE A  tablet 3    WIXELA INHUB 100-50 MCG/DOSE diskus inhaler TAKE 1 PUFF BY MOUTH TWICE A  each 2    Lancets MISC 1 each by Does not apply route 2 times daily 100 each 5    buPROPion (WELLBUTRIN SR) 100 MG extended release tablet Take 1 tablet by mouth daily (Patient taking differently: Take 300 mg by mouth daily) 30 tablet 0    meloxicam (MOBIC) 15 MG tablet Take 7.5 mg by mouth daily       guaiFENesin (MUCINEX PO) Take by mouth nightly      ipratropium-albuterol (DUONEB) 0.5-2.5 (3) MG/3ML SOLN nebulizer solution INHALE ONE AMPULE INTO THE LUNGS EVERY 6 HOURS AS NEEDED FOR SHORTNESS OF BREATH 360 mL 1    Respiratory Therapy Supplies LUPE cpap supplies x 1 year 1 Device 0    HYDROcodone-acetaminophen (NORCO) 7.5-325 MG per tablet Take 1 tablet by mouth 3 times daily as needed for Pain (Dr. Israel Suarez). busPIRone (BUSPAR) 10 MG tablet Take 10 mg by mouth 3 times daily as needed      Blood Glucose Monitoring Suppl (BLOOD GLUCOSE MONITOR SYSTEM) w/Device KIT 1 Device by Does not apply route 2 times daily 1 kit 0     No current facility-administered medications for this visit. Allergies   Allergen Reactions    Tape Percell Shauna Tape]      Surgical tape       Family History   Problem Relation Age of Onset    Diabetes Mother     Cancer Mother         breast    Heart Disease Mother     High Blood Pressure Mother     Breast Cancer Mother     Colon Cancer Neg Hx     Colon Polyps Neg Hx     Esophageal Cancer Neg Hx     Liver Cancer Neg Hx     Liver Disease Neg Hx     Rectal Cancer Neg Hx     Stomach Cancer Neg Hx                Subjective:      Review of Systems   Constitutional:  Negative for activity change and fever. HENT:  Negative for congestion, ear pain and sore throat. Respiratory:  Negative for cough, chest tightness and shortness of breath. Cardiovascular:  Negative for chest pain. Gastrointestinal:  Negative for abdominal pain, diarrhea, nausea and vomiting. Genitourinary:  Negative for frequency and urgency. Musculoskeletal:  Positive for arthralgias and back pain. Negative for myalgias. Skin:  Negative for color change. Neurological:  Negative for dizziness, weakness and numbness. Psychiatric/Behavioral:  Positive for dysphoric mood. Negative for agitation. The patient is nervous/anxious. Objective:     Physical Exam  Vitals reviewed. Constitutional:       Appearance: Normal appearance. HENT:      Head: Normocephalic.       Right Ear: Tympanic membrane normal.      Left Ear: Tympanic membrane normal.      Nose: Nose normal.      Mouth/Throat:      Mouth: Mucous membranes are moist.      Pharynx: Oropharynx is clear. Eyes:      Extraocular Movements: Extraocular movements intact. Pupils: Pupils are equal, round, and reactive to light. Cardiovascular:      Rate and Rhythm: Normal rate and regular rhythm. Pulses: Normal pulses. Heart sounds: Normal heart sounds. Pulmonary:      Effort: Pulmonary effort is normal.      Breath sounds: Normal breath sounds. Abdominal:      General: Bowel sounds are normal.      Palpations: Abdomen is soft. Musculoskeletal:         General: Normal range of motion. Cervical back: Normal range of motion. Skin:     General: Skin is warm and dry. Neurological:      Mental Status: She is alert and oriented to person, place, and time. Psychiatric:         Attention and Perception: Attention and perception normal.         Mood and Affect: Affect normal. Mood is depressed. Behavior: Behavior normal.         Thought Content: Thought content normal.         Judgment: Judgment normal.       /80   Pulse (!) 112   Temp 97.3 °F (36.3 °C) (Temporal)   Ht 5' 7\" (1.702 m)   Wt 223 lb 12.8 oz (101.5 kg)   PF 97 L/min   BMI 35.05 kg/m²     Assessment:      Diagnosis Orders   1. Encounter to establish care        2. Type 2 diabetes mellitus with hyperosmolarity without coma, without long-term current use of insulin ([facility])  POCT glycosylated hemoglobin (Hb A1C)    Dulaglutide 3 MG/0.5ML SOPN    dapagliflozin (FARXIGA) 5 MG tablet      3. Osteoarthritis, unspecified osteoarthritis type, unspecified site        4. Therapeutic drug monitoring  POCT Rapid Drug Screen      5. Tachycardia        6. Gastroesophageal reflux disease without esophagitis  omeprazole (PRILOSEC) 20 MG delayed release capsule      7.  BMI 35.0-35.9,adult            Results for orders placed or performed in visit on 01/04/23   POCT glycosylated hemoglobin (Hb A1C)   Result Value Ref Range    Hemoglobin A1C 12.4 %   POCT Rapid Drug Screen   Result Value Ref Range    Alcohol, Urine neg     Amphetamine Screen, Urine neg     Barbiturate Screen, Urine neg     Benzodiazepine Screen, Urine neg     Buprenorphine Urine neg     Cocaine Metabolite Screen, Urine neg     FENTANYL SCREEN, URINE neg     Gabapentin Screen, Urine neg     MDMA, Urine neg     Methadone Screen, Urine neg     Methamphetamine, Urine neg     Opiate Scrn, Ur pos     Oxycodone Screen, Ur neg     PCP Screen, Urine neg     Propoxyphene Screen, Urine neg     Synthetic Cannabinoids (K2) Screen, Urine neg     THC Screen, Urine neg     Tramadol Scrn, Ur neg     Tricyclic Antidepressants, Urine neg        Plan:     1. Encounter to establish care      2. Type 2 diabetes mellitus with hyperosmolarity without coma, without long-term current use of insulin ([facility] [address]  Recommend patient take trulicity once weekly, eat a diabetic diet and exercise 150 minutes weekly. - POCT glycosylated hemoglobin (Hb A1C)  - Dulaglutide 3 MG/0.5ML SOPN; Inject 3 mg into the skin once a week  Dispense: 2 mL; Refill: 2  - dapagliflozin (FARXIGA) 5 MG tablet; Take 1 tablet by mouth every morning  Dispense: 30 tablet; Refill: 2    3. Osteoarthritis, unspecified osteoarthritis type, unspecified site  Stay active and exercise 150 minutes weekly. 4. Therapeutic drug monitoring    - POCT Rapid Drug Screen    5. Tachycardia      6. Gastroesophageal reflux disease without esophagitis  Stop pepcid. Patient verbalized understanding.     - omeprazole (PRILOSEC) 20 MG delayed release capsule; Take 1 capsule by mouth 2 times daily (before meals)  Dispense: 60 capsule; Refill: 0    7. BMI 35.0-35.9,adult  Exercise 150 minutes weekly. Eat a diabetic diet. Return in about 3 months (around 4/4/2023) for 3 month f/u .     Orders Placed This Encounter   Procedures    POCT glycosylated hemoglobin (Hb A1C)    POCT Rapid Drug Screen       Orders Placed This Encounter   Medications    Dulaglutide 3 MG/0.5ML SOPN     Sig: Inject 3 mg into the skin once a week     Dispense:  2 mL     Refill:  2    dapagliflozin (FARXIGA) 5 MG tablet     Sig: Take 1 tablet by mouth every morning     Dispense:  30 tablet     Refill:  2    omeprazole (PRILOSEC) 20 MG delayed release capsule     Sig: Take 1 capsule by mouth 2 times daily (before meals)     Dispense:  60 capsule     Refill:  0            Patient offered educational handouts and has had all questions answered. Patient voices understanding and agrees to plans along with risks and benefits of plan. Patient is instructed to continue prior meds, diet, and exercise plans as instructed. Patient agrees to follow up as instructed and sooner if needed. Patient agrees to go to ER if condition becomes emergent. EMR Dragon/transcription disclaimer: Some of this encounter note is an electronic transcription/translation of spoken language to printed text. The electronic translation of spoken language may permit erroneous, or at times, nonsensical words or phrases to be inadvertently transcribed.  Although I have reviewed the note for such errors, some may still exist.    Electronically signed by RADHA Villasenor CNP on 1/5/2023 at 4:18 PM

## 2023-01-05 ASSESSMENT — ENCOUNTER SYMPTOMS: BACK PAIN: 1

## 2023-01-06 NOTE — PROGRESS NOTES
Nieves Fernandez MD FACS Progress Note    Referring Provider: No ref. provider found      Chief complaint   Chief Complaint   Patient presents with   • Post-op        Subjective .     History of present illness:  Cathie Sylvester  is a 57 y.o. female who presents status post excision cysts right axilla.  She states that part of the incision has opened.    History    The following portions of the patient's history were reviewed and updated as appropriate: allergies, current medications, past family history, past medical history, past social history, past surgical history, and problem list.    Objective     Vital Signs   /93 (BP Location: Left arm, Patient Position: Sitting, Cuff Size: Adult)   Pulse (!) 130   Temp 97.8 °F (36.6 °C)   Ht 168 cm (66.14\")   Wt 100 kg (220 lb 7.4 oz)   SpO2 96%   BMI 35.43 kg/m²      Physical Exam:    General The patient is well-developed, well-nourished, and in no acute distress.    Right axilla The incision has opened at the lateral edge.  The wound is clean.        Class 2 Severe Obesity (BMI >=35 and <=39.9). Obesity-related health conditions include the following: none. Obesity is newly identified. BMI is is above average; BMI management plan is completed. We discussed portion control and increasing exercise.    Assessment & Plan       Diagnoses and all orders for this visit:    1. Postoperative visit (Primary)    2. Aftercare following surgery           The patient will return in one week.  She was instructed to begin saline wet to dry bid.      Nieves Fernandez MD

## 2023-01-09 ENCOUNTER — OFFICE VISIT (OUTPATIENT)
Dept: SURGERY | Facility: CLINIC | Age: 58
End: 2023-01-09
Payer: MEDICARE

## 2023-01-09 VITALS
HEART RATE: 130 BPM | BODY MASS INDEX: 35.43 KG/M2 | OXYGEN SATURATION: 96 % | TEMPERATURE: 97.8 F | HEIGHT: 66 IN | DIASTOLIC BLOOD PRESSURE: 93 MMHG | SYSTOLIC BLOOD PRESSURE: 142 MMHG | WEIGHT: 220.46 LBS

## 2023-01-09 DIAGNOSIS — G62.0 CHEMOTHERAPY-INDUCED NEUROPATHY (HCC): ICD-10-CM

## 2023-01-09 DIAGNOSIS — T45.1X5A CHEMOTHERAPY-INDUCED NEUROPATHY (HCC): ICD-10-CM

## 2023-01-09 DIAGNOSIS — Z48.89 AFTERCARE FOLLOWING SURGERY: ICD-10-CM

## 2023-01-09 DIAGNOSIS — Z48.89 POSTOPERATIVE VISIT: Primary | ICD-10-CM

## 2023-01-09 PROCEDURE — 99212 OFFICE O/P EST SF 10 MIN: CPT | Performed by: SPECIALIST

## 2023-01-09 RX ORDER — GABAPENTIN 300 MG/1
CAPSULE ORAL
Qty: 150 CAPSULE | Refills: 0 | Status: SHIPPED | OUTPATIENT
Start: 2023-01-09 | End: 2023-02-09

## 2023-01-09 NOTE — TELEPHONE ENCOUNTER
Michael Griffon called to request a refill on her medication. Last office visit : 1/4/2023   Next office visit : 4/4/2023     Last UDS:   Amphetamine Screen, Urine   Date Value Ref Range Status   01/04/2023 neg  Final     Barbiturate Screen, Urine   Date Value Ref Range Status   01/04/2023 neg  Final     Benzodiazepine Screen, Urine   Date Value Ref Range Status   01/04/2023 neg  Final     Buprenorphine Urine   Date Value Ref Range Status   01/04/2023 neg  Final     Cocaine Metabolite Screen, Urine   Date Value Ref Range Status   01/04/2023 neg  Final     Gabapentin Screen, Urine   Date Value Ref Range Status   01/04/2023 neg  Final     MDMA, Urine   Date Value Ref Range Status   01/04/2023 neg  Final     Methamphetamine, Urine   Date Value Ref Range Status   01/04/2023 neg  Final     Opiate Scrn, Ur   Date Value Ref Range Status   01/04/2023 pos  Final     Oxycodone Screen, Ur   Date Value Ref Range Status   01/04/2023 neg  Final     PCP Screen, Urine   Date Value Ref Range Status   01/04/2023 neg  Final     Propoxyphene Screen, Urine   Date Value Ref Range Status   01/04/2023 neg  Final     THC Screen, Urine   Date Value Ref Range Status   01/04/2023 neg  Final     Tricyclic Antidepressants, Urine   Date Value Ref Range Status   01/04/2023 neg  Final       Last [place]al [place]: 1/9/23  Medication Contract: 4/12/22   Last Fill: 12/4/22    Requested Prescriptions     Pending Prescriptions Disp Refills    gabapentin (NEURONTIN) 300 MG capsule [Pharmacy Med Name: GABAPENTIN 300 MG CAPSULE] 150 capsule 0     Sig: TAKE 2 CAPSULES BY MOUTH EVERY MORNING, THEN TAKE 1 CAPSULE BY MOUTH AT NOON, THEN TAKE 2 CAPSULES BY MOUTH AT BEDTIME         Please approve or refuse this medication.    Jose Grider LPN

## 2023-01-12 NOTE — PROGRESS NOTES
"Nieves Fernandez MD FACS Progress Note    Referring Provider: No ref. provider found      Chief complaint   Chief Complaint   Patient presents with   • Follow-up        Subjective .     History of present illness:  Cathie Sylvester  is a 57 y.o. female who presents status post excision cysts right axilla.  She denies complaint.    History    The following portions of the patient's history were reviewed and updated as appropriate: allergies, current medications, past family history, past medical history, past social history, past surgical history, and problem list.    Objective     Vital Signs   /89   Pulse 117   Temp 98 °F (36.7 °C)   Ht 167.6 cm (66\")   Wt 99.8 kg (220 lb)   SpO2 99%   BMI 35.51 kg/m²      Physical Exam:    General The patient is well-developed, well-nourished, and in no acute distress.    Right axilla The wound is clean.  Surrounding skin irritated from tape.  Another piece of vicryl clipped.      Class 2 Severe Obesity (BMI >=35 and <=39.9). Obesity-related health conditions include the following: none. Obesity is newly identified. BMI is is above average; BMI management plan is completed. We discussed portion control and increasing exercise.    Assessment & Plan       Diagnoses and all orders for this visit:    1. Postoperative visit (Primary)    2. Aftercare following surgery           The patient will return in two weeks.  She was instructed to continue saline wet to dry bid.      Nieves Fernandez MD              "

## 2023-01-16 ENCOUNTER — OFFICE VISIT (OUTPATIENT)
Dept: SURGERY | Facility: CLINIC | Age: 58
End: 2023-01-16
Payer: MEDICARE

## 2023-01-16 VITALS
TEMPERATURE: 98 F | HEIGHT: 66 IN | SYSTOLIC BLOOD PRESSURE: 122 MMHG | HEART RATE: 117 BPM | OXYGEN SATURATION: 99 % | WEIGHT: 220 LBS | BODY MASS INDEX: 35.36 KG/M2 | DIASTOLIC BLOOD PRESSURE: 89 MMHG

## 2023-01-16 DIAGNOSIS — Z48.89 AFTERCARE FOLLOWING SURGERY: ICD-10-CM

## 2023-01-16 DIAGNOSIS — Z48.89 POSTOPERATIVE VISIT: Primary | ICD-10-CM

## 2023-01-16 PROCEDURE — 99212 OFFICE O/P EST SF 10 MIN: CPT | Performed by: SPECIALIST

## 2023-01-16 RX ORDER — DAPAGLIFLOZIN 5 MG/1
5 TABLET, FILM COATED ORAL EVERY MORNING
COMMUNITY
Start: 2023-01-04

## 2023-01-16 RX ORDER — CELECOXIB 100 MG/1
100 CAPSULE ORAL DAILY
COMMUNITY
Start: 2022-12-24

## 2023-01-16 RX ORDER — BLOOD SUGAR DIAGNOSTIC
STRIP MISCELLANEOUS
COMMUNITY
Start: 2022-12-16

## 2023-01-16 RX ORDER — LANCETS 30 GAUGE
EACH MISCELLANEOUS
COMMUNITY
Start: 2022-12-30

## 2023-01-22 DIAGNOSIS — K21.9 GASTROESOPHAGEAL REFLUX DISEASE WITHOUT ESOPHAGITIS: ICD-10-CM

## 2023-01-22 DIAGNOSIS — J30.89 ALLERGIC RHINITIS DUE TO OTHER ALLERGIC TRIGGER, UNSPECIFIED SEASONALITY: ICD-10-CM

## 2023-01-23 RX ORDER — OMEPRAZOLE 20 MG/1
20 CAPSULE, DELAYED RELEASE ORAL
Qty: 60 CAPSULE | Refills: 0 | OUTPATIENT
Start: 2023-01-23

## 2023-01-24 RX ORDER — FAMOTIDINE 20 MG/1
TABLET, FILM COATED ORAL
Qty: 90 TABLET | Refills: 3 | OUTPATIENT
Start: 2023-01-24

## 2023-01-24 RX ORDER — HYDROXYZINE HYDROCHLORIDE 25 MG/1
TABLET, FILM COATED ORAL
Qty: 90 TABLET | Refills: 1 | Status: SHIPPED | OUTPATIENT
Start: 2023-01-24

## 2023-01-24 RX ORDER — FLUTICASONE PROPIONATE 50 MCG
SPRAY, SUSPENSION (ML) NASAL
Qty: 1 EACH | Refills: 5 | Status: SHIPPED | OUTPATIENT
Start: 2023-01-24

## 2023-01-24 NOTE — TELEPHONE ENCOUNTER
Marianna Sal called to request a refill on her medication.       Last office visit : 1/4/2023   Next office visit : 1/22/2023     Requested Prescriptions     Pending Prescriptions Disp Refills    hydrOXYzine HCl (ATARAX) 25 MG tablet [Pharmacy Med Name: HYDROXYZINE HCL 25 MG TABLET] 90 tablet 1     Sig: TAKE 1 TABLET BY MOUTH NIGHTLY            [id number] [facility]

## 2023-01-29 NOTE — PROGRESS NOTES
"Nieves Fernandez MD FACS Progress Note    Referring Provider: No ref. provider found      Chief complaint   Chief Complaint   Patient presents with   • Follow-up     Mrs. Sylvester is here today for a 2 week follow up.         Subjective .     History of present illness:  Cathie Sylvester  is a 57 y.o. female who presents status post excision cysts right axilla.  She denies complaint.    History    The following portions of the patient's history were reviewed and updated as appropriate: allergies, current medications, past family history, past medical history, past social history, past surgical history, and problem list.    Objective     Vital Signs   /85   Pulse 85   Temp 97.5 °F (36.4 °C)   Ht 167.6 cm (66\")   Wt 99.8 kg (220 lb)   SpO2 98%   BMI 35.51 kg/m²      Physical Exam:    General The patient is well-developed, well-nourished, and in no acute distress.    Right axilla The incision is without surrounding erythema or discharge.  It is now very shallow.      Class 2 Severe Obesity (BMI >=35 and <=39.9). Obesity-related health conditions include the following: none. Obesity is newly identified. BMI is is above average; BMI management plan is completed. We discussed portion control and increasing exercise.    Assessment & Plan       Diagnoses and all orders for this visit:    1. Postoperative visit (Primary)    2. Aftercare following surgery           The patient will return prn.      Nieves Fernandez MD              "

## 2023-01-30 ENCOUNTER — OFFICE VISIT (OUTPATIENT)
Dept: SURGERY | Facility: CLINIC | Age: 58
End: 2023-01-30
Payer: MEDICARE

## 2023-01-30 VITALS
WEIGHT: 220 LBS | BODY MASS INDEX: 35.36 KG/M2 | OXYGEN SATURATION: 98 % | TEMPERATURE: 97.5 F | HEIGHT: 66 IN | SYSTOLIC BLOOD PRESSURE: 119 MMHG | HEART RATE: 85 BPM | DIASTOLIC BLOOD PRESSURE: 85 MMHG

## 2023-01-30 DIAGNOSIS — Z48.89 AFTERCARE FOLLOWING SURGERY: ICD-10-CM

## 2023-01-30 DIAGNOSIS — Z48.89 POSTOPERATIVE VISIT: Primary | ICD-10-CM

## 2023-01-30 PROCEDURE — 99212 OFFICE O/P EST SF 10 MIN: CPT | Performed by: SPECIALIST

## 2023-01-30 RX ORDER — BUSPIRONE HYDROCHLORIDE 10 MG/1
1 TABLET ORAL 3 TIMES DAILY
COMMUNITY
Start: 2022-12-02

## 2023-02-18 DIAGNOSIS — K21.9 GASTROESOPHAGEAL REFLUX DISEASE WITHOUT ESOPHAGITIS: ICD-10-CM

## 2023-02-18 DIAGNOSIS — E11.00 TYPE 2 DIABETES MELLITUS WITH HYPEROSMOLARITY WITHOUT COMA, WITHOUT LONG-TERM CURRENT USE OF INSULIN (HCC): ICD-10-CM

## 2023-02-20 RX ORDER — OMEPRAZOLE 20 MG/1
20 CAPSULE, DELAYED RELEASE ORAL
Qty: 60 CAPSULE | Refills: 1 | Status: SHIPPED | OUTPATIENT
Start: 2023-02-20 | End: 2023-03-16

## 2023-02-21 RX ORDER — FAMOTIDINE 20 MG/1
TABLET, FILM COATED ORAL
Qty: 90 TABLET | Refills: 3 | OUTPATIENT
Start: 2023-02-21

## 2023-02-21 RX ORDER — DULAGLUTIDE 1.5 MG/.5ML
INJECTION, SOLUTION SUBCUTANEOUS
Refills: 2 | OUTPATIENT
Start: 2023-02-21

## 2023-03-08 DIAGNOSIS — T45.1X5A CHEMOTHERAPY-INDUCED NEUROPATHY (HCC): ICD-10-CM

## 2023-03-08 DIAGNOSIS — E11.00 TYPE 2 DIABETES MELLITUS WITH HYPEROSMOLARITY WITHOUT COMA, WITHOUT LONG-TERM CURRENT USE OF INSULIN (HCC): ICD-10-CM

## 2023-03-08 DIAGNOSIS — F31.9 BIPOLAR AFFECTIVE DISORDER, REMISSION STATUS UNSPECIFIED (HCC): ICD-10-CM

## 2023-03-08 DIAGNOSIS — M19.90 OSTEOARTHRITIS, UNSPECIFIED OSTEOARTHRITIS TYPE, UNSPECIFIED SITE: ICD-10-CM

## 2023-03-08 DIAGNOSIS — K21.9 GASTROESOPHAGEAL REFLUX DISEASE WITHOUT ESOPHAGITIS: ICD-10-CM

## 2023-03-08 DIAGNOSIS — I10 HYPERTENSION, ESSENTIAL, BENIGN: ICD-10-CM

## 2023-03-08 DIAGNOSIS — G62.0 CHEMOTHERAPY-INDUCED NEUROPATHY (HCC): ICD-10-CM

## 2023-03-09 RX ORDER — FAMOTIDINE 20 MG/1
TABLET, FILM COATED ORAL
Qty: 90 TABLET | Refills: 3 | OUTPATIENT
Start: 2023-03-09

## 2023-03-09 RX ORDER — DULAGLUTIDE 1.5 MG/.5ML
INJECTION, SOLUTION SUBCUTANEOUS
Refills: 2 | OUTPATIENT
Start: 2023-03-09

## 2023-03-09 RX ORDER — DAPAGLIFLOZIN 5 MG/1
TABLET, FILM COATED ORAL
Qty: 30 TABLET | Refills: 2 | Status: SHIPPED | OUTPATIENT
Start: 2023-03-09

## 2023-03-09 RX ORDER — LISINOPRIL 20 MG/1
TABLET ORAL
Qty: 180 TABLET | Refills: 3 | Status: SHIPPED | OUTPATIENT
Start: 2023-03-09

## 2023-03-09 NOTE — TELEPHONE ENCOUNTER
Mariella Haynes called to request a refill on her medication.       Last office visit : 1/4/2023   Next office visit : 4/4/2023     Last UDS:   Amphetamine Screen, Urine   Date Value Ref Range Status   01/04/2023 neg  Final     Barbiturate Screen, Urine   Date Value Ref Range Status   01/04/2023 neg  Final     Benzodiazepine Screen, Urine   Date Value Ref Range Status   01/04/2023 neg  Final     Buprenorphine Urine   Date Value Ref Range Status   01/04/2023 neg  Final     Cocaine Metabolite Screen, Urine   Date Value Ref Range Status   01/04/2023 neg  Final     Gabapentin Screen, Urine   Date Value Ref Range Status   01/04/2023 neg  Final     MDMA, Urine   Date Value Ref Range Status   01/04/2023 neg  Final     Methamphetamine, Urine   Date Value Ref Range Status   01/04/2023 neg  Final     Opiate Scrn, Ur   Date Value Ref Range Status   01/04/2023 pos  Final     Oxycodone Screen, Ur   Date Value Ref Range Status   01/04/2023 neg  Final     PCP Screen, Urine   Date Value Ref Range Status   01/04/2023 neg  Final     Propoxyphene Screen, Urine   Date Value Ref Range Status   01/04/2023 neg  Final     THC Screen, Urine   Date Value Ref Range Status   01/04/2023 neg  Final     Tricyclic Antidepressants, Urine   Date Value Ref Range Status   01/04/2023 neg  Final       Last [place]: 1/9/23  Medication Contract: 4/12/22   Last Fill: 1/9/23    Requested Prescriptions     Pending Prescriptions Disp Refills    gabapentin (NEURONTIN) 300 MG capsule [Pharmacy Med Name: GABAPENTIN 300 MG CAPSULE] 150 capsule 0     Sig: TAKE 2 CAPSULES BY MOUTH IN THE MORNING TAKE 1 CAPSULE AT NOON AND TAKE 2 CAPSULES AT BEDTIME     Signed Prescriptions Disp Refills    FARXIGA 5 MG tablet 30 tablet 2     Sig: TAKE 1 TABLET BY MOUTH EVERY DAY IN THE MORNING     Authorizing Provider: CAROLINA JONES     Ordering User: LASHAY Alba    lisinopril (PRINIVIL;ZESTRIL) 20 MG tablet 180 tablet 3     Sig: TAKE 1 TABLET BY MOUTH TWICE A DAY Authorizing Provider: CAROLINA JONES     Ordering User: Cleve Roy         Please approve or refuse this medication.    [place] [place], [place]

## 2023-03-10 RX ORDER — GABAPENTIN 300 MG/1
CAPSULE ORAL
Qty: 150 CAPSULE | Refills: 0 | Status: SHIPPED | OUTPATIENT
Start: 2023-03-10 | End: 2023-04-10

## 2023-03-16 DIAGNOSIS — K21.9 GASTROESOPHAGEAL REFLUX DISEASE WITHOUT ESOPHAGITIS: ICD-10-CM

## 2023-03-16 RX ORDER — OMEPRAZOLE 20 MG/1
20 CAPSULE, DELAYED RELEASE ORAL
Qty: 180 CAPSULE | Refills: 1 | Status: SHIPPED | OUTPATIENT
Start: 2023-03-16

## 2023-03-16 NOTE — TELEPHONE ENCOUNTER
Yariel Colemanyaritza called to request a refill on her medication. Last office visit : 1/4/2023   Next office visit : 4/4/2023     Requested Prescriptions     Pending Prescriptions Disp Refills    omeprazole (PRILOSEC) 20 MG delayed release capsule [Pharmacy Med Name: OMEPRAZOLE DR 20 MG CAPSULE] 60 capsule 1     Sig: TAKE 1 CAPSULE BY MOUTH 2 TIMES DAILY (BEFORE MEALS).             Jodi Vega LPN

## 2023-04-26 ENCOUNTER — TELEPHONE (OUTPATIENT)
Dept: PRIMARY CARE CLINIC | Age: 58
End: 2023-04-26

## 2023-04-28 ENCOUNTER — OFFICE VISIT (OUTPATIENT)
Dept: PRIMARY CARE CLINIC | Age: 58
End: 2023-04-28

## 2023-04-28 VITALS
OXYGEN SATURATION: 96 % | TEMPERATURE: 97.1 F | SYSTOLIC BLOOD PRESSURE: 120 MMHG | HEIGHT: 67 IN | BODY MASS INDEX: 34.53 KG/M2 | DIASTOLIC BLOOD PRESSURE: 86 MMHG | WEIGHT: 220 LBS | HEART RATE: 105 BPM
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
## 2023-04-28 ASSESSMENT — ENCOUNTER SYMPTOMS
COUGH: 0
NAUSEA: 0
ABDOMINAL PAIN: 0
COLOR CHANGE: 0
VOMITING: 0
DIARRHEA: 0
SHORTNESS OF BREATH: 0
ROS SKIN COMMENTS: MULTIPLE INSECT BITES
SORE THROAT: 0
CHEST TIGHTNESS: 0

## 2023-04-28 NOTE — PROGRESS NOTES
2023     Fern Burks (:  1965) is a 60 y.o. female,Established patient, here for evaluation of the following chief complaint(s):  Insect Bite (Tick bite )      ASSESSMENT/PLAN:  1. Multiple insect bites  Assessment & Plan:   Patient here today for complaints of multiple insect bites. She has several areas of concern in which she states she has removed ticks (chest, left shoulder, upper back, bilateral posterior patella). Patient complains that the areas are very itchy despite using over the counter cream. Patient denies spending a lot of time outdoors, but feels as if she has always attracted them. Also notes that some are brought in by her dog. Will send in Triamcinolone ointment with instructions not to every use from the neck up. Orders:  -     triamcinolone (KENALOG) 0.1 % ointment; Apply topically 2 times daily. , Disp-30 g, R-0, Normal      Return if symptoms worsen or fail to improve. SUBJECTIVE/OBJECTIVE:  Insect Bite  Pertinent negatives include no abdominal pain, arthralgias, chest pain, congestion, coughing, fever, myalgias, nausea, numbness, sore throat, vomiting or weakness. Prior to Visit Medications    Medication Sig Taking? Authorizing Provider   triamcinolone (KENALOG) 0.1 % ointment Apply topically 2 times daily. Yes RADHA Wagner CNP   gabapentin (NEURONTIN) 300 MG capsule TAKE 2 CAPSULES BY MOUTH IN THE MORNING TAKE 1 CAPSULE AT NOON AND TAKE 2 CAPSULES AT BEDTIME Yes RADHA Mackey CNP   omeprazole (PRILOSEC) 20 MG delayed release capsule TAKE 1 CAPSULE BY MOUTH 2 TIMES DAILY (BEFORE MEALS).  Yes RADHA Mackey CNP   diclofenac sodium (VOLTAREN) 1 % GEL APPLY 4 GRAMS TOPICALLY TO AFFECTED AREA 4 TIMES DAILY Yes RADHA Mackey CNP   FARXIGA 5 MG tablet TAKE 1 TABLET BY MOUTH EVERY DAY IN THE MORNING Yes RADHA Mackey CNP   lisinopril (PRINIVIL;ZESTRIL) 20 MG tablet TAKE 1 TABLET BY MOUTH TWICE A DAY Yes RADHA Mackey CNP   hydrOXYzine

## 2023-04-28 NOTE — ASSESSMENT & PLAN NOTE
Patient here today for complaints of multiple insect bites. She has several areas of concern in which she states she has removed ticks (chest, left shoulder, upper back, bilateral posterior patella). Patient complains that the areas are very itchy despite using over the counter cream. Patient denies spending a lot of time outdoors, but feels as if she has always attracted them. Also notes that some are brought in by her dog. Will send in Triamcinolone ointment with instructions not to every use from the neck up.

## 2023-05-02 DIAGNOSIS — G62.0 CHEMOTHERAPY-INDUCED NEUROPATHY (HCC): ICD-10-CM

## 2023-05-02 DIAGNOSIS — T45.1X5A CHEMOTHERAPY-INDUCED NEUROPATHY (HCC): ICD-10-CM

## 2023-05-02 RX ORDER — GABAPENTIN 300 MG/1
CAPSULE ORAL
Qty: 150 CAPSULE | Refills: 0 | Status: SHIPPED | OUTPATIENT
Start: 2023-05-10 | End: 2023-06-27
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
## 2023-05-24 NOTE — TELEPHONE ENCOUNTER
Aysha Crew called to request a refill on her medication.       Last office visit : 4/28/2023   Next office visit : 7/5/2023     Requested Prescriptions     Pending Prescriptions Disp Refills    triamcinolone (KENALOG) 0.1 % ointment [Pharmacy Med Name: TRIAMCINOLONE 0.1% OINTMENT] 30 g 0     Sig: APPLY TO [address] [facility] [place] A DAY            Larry Vela LPN

## 2023-06-19 DIAGNOSIS — E11.00 TYPE 2 DIABETES MELLITUS WITH HYPEROSMOLARITY WITHOUT COMA, WITHOUT LONG-TERM CURRENT USE OF INSULIN (HCC): ICD-10-CM

## 2023-06-19 NOTE — TELEPHONE ENCOUNTER
----- Message from Huntervangie Cole sent at 6/19/2023 10:13 AM CDT -----  Subject: Refill Request    QUESTIONS  Name of Medication? metFORMIN (GLUCOPHAGE-XR) 500 MG extended release   tablet  Patient-reported dosage and instructions? 500 mg, twice daily  How many days do you have left? 0  Preferred Pharmacy? [facility]/PHARMACY #[id number]  Pharmacy phone number (if available)? [phone]  Additional Information for Provider? Pt was told to increase her dosage on   this medication and ran out quickly. She been out for the past 2 days. ---------------------------------------------------------------------------  --------------  Marylin MARQUEZ  What is the best way for the office to contact you? OK to leave message on   voicemail  Preferred Call Back Phone Number? [phone]  ---------------------------------------------------------------------------  --------------  SCRIPT ANSWERS  Relationship to Patient?  Self

## 2023-06-20 ENCOUNTER — TELEPHONE (OUTPATIENT)
Dept: PRIMARY CARE CLINIC | Age: 58
End: 2023-06-20

## 2023-06-20 DIAGNOSIS — E11.00 TYPE 2 DIABETES MELLITUS WITH HYPEROSMOLARITY WITHOUT COMA, WITHOUT LONG-TERM CURRENT USE OF INSULIN (HCC): Primary | ICD-10-CM

## 2023-06-20 RX ORDER — METFORMIN HYDROCHLORIDE 500 MG/1
TABLET, EXTENDED RELEASE ORAL
Qty: 90 TABLET | Refills: 1 | OUTPATIENT
Start: 2023-06-20

## 2023-06-20 NOTE — TELEPHONE ENCOUNTER
Patient calls stating she is out of her Metformin. She states she was told to take Metformin 500mg BID. And has been out of medication for several days. Please advise.

## 2023-06-25 DIAGNOSIS — G62.0 CHEMOTHERAPY-INDUCED NEUROPATHY (HCC): ICD-10-CM

## 2023-06-25 DIAGNOSIS — T45.1X5A CHEMOTHERAPY-INDUCED NEUROPATHY (HCC): ICD-10-CM

## 2023-06-27 DIAGNOSIS — G62.0 CHEMOTHERAPY-INDUCED NEUROPATHY (HCC): Primary | ICD-10-CM

## 2023-06-27 DIAGNOSIS — T45.1X5A CHEMOTHERAPY-INDUCED NEUROPATHY (HCC): Primary | ICD-10-CM

## 2023-06-27 RX ORDER — GABAPENTIN 600 MG/1
TABLET ORAL
Qty: 75 TABLET | Refills: 0 | Status: SHIPPED | OUTPATIENT
Start: 2023-06-27 | End: 2023-07-27

## 2023-06-27 RX ORDER — GABAPENTIN 300 MG/1
CAPSULE ORAL
Qty: 150 CAPSULE | Refills: 0 | OUTPATIENT
Start: 2023-06-27 | End: 2023-07-26

## 2023-06-27 NOTE — TELEPHONE ENCOUNTER
Hung Lyn called to request a refill on her medication. Last office visit : 4/28/2023   Next office visit : 7/5/2023     Last UDS:   Amphetamine Screen, Urine   Date Value Ref Range Status   01/04/2023 neg  Final     Barbiturate Screen, Urine   Date Value Ref Range Status   01/04/2023 neg  Final     Benzodiazepine Screen, Urine   Date Value Ref Range Status   01/04/2023 neg  Final     Buprenorphine Urine   Date Value Ref Range Status   01/04/2023 neg  Final     Cocaine Metabolite Screen, Urine   Date Value Ref Range Status   01/04/2023 neg  Final     Gabapentin Screen, Urine   Date Value Ref Range Status   01/04/2023 neg  Final     MDMA, Urine   Date Value Ref Range Status   01/04/2023 neg  Final     Methamphetamine, Urine   Date Value Ref Range Status   01/04/2023 neg  Final     Opiate Scrn, Ur   Date Value Ref Range Status   01/04/2023 pos  Final     Oxycodone Screen, Ur   Date Value Ref Range Status   01/04/2023 neg  Final     PCP Screen, Urine   Date Value Ref Range Status   01/04/2023 neg  Final     Propoxyphene Screen, Urine   Date Value Ref Range Status   01/04/2023 neg  Final     THC Screen, Urine   Date Value Ref Range Status   01/04/2023 neg  Final     Tricyclic Antidepressants, Urine   Date Value Ref Range Status   01/04/2023 neg  Final       Last Yina Granado: 1/2023  Medication Contract: 4/2022   Last Fill: 5/10/23    Requested Prescriptions     Pending Prescriptions Disp Refills    gabapentin (NEURONTIN) 300 MG capsule [Pharmacy Med Name: GABAPENTIN 300 MG CAPSULE] 150 capsule 0     Sig: TAKE 2 CAPSULES BY MOUTH IN THE MORNING 1 CAPSULE AT NOON AND 2 CAPSULES AT BEDTIME DO NOT FILL BEFORE 5-10-23                       Please approve or refuse this medication.    Lucía Thakkar LPN

## 2023-07-12 DIAGNOSIS — E11.00 TYPE 2 DIABETES MELLITUS WITH HYPEROSMOLARITY WITHOUT COMA, WITHOUT LONG-TERM CURRENT USE OF INSULIN (HCC): ICD-10-CM

## 2023-08-09 ENCOUNTER — TELEPHONE (OUTPATIENT)
Dept: PRIMARY CARE CLINIC | Age: 58
End: 2023-08-09

## 2023-08-09 DIAGNOSIS — E11.00 TYPE 2 DIABETES MELLITUS WITH HYPEROSMOLARITY WITHOUT COMA, WITHOUT LONG-TERM CURRENT USE OF INSULIN (HCC): ICD-10-CM

## 2023-08-09 NOTE — TELEPHONE ENCOUNTER
Trent Lopez called to request a refill on her medication. Last office visit : 4/28/2023   Next office visit : 8/21/2023     Requested Prescriptions     Pending Prescriptions Disp Refills    metFORMIN (GLUCOPHAGE) 500 MG tablet 24 tablet 0     Sig: Take one tablet twice daily with meals. NO FURTHER REFILLS UNTIL SEEN IN OFFICE.             Jacquelyn Mcduffie LPN

## 2023-08-09 NOTE — TELEPHONE ENCOUNTER
----- Message from Carine Iglesia sent at 8/9/2023 12:00 PM CDT -----  Subject: Refill Request    QUESTIONS  Name of Medication? metFORMIN (GLUCOPHAGE) 500 MG tablet  Patient-reported dosage and instructions? Twice daily  How many days do you have left? 0  Preferred Pharmacy? [facility]/PHARMACY #[id number]  Pharmacy phone number (if available)? [phone]  Additional Information for Provider? Patient said she out of Metformin   need enough until she comes in for her appt on 08/21/23. Please call   patient. ASAP  ---------------------------------------------------------------------------  --------------  CALL BACK INFO  What is the best way for the office to contact you? OK to leave message on   voicemail  Preferred Call Back Phone Number? [phone]  ---------------------------------------------------------------------------  --------------  SCRIPT ANSWERS  Relationship to Patient?  Self

## 2023-08-13 DIAGNOSIS — E11.00 TYPE 2 DIABETES MELLITUS WITH HYPEROSMOLARITY WITHOUT COMA, WITHOUT LONG-TERM CURRENT USE OF INSULIN (HCC): ICD-10-CM

## 2023-08-21 ENCOUNTER — OFFICE VISIT (OUTPATIENT)
Dept: PRIMARY CARE CLINIC | Age: 58
End: 2023-08-21
Payer: MEDICARE

## 2023-08-21 VITALS
HEIGHT: 67 IN | DIASTOLIC BLOOD PRESSURE: 80 MMHG | SYSTOLIC BLOOD PRESSURE: 130 MMHG | TEMPERATURE: 97 F | WEIGHT: 214.2 LBS | HEART RATE: 74 BPM | BODY MASS INDEX: 33.62 KG/M2 | OXYGEN SATURATION: 97 %

## 2023-08-21 DIAGNOSIS — E11.00 TYPE 2 DIABETES MELLITUS WITH HYPEROSMOLARITY WITHOUT COMA, WITHOUT LONG-TERM CURRENT USE OF INSULIN (HCC): ICD-10-CM

## 2023-08-21 DIAGNOSIS — T45.1X5A CHEMOTHERAPY-INDUCED NEUROPATHY (HCC): ICD-10-CM

## 2023-08-21 DIAGNOSIS — E11.00 TYPE 2 DIABETES MELLITUS WITH HYPEROSMOLARITY WITHOUT COMA, WITHOUT LONG-TERM CURRENT USE OF INSULIN (HCC): Primary | ICD-10-CM

## 2023-08-21 DIAGNOSIS — K21.9 GASTROESOPHAGEAL REFLUX DISEASE WITHOUT ESOPHAGITIS: ICD-10-CM

## 2023-08-21 DIAGNOSIS — E66.09 CLASS 1 OBESITY DUE TO EXCESS CALORIES WITHOUT SERIOUS COMORBIDITY WITH BODY MASS INDEX (BMI) OF 33.0 TO 33.9 IN ADULT: ICD-10-CM

## 2023-08-21 DIAGNOSIS — I10 HYPERTENSION, ESSENTIAL, BENIGN: ICD-10-CM

## 2023-08-21 DIAGNOSIS — Z79.899 DRUG THERAPY: ICD-10-CM

## 2023-08-21 DIAGNOSIS — G62.0 CHEMOTHERAPY-INDUCED NEUROPATHY (HCC): ICD-10-CM

## 2023-08-21 LAB
ALCOHOL URINE: NORMAL
AMPHETAMINE SCREEN, URINE: NORMAL
BARBITURATE SCREEN, URINE: NORMAL
BENZODIAZEPINE SCREEN, URINE: NORMAL
BUPRENORPHINE URINE: NORMAL
COCAINE METABOLITE SCREEN URINE: NORMAL
FENTANYL SCREEN, URINE: NORMAL
GABAPENTIN SCREEN, URINE: NORMAL
HBA1C MFR BLD: 8.4 %
MDMA URINE: NORMAL
METHADONE SCREEN, URINE: NORMAL
METHAMPHETAMINE, URINE: NORMAL
OPIATE SCREEN URINE: NORMAL
OXYCODONE SCREEN URINE: NORMAL
PHENCYCLIDINE SCREEN URINE: NORMAL
PROPOXYPHENE SCREEN, URINE: NORMAL
SYNTHETIC CANNABINOIDS(K2) SCREEN, URINE: NORMAL
THC SCREEN, URINE: NORMAL
TRAMADOL SCREEN URINE: NORMAL
TRICYCLIC ANTIDEPRESSANTS, UR: NORMAL

## 2023-08-21 PROCEDURE — 3078F DIAST BP <80 MM HG: CPT | Performed by: NURSE PRACTITIONER

## 2023-08-21 PROCEDURE — 3074F SYST BP LT 130 MM HG: CPT | Performed by: NURSE PRACTITIONER

## 2023-08-21 PROCEDURE — 99214 OFFICE O/P EST MOD 30 MIN: CPT | Performed by: NURSE PRACTITIONER

## 2023-08-21 PROCEDURE — 3052F HG A1C>EQUAL 8.0%<EQUAL 9.0%: CPT | Performed by: NURSE PRACTITIONER

## 2023-08-21 RX ORDER — OMEPRAZOLE 20 MG/1
20 CAPSULE, DELAYED RELEASE ORAL
Qty: 180 CAPSULE | Refills: 1 | Status: SHIPPED | OUTPATIENT
Start: 2023-08-21

## 2023-08-21 RX ORDER — DAPAGLIFLOZIN 5 MG/1
TABLET, FILM COATED ORAL
Qty: 30 TABLET | Refills: 2 | OUTPATIENT
Start: 2023-08-21

## 2023-08-21 NOTE — PROGRESS NOTES
benefits of plan. Patient is instructed to continue prior meds, diet, and exercise plans as instructed. Patient agrees to follow up as instructed and sooner if needed. Patient agrees to go to ER if condition becomes emergent. EMR Dragon/transcription disclaimer: Some of this encounter note is an electronic transcription/translation of spoken language to printed text. The electronic translation of spoken language may permit erroneous, or at times, nonsensical words or phrases to be inadvertently transcribed.  Although I have reviewed the note for such errors, some may still exist.    Electronically signed by RADHA Wei CNP on 8/26/2023 at 7:38 PM

## 2023-08-24 RX ORDER — GABAPENTIN 300 MG/1
CAPSULE ORAL
Qty: 75 CAPSULE | Refills: 0 | Status: SHIPPED | OUTPATIENT
Start: 2023-08-24 | End: 2023-09-23

## 2023-08-26 ASSESSMENT — ENCOUNTER SYMPTOMS
CHEST TIGHTNESS: 0
SORE THROAT: 0
COUGH: 0
SHORTNESS OF BREATH: 0
NAUSEA: 0
DIARRHEA: 0
VOMITING: 0
COLOR CHANGE: 0
ABDOMINAL PAIN: 0

## 2023-08-28 NOTE — DISCHARGE INSTRUCTIONS
Follow-up with your primary care provider with resources back the next 2 to 3 days to ensure improvement of wound and for packing removal.  Return to the ER for new or worsening symptoms Bexarotene Pregnancy And Lactation Text: This medication is Pregnancy Category X and should not be given to women who are pregnant or may become pregnant. This medication should not be used if you are breast feeding.

## 2023-08-30 DIAGNOSIS — E11.00 TYPE 2 DIABETES MELLITUS WITH HYPEROSMOLARITY WITHOUT COMA, WITHOUT LONG-TERM CURRENT USE OF INSULIN (HCC): ICD-10-CM

## 2023-08-30 DIAGNOSIS — G62.0 CHEMOTHERAPY-INDUCED NEUROPATHY (HCC): Primary | ICD-10-CM

## 2023-08-30 DIAGNOSIS — G62.0 CHEMOTHERAPY-INDUCED NEUROPATHY (HCC): ICD-10-CM

## 2023-08-30 DIAGNOSIS — T45.1X5A CHEMOTHERAPY-INDUCED NEUROPATHY (HCC): ICD-10-CM

## 2023-08-30 DIAGNOSIS — T45.1X5A CHEMOTHERAPY-INDUCED NEUROPATHY (HCC): Primary | ICD-10-CM

## 2023-08-30 RX ORDER — GABAPENTIN 600 MG/1
TABLET ORAL
Qty: 75 TABLET | Refills: 0 | Status: SHIPPED | OUTPATIENT
Start: 2023-08-30 | End: 2023-09-29

## 2023-08-30 RX ORDER — GABAPENTIN 300 MG/1
CAPSULE ORAL
Qty: 150 CAPSULE | Refills: 0 | OUTPATIENT
Start: 2023-08-30 | End: 2024-02-14

## 2023-08-30 NOTE — TELEPHONE ENCOUNTER
Patient called and reports that the gabapentin is suppose to be ordered as previous take two capsule in am,one at lunch and 2 caps at bedtime. Reports pharmacy won't fill previous rx related to you can't break a capsule in half.

## 2023-08-30 NOTE — PROGRESS NOTES
Spoke with patient and clarified her gabapentin needs. Will send in gabapentin 600 mg 1 tablet in am and pm and 1/2 tablet at noon.

## 2023-09-21 ENCOUNTER — TELEPHONE (OUTPATIENT)
Dept: PRIMARY CARE CLINIC | Age: 58
End: 2023-09-21

## 2023-09-22 NOTE — TELEPHONE ENCOUNTER
Please let patient know I would not recommend adult pull ups due to metformin. If she is having that much trouble, she needs to stop metformin and make an appointment to be seen in office to discuss other options.

## 2023-09-27 NOTE — TELEPHONE ENCOUNTER
Called patient,informed of provider recommendations  Patient VU-offered sooner appt to discuss metformin and patient declines-wants to do physical all at one time.

## 2023-10-02 DIAGNOSIS — T45.1X5A CHEMOTHERAPY-INDUCED NEUROPATHY (HCC): ICD-10-CM

## 2023-10-02 DIAGNOSIS — G62.0 CHEMOTHERAPY-INDUCED NEUROPATHY (HCC): ICD-10-CM

## 2023-10-02 RX ORDER — GABAPENTIN 600 MG/1
TABLET ORAL
Qty: 75 TABLET | Refills: 0 | Status: SHIPPED | OUTPATIENT
Start: 2023-10-02 | End: 2023-11-01

## 2023-10-02 NOTE — TELEPHONE ENCOUNTER
Saundra Cook called to request a refill on her medication. Last office visit : 8/21/2023   Next office visit : 10/13/2023     Last Oscar Webster: 1/09/23  Medication Contract: 4/12/22   Last UDS: 8/21/23  Last Rx: 8/30/23    Amphetamine Screen, Urine   Date Value Ref Range Status   08/21/2023 neg  Final     Barbiturate Screen, Urine   Date Value Ref Range Status   08/21/2023 neg  Final     Benzodiazepine Screen, Urine   Date Value Ref Range Status   08/21/2023 neg  Final     Buprenorphine Urine   Date Value Ref Range Status   08/21/2023 neg  Final     Cocaine Metabolite Screen, Urine   Date Value Ref Range Status   08/21/2023 neg  Final     Gabapentin Screen, Urine   Date Value Ref Range Status   08/21/2023 neg  Final     Comment:     not checked on UDS     MDMA, Urine   Date Value Ref Range Status   08/21/2023 neg  Final     Methamphetamine, Urine   Date Value Ref Range Status   08/21/2023 neg  Final     Opiate Scrn, Ur   Date Value Ref Range Status   08/21/2023 pos  Final     Oxycodone Screen, Ur   Date Value Ref Range Status   08/21/2023 neg  Final     PCP Screen, Urine   Date Value Ref Range Status   08/21/2023 neg  Final     Propoxyphene Screen, Urine   Date Value Ref Range Status   08/21/2023 neg  Final     THC Screen, Urine   Date Value Ref Range Status   08/21/2023 neg  Final     Tricyclic Antidepressants, Urine   Date Value Ref Range Status   08/21/2023 neg  Final           Requested Prescriptions     Pending Prescriptions Disp Refills    gabapentin (NEURONTIN) 600 MG tablet 75 tablet 0     Sig: Take 1 tablet in am, 1/2 tablet at noon, and 1 tablet in pm.         Please approve or refuse this medication.    Mary Darby

## 2023-10-13 ENCOUNTER — OFFICE VISIT (OUTPATIENT)
Dept: PRIMARY CARE CLINIC | Age: 58
End: 2023-10-13
Payer: MEDICARE

## 2023-10-13 VITALS
SYSTOLIC BLOOD PRESSURE: 134 MMHG | DIASTOLIC BLOOD PRESSURE: 82 MMHG | HEIGHT: 67 IN | HEART RATE: 94 BPM | TEMPERATURE: 97.8 F | OXYGEN SATURATION: 97 % | BODY MASS INDEX: 34.15 KG/M2 | WEIGHT: 217.6 LBS

## 2023-10-13 DIAGNOSIS — R32 URINARY INCONTINENCE, UNSPECIFIED TYPE: ICD-10-CM

## 2023-10-13 DIAGNOSIS — Z00.00 MEDICARE ANNUAL WELLNESS VISIT, SUBSEQUENT: Primary | ICD-10-CM

## 2023-10-13 DIAGNOSIS — Z23 FLU VACCINE NEED: ICD-10-CM

## 2023-10-13 DIAGNOSIS — J30.89 ALLERGIC RHINITIS DUE TO OTHER ALLERGIC TRIGGER, UNSPECIFIED SEASONALITY: ICD-10-CM

## 2023-10-13 LAB
BILIRUBIN, POC: NORMAL
BLOOD URINE, POC: NORMAL
CLARITY, POC: NORMAL
COLOR, POC: YELLOW
GLUCOSE URINE, POC: 500
KETONES, POC: NORMAL
LEUKOCYTE EST, POC: NORMAL
NITRITE, POC: NORMAL
PH, POC: 5.5
PROTEIN, POC: NORMAL
SPECIFIC GRAVITY, POC: 1.01
UROBILINOGEN, POC: 0.2

## 2023-10-13 PROCEDURE — 99213 OFFICE O/P EST LOW 20 MIN: CPT | Performed by: NURSE PRACTITIONER

## 2023-10-13 PROCEDURE — 90674 CCIIV4 VAC NO PRSV 0.5 ML IM: CPT | Performed by: NURSE PRACTITIONER

## 2023-10-13 PROCEDURE — G0439 PPPS, SUBSEQ VISIT: HCPCS | Performed by: NURSE PRACTITIONER

## 2023-10-13 PROCEDURE — 3078F DIAST BP <80 MM HG: CPT | Performed by: NURSE PRACTITIONER

## 2023-10-13 PROCEDURE — 3074F SYST BP LT 130 MM HG: CPT | Performed by: NURSE PRACTITIONER

## 2023-10-13 PROCEDURE — G0008 ADMIN INFLUENZA VIRUS VAC: HCPCS | Performed by: NURSE PRACTITIONER

## 2023-10-13 PROCEDURE — 90471 IMMUNIZATION ADMIN: CPT | Performed by: NURSE PRACTITIONER

## 2023-10-13 PROCEDURE — 81002 URINALYSIS NONAUTO W/O SCOPE: CPT | Performed by: NURSE PRACTITIONER

## 2023-10-13 RX ORDER — FLUTICASONE PROPIONATE 50 MCG
SPRAY, SUSPENSION (ML) NASAL
Qty: 48 G | Refills: 1 | Status: SHIPPED | OUTPATIENT
Start: 2023-10-13

## 2023-10-13 ASSESSMENT — PATIENT HEALTH QUESTIONNAIRE - PHQ9
2. FEELING DOWN, DEPRESSED OR HOPELESS: 2
8. MOVING OR SPEAKING SO SLOWLY THAT OTHER PEOPLE COULD HAVE NOTICED. OR THE OPPOSITE, BEING SO FIGETY OR RESTLESS THAT YOU HAVE BEEN MOVING AROUND A LOT MORE THAN USUAL: 2
3. TROUBLE FALLING OR STAYING ASLEEP: 3
SUM OF ALL RESPONSES TO PHQ9 QUESTIONS 1 & 2: 3
1. LITTLE INTEREST OR PLEASURE IN DOING THINGS: 1
SUM OF ALL RESPONSES TO PHQ QUESTIONS 1-9: 15
SUM OF ALL RESPONSES TO PHQ QUESTIONS 1-9: 15
4. FEELING TIRED OR HAVING LITTLE ENERGY: 3
7. TROUBLE CONCENTRATING ON THINGS, SUCH AS READING THE NEWSPAPER OR WATCHING TELEVISION: 3
6. FEELING BAD ABOUT YOURSELF - OR THAT YOU ARE A FAILURE OR HAVE LET YOURSELF OR YOUR FAMILY DOWN: 0
SUM OF ALL RESPONSES TO PHQ QUESTIONS 1-9: 15
5. POOR APPETITE OR OVEREATING: 1
SUM OF ALL RESPONSES TO PHQ QUESTIONS 1-9: 15
10. IF YOU CHECKED OFF ANY PROBLEMS, HOW DIFFICULT HAVE THESE PROBLEMS MADE IT FOR YOU TO DO YOUR WORK, TAKE CARE OF THINGS AT HOME, OR GET ALONG WITH OTHER PEOPLE: 3
9. THOUGHTS THAT YOU WOULD BE BETTER OFF DEAD, OR OF HURTING YOURSELF: 0

## 2023-10-13 ASSESSMENT — LIFESTYLE VARIABLES
HOW OFTEN DO YOU HAVE A DRINK CONTAINING ALCOHOL: NEVER
HOW MANY STANDARD DRINKS CONTAINING ALCOHOL DO YOU HAVE ON A TYPICAL DAY: PATIENT DOES NOT DRINK

## 2023-10-13 NOTE — PROGRESS NOTES
NOSTRIL TWICE A DAY Yes RADHA Mcclure CNP   gabapentin (NEURONTIN) 600 MG tablet Take 1 tablet in am, 1/2 tablet at noon, and 1 tablet in pm. Yes RADHA Mcclure CNP   Dulaglutide 4.5 MG/0.5ML SOPN Inject 4.5 mg into the skin once a week Yes RADHA Mcclure CNP   dapagliflozin (FARXIGA) 10 MG tablet Take 1 tablet by mouth every morning Yes RADHA Mcclure CNP   omeprazole (PRILOSEC) 20 MG delayed release capsule TAKE 1 CAPSULE BY MOUTH 2 TIMES DAILY (BEFORE MEALS). Yes RADHA Mcclure CNP   diclofenac sodium (VOLTAREN) 1 % GEL APPLY 4 GRAMS TOPICALLY TO AFFECTED AREA 4 TIMES DAILY Yes RADHA Mcclure CNP   hydrOXYzine HCl (ATARAX) 25 MG tablet TAKE 1 TABLET BY MOUTH NIGHTLY Yes RADHA Mcclure CNP   desvenlafaxine succinate (PRISTIQ) 100 MG TB24 extended release tablet Take 1 tablet by mouth daily Yes German Masters MD   cyclobenzaprine (FLEXERIL) 10 MG tablet Take 1 tablet by mouth 2 times daily as needed Yes German Masters MD   doxepin (SINEQUAN) 100 MG capsule Take 3 capsules by mouth nightly Yes ProviderGerman MD   blood glucose monitor kit and supplies Dispense sufficient amount for indicated testing frequency plus additional to accommodate PRN testing needs. Dispense all needed supplies to include: monitor, strips, lancing device, lancets, control solutions, alcohol swabs. Yes RADHA Whitt NP   blood glucose monitor strips Test 2 times a day & as needed for symptoms of irregular blood glucose. Dispense sufficient amount for indicated testing frequency plus additional to accommodate PRN testing needs. Yes RADHA Whitt NP   Blood Glucose Monitoring Suppl (ONE TOUCH ULTRA 2) w/Device KIT 1 kit by Does not apply route daily Yes RADHA Whitt NP   blood glucose test strips (ASCENSIA AUTODISC VI;ONE TOUCH ULTRA TEST VI) strip 1 each by In Vitro route daily As needed.  Yes RADHA Whitt NP   blood

## 2023-10-16 DIAGNOSIS — N30.01 ACUTE CYSTITIS WITH HEMATURIA: Primary | ICD-10-CM

## 2023-10-16 LAB
BACTERIA UR CULT: ABNORMAL
ORGANISM: ABNORMAL
ORGANISM: ABNORMAL

## 2023-10-16 RX ORDER — SULFAMETHOXAZOLE AND TRIMETHOPRIM 800; 160 MG/1; MG/1
1 TABLET ORAL 2 TIMES DAILY
Qty: 14 TABLET | Refills: 0 | Status: SHIPPED | OUTPATIENT
Start: 2023-10-16 | End: 2023-10-23

## 2023-11-09 DIAGNOSIS — G62.0 CHEMOTHERAPY-INDUCED NEUROPATHY (HCC): ICD-10-CM

## 2023-11-09 DIAGNOSIS — T45.1X5A CHEMOTHERAPY-INDUCED NEUROPATHY (HCC): ICD-10-CM

## 2023-11-10 RX ORDER — GABAPENTIN 600 MG/1
TABLET ORAL
Qty: 75 TABLET | Refills: 0 | Status: SHIPPED | OUTPATIENT
Start: 2023-11-10 | End: 2023-12-10

## 2023-11-10 NOTE — TELEPHONE ENCOUNTER
Fabien Maldonado called to request a refill on her medication. Last office visit : 10/13/2023   Next office visit : 11/27/2023     Last UDS:   Amphetamine Screen, Urine   Date Value Ref Range Status   08/21/2023 neg  Final     Barbiturate Screen, Urine   Date Value Ref Range Status   08/21/2023 neg  Final     Benzodiazepine Screen, Urine   Date Value Ref Range Status   08/21/2023 neg  Final     Buprenorphine Urine   Date Value Ref Range Status   08/21/2023 neg  Final     Cocaine Metabolite Screen, Urine   Date Value Ref Range Status   08/21/2023 neg  Final     Gabapentin Screen, Urine   Date Value Ref Range Status   08/21/2023 neg  Final     Comment:     not checked on UDS     MDMA, Urine   Date Value Ref Range Status   08/21/2023 neg  Final     Methamphetamine, Urine   Date Value Ref Range Status   08/21/2023 neg  Final     Opiate Scrn, Ur   Date Value Ref Range Status   08/21/2023 pos  Final     Oxycodone Screen, Ur   Date Value Ref Range Status   08/21/2023 neg  Final     PCP Screen, Urine   Date Value Ref Range Status   08/21/2023 neg  Final     Propoxyphene Screen, Urine   Date Value Ref Range Status   08/21/2023 neg  Final     THC Screen, Urine   Date Value Ref Range Status   08/21/2023 neg  Final     Tricyclic Antidepressants, Urine   Date Value Ref Range Status   08/21/2023 neg  Final       Last Salomón Garcia: 1-9-23  Medication Contract: 8-21-23   Last Fill: 10-2-23    Requested Prescriptions     Pending Prescriptions Disp Refills    gabapentin (NEURONTIN) 600 MG tablet [Pharmacy Med Name: Gabapentin 600 MG Oral Tablet] 75 tablet 0     Sig: TAKE 1 TABLET BY MOUTH IN THE MORNING AND 1/2 (ONE-HALF) AT NOON AND 1 IN THE EVENING         Please approve or refuse this medication.    Fabián Kent MA normal...

## 2024-01-02 ENCOUNTER — OFFICE VISIT (OUTPATIENT)
Dept: PRIMARY CARE CLINIC | Age: 59
End: 2024-01-02
Payer: COMMERCIAL

## 2024-01-02 VITALS
SYSTOLIC BLOOD PRESSURE: 130 MMHG | TEMPERATURE: 97 F | DIASTOLIC BLOOD PRESSURE: 86 MMHG | BODY MASS INDEX: 34.69 KG/M2 | WEIGHT: 221 LBS | OXYGEN SATURATION: 98 % | HEIGHT: 67 IN

## 2024-01-02 DIAGNOSIS — G62.0 CHEMOTHERAPY-INDUCED NEUROPATHY (HCC): ICD-10-CM

## 2024-01-02 DIAGNOSIS — T45.1X5A CHEMOTHERAPY-INDUCED NEUROPATHY (HCC): ICD-10-CM

## 2024-01-02 DIAGNOSIS — G43.009 MIGRAINE WITHOUT AURA AND WITHOUT STATUS MIGRAINOSUS, NOT INTRACTABLE: ICD-10-CM

## 2024-01-02 DIAGNOSIS — E11.00 TYPE 2 DIABETES MELLITUS WITH HYPEROSMOLARITY WITHOUT COMA, WITHOUT LONG-TERM CURRENT USE OF INSULIN (HCC): Primary | ICD-10-CM

## 2024-01-02 DIAGNOSIS — I10 HYPERTENSION, ESSENTIAL, BENIGN: ICD-10-CM

## 2024-01-02 LAB — HBA1C MFR BLD: 7.1 %

## 2024-01-02 PROCEDURE — 3051F HG A1C>EQUAL 7.0%<8.0%: CPT | Performed by: NURSE PRACTITIONER

## 2024-01-02 PROCEDURE — 3075F SYST BP GE 130 - 139MM HG: CPT | Performed by: NURSE PRACTITIONER

## 2024-01-02 PROCEDURE — 3079F DIAST BP 80-89 MM HG: CPT | Performed by: NURSE PRACTITIONER

## 2024-01-02 PROCEDURE — 99214 OFFICE O/P EST MOD 30 MIN: CPT | Performed by: NURSE PRACTITIONER

## 2024-01-02 RX ORDER — GABAPENTIN 600 MG/1
TABLET ORAL
Qty: 75 TABLET | Refills: 0 | Status: SHIPPED | OUTPATIENT
Start: 2024-01-02 | End: 2024-02-01

## 2024-01-02 RX ORDER — RIZATRIPTAN BENZOATE 10 MG/1
10 TABLET ORAL DAILY PRN
Qty: 9 TABLET | Refills: 0 | Status: SHIPPED | OUTPATIENT
Start: 2024-01-02

## 2024-01-02 ASSESSMENT — ENCOUNTER SYMPTOMS
COLOR CHANGE: 0
SHORTNESS OF BREATH: 0
SORE THROAT: 0
VOMITING: 0
NAUSEA: 0
CHEST TIGHTNESS: 0
ABDOMINAL PAIN: 0
COUGH: 0
DIARRHEA: 0

## 2024-01-02 ASSESSMENT — PATIENT HEALTH QUESTIONNAIRE - PHQ9
SUM OF ALL RESPONSES TO PHQ QUESTIONS 1-9: 6
SUM OF ALL RESPONSES TO PHQ QUESTIONS 1-9: 6
9. THOUGHTS THAT YOU WOULD BE BETTER OFF DEAD, OR OF HURTING YOURSELF: 0
10. IF YOU CHECKED OFF ANY PROBLEMS, HOW DIFFICULT HAVE THESE PROBLEMS MADE IT FOR YOU TO DO YOUR WORK, TAKE CARE OF THINGS AT HOME, OR GET ALONG WITH OTHER PEOPLE: 1
7. TROUBLE CONCENTRATING ON THINGS, SUCH AS READING THE NEWSPAPER OR WATCHING TELEVISION: 0
4. FEELING TIRED OR HAVING LITTLE ENERGY: 1
6. FEELING BAD ABOUT YOURSELF - OR THAT YOU ARE A FAILURE OR HAVE LET YOURSELF OR YOUR FAMILY DOWN: 0
1. LITTLE INTEREST OR PLEASURE IN DOING THINGS: 1
5. POOR APPETITE OR OVEREATING: 1
3. TROUBLE FALLING OR STAYING ASLEEP: 1
SUM OF ALL RESPONSES TO PHQ9 QUESTIONS 1 & 2: 2
SUM OF ALL RESPONSES TO PHQ QUESTIONS 1-9: 6
8. MOVING OR SPEAKING SO SLOWLY THAT OTHER PEOPLE COULD HAVE NOTICED. OR THE OPPOSITE, BEING SO FIGETY OR RESTLESS THAT YOU HAVE BEEN MOVING AROUND A LOT MORE THAN USUAL: 1
SUM OF ALL RESPONSES TO PHQ QUESTIONS 1-9: 6
2. FEELING DOWN, DEPRESSED OR HOPELESS: 1

## 2024-01-02 NOTE — PROGRESS NOTES
[address] [place] [address] Drive   [address] [place] [address], [address]     Phone:  [phone]  Fax:  [phone]      Joyce Faye is a 58 y.o. female who presents today for her medical conditions/complaints as noted below.  Joyce Faye is c/o of Hypertension and Diabetes      Chief Complaint   Patient presents with    Hypertension    Diabetes       HPI:     HPI     Patient presents for a 3 month follow up for hypertension and diabetes mellitus. Blood sugars have been elevated through the holidays due to what she has been eating. Has been having bad headaches with relief with tylenol. Has sensitivity to light and nausea is having 4-5 per month.     Past Medical History:   Diagnosis Date    Allergic rhinitis     Anxiety     Breast cancer (HCC) 10/2010    Left    Bronchitis     Carpal tunnel syndrome     R and L    Chest pain     Chronic back pain     Chronic bronchitis (HCC)     Depression     Diabetes mellitus (HCC)     GERD (gastroesophageal reflux disease)     Headache(784.0)     Heart palpitations     Mononeuropathy     Osteoarthritis     Sleep apnea     Wears glasses         Past Surgical History:   Procedure Laterality Date    ANKLE FRACTURE SURGERY Right 2014    BACK SURGERY  1993    BREAST RECONSTRUCTION  06/2011    BREAST SURGERY  06/2011    emergency removal of Left breast expander    COLONOSCOPY N/A 02/05/2016    Dr RONY Childers-HP x 1, serrated AP x 1, 12 month recall    COLONOSCOPY N/A 01/06/2021    Dr RONY Childers-5 yr recall    EGD COLONOSCOPY N/A 06/03/2016    w/empiric dilation to 48 Portuguese (16mm)-Dr RONY Childers, Nafisa (-)    HYSTERECTOMY (CERVIX STATUS UNKNOWN)      MASTECTOMY, BILATERAL Bilateral 03/2011    OTHER SURGICAL HISTORY      R leg & ankle fracture due to fall repair-Dr Cervantes at [facility]    UPPER GASTROINTESTINAL ENDOSCOPY  06/03/2016    w/empiric dilation to 48 Portuguese (16mm)-Dr RONY Childers, Nafisa (-)    UPPER GASTROINTESTINAL ENDOSCOPY N/A 01/06/2021    Dr RONY Childers-w/brushings-[place]

## 2024-01-31 DIAGNOSIS — G62.0 CHEMOTHERAPY-INDUCED NEUROPATHY (HCC): ICD-10-CM

## 2024-01-31 DIAGNOSIS — T45.1X5A CHEMOTHERAPY-INDUCED NEUROPATHY (HCC): ICD-10-CM

## 2024-02-02 DIAGNOSIS — G43.009 MIGRAINE WITHOUT AURA AND WITHOUT STATUS MIGRAINOSUS, NOT INTRACTABLE: ICD-10-CM

## 2024-02-02 RX ORDER — GABAPENTIN 600 MG/1
TABLET ORAL
Qty: 75 TABLET | Refills: 0 | Status: SHIPPED | OUTPATIENT
Start: 2024-02-02 | End: 2024-03-01

## 2024-02-02 RX ORDER — RIZATRIPTAN BENZOATE 10 MG/1
10 TABLET ORAL DAILY PRN
Qty: 9 TABLET | Refills: 2 | Status: SHIPPED | OUTPATIENT
Start: 2024-02-02

## 2024-02-02 NOTE — TELEPHONE ENCOUNTER
Joyce Faye called to request a refill on her medication.      Last office visit : 1/2/2024   Next office visit : 4/8/2024     Requested Prescriptions     Pending Prescriptions Disp Refills    rizatriptan (MAXALT) 10 MG tablet 9 tablet 0     Sig: Take 1 tablet by mouth daily as needed for Migraine May repeat in 2 hours if needed            Nuvia Nunez, MARYN

## 2024-02-09 DIAGNOSIS — K21.9 GASTROESOPHAGEAL REFLUX DISEASE WITHOUT ESOPHAGITIS: ICD-10-CM

## 2024-02-09 DIAGNOSIS — J30.89 ALLERGIC RHINITIS DUE TO OTHER ALLERGIC TRIGGER, UNSPECIFIED SEASONALITY: ICD-10-CM

## 2024-02-09 DIAGNOSIS — E11.00 TYPE 2 DIABETES MELLITUS WITH HYPEROSMOLARITY WITHOUT COMA, WITHOUT LONG-TERM CURRENT USE OF INSULIN (HCC): ICD-10-CM

## 2024-02-12 RX ORDER — OMEPRAZOLE 20 MG/1
CAPSULE, DELAYED RELEASE ORAL
Qty: 180 CAPSULE | Refills: 0 | Status: SHIPPED | OUTPATIENT
Start: 2024-02-12

## 2024-02-12 RX ORDER — DULAGLUTIDE 4.5 MG/.5ML
INJECTION, SOLUTION SUBCUTANEOUS
Qty: 4 ML | Refills: 0 | Status: SHIPPED | OUTPATIENT
Start: 2024-02-12

## 2024-02-12 RX ORDER — FLUTICASONE PROPIONATE 50 MCG
SPRAY, SUSPENSION (ML) NASAL
Qty: 48 G | Refills: 0 | Status: SHIPPED | OUTPATIENT
Start: 2024-02-12

## 2024-02-13 DIAGNOSIS — G43.009 MIGRAINE WITHOUT AURA AND WITHOUT STATUS MIGRAINOSUS, NOT INTRACTABLE: ICD-10-CM

## 2024-02-13 RX ORDER — RIZATRIPTAN BENZOATE 10 MG/1
10 TABLET ORAL DAILY PRN
Qty: 18 TABLET | Refills: 2 | Status: SHIPPED | OUTPATIENT
Start: 2024-02-13

## 2024-03-03 DIAGNOSIS — G43.009 MIGRAINE WITHOUT AURA AND WITHOUT STATUS MIGRAINOSUS, NOT INTRACTABLE: ICD-10-CM

## 2024-03-04 DIAGNOSIS — T45.1X5A CHEMOTHERAPY-INDUCED NEUROPATHY (HCC): ICD-10-CM

## 2024-03-04 DIAGNOSIS — G62.0 CHEMOTHERAPY-INDUCED NEUROPATHY (HCC): ICD-10-CM

## 2024-03-04 RX ORDER — RIZATRIPTAN BENZOATE 10 MG/1
TABLET ORAL
Qty: 18 TABLET | Refills: 0 | Status: SHIPPED | OUTPATIENT
Start: 2024-03-04

## 2024-03-04 RX ORDER — GABAPENTIN 600 MG/1
TABLET ORAL
Qty: 75 TABLET | Refills: 0 | Status: SHIPPED | OUTPATIENT
Start: 2024-03-04 | End: 2024-04-01

## 2024-03-04 NOTE — TELEPHONE ENCOUNTER
Joyce Faye called to request a refill on her medication.      Last office visit : 1/2/2024   Next office visit : 4/8/2024     Last UDS:   Amphetamine Screen, Urine   Date Value Ref Range Status   08/21/2023 neg  Final     Barbiturate Screen, Urine   Date Value Ref Range Status   08/21/2023 neg  Final     Benzodiazepine Screen, Urine   Date Value Ref Range Status   08/21/2023 neg  Final     Buprenorphine Urine   Date Value Ref Range Status   08/21/2023 neg  Final     Cocaine Metabolite Screen, Urine   Date Value Ref Range Status   08/21/2023 neg  Final     Gabapentin Screen, Urine   Date Value Ref Range Status   08/21/2023 neg  Final     Comment:     not checked on UDS     MDMA, Urine   Date Value Ref Range Status   08/21/2023 neg  Final     Methamphetamine, Urine   Date Value Ref Range Status   08/21/2023 neg  Final     Opiate Scrn, Ur   Date Value Ref Range Status   08/21/2023 pos  Final     Oxycodone Screen, Ur   Date Value Ref Range Status   08/21/2023 neg  Final     PCP Screen, Urine   Date Value Ref Range Status   08/21/2023 neg  Final     Propoxyphene Screen, Urine   Date Value Ref Range Status   08/21/2023 neg  Final     THC Screen, Urine   Date Value Ref Range Status   08/21/2023 neg  Final     Tricyclic Antidepressants, Urine   Date Value Ref Range Status   08/21/2023 neg  Final       Last Paul: 3/4/2024  Medication Contract: 04/12/2022- will update   Last Fill: 2/2/2024    Requested Prescriptions     Pending Prescriptions Disp Refills    gabapentin (NEURONTIN) 600 MG tablet 75 tablet 0     Sig: TAKE 1 TABLET BY MOUTH ONCE DAILY IN THE MORNING AND 1/2 (ONE-HALF) TAB ONCE DAILY AT NOON AND 1 TAB ONCE DAILY IN THE EVENING                       Please approve or refuse this medication.   Francine Cid LPN Joyce Faye called to request a refill on her medication.      Last office visit : 1/2/2024   Next office visit : 4/8/2024     Last UDS:   Amphetamine Screen, Urine   Date Value Ref Range Status

## 2024-03-05 DIAGNOSIS — E11.00 TYPE 2 DIABETES MELLITUS WITH HYPEROSMOLARITY WITHOUT COMA, WITHOUT LONG-TERM CURRENT USE OF INSULIN (HCC): ICD-10-CM

## 2024-03-05 RX ORDER — DAPAGLIFLOZIN 10 MG/1
10 TABLET, FILM COATED ORAL EVERY MORNING
Qty: 90 TABLET | Refills: 0 | Status: SHIPPED | OUTPATIENT
Start: 2024-03-05

## 2024-03-22 DIAGNOSIS — E11.00 TYPE 2 DIABETES MELLITUS WITH HYPEROSMOLARITY WITHOUT COMA, WITHOUT LONG-TERM CURRENT USE OF INSULIN (HCC): ICD-10-CM

## 2024-03-22 RX ORDER — DAPAGLIFLOZIN 10 MG/1
10 TABLET, FILM COATED ORAL EVERY MORNING
Qty: 90 TABLET | Refills: 0 | Status: SHIPPED | OUTPATIENT
Start: 2024-03-22

## 2024-03-28 ENCOUNTER — TRANSCRIBE ORDERS (OUTPATIENT)
Dept: ADMINISTRATIVE | Age: 59
End: 2024-03-28

## 2024-03-28 DIAGNOSIS — M51.16 INTERVERTEBRAL DISC DISORDER WITH RADICULOPATHY OF LUMBAR REGION: Primary | ICD-10-CM

## 2024-04-02 DIAGNOSIS — G62.0 CHEMOTHERAPY-INDUCED NEUROPATHY (HCC): ICD-10-CM

## 2024-04-02 DIAGNOSIS — T45.1X5A CHEMOTHERAPY-INDUCED NEUROPATHY (HCC): ICD-10-CM

## 2024-04-03 RX ORDER — GABAPENTIN 600 MG/1
TABLET ORAL
Qty: 75 TABLET | Refills: 0 | Status: SHIPPED | OUTPATIENT
Start: 2024-04-03 | End: 2024-05-01

## 2024-04-03 NOTE — TELEPHONE ENCOUNTER
Joyce Faye called to request a refill on her medication.      Last office visit : 1/2/2024   Next office visit : 4/8/2024     Last Paul: 4/3/24  Medication Contract: 4/3/24   Last UDS: 8/21/23  Last Rx: 3/4/24    Amphetamine Screen, Urine   Date Value Ref Range Status   08/21/2023 neg  Final     Barbiturate Screen, Urine   Date Value Ref Range Status   08/21/2023 neg  Final     Benzodiazepine Screen, Urine   Date Value Ref Range Status   08/21/2023 neg  Final     Buprenorphine Urine   Date Value Ref Range Status   08/21/2023 neg  Final     Cocaine Metabolite Screen, Urine   Date Value Ref Range Status   08/21/2023 neg  Final     Gabapentin Screen, Urine   Date Value Ref Range Status   08/21/2023 neg  Final     Comment:     not checked on UDS     MDMA, Urine   Date Value Ref Range Status   08/21/2023 neg  Final     Methamphetamine, Urine   Date Value Ref Range Status   08/21/2023 neg  Final     Opiate Scrn, Ur   Date Value Ref Range Status   08/21/2023 pos  Final     Oxycodone Screen, Ur   Date Value Ref Range Status   08/21/2023 neg  Final     PCP Screen, Urine   Date Value Ref Range Status   08/21/2023 neg  Final     Propoxyphene Screen, Urine   Date Value Ref Range Status   08/21/2023 neg  Final     THC Screen, Urine   Date Value Ref Range Status   08/21/2023 neg  Final     Tricyclic Antidepressants, Urine   Date Value Ref Range Status   08/21/2023 neg  Final           Requested Prescriptions     Pending Prescriptions Disp Refills    gabapentin (NEURONTIN) 600 MG tablet 75 tablet 0     Sig: TAKE 1 TABLET BY MOUTH ONCE DAILY IN THE MORNING AND 1/2 (ONE-HALF) TAB ONCE DAILY AT NOON AND 1 TAB ONCE DAILY IN THE EVENING         Please approve or refuse this medication.   Joaquin Wright MA

## 2024-04-08 ENCOUNTER — TELEPHONE (OUTPATIENT)
Dept: PRIMARY CARE CLINIC | Age: 59
End: 2024-04-08

## 2024-04-08 ENCOUNTER — OFFICE VISIT (OUTPATIENT)
Dept: PRIMARY CARE CLINIC | Age: 59
End: 2024-04-08

## 2024-04-08 VITALS
HEART RATE: 88 BPM | HEIGHT: 67 IN | TEMPERATURE: 97.8 F | BODY MASS INDEX: 32.43 KG/M2 | WEIGHT: 206.6 LBS | OXYGEN SATURATION: 98 % | SYSTOLIC BLOOD PRESSURE: 130 MMHG | DIASTOLIC BLOOD PRESSURE: 80 MMHG

## 2024-04-08 DIAGNOSIS — E11.9 TYPE 2 DIABETES MELLITUS WITHOUT COMPLICATION, WITHOUT LONG-TERM CURRENT USE OF INSULIN (HCC): Primary | ICD-10-CM

## 2024-04-08 DIAGNOSIS — I10 HYPERTENSION, ESSENTIAL, BENIGN: ICD-10-CM

## 2024-04-08 DIAGNOSIS — Z13.220 SCREENING FOR HYPERLIPIDEMIA: ICD-10-CM

## 2024-04-08 DIAGNOSIS — M54.50 ACUTE BILATERAL LOW BACK PAIN WITHOUT SCIATICA: ICD-10-CM

## 2024-04-08 DIAGNOSIS — K42.9 UMBILICAL HERNIA WITHOUT OBSTRUCTION AND WITHOUT GANGRENE: ICD-10-CM

## 2024-04-08 DIAGNOSIS — F43.21 GRIEVING: ICD-10-CM

## 2024-04-08 LAB — HBA1C MFR BLD: 7.4 %

## 2024-04-08 RX ORDER — DULAGLUTIDE 4.5 MG/.5ML
4.5 INJECTION, SOLUTION SUBCUTANEOUS WEEKLY
Qty: 6 ML | Refills: 0 | Status: SHIPPED | OUTPATIENT
Start: 2024-04-08 | End: 2024-04-08 | Stop reason: RX

## 2024-04-08 SDOH — ECONOMIC STABILITY: INCOME INSECURITY: HOW HARD IS IT FOR YOU TO PAY FOR THE VERY BASICS LIKE FOOD, HOUSING, MEDICAL CARE, AND HEATING?: NOT VERY HARD

## 2024-04-08 SDOH — ECONOMIC STABILITY: FOOD INSECURITY: WITHIN THE PAST 12 MONTHS, THE FOOD YOU BOUGHT JUST DIDN'T LAST AND YOU DIDN'T HAVE MONEY TO GET MORE.: NEVER TRUE

## 2024-04-08 SDOH — ECONOMIC STABILITY: FOOD INSECURITY: WITHIN THE PAST 12 MONTHS, YOU WORRIED THAT YOUR FOOD WOULD RUN OUT BEFORE YOU GOT MONEY TO BUY MORE.: NEVER TRUE

## 2024-04-08 SDOH — ECONOMIC STABILITY: HOUSING INSECURITY
IN THE LAST 12 MONTHS, WAS THERE A TIME WHEN YOU DID NOT HAVE A STEADY PLACE TO SLEEP OR SLEPT IN A SHELTER (INCLUDING NOW)?: NO

## 2024-04-08 NOTE — PROGRESS NOTES
repair-Dr Cervantes at [facility]    UPPER GASTROINTESTINAL ENDOSCOPY  2016    w/empiric dilation to 48 French (16mm)-Dr RONY Childers, Nafisa (-)    UPPER GASTROINTESTINAL ENDOSCOPY N/A 2021    Dr RONY Childers-w/brushings-Candidal esophagitis-Treated with Diflucan       Social History     Tobacco Use    Smoking status: Former     Current packs/day: 0.00     Average packs/day: 0.3 packs/day for 20.0 years (5.0 ttl pk-yrs)     Types: Cigars, Cigarettes     Start date: 2/3/2016     Quit date: 2016     Years since quittin.3    Smokeless tobacco: Never    Tobacco comments:     Smokes Ciggerelos now,1-2 a day.   Substance Use Topics    Alcohol use: No     Alcohol/week: 0.0 standard drinks of alcohol        Current Outpatient Medications   Medication Sig Dispense Refill    gabapentin (NEURONTIN) 600 MG tablet TAKE 1 TABLET BY MOUTH ONCE DAILY IN THE MORNING AND 1/2 (ONE-HALF) TAB ONCE DAILY AT NOON AND 1 TAB ONCE DAILY IN THE EVENING 75 tablet 0    dapagliflozin (FARXIGA) 10 MG tablet TAKE 1 TABLET BY MOUTH ONCE DAILY IN THE MORNING 90 tablet 0    rizatriptan (MAXALT) 10 MG tablet TAKE 1 TABLET BY MOUTH ONCE DAILY AS NEEDED FOR  MIGRAINE  MAY  REPEAT  IN  2  HOURS  IF  NEEDED 18 tablet 0    fluticasone (FLONASE) 50 MCG/ACT nasal spray Use 1 spray(s) in each nostril twice daily 48 g 0    omeprazole (PRILOSEC) 20 MG delayed release capsule TAKE 1 CAPSULE BY MOUTH TWICE DAILY (BEFORE  MEALS) 180 capsule 0    diclofenac sodium (VOLTAREN) 1 % GEL APPLY 4 GRAMS TOPICALLY TO AFFECTED AREA 4 TIMES DAILY 400 g 5    lisinopril (PRINIVIL;ZESTRIL) 20 MG tablet TAKE 1 TABLET BY MOUTH TWICE A DAY (Patient taking differently: Taking 1/2 daily) 180 tablet 3    desvenlafaxine succinate (PRISTIQ) 100 MG TB24 extended release tablet Take 1 tablet by mouth daily      cyclobenzaprine (FLEXERIL) 10 MG tablet Take 1 tablet by mouth 2 times daily as needed      doxepin (SINEQUAN) 100 MG capsule Take 3 capsules by mouth nightly

## 2024-04-08 NOTE — TELEPHONE ENCOUNTER
Please let patient know I have called in Ozempic to [facility] for her as Trulicity at her current dosage is not available.  We will increase her dosage in 1 month as long as she is tolerating well.

## 2024-04-22 ENCOUNTER — HOSPITAL ENCOUNTER (OUTPATIENT)
Dept: MRI IMAGING | Age: 59
Discharge: HOME | End: 2024-04-22
Payer: MEDICARE

## 2024-04-22 DIAGNOSIS — M51.16 INTERVERTEBRAL DISC DISORDER WITH RADICULOPATHY OF LUMBAR REGION: ICD-10-CM

## 2024-04-22 PROCEDURE — 72148 MRI LUMBAR SPINE W/O DYE: CPT

## 2024-04-24 ENCOUNTER — OFFICE VISIT (OUTPATIENT)
Dept: SURGERY | Age: 59
End: 2024-04-24
Payer: MEDICARE

## 2024-04-24 VITALS
WEIGHT: 218 LBS | TEMPERATURE: 97 F | OXYGEN SATURATION: 98 % | HEIGHT: 67 IN | HEART RATE: 88 BPM | BODY MASS INDEX: 34.21 KG/M2

## 2024-04-24 DIAGNOSIS — E11.00 TYPE 2 DIABETES MELLITUS WITH HYPEROSMOLARITY WITHOUT COMA, WITHOUT LONG-TERM CURRENT USE OF INSULIN (HCC): ICD-10-CM

## 2024-04-24 DIAGNOSIS — K43.0 INCARCERATED INCISIONAL HERNIA: Primary | ICD-10-CM

## 2024-04-24 DIAGNOSIS — E66.9 OBESITY (BMI 35.0-39.9 WITHOUT COMORBIDITY): Chronic | ICD-10-CM

## 2024-04-24 PROCEDURE — 3051F HG A1C>EQUAL 7.0%<8.0%: CPT | Performed by: SURGERY

## 2024-04-24 PROCEDURE — 99204 OFFICE O/P NEW MOD 45 MIN: CPT | Performed by: SURGERY

## 2024-04-24 RX ORDER — SODIUM CHLORIDE 9 MG/ML
INJECTION, SOLUTION INTRAVENOUS PRN
OUTPATIENT
Start: 2024-04-24

## 2024-04-24 RX ORDER — ACETAMINOPHEN 325 MG/1
1000 TABLET ORAL ONCE
OUTPATIENT
Start: 2024-04-24 | End: 2024-04-24

## 2024-04-24 RX ORDER — CELECOXIB 200 MG/1
200 CAPSULE ORAL ONCE
OUTPATIENT
Start: 2024-04-24 | End: 2024-04-24

## 2024-04-24 RX ORDER — SODIUM CHLORIDE 0.9 % (FLUSH) 0.9 %
5-40 SYRINGE (ML) INJECTION PRN
OUTPATIENT
Start: 2024-04-24

## 2024-04-24 RX ORDER — SODIUM CHLORIDE 0.9 % (FLUSH) 0.9 %
5-40 SYRINGE (ML) INJECTION EVERY 12 HOURS SCHEDULED
OUTPATIENT
Start: 2024-04-24

## 2024-04-24 RX ORDER — SODIUM CHLORIDE, SODIUM LACTATE, POTASSIUM CHLORIDE, CALCIUM CHLORIDE 600; 310; 30; 20 MG/100ML; MG/100ML; MG/100ML; MG/100ML
INJECTION, SOLUTION INTRAVENOUS CONTINUOUS
OUTPATIENT
Start: 2024-04-24

## 2024-04-24 ASSESSMENT — ENCOUNTER SYMPTOMS
SORE THROAT: 0
ABDOMINAL PAIN: 1
CONSTIPATION: 1
DIARRHEA: 0
NAUSEA: 1
BACK PAIN: 1
ABDOMINAL DISTENTION: 0
EYE PAIN: 0
VOMITING: 1
SHORTNESS OF BREATH: 0
CHEST TIGHTNESS: 0
COUGH: 0
APNEA: 1
COLOR CHANGE: 0
EYE REDNESS: 0

## 2024-04-24 NOTE — H&P (VIEW-ONLY)
SUBJECTIVE:  Ms. Faye is a 59 y.o. female who presents today with a lump in her central abdomen that has been present since her hysterectomy in 2014. She notes when she is constipated and straining it hurts there.  The area sticks out and does not look good. She denies obstructive symptoms. She takes 7.5mg norco every 8 hours for back pain.      Past Medical History:   Diagnosis Date    Allergic rhinitis     Anxiety     Breast cancer (HCC) 10/2010    Left    Bronchitis     Carpal tunnel syndrome     R and L    Chest pain     Chronic back pain     Chronic bronchitis (HCC)     Depression     Diabetes mellitus (HCC)     GERD (gastroesophageal reflux disease)     Headache(784.0)     Heart palpitations     Mononeuropathy     Osteoarthritis     Sleep apnea     Wears glasses      Past Surgical History:   Procedure Laterality Date    ANKLE FRACTURE SURGERY Right 2014    BACK SURGERY  1993    BREAST RECONSTRUCTION  06/2011    BREAST SURGERY  06/2011    emergency removal of Left breast expander    COLONOSCOPY N/A 02/05/2016    Dr RONY Childers-HP x 1, serrated AP x 1, 12 month recall    COLONOSCOPY N/A 01/06/2021    Dr RONY Childers-5 yr recall    EGD COLONOSCOPY N/A 06/03/2016    w/empiric dilation to 48 [place] (16mm)-Dr RONY Childers, Nafisa (-)    HYSTERECTOMY (CERVIX STATUS UNKNOWN)      MASTECTOMY, BILATERAL Bilateral 03/2011    OTHER SURGICAL HISTORY      R leg & ankle fracture due to fall repair-Dr Cervantes at [facility]    UPPER GASTROINTESTINAL ENDOSCOPY  06/03/2016    w/empiric dilation to 48 [place] (16mm)-Dr RONY Childers, Nafisa (-)    UPPER GASTROINTESTINAL ENDOSCOPY N/A 01/06/2021    Dr RONY Childers-w/brushings-Candidal esophagitis-Treated with Diflucan     Current Outpatient Medications   Medication Sig Dispense Refill    Semaglutide,0.25 or 0.5MG/DOS, 2 MG/1.5ML SOPN Inject 0.25 mg into the skin once a week 2 mL 0    gabapentin (NEURONTIN) 600 MG tablet TAKE 1 TABLET BY MOUTH ONCE DAILY IN THE MORNING AND 1/2 (ONE-HALF) TAB ONCE DAILY AT

## 2024-04-24 NOTE — PROGRESS NOTES
risks included heart attacks, strokes, pneumonia, phlebitis, etc.  She is willing to accept all risks and proceed with surgery. No guarantees have been given.    Discussed postoperative pain control with her present medications, as this is stronger than that typically prescribed.     Reviewed blood glucose control and how this increases risks for infection. She notes understanding.     She must hold her semiglutide one week prior ot surgery, missing the 5/1 dose. This was reviewed with the patient.     Encouraged bowel regimen to the patient and importance of avoiding constipation postoperatively. Reviewed postoperative restrictions with weight lifting limitations.    Her obesity, diabetes and this hernia are all chronic conditions.       Return for Post-operative Visit.    Nasreen King DO 4/24/2024 12:53 PM

## 2024-04-26 DIAGNOSIS — K21.9 GASTROESOPHAGEAL REFLUX DISEASE WITHOUT ESOPHAGITIS: ICD-10-CM

## 2024-04-26 RX ORDER — OMEPRAZOLE 20 MG/1
CAPSULE, DELAYED RELEASE ORAL
Qty: 180 CAPSULE | Refills: 0 | Status: SHIPPED | OUTPATIENT
Start: 2024-04-26

## 2024-04-29 ENCOUNTER — HOSPITAL ENCOUNTER (OUTPATIENT)
Dept: PREADMISSION TESTING | Age: 59
Discharge: HOME | End: 2024-05-03
Payer: MEDICARE

## 2024-04-29 VITALS — BODY MASS INDEX: 33.12 KG/M2 | WEIGHT: 211 LBS | HEIGHT: 67 IN

## 2024-04-29 LAB
ALBUMIN SERPL-MCNC: 4.1 G/DL (ref 3.5–5.2)
ALP SERPL-CCNC: 112 U/L (ref 35–104)
ALT SERPL-CCNC: 21 U/L (ref 5–33)
ANION GAP SERPL CALCULATED.3IONS-SCNC: 13 MMOL/L (ref 7–19)
AST SERPL-CCNC: 18 U/L (ref 5–32)
BILIRUB SERPL-MCNC: <0.2 MG/DL (ref 0.2–1.2)
BUN SERPL-MCNC: 19 MG/DL (ref 8–23)
CALCIUM SERPL-MCNC: 9.4 MG/DL (ref 8.8–10.2)
CHLORIDE SERPL-SCNC: 101 MMOL/L (ref 98–111)
CO2 SERPL-SCNC: 23 MMOL/L (ref 22–29)
CREAT SERPL-MCNC: 0.6 MG/DL (ref 0.5–0.9)
ERYTHROCYTE [DISTWIDTH] IN BLOOD BY AUTOMATED COUNT: 13.9 % (ref 11.5–14.5)
GLUCOSE SERPL-MCNC: 139 MG/DL (ref 74–109)
HCT VFR BLD AUTO: 50.4 % (ref 37–47)
HGB BLD-MCNC: 16.6 G/DL (ref 12–16)
MCH RBC QN AUTO: 29 PG (ref 27–31)
MCHC RBC AUTO-ENTMCNC: 32.9 G/DL (ref 33–37)
MCV RBC AUTO: 88.1 FL (ref 81–99)
MRSA DNA SPEC QL NAA+PROBE: NOT DETECTED
PLATELET # BLD AUTO: 312 K/UL (ref 130–400)
PMV BLD AUTO: 10.1 FL (ref 9.4–12.3)
POTASSIUM SERPL-SCNC: 4.5 MMOL/L (ref 3.5–5)
PROT SERPL-MCNC: 7 G/DL (ref 6.6–8.7)
RBC # BLD AUTO: 5.72 M/UL (ref 4.2–5.4)
REASON FOR REJECTION: NORMAL
REJECTED TEST: NORMAL
SODIUM SERPL-SCNC: 137 MMOL/L (ref 136–145)
WBC # BLD AUTO: 8.6 K/UL (ref 4.8–10.8)

## 2024-04-29 PROCEDURE — 93005 ELECTROCARDIOGRAM TRACING: CPT | Performed by: ANESTHESIOLOGY

## 2024-04-29 PROCEDURE — 80053 COMPREHEN METABOLIC PANEL: CPT

## 2024-04-29 PROCEDURE — 85027 COMPLETE CBC AUTOMATED: CPT

## 2024-04-29 PROCEDURE — 87641 MR-STAPH DNA AMP PROBE: CPT

## 2024-04-29 NOTE — DISCHARGE INSTRUCTIONS
You will be contacted by someone from same-day surgery between 12-3 pm the Friday before your surgery regarding your arrival time. Please check your voicemail as they may leave a message with that information.  If you do not receive a call or voicemail or you have a problem please call [phone].    If you are running late the morning of your surgery or you wake up with signs/symptoms of COVID call [phone] for instructions    You will be scheduled to arrive 11/2-2 hours prior to your surgery time. Do not come earlier than when you are told to arrive.  You will come to the [facility] to register and be taken to the outpatient services area.    Do Not eat or drink anything after midnight. That includes candy, gum and mints, coffee, tea and water.    If appropriate, you may have a small sip of water with medications as instructed by your nurse/surgeon.  The morning of surgery you may take the followig medications only:  Bupropion, buspar, pristiq, flonase, gabapentin, muscinex and prilosec.  Do not take your Oaempic on 5/1/24!    This is for your protection and to avoid food and liquid from getting into your lungs.     Always, follow your surgeon's instructions on any blood thinner or aspirin use before surgery.    Brush your teeth and shower the morning of surgery.  Chlorhexidine Gluconate 4% Solution    Patient should shower with this soap the night before surgery and the morning of surgery washing from the neck down (avoiding contact with genitalia).      DO NOT WASH YOUR HAIR OR FACE WITH THIS SOAP.  When washing with this soap, apply enough to suds up the body thoroughly, turn the water away from your body and allow the soap suds to remain on the body for 2 full minutes, then rinse body completely.      After using this soap on the body, please do not apply powders or lotions to your body.  After the shower the night before surgery, please dry off with a new towel, sleep in new freshly

## 2024-04-30 LAB
EKG P AXIS: 51 DEGREES
EKG P-R INTERVAL: 160 MS
EKG Q-T INTERVAL: 324 MS
EKG QRS DURATION: 76 MS
EKG QTC CALCULATION (BAZETT): 415 MS
EKG T AXIS: 53 DEGREES

## 2024-04-30 PROCEDURE — 93010 ELECTROCARDIOGRAM REPORT: CPT | Performed by: INTERNAL MEDICINE

## 2024-05-06 ENCOUNTER — HOSPITAL ENCOUNTER (OUTPATIENT)
Age: 59
Setting detail: OUTPATIENT SURGERY
Discharge: HOME | End: 2024-05-06
Attending: SURGERY | Admitting: SURGERY
Payer: MEDICARE

## 2024-05-06 ENCOUNTER — ANESTHESIA (OUTPATIENT)
Dept: OPERATING ROOM | Age: 59
End: 2024-05-06
Payer: MEDICARE

## 2024-05-06 ENCOUNTER — ANESTHESIA EVENT (OUTPATIENT)
Dept: OPERATING ROOM | Age: 59
End: 2024-05-06
Payer: MEDICARE

## 2024-05-06 VITALS
DIASTOLIC BLOOD PRESSURE: 79 MMHG | SYSTOLIC BLOOD PRESSURE: 113 MMHG | OXYGEN SATURATION: 97 % | RESPIRATION RATE: 18 BRPM | TEMPERATURE: 97.1 F | WEIGHT: 210 LBS | HEIGHT: 67 IN | BODY MASS INDEX: 32.96 KG/M2 | HEART RATE: 100 BPM

## 2024-05-06 DIAGNOSIS — K43.0 INCARCERATED INCISIONAL HERNIA: Primary | ICD-10-CM

## 2024-05-06 LAB
GLUCOSE BLD-MCNC: 120 MG/DL (ref 70–99)
PERFORMED ON: ABNORMAL

## 2024-05-06 PROCEDURE — 49596 RPR AA HRN 1ST > 10 NCR/STRN: CPT | Performed by: SURGERY

## 2024-05-06 PROCEDURE — 3600000009 HC SURGERY ROBOT BASE: Performed by: SURGERY

## 2024-05-06 PROCEDURE — 82962 GLUCOSE BLOOD TEST: CPT

## 2024-05-06 PROCEDURE — 6370000000 HC RX 637 (ALT 250 FOR IP): Performed by: SURGERY

## 2024-05-06 PROCEDURE — 6360000002 HC RX W HCPCS: Performed by: SURGERY

## 2024-05-06 PROCEDURE — 2580000003 HC RX 258: Performed by: SURGERY

## 2024-05-06 PROCEDURE — 2709999900 HC NON-CHARGEABLE SUPPLY: Performed by: SURGERY

## 2024-05-06 PROCEDURE — 3700000000 HC ANESTHESIA ATTENDED CARE: Performed by: SURGERY

## 2024-05-06 PROCEDURE — 6360000002 HC RX W HCPCS: Performed by: NURSE ANESTHETIST, CERTIFIED REGISTERED

## 2024-05-06 PROCEDURE — 7100000011 HC PHASE II RECOVERY - ADDTL 15 MIN: Performed by: SURGERY

## 2024-05-06 PROCEDURE — 3700000001 HC ADD 15 MINUTES (ANESTHESIA): Performed by: SURGERY

## 2024-05-06 PROCEDURE — C9290 INJ, BUPIVACAINE LIPOSOME: HCPCS | Performed by: SURGERY

## 2024-05-06 PROCEDURE — S2900 ROBOTIC SURGICAL SYSTEM: HCPCS | Performed by: SURGERY

## 2024-05-06 PROCEDURE — C1781 MESH (IMPLANTABLE): HCPCS | Performed by: SURGERY

## 2024-05-06 PROCEDURE — 3600000019 HC SURGERY ROBOT ADDTL 15MIN: Performed by: SURGERY

## 2024-05-06 PROCEDURE — 7100000001 HC PACU RECOVERY - ADDTL 15 MIN: Performed by: SURGERY

## 2024-05-06 PROCEDURE — 7100000000 HC PACU RECOVERY - FIRST 15 MIN: Performed by: SURGERY

## 2024-05-06 PROCEDURE — 7100000010 HC PHASE II RECOVERY - FIRST 15 MIN: Performed by: SURGERY

## 2024-05-06 PROCEDURE — 6360000002 HC RX W HCPCS: Performed by: ANESTHESIOLOGY

## 2024-05-06 PROCEDURE — 2580000003 HC RX 258: Performed by: NURSE ANESTHETIST, CERTIFIED REGISTERED

## 2024-05-06 PROCEDURE — 2500000003 HC RX 250 WO HCPCS: Performed by: NURSE ANESTHETIST, CERTIFIED REGISTERED

## 2024-05-06 DEVICE — MESH SURG W4XL6IN ELLIPSE W/ ECHO 2 POS SYS VENTRALIGHT: Type: IMPLANTABLE DEVICE | Status: FUNCTIONAL

## 2024-05-06 RX ORDER — SODIUM CHLORIDE 0.9 % (FLUSH) 0.9 %
5-40 SYRINGE (ML) INJECTION EVERY 12 HOURS SCHEDULED
Status: DISCONTINUED | OUTPATIENT
Start: 2024-05-06 | End: 2024-05-06 | Stop reason: HOSPADM

## 2024-05-06 RX ORDER — ONDANSETRON 2 MG/ML
4 INJECTION INTRAMUSCULAR; INTRAVENOUS
Status: DISCONTINUED | OUTPATIENT
Start: 2024-05-06 | End: 2024-05-06 | Stop reason: HOSPADM

## 2024-05-06 RX ORDER — FENTANYL CITRATE 50 UG/ML
INJECTION, SOLUTION INTRAMUSCULAR; INTRAVENOUS PRN
Status: DISCONTINUED | OUTPATIENT
Start: 2024-05-06 | End: 2024-05-06 | Stop reason: SDUPTHER

## 2024-05-06 RX ORDER — MIDAZOLAM HYDROCHLORIDE 1 MG/ML
INJECTION INTRAMUSCULAR; INTRAVENOUS PRN
Status: DISCONTINUED | OUTPATIENT
Start: 2024-05-06 | End: 2024-05-06 | Stop reason: SDUPTHER

## 2024-05-06 RX ORDER — SODIUM CHLORIDE 0.9 % (FLUSH) 0.9 %
5-40 SYRINGE (ML) INJECTION PRN
Status: DISCONTINUED | OUTPATIENT
Start: 2024-05-06 | End: 2024-05-06 | Stop reason: HOSPADM

## 2024-05-06 RX ORDER — HYDROMORPHONE HYDROCHLORIDE 1 MG/ML
0.25 INJECTION, SOLUTION INTRAMUSCULAR; INTRAVENOUS; SUBCUTANEOUS EVERY 5 MIN PRN
Status: DISCONTINUED | OUTPATIENT
Start: 2024-05-06 | End: 2024-05-06 | Stop reason: HOSPADM

## 2024-05-06 RX ORDER — SODIUM CHLORIDE, SODIUM LACTATE, POTASSIUM CHLORIDE, CALCIUM CHLORIDE 600; 310; 30; 20 MG/100ML; MG/100ML; MG/100ML; MG/100ML
INJECTION, SOLUTION INTRAVENOUS CONTINUOUS PRN
Status: DISCONTINUED | OUTPATIENT
Start: 2024-05-06 | End: 2024-05-06 | Stop reason: SDUPTHER

## 2024-05-06 RX ORDER — PROPOFOL 10 MG/ML
INJECTION, EMULSION INTRAVENOUS PRN
Status: DISCONTINUED | OUTPATIENT
Start: 2024-05-06 | End: 2024-05-06 | Stop reason: SDUPTHER

## 2024-05-06 RX ORDER — DEXAMETHASONE SODIUM PHOSPHATE 10 MG/ML
INJECTION, SOLUTION INTRAMUSCULAR; INTRAVENOUS PRN
Status: DISCONTINUED | OUTPATIENT
Start: 2024-05-06 | End: 2024-05-06 | Stop reason: SDUPTHER

## 2024-05-06 RX ORDER — ACETAMINOPHEN 500 MG
1000 TABLET ORAL ONCE
Status: COMPLETED | OUTPATIENT
Start: 2024-05-06 | End: 2024-05-06

## 2024-05-06 RX ORDER — NALOXONE HYDROCHLORIDE 0.4 MG/ML
INJECTION, SOLUTION INTRAMUSCULAR; INTRAVENOUS; SUBCUTANEOUS PRN
Status: DISCONTINUED | OUTPATIENT
Start: 2024-05-06 | End: 2024-05-06 | Stop reason: HOSPADM

## 2024-05-06 RX ORDER — ROCURONIUM BROMIDE 10 MG/ML
INJECTION, SOLUTION INTRAVENOUS PRN
Status: DISCONTINUED | OUTPATIENT
Start: 2024-05-06 | End: 2024-05-06 | Stop reason: SDUPTHER

## 2024-05-06 RX ORDER — SODIUM CHLORIDE, SODIUM LACTATE, POTASSIUM CHLORIDE, CALCIUM CHLORIDE 600; 310; 30; 20 MG/100ML; MG/100ML; MG/100ML; MG/100ML
INJECTION, SOLUTION INTRAVENOUS CONTINUOUS
Status: DISCONTINUED | OUTPATIENT
Start: 2024-05-06 | End: 2024-05-06 | Stop reason: HOSPADM

## 2024-05-06 RX ORDER — HYDROMORPHONE HYDROCHLORIDE 1 MG/ML
0.5 INJECTION, SOLUTION INTRAMUSCULAR; INTRAVENOUS; SUBCUTANEOUS EVERY 5 MIN PRN
Status: DISCONTINUED | OUTPATIENT
Start: 2024-05-06 | End: 2024-05-06 | Stop reason: HOSPADM

## 2024-05-06 RX ORDER — CELECOXIB 200 MG/1
200 CAPSULE ORAL ONCE
Status: COMPLETED | OUTPATIENT
Start: 2024-05-06 | End: 2024-05-06

## 2024-05-06 RX ORDER — SODIUM CHLORIDE 9 MG/ML
INJECTION, SOLUTION INTRAVENOUS PRN
Status: DISCONTINUED | OUTPATIENT
Start: 2024-05-06 | End: 2024-05-06 | Stop reason: HOSPADM

## 2024-05-06 RX ORDER — KETOROLAC TROMETHAMINE 30 MG/ML
30 INJECTION, SOLUTION INTRAMUSCULAR; INTRAVENOUS ONCE
Status: COMPLETED | OUTPATIENT
Start: 2024-05-06 | End: 2024-05-06

## 2024-05-06 RX ORDER — DEXAMETHASONE SODIUM PHOSPHATE 10 MG/ML
8 INJECTION, SOLUTION INTRAMUSCULAR; INTRAVENOUS ONCE
Status: DISCONTINUED | OUTPATIENT
Start: 2024-05-06 | End: 2024-05-06 | Stop reason: HOSPADM

## 2024-05-06 RX ORDER — OXYCODONE HYDROCHLORIDE AND ACETAMINOPHEN 5; 325 MG/1; MG/1
1 TABLET ORAL EVERY 6 HOURS PRN
Qty: 12 TABLET | Refills: 0 | Status: SHIPPED | OUTPATIENT
Start: 2024-05-06 | End: 2024-05-09

## 2024-05-06 RX ORDER — LIDOCAINE HYDROCHLORIDE 10 MG/ML
INJECTION, SOLUTION INFILTRATION; PERINEURAL PRN
Status: DISCONTINUED | OUTPATIENT
Start: 2024-05-06 | End: 2024-05-06 | Stop reason: SDUPTHER

## 2024-05-06 RX ORDER — ONDANSETRON 2 MG/ML
INJECTION INTRAMUSCULAR; INTRAVENOUS PRN
Status: DISCONTINUED | OUTPATIENT
Start: 2024-05-06 | End: 2024-05-06 | Stop reason: SDUPTHER

## 2024-05-06 RX ADMIN — FENTANYL CITRATE 50 MCG: 0.05 INJECTION, SOLUTION INTRAMUSCULAR; INTRAVENOUS at 14:53

## 2024-05-06 RX ADMIN — PHENYLEPHRINE HYDROCHLORIDE 100 MCG: 10 INJECTION INTRAVENOUS at 13:39

## 2024-05-06 RX ADMIN — ROCURONIUM BROMIDE 80 MG: 10 INJECTION, SOLUTION INTRAVENOUS at 13:25

## 2024-05-06 RX ADMIN — DEXAMETHASONE SODIUM PHOSPHATE 10 MG: 10 INJECTION, SOLUTION INTRAMUSCULAR; INTRAVENOUS at 13:51

## 2024-05-06 RX ADMIN — KETOROLAC TROMETHAMINE 30 MG: 30 INJECTION INTRAMUSCULAR; INTRAVENOUS at 16:10

## 2024-05-06 RX ADMIN — HYDROMORPHONE HYDROCHLORIDE 0.5 MG: 1 INJECTION, SOLUTION INTRAMUSCULAR; INTRAVENOUS; SUBCUTANEOUS at 15:08

## 2024-05-06 RX ADMIN — PHENYLEPHRINE HYDROCHLORIDE 100 MCG: 10 INJECTION INTRAVENOUS at 14:14

## 2024-05-06 RX ADMIN — PHENYLEPHRINE HYDROCHLORIDE 100 MCG: 10 INJECTION INTRAVENOUS at 13:43

## 2024-05-06 RX ADMIN — PHENYLEPHRINE HYDROCHLORIDE 100 MCG: 10 INJECTION INTRAVENOUS at 14:37

## 2024-05-06 RX ADMIN — CELECOXIB 200 MG: 200 CAPSULE ORAL at 11:55

## 2024-05-06 RX ADMIN — LIDOCAINE HYDROCHLORIDE 50 MG: 10 INJECTION, SOLUTION INFILTRATION; PERINEURAL at 13:25

## 2024-05-06 RX ADMIN — MIDAZOLAM 2 MG: 1 INJECTION INTRAMUSCULAR; INTRAVENOUS at 13:21

## 2024-05-06 RX ADMIN — SODIUM CHLORIDE, SODIUM LACTATE, POTASSIUM CHLORIDE, AND CALCIUM CHLORIDE: 600; 310; 30; 20 INJECTION, SOLUTION INTRAVENOUS at 14:09

## 2024-05-06 RX ADMIN — FENTANYL CITRATE 100 MCG: 0.05 INJECTION, SOLUTION INTRAMUSCULAR; INTRAVENOUS at 14:47

## 2024-05-06 RX ADMIN — HYDROMORPHONE HYDROCHLORIDE 0.5 MG: 1 INJECTION, SOLUTION INTRAMUSCULAR; INTRAVENOUS; SUBCUTANEOUS at 15:16

## 2024-05-06 RX ADMIN — CEFAZOLIN 2000 MG: 2 INJECTION, POWDER, FOR SOLUTION INTRAMUSCULAR; INTRAVENOUS at 13:36

## 2024-05-06 RX ADMIN — FENTANYL CITRATE 100 MCG: 0.05 INJECTION, SOLUTION INTRAMUSCULAR; INTRAVENOUS at 13:25

## 2024-05-06 RX ADMIN — ACETAMINOPHEN 1000 MG: 500 TABLET ORAL at 11:57

## 2024-05-06 RX ADMIN — SODIUM CHLORIDE, SODIUM LACTATE, POTASSIUM CHLORIDE, AND CALCIUM CHLORIDE: 600; 310; 30; 20 INJECTION, SOLUTION INTRAVENOUS at 13:21

## 2024-05-06 RX ADMIN — SUGAMMADEX 500 MG: 100 INJECTION, SOLUTION INTRAVENOUS at 14:44

## 2024-05-06 RX ADMIN — PHENYLEPHRINE HYDROCHLORIDE 100 MCG: 10 INJECTION INTRAVENOUS at 13:54

## 2024-05-06 RX ADMIN — ONDANSETRON 4 MG: 2 INJECTION INTRAMUSCULAR; INTRAVENOUS at 14:34

## 2024-05-06 RX ADMIN — PROPOFOL 150 MG: 10 INJECTION, EMULSION INTRAVENOUS at 13:25

## 2024-05-06 RX ADMIN — SODIUM CHLORIDE, POTASSIUM CHLORIDE, SODIUM LACTATE AND CALCIUM CHLORIDE: 600; 310; 30; 20 INJECTION, SOLUTION INTRAVENOUS at 11:53

## 2024-05-06 ASSESSMENT — PAIN - FUNCTIONAL ASSESSMENT
PAIN_FUNCTIONAL_ASSESSMENT: 0-10
PAIN_FUNCTIONAL_ASSESSMENT: 0-10

## 2024-05-06 ASSESSMENT — PAIN DESCRIPTION - DESCRIPTORS
DESCRIPTORS: THROBBING
DESCRIPTORS: ACHING
DESCRIPTORS: ACHING

## 2024-05-06 ASSESSMENT — PAIN SCALES - GENERAL: PAINLEVEL_OUTOF10: 9

## 2024-05-06 ASSESSMENT — PAIN DESCRIPTION - LOCATION: LOCATION: ABDOMEN

## 2024-05-06 NOTE — ANESTHESIA PRE PROCEDURE
Department of Anesthesiology  Preprocedure Note       Name:  Joyce Faye   Age:  59 y.o.  :  1965                                          MRN:  [id number]         Date:  2024      Surgeon: Surgeon(s):  Nasreen King DO    Procedure: Procedure(s):  ROBOTIC LAPAROSCOPIC INCARCERATED INCISIONAL HERNIA REPAIR WITH MESH    Medications prior to admission:   Prior to Admission medications    Medication Sig Start Date End Date Taking? Authorizing Provider   omeprazole (PRILOSEC) 20 MG delayed release capsule TAKE 1 CAPSULE BY MOUTH TWICE DAILY (BEFORE  MEALS)  Patient taking differently: Take 1 capsule by mouth in the morning and at bedtime 24   Anastasia Kline APRN - CNP   Semaglutide,0.25 or 0.5MG/DOS, 2 MG/1.5ML SOPN Inject 0.25 mg into the skin once a week  Patient taking differently: Inject 0.25 mg into the skin once a week 24   Anastasia Kline APRN - CNP   gabapentin (NEURONTIN) 600 MG tablet TAKE 1 TABLET BY MOUTH ONCE DAILY IN THE MORNING AND 1/2 (ONE-HALF) TAB ONCE DAILY AT NOON AND 1 TAB ONCE DAILY IN THE EVENING  Patient taking differently: Take 1 tablet by mouth See Admin Instructions. TAKE 1 TABLET BY MOUTH ONCE DAILY IN THE MORNING AND 1/2 (ONE-HALF) TAB ONCE DAILY AT NOON AND 1 TAB ONCE DAILY IN THE EVENING 4/3/24 5/6/24  Maria De Jesus Fam APRN - CNP   dapagliflozin (FARXIGA) 10 MG tablet TAKE 1 TABLET BY MOUTH ONCE DAILY IN THE MORNING 3/22/24   Anastasia Kline APRN - CNP   rizatriptan (MAXALT) 10 MG tablet TAKE 1 TABLET BY MOUTH ONCE DAILY AS NEEDED FOR  MIGRAINE  MAY  REPEAT  IN  2  HOURS  IF  NEEDED 3/4/24   Anastasia Kline APRN - CNP   fluticasone (FLONASE) 50 MCG/ACT nasal spray Use 1 spray(s) in each nostril twice daily  Patient taking differently: 1 spray by Nasal route in the morning and at bedtime Use 1 spray(s) in each nostril twice daily 24   Anastasia Kline APRN - CNP   diclofenac sodium (VOLTAREN) 1 % GEL APPLY 4 GRAMS TOPICALLY TO AFFECTED AREA 4 TIMES DAILY 3/9/23

## 2024-05-06 NOTE — ANESTHESIA POSTPROCEDURE EVALUATION
Department of Anesthesiology  Postprocedure Note    Patient: Joyce Faye  MRN: [id number]  YOB: 1965  Date of evaluation: 5/6/2024    Procedure Summary       Date: 05/06/24 Room / Location: [address]    Anesthesia Start: 1321 Anesthesia Stop: 1457    Procedure: ROBOTIC LAPAROSCOPIC INCARCERATED INCISIONAL HERNIA GREATER THAN 10cm REPAIR WITH MESH Diagnosis:       Incarcerated incisional hernia      (Incarcerated incisional hernia [K43.0])    Surgeons: Nasreen King DO Responsible Provider: Alhaji Hernandez APRN - CRNA    Anesthesia Type: general ASA Status: 3            Anesthesia Type: No value filed.    Keila Phase I: Keila Score: 10    Keila Phase II:      Anesthesia Post Evaluation    Patient location during evaluation: PACU  Patient participation: complete - patient participated  Level of consciousness: sleepy but conscious  Pain score: 0  Airway patency: patent  Nausea & Vomiting: no nausea and no vomiting  Cardiovascular status: blood pressure returned to baseline  Respiratory status: acceptable  Pain management: adequate        No notable events documented.

## 2024-05-06 NOTE — OP NOTE
Operative Note      Patient: Joyce Faye  YOB: 1965  MRN: [id number]    Date of Procedure: 5/6/2024    Pre-Op Diagnosis Codes:     * Incarcerated incisional hernia [K43.0]    Post-Op Diagnosis:  Incarcerated Incisional Hernia greater than 10 cm.       Procedure(s):  ROBOTIC LAPAROSCOPIC INCARCERATED INCISIONAL HERNIA GREATER THAN 10cm REPAIR WITH MESH    Surgeon(s):  Nasreen King DO    Assistant:   * No surgical staff found *    Anesthesia: General    Estimated Blood Loss (mL): Minimal    Complications: None    Specimens:   * No specimens in log *    Implants:  Implant Name Type Inv. Item Serial No.  Lot No. LRB No. Used Action   MESH SURG [id number] ELLIPSE W/ ECHO 2 POS SYS VENTRALIGHT - [id number]  MESH SURG [id number] ELLIPSE W/ ECHO 2 POS SYS VENTRALIGHT  BARD DAVOL-WD [id number] N/A 1 Implanted         Drains: * No LDAs found *    Findings:  Infection Present At Time Of Surgery (PATOS) (choose all levels that have infection present):  No infection present  Other Findings: 11 cm midline defects incarcerated with omentum.     Detailed Description of Procedure:   Ms. Faye was taken to the main operating room and placed on the operating  table supine with the left side raised slightly on a bump and slightly jackknifed. After the induction of adequate general endotracheal anesthesia, the abdomen was prepped and draped to a sterile field with I-O band. A time-out was undertaken.     A small incision was made two fingerbreadths below the costal margin on the left side after instilling local anesthesic, and through this an optiview trocar was inserted, the abdomen was entered, and a pneumoperitoneum created.  A laparoscope was advanced and inspection undertaken. No evidence of trauma from the trocar site insertion was appreciated. The hernia was seen along the midline with overlying fat pads adherent in the area. There was incarcerated omentum within the defects.      Two additional 8 mm

## 2024-05-06 NOTE — PROGRESS NOTES
CLINICAL PHARMACY NOTE: MEDS TO BEDS    Total # of Prescriptions Filled: 1   The following medications were delivered to the patient:  Current Discharge Medication List        START taking these medications    Details   oxyCODONE-acetaminophen (PERCOCET) 5-325 MG per tablet Take 1 tablet by mouth every 6 hours as needed for Pain for up to 3 days. Intended supply: 3 days. Take lowest dose possible to manage pain Max Daily Amount: 4 tablets  Qty: 12 tablet, Refills: 0    Comments: Reduce doses taken as pain becomes manageable  Associated Diagnoses: Incarcerated incisional hernia               Additional Documentation:  Handed to patients family at [facility]. Paid with cash

## 2024-05-06 NOTE — INTERVAL H&P NOTE
Update History & Physical    The patient's History and Physical of April 24, 2024 was reviewed with the patient and I examined the patient. There was no change. The surgical site was confirmed by the patient and me.     Plan: The risks, benefits, expected outcome, and alternative to the recommended procedure have been discussed with the patient. Patient understands and wants to proceed with the procedure.     Electronically signed by Nasreen King DO on 5/6/2024 at 11:26 AM

## 2024-05-06 NOTE — DISCHARGE INSTR - ACTIVITY
No heavy lifting. No lifting more than 15 pounds for 6 weeks.   No work for 2 weeks.  Wear Abdominal Binder at all times when up and about.   May shower tomorrow. Keep shower head to the back. Pat dry.  Do not soak in tub.  No swimming pools, oceans, lakes, etc for 2 weeks.   Do not drive while on pain medications.    Keep white dressing in place. May remove in one week.       Avoid constipation.   Take colace nightly (up to 300 mg) and miralax daily (up to three doses).   Drink 64 ounces of water and take a powdered fiber supplement daily (ie-Metamucil).    If you have signs of infection, call you doctor. These include:   -Increased pain, swelling, warmth, or redness  -Red streaks leading from the incision  -Pus draining from the incision  -A fever > 100.4

## 2024-05-07 DIAGNOSIS — T45.1X5A CHEMOTHERAPY-INDUCED NEUROPATHY (HCC): ICD-10-CM

## 2024-05-07 DIAGNOSIS — G62.0 CHEMOTHERAPY-INDUCED NEUROPATHY (HCC): ICD-10-CM

## 2024-05-07 RX ORDER — GABAPENTIN 600 MG/1
TABLET ORAL
Qty: 75 TABLET | Refills: 0 | Status: SHIPPED | OUTPATIENT
Start: 2024-05-07 | End: 2024-06-09

## 2024-05-07 NOTE — TELEPHONE ENCOUNTER
Joyce Faye called to request a refill on her medication.      Last office visit : 4/8/2024   Next office visit : Visit date not found     Last UDS:   Amphetamine Screen, Urine   Date Value Ref Range Status   08/21/2023 neg  Final     Barbiturate Screen, Urine   Date Value Ref Range Status   08/21/2023 neg  Final     Benzodiazepine Screen, Urine   Date Value Ref Range Status   08/21/2023 neg  Final     Buprenorphine Urine   Date Value Ref Range Status   08/21/2023 neg  Final     Cocaine Metabolite Screen, Urine   Date Value Ref Range Status   08/21/2023 neg  Final     Gabapentin Screen, Urine   Date Value Ref Range Status   08/21/2023 neg  Final     Comment:     not checked on UDS     MDMA, Urine   Date Value Ref Range Status   08/21/2023 neg  Final     Opiate Scrn, Ur   Date Value Ref Range Status   08/21/2023 pos  Final     Oxycodone Screen, Ur   Date Value Ref Range Status   08/21/2023 neg  Final     PCP Screen, Urine   Date Value Ref Range Status   08/21/2023 neg  Final     Propoxyphene Screen, Urine   Date Value Ref Range Status   08/21/2023 neg  Final     THC Screen, Urine   Date Value Ref Range Status   08/21/2023 neg  Final     Tricyclic Antidepressants, Urine   Date Value Ref Range Status   08/21/2023 neg  Final       Last Paul: 4/3/24  Medication Contract: 4/3/24   Last Fill: 4/3/24    Requested Prescriptions     Pending Prescriptions Disp Refills    gabapentin (NEURONTIN) 600 MG tablet 75 tablet 0     Sig: TAKE 1 TABLET BY MOUTH ONCE DAILY IN THE MORNING AND 1/2 (ONE-HALF) TAB ONCE DAILY AT NOON AND 1 TAB ONCE DAILY IN THE EVENING                                   Please approve or refuse this medication.   Nuvia Nunez LPN

## 2024-05-07 NOTE — TELEPHONE ENCOUNTER
Called patient to scheduled AWV and patient declined \"I have hernia surgery yesterday and instead of one I had four hernias and I am in to much pain to schedule now I will call back when I feel better.\"

## 2024-05-14 ENCOUNTER — HOSPITAL ENCOUNTER (EMERGENCY)
Age: 59
Discharge: HOME | End: 2024-05-14
Payer: MEDICARE

## 2024-05-14 ENCOUNTER — APPOINTMENT (OUTPATIENT)
Dept: CT IMAGING | Age: 59
End: 2024-05-14
Payer: MEDICARE

## 2024-05-14 VITALS
BODY MASS INDEX: 32.96 KG/M2 | TEMPERATURE: 98.2 F | DIASTOLIC BLOOD PRESSURE: 66 MMHG | HEIGHT: 67 IN | SYSTOLIC BLOOD PRESSURE: 108 MMHG | WEIGHT: 210 LBS | RESPIRATION RATE: 15 BRPM | OXYGEN SATURATION: 100 % | HEART RATE: 100 BPM

## 2024-05-14 DIAGNOSIS — K59.00 CONSTIPATION, UNSPECIFIED CONSTIPATION TYPE: ICD-10-CM

## 2024-05-14 DIAGNOSIS — R10.31 ABDOMINAL PAIN, RIGHT LOWER QUADRANT: ICD-10-CM

## 2024-05-14 DIAGNOSIS — M25.532 PAIN IN BOTH WRISTS: Primary | ICD-10-CM

## 2024-05-14 DIAGNOSIS — M25.531 PAIN IN BOTH WRISTS: Primary | ICD-10-CM

## 2024-05-14 LAB
ALBUMIN SERPL-MCNC: 4.1 G/DL (ref 3.5–5.2)
ALP SERPL-CCNC: 153 U/L (ref 35–104)
ALT SERPL-CCNC: 14 U/L (ref 5–33)
ANION GAP SERPL CALCULATED.3IONS-SCNC: 21 MMOL/L (ref 7–19)
AST SERPL-CCNC: 17 U/L (ref 5–32)
BASOPHILS # BLD: 0.1 K/UL (ref 0–0.2)
BASOPHILS NFR BLD: 0.3 % (ref 0–1)
BILIRUB SERPL-MCNC: 0.6 MG/DL (ref 0.2–1.2)
BILIRUB UR QL STRIP: NEGATIVE
BUN SERPL-MCNC: 16 MG/DL (ref 8–23)
CALCIUM SERPL-MCNC: 9.4 MG/DL (ref 8.8–10.2)
CHLORIDE SERPL-SCNC: 94 MMOL/L (ref 98–111)
CLARITY UR: CLEAR
CO2 SERPL-SCNC: 18 MMOL/L (ref 22–29)
COLOR UR: YELLOW
CREAT SERPL-MCNC: 0.5 MG/DL (ref 0.5–0.9)
EOSINOPHIL # BLD: 0.1 K/UL (ref 0–0.6)
EOSINOPHIL NFR BLD: 0.5 % (ref 0–5)
ERYTHROCYTE [DISTWIDTH] IN BLOOD BY AUTOMATED COUNT: 13.8 % (ref 11.5–14.5)
GLUCOSE SERPL-MCNC: 138 MG/DL (ref 74–109)
GLUCOSE UR STRIP.AUTO-MCNC: =>1000 MG/DL
HCT VFR BLD AUTO: 44.5 % (ref 37–47)
HGB BLD-MCNC: 14.8 G/DL (ref 12–16)
HGB UR STRIP.AUTO-MCNC: NEGATIVE MG/L
IMM GRANULOCYTES # BLD: 0.2 K/UL
KETONES UR STRIP.AUTO-MCNC: =>160 MG/DL
LACTATE BLDV-SCNC: 1.3 MMOL/L (ref 0.5–1.9)
LEUKOCYTE ESTERASE UR QL STRIP.AUTO: NEGATIVE
LIPASE SERPL-CCNC: 9 U/L (ref 13–60)
LYMPHOCYTES # BLD: 1.2 K/UL (ref 1.1–4.5)
LYMPHOCYTES NFR BLD: 7.5 % (ref 20–40)
MCH RBC QN AUTO: 28.3 PG (ref 27–31)
MCHC RBC AUTO-ENTMCNC: 33.3 G/DL (ref 33–37)
MCV RBC AUTO: 85.1 FL (ref 81–99)
MONOCYTES # BLD: 0.9 K/UL (ref 0–0.9)
MONOCYTES NFR BLD: 5.3 % (ref 0–10)
NEUTROPHILS # BLD: 14 K/UL (ref 1.5–7.5)
NEUTS SEG NFR BLD: 84.9 % (ref 50–65)
NITRITE UR QL STRIP.AUTO: NEGATIVE
PH UR STRIP.AUTO: 5.5 [PH] (ref 5–8)
PLATELET # BLD AUTO: 273 K/UL (ref 130–400)
PMV BLD AUTO: 9 FL (ref 9.4–12.3)
POTASSIUM SERPL-SCNC: 4.2 MMOL/L (ref 3.5–5)
PROT SERPL-MCNC: 7.4 G/DL (ref 6.6–8.7)
PROT UR STRIP.AUTO-MCNC: NEGATIVE MG/DL
RBC # BLD AUTO: 5.23 M/UL (ref 4.2–5.4)
SODIUM SERPL-SCNC: 133 MMOL/L (ref 136–145)
SP GR UR STRIP.AUTO: 1.03 (ref 1–1.03)
URATE SERPL-MCNC: 4.7 MG/DL (ref 2.4–5.7)
UROBILINOGEN UR STRIP.AUTO-MCNC: 1 E.U./DL
WBC # BLD AUTO: 16.5 K/UL (ref 4.8–10.8)

## 2024-05-14 PROCEDURE — 74177 CT ABD & PELVIS W/CONTRAST: CPT

## 2024-05-14 PROCEDURE — 84550 ASSAY OF BLOOD/URIC ACID: CPT

## 2024-05-14 PROCEDURE — 36415 COLL VENOUS BLD VENIPUNCTURE: CPT

## 2024-05-14 PROCEDURE — 81003 URINALYSIS AUTO W/O SCOPE: CPT

## 2024-05-14 PROCEDURE — 6360000004 HC RX CONTRAST MEDICATION: Performed by: PHYSICIAN ASSISTANT

## 2024-05-14 PROCEDURE — 83605 ASSAY OF LACTIC ACID: CPT

## 2024-05-14 PROCEDURE — 85025 COMPLETE CBC W/AUTO DIFF WBC: CPT

## 2024-05-14 PROCEDURE — 83690 ASSAY OF LIPASE: CPT

## 2024-05-14 PROCEDURE — 96375 TX/PRO/DX INJ NEW DRUG ADDON: CPT

## 2024-05-14 PROCEDURE — 6360000002 HC RX W HCPCS: Performed by: PHYSICIAN ASSISTANT

## 2024-05-14 PROCEDURE — 96372 THER/PROPH/DIAG INJ SC/IM: CPT

## 2024-05-14 PROCEDURE — 80053 COMPREHEN METABOLIC PANEL: CPT

## 2024-05-14 PROCEDURE — 96374 THER/PROPH/DIAG INJ IV PUSH: CPT

## 2024-05-14 PROCEDURE — 99285 EMERGENCY DEPT VISIT HI MDM: CPT

## 2024-05-14 RX ORDER — METHYLPREDNISOLONE 4 MG/1
TABLET ORAL
Qty: 1 KIT | Refills: 0 | Status: SHIPPED | OUTPATIENT
Start: 2024-05-14

## 2024-05-14 RX ORDER — KETOROLAC TROMETHAMINE 10 MG/1
10 TABLET, FILM COATED ORAL EVERY 6 HOURS PRN
Qty: 15 TABLET | Refills: 0 | Status: SHIPPED | OUTPATIENT
Start: 2024-05-14

## 2024-05-14 RX ORDER — KETOROLAC TROMETHAMINE 30 MG/ML
30 INJECTION, SOLUTION INTRAMUSCULAR; INTRAVENOUS ONCE
Status: COMPLETED | OUTPATIENT
Start: 2024-05-14 | End: 2024-05-14

## 2024-05-14 RX ORDER — ONDANSETRON 2 MG/ML
4 INJECTION INTRAMUSCULAR; INTRAVENOUS ONCE
Status: COMPLETED | OUTPATIENT
Start: 2024-05-14 | End: 2024-05-14

## 2024-05-14 RX ORDER — MORPHINE SULFATE 4 MG/ML
4 INJECTION, SOLUTION INTRAMUSCULAR; INTRAVENOUS ONCE
Status: COMPLETED | OUTPATIENT
Start: 2024-05-14 | End: 2024-05-14

## 2024-05-14 RX ORDER — DEXAMETHASONE SODIUM PHOSPHATE 10 MG/ML
8 INJECTION, SOLUTION INTRAMUSCULAR; INTRAVENOUS ONCE
Status: COMPLETED | OUTPATIENT
Start: 2024-05-14 | End: 2024-05-14

## 2024-05-14 RX ADMIN — KETOROLAC TROMETHAMINE 30 MG: 30 INJECTION, SOLUTION INTRAMUSCULAR at 17:21

## 2024-05-14 RX ADMIN — ONDANSETRON 4 MG: 2 INJECTION INTRAMUSCULAR; INTRAVENOUS at 16:11

## 2024-05-14 RX ADMIN — DEXAMETHASONE SODIUM PHOSPHATE 8 MG: 10 INJECTION INTRAMUSCULAR; INTRAVENOUS at 17:21

## 2024-05-14 RX ADMIN — MORPHINE SULFATE 4 MG: 4 INJECTION, SOLUTION INTRAMUSCULAR; INTRAVENOUS at 16:11

## 2024-05-14 RX ADMIN — IOPAMIDOL 70 ML: 755 INJECTION, SOLUTION INTRAVENOUS at 13:39

## 2024-05-14 ASSESSMENT — PAIN SCALES - GENERAL: PAINLEVEL_OUTOF10: 10

## 2024-05-14 ASSESSMENT — PAIN - FUNCTIONAL ASSESSMENT: PAIN_FUNCTIONAL_ASSESSMENT: 0-10

## 2024-05-14 ASSESSMENT — ENCOUNTER SYMPTOMS
DIARRHEA: 0
NAUSEA: 0
COUGH: 0
CONSTIPATION: 0
VOMITING: 0
ABDOMINAL PAIN: 1
SHORTNESS OF BREATH: 0
COLOR CHANGE: 1

## 2024-05-14 NOTE — ED PROVIDER NOTES
her CBC.  She also did have CMP negative for GERSON or LFT elevation.  Mild electrolyte disturbances.  Lipase was normal.  No lactic elevation supporting infection.  Uric acid was also not elevated concerning for gout.  UA negative.  I discussed case in detail with my attending, Dr. Peraza.  At this time, plan to treat this as gout versus arthritis flare.  No recent trauma or decreased range of motion that would have warranted x-rays.  She was given steroid in the ER and Toradol as an anti-inflammatory.  I also sent her home with a similar regimen to try to help with pain control and decrease symptoms.  We did discuss the risk associated with increasing her glucose with the use of steroids and patient verbalized understanding and will watch the blood sugar closely.  I gone over strict return precautions and the importance of follow-up promptly with both primary care and surgery to ensure improvement and further evaluation as needed.  All of her questions were answered.  She is agreeable to the plan.    CONSULTS:  Dr Rebollar, General Surgery    PROCEDURES:  Unless otherwise noted below, none     Procedures    FINAL IMPRESSION      1. Pain in both wrists    2. Abdominal pain, right lower quadrant    3. Constipation, unspecified constipation type          DISPOSITION/PLAN   DISPOSITION Decision To Discharge 05/14/2024 05:13:51 PM      PATIENT REFERRED TO:  Nasreen King DO  [address] [facility]  [address]  [place] [id number]  [phone]          Anastasia Kline APRN - [address]  [address]  [place] [id number] [phone]            DISCHARGE MEDICATIONS:  Discharge Medication List as of 5/14/2024  5:24 PM        START taking these medications    Details   methylPREDNISolone (MEDROL, CLEMENTE,) 4 MG tablet Take by mouth., Disp-1 kit, R-0Normal      ketorolac (TORADOL) 10 MG tablet Take 1 tablet by mouth every 6 hours as needed for Pain, Disp-15 tablet, R-0Normal                (Please note that portions of this

## 2024-05-14 NOTE — DISCHARGE INSTRUCTIONS
Follow up with your primary care provider in the next 2-3 days to ensure improvement. Return to the ER for new or worsening symptoms.  Also follow up with your general surgeon as soon as possible.

## 2024-05-22 ENCOUNTER — OFFICE VISIT (OUTPATIENT)
Dept: SURGERY | Age: 59
End: 2024-05-22
Payer: MEDICARE

## 2024-05-22 ENCOUNTER — OFFICE VISIT (OUTPATIENT)
Dept: PRIMARY CARE CLINIC | Age: 59
End: 2024-05-22
Payer: MEDICARE

## 2024-05-22 VITALS
TEMPERATURE: 96.8 F | BODY MASS INDEX: 33.9 KG/M2 | WEIGHT: 216 LBS | OXYGEN SATURATION: 99 % | HEIGHT: 67 IN | HEART RATE: 115 BPM

## 2024-05-22 VITALS
HEART RATE: 94 BPM | TEMPERATURE: 98 F | OXYGEN SATURATION: 98 % | SYSTOLIC BLOOD PRESSURE: 110 MMHG | WEIGHT: 215.2 LBS | BODY MASS INDEX: 33.78 KG/M2 | DIASTOLIC BLOOD PRESSURE: 60 MMHG | HEIGHT: 67 IN

## 2024-05-22 DIAGNOSIS — K43.0 INCARCERATED INCISIONAL HERNIA: Primary | ICD-10-CM

## 2024-05-22 DIAGNOSIS — M79.89 SWELLING OF BOTH HANDS: Primary | ICD-10-CM

## 2024-05-22 DIAGNOSIS — K59.09 OTHER CONSTIPATION: ICD-10-CM

## 2024-05-22 DIAGNOSIS — M25.541 ARTHRALGIA OF BOTH HANDS: ICD-10-CM

## 2024-05-22 DIAGNOSIS — Z98.890 HISTORY OF INCISIONAL HERNIA REPAIR: ICD-10-CM

## 2024-05-22 DIAGNOSIS — Z87.19 HISTORY OF INCISIONAL HERNIA REPAIR: ICD-10-CM

## 2024-05-22 DIAGNOSIS — M25.542 ARTHRALGIA OF BOTH HANDS: ICD-10-CM

## 2024-05-22 PROCEDURE — 3074F SYST BP LT 130 MM HG: CPT | Performed by: NURSE PRACTITIONER

## 2024-05-22 PROCEDURE — 99212 OFFICE O/P EST SF 10 MIN: CPT | Performed by: SURGERY

## 2024-05-22 PROCEDURE — 3078F DIAST BP <80 MM HG: CPT | Performed by: NURSE PRACTITIONER

## 2024-05-22 PROCEDURE — 99214 OFFICE O/P EST MOD 30 MIN: CPT | Performed by: NURSE PRACTITIONER

## 2024-05-22 ASSESSMENT — ENCOUNTER SYMPTOMS
NAUSEA: 1
VOMITING: 1
COUGH: 0
DIARRHEA: 0
SORE THROAT: 0
CONSTIPATION: 1
CHEST TIGHTNESS: 0
EYE REDNESS: 0
COLOR CHANGE: 0
BACK PAIN: 1
APNEA: 1
EYE PAIN: 0
ABDOMINAL PAIN: 1
ABDOMINAL DISTENTION: 0
SHORTNESS OF BREATH: 0

## 2024-05-22 NOTE — PROGRESS NOTES
SUBJECTIVE:  Ms. Faye is a 59 y.o. female who presents today two weeks s/p robotic ventral hernia repair with mesh. She has had pain at the left upper abdomen. The pain was severe initially. It is improving. She is tearful about her experience.     Patient's medications, allergies, past medical, surgical, social and family histories werereviewed and updated as appropriate.    Review of Systems   Constitutional:  Positive for appetite change. Negative for fatigue, fever and unexpected weight change.   HENT:  Positive for tinnitus. Negative for hearing loss, nosebleeds and sore throat.    Eyes:  Negative for pain, redness and visual disturbance.   Respiratory:  Positive for apnea. Negative for cough, chest tightness and shortness of breath.    Cardiovascular:  Positive for chest pain. Negative for palpitations and leg swelling.   Gastrointestinal:  Positive for abdominal pain, constipation, nausea and vomiting. Negative for abdominal distention and diarrhea.   Endocrine: Negative for cold intolerance, heat intolerance and polydipsia.   Genitourinary:  Negative for difficulty urinating, frequency and urgency.   Musculoskeletal:  Positive for arthralgias, back pain, gait problem, joint swelling, myalgias and neck pain.   Skin:  Negative for color change, rash and wound.   Allergic/Immunologic: Negative for environmental allergies and food allergies.   Neurological:  Positive for headaches. Negative for seizures and light-headedness.   Hematological:  Negative for adenopathy. Bruises/bleeds easily.   Psychiatric/Behavioral:  Positive for decreased concentration, dysphoric mood and sleep disturbance. Negative for confusion and suicidal ideas.        OBJECTIVE:  Pulse (!) 115   Temp 96.8 °F (36 °C) (Temporal)   Ht 1.702 m (5' 7\")   Wt 98 kg (216 lb)   SpO2 99%   BMI 33.83 kg/m²   Physical Exam  Constitutional:       Appearance: Normal appearance. She is normal weight.   HENT:      Head: Normocephalic and atraumatic.

## 2024-05-28 ASSESSMENT — ENCOUNTER SYMPTOMS
SHORTNESS OF BREATH: 0
ABDOMINAL PAIN: 0
COLOR CHANGE: 0
NAUSEA: 0
CHEST TIGHTNESS: 0
SORE THROAT: 0
DIARRHEA: 0
CONSTIPATION: 1
COUGH: 0
VOMITING: 0

## 2024-06-20 DIAGNOSIS — T45.1X5A CHEMOTHERAPY-INDUCED NEUROPATHY (HCC): ICD-10-CM

## 2024-06-20 DIAGNOSIS — G62.0 CHEMOTHERAPY-INDUCED NEUROPATHY (HCC): ICD-10-CM

## 2024-06-21 NOTE — TELEPHONE ENCOUNTER
Joyce Faye called to request a refill on her medication.      Last office visit : 5/22/2024   Next office visit : 7/8/2024     Last UDS:   Benzodiazepine Screen, Urine   Date Value Ref Range Status   08/21/2023 neg  Final     Buprenorphine Urine   Date Value Ref Range Status   08/21/2023 neg  Final     Cocaine Metabolite Screen, Urine   Date Value Ref Range Status   08/21/2023 neg  Final     Gabapentin Screen, Urine   Date Value Ref Range Status   08/21/2023 neg  Final     Comment:     not checked on UDS     MDMA, Urine   Date Value Ref Range Status   08/21/2023 neg  Final     Oxycodone Screen, Ur   Date Value Ref Range Status   08/21/2023 neg  Final     Propoxyphene Screen, Urine   Date Value Ref Range Status   08/21/2023 neg  Final     THC Screen, Urine   Date Value Ref Range Status   08/21/2023 neg  Final     Tricyclic Antidepressants, Urine   Date Value Ref Range Status   08/21/2023 neg  Final       Last Paul: 5/3/2024  Medication Contract: 4/3/2024   Last Fill: 5/7/2024    Requested Prescriptions     Pending Prescriptions Disp Refills    gabapentin (NEURONTIN) 600 MG tablet [Pharmacy Med Name: Gabapentin 600 MG Oral Tablet] 75 tablet 0     Sig: TAKE 1 TABLET BY MOUTH ONCE DAILY IN THE MORNING AND 1/2 (ONE-HALF) TAB ONCE DAILY AT NOON AND 1 TAB ONCE DAILY IN THE EVENING                           Please approve or refuse this medication.   Francine Cid LPN

## 2024-06-24 RX ORDER — GABAPENTIN 600 MG/1
TABLET ORAL
Qty: 75 TABLET | Refills: 0 | Status: SHIPPED | OUTPATIENT
Start: 2024-06-24 | End: 2024-07-21

## 2024-07-06 DIAGNOSIS — E11.9 TYPE 2 DIABETES MELLITUS WITHOUT COMPLICATION, WITHOUT LONG-TERM CURRENT USE OF INSULIN (HCC): ICD-10-CM

## 2024-07-08 ENCOUNTER — OFFICE VISIT (OUTPATIENT)
Dept: PRIMARY CARE CLINIC | Age: 59
End: 2024-07-08
Payer: MEDICARE

## 2024-07-08 VITALS
HEIGHT: 67 IN | BODY MASS INDEX: 33.93 KG/M2 | OXYGEN SATURATION: 98 % | TEMPERATURE: 98 F | WEIGHT: 216.2 LBS | HEART RATE: 94 BPM | SYSTOLIC BLOOD PRESSURE: 130 MMHG | DIASTOLIC BLOOD PRESSURE: 80 MMHG

## 2024-07-08 DIAGNOSIS — E11.9 TYPE 2 DIABETES MELLITUS WITHOUT COMPLICATION, WITHOUT LONG-TERM CURRENT USE OF INSULIN (HCC): Primary | ICD-10-CM

## 2024-07-08 DIAGNOSIS — F33.1 MAJOR DEPRESSIVE DISORDER, RECURRENT EPISODE, MODERATE (HCC): ICD-10-CM

## 2024-07-08 DIAGNOSIS — Z13.220 SCREENING FOR HYPERLIPIDEMIA: ICD-10-CM

## 2024-07-08 DIAGNOSIS — I10 HYPERTENSION, ESSENTIAL, BENIGN: ICD-10-CM

## 2024-07-08 DIAGNOSIS — M79.89 SWELLING OF BOTH HANDS: ICD-10-CM

## 2024-07-08 DIAGNOSIS — F41.9 ANXIETY: ICD-10-CM

## 2024-07-08 DIAGNOSIS — J30.89 ALLERGIC RHINITIS DUE TO OTHER ALLERGIC TRIGGER, UNSPECIFIED SEASONALITY: ICD-10-CM

## 2024-07-08 DIAGNOSIS — M17.0 PRIMARY OSTEOARTHRITIS OF BOTH KNEES: ICD-10-CM

## 2024-07-08 DIAGNOSIS — E11.9 TYPE 2 DIABETES MELLITUS WITHOUT COMPLICATION, WITHOUT LONG-TERM CURRENT USE OF INSULIN (HCC): ICD-10-CM

## 2024-07-08 LAB
ALBUMIN SERPL-MCNC: 4.2 G/DL (ref 3.5–5.2)
ALP SERPL-CCNC: 116 U/L (ref 35–104)
ALT SERPL-CCNC: 18 U/L (ref 5–33)
ANION GAP SERPL CALCULATED.3IONS-SCNC: 13 MMOL/L (ref 7–19)
AST SERPL-CCNC: 15 U/L (ref 5–32)
BASOPHILS # BLD: 0 K/UL (ref 0–0.2)
BASOPHILS NFR BLD: 0.4 % (ref 0–1)
BILIRUB SERPL-MCNC: 0.2 MG/DL (ref 0.2–1.2)
BUN SERPL-MCNC: 15 MG/DL (ref 8–23)
CALCIUM SERPL-MCNC: 9.3 MG/DL (ref 8.8–10.2)
CHLORIDE SERPL-SCNC: 100 MMOL/L (ref 98–111)
CHOLEST SERPL-MCNC: 230 MG/DL (ref 160–199)
CO2 SERPL-SCNC: 27 MMOL/L (ref 22–29)
CREAT SERPL-MCNC: 0.7 MG/DL (ref 0.5–0.9)
CREAT UR-MCNC: 96.9 MG/DL (ref 28–217)
CRP SERPL HS-MCNC: 0.85 MG/DL (ref 0–0.5)
EOSINOPHIL # BLD: 0.3 K/UL (ref 0–0.6)
EOSINOPHIL NFR BLD: 4.3 % (ref 0–5)
ERYTHROCYTE [DISTWIDTH] IN BLOOD BY AUTOMATED COUNT: 14.2 % (ref 11.5–14.5)
ERYTHROCYTE [SEDIMENTATION RATE] IN BLOOD BY WESTERGREN METHOD: 3 MM/HR (ref 0–25)
GLUCOSE SERPL-MCNC: 151 MG/DL (ref 74–109)
HBA1C MFR BLD: 7.7 %
HCT VFR BLD AUTO: 44.2 % (ref 37–47)
HDLC SERPL-MCNC: 54 MG/DL (ref 65–121)
HGB BLD-MCNC: 14.4 G/DL (ref 12–16)
IMM GRANULOCYTES # BLD: 0.1 K/UL
LDLC SERPL CALC-MCNC: 140 MG/DL
LYMPHOCYTES # BLD: 2.4 K/UL (ref 1.1–4.5)
LYMPHOCYTES NFR BLD: 34.3 % (ref 20–40)
MCH RBC QN AUTO: 29.4 PG (ref 27–31)
MCHC RBC AUTO-ENTMCNC: 32.6 G/DL (ref 33–37)
MCV RBC AUTO: 90.2 FL (ref 81–99)
MICROALBUMIN UR-MCNC: <1.2 MG/DL (ref 0–19)
MICROALBUMIN/CREAT UR-RTO: NORMAL MG/G
MONOCYTES # BLD: 0.7 K/UL (ref 0–0.9)
MONOCYTES NFR BLD: 10.7 % (ref 0–10)
NEUTROPHILS # BLD: 3.4 K/UL (ref 1.5–7.5)
NEUTS SEG NFR BLD: 49.6 % (ref 50–65)
PLATELET # BLD AUTO: 336 K/UL (ref 130–400)
PMV BLD AUTO: 9.7 FL (ref 9.4–12.3)
POTASSIUM SERPL-SCNC: 4.2 MMOL/L (ref 3.5–5)
PROT SERPL-MCNC: 6.9 G/DL (ref 6.6–8.7)
RBC # BLD AUTO: 4.9 M/UL (ref 4.2–5.4)
SODIUM SERPL-SCNC: 140 MMOL/L (ref 136–145)
TRIGL SERPL-MCNC: 178 MG/DL (ref 0–149)
WBC # BLD AUTO: 6.9 K/UL (ref 4.8–10.8)

## 2024-07-08 PROCEDURE — 3051F HG A1C>EQUAL 7.0%<8.0%: CPT | Performed by: NURSE PRACTITIONER

## 2024-07-08 PROCEDURE — 99214 OFFICE O/P EST MOD 30 MIN: CPT | Performed by: NURSE PRACTITIONER

## 2024-07-08 PROCEDURE — 3075F SYST BP GE 130 - 139MM HG: CPT | Performed by: NURSE PRACTITIONER

## 2024-07-08 PROCEDURE — 3079F DIAST BP 80-89 MM HG: CPT | Performed by: NURSE PRACTITIONER

## 2024-07-08 PROCEDURE — 83036 HEMOGLOBIN GLYCOSYLATED A1C: CPT | Performed by: NURSE PRACTITIONER

## 2024-07-08 RX ORDER — SEMAGLUTIDE 0.68 MG/ML
INJECTION, SOLUTION SUBCUTANEOUS
Qty: 3 ML | Refills: 0 | Status: SHIPPED | OUTPATIENT
Start: 2024-07-08

## 2024-07-08 RX ORDER — BUSPIRONE HYDROCHLORIDE 10 MG/1
10 TABLET ORAL 3 TIMES DAILY
Qty: 90 TABLET | Refills: 0 | Status: SHIPPED | OUTPATIENT
Start: 2024-07-08

## 2024-07-08 RX ORDER — FLUTICASONE PROPIONATE 50 MCG
SPRAY, SUSPENSION (ML) NASAL
Qty: 48 G | Refills: 0 | Status: SHIPPED | OUTPATIENT
Start: 2024-07-08

## 2024-07-08 NOTE — TELEPHONE ENCOUNTER
Joyce Faye called to request a refill on her medication.  Patient called to report she needs the Flonase called in today.Patient was seen today 07-.    Last office visit : 7/8/2024   Next office visit : 7/24/2024     Requested Prescriptions     Pending Prescriptions Disp Refills    fluticasone (FLONASE) 50 MCG/ACT nasal spray 48 g 0     Sig: Use 1 spray(s) in each nostril twice daily            Marcelo Larios MA

## 2024-07-08 NOTE — PROGRESS NOTES
for chest pain.   Gastrointestinal:  Negative for abdominal pain, diarrhea, nausea and vomiting.   Genitourinary:  Negative for frequency and urgency.   Musculoskeletal:  Positive for arthralgias (bilateral knee pain). Negative for myalgias.   Skin:  Negative for color change.   Neurological:  Negative for dizziness, weakness and numbness.   Psychiatric/Behavioral:  Negative for agitation. The patient is not nervous/anxious.        Objective:     Physical Exam  Vitals reviewed.   Constitutional:       Appearance: Normal appearance.   HENT:      Head: Normocephalic.      Right Ear: Tympanic membrane normal.      Left Ear: Tympanic membrane normal.      Nose: Nose normal.      Mouth/Throat:      Mouth: Mucous membranes are moist.      Pharynx: Oropharynx is clear.   Eyes:      Extraocular Movements: Extraocular movements intact.      Pupils: Pupils are equal, round, and reactive to light.   Cardiovascular:      Rate and Rhythm: Normal rate and regular rhythm.      Pulses: Normal pulses.      Heart sounds: Normal heart sounds.   Pulmonary:      Effort: Pulmonary effort is normal.      Breath sounds: Normal breath sounds.   Abdominal:      General: Bowel sounds are normal.      Palpations: Abdomen is soft.   Musculoskeletal:         General: Normal range of motion.      Cervical back: Normal range of motion.      Right knee: Tenderness present.      Left knee: Tenderness present.   Skin:     General: Skin is warm and dry.   Neurological:      Mental Status: She is alert and oriented to person, place, and time.   Psychiatric:         Attention and Perception: Attention and perception normal.         Mood and Affect: Mood and affect normal.         Speech: Speech normal.         Behavior: Behavior normal. Behavior is cooperative.         Thought Content: Thought content normal.         /80   Pulse 94   Temp 98 °F (36.7 °C) (Temporal)   Ht 1.702 m (5' 7\")   Wt 98.1 kg (216 lb 3.2 oz)   SpO2 98%   BMI 33.86

## 2024-07-09 LAB — NUCLEAR IGG SER QL IA: NORMAL

## 2024-07-14 PROBLEM — R10.10 UPPER ABDOMINAL PAIN: Status: RESOLVED | Noted: 2020-12-09 | Resolved: 2024-07-14

## 2024-07-14 PROBLEM — R14.2 BELCHING: Status: RESOLVED | Noted: 2020-12-09 | Resolved: 2024-07-14

## 2024-07-14 PROBLEM — Z13.31 POSITIVE DEPRESSION SCREENING: Status: RESOLVED | Noted: 2019-09-30 | Resolved: 2024-07-14

## 2024-07-14 PROBLEM — Z63.4 BEREAVEMENT: Status: RESOLVED | Noted: 2017-01-17 | Resolved: 2024-07-14

## 2024-07-14 PROBLEM — R63.0 LOSS OF APPETITE: Status: RESOLVED | Noted: 2020-12-09 | Resolved: 2024-07-14

## 2024-07-14 PROBLEM — R63.4 UNINTENTIONAL WEIGHT LOSS: Status: RESOLVED | Noted: 2020-12-09 | Resolved: 2024-07-14
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
## 2024-07-14 PROBLEM — Z91.81 AT RISK FOR FALLS: Status: RESOLVED | Noted: 2019-09-30 | Resolved: 2024-07-14

## 2024-07-14 PROBLEM — Z76.0 MEDICATION REFILL: Status: RESOLVED | Noted: 2018-02-16 | Resolved: 2024-07-14

## 2024-07-14 PROBLEM — R45.851 SUICIDAL IDEATION: Status: RESOLVED | Noted: 2017-01-17 | Resolved: 2024-07-14

## 2024-07-16 DIAGNOSIS — E78.2 MIXED HYPERLIPIDEMIA: Primary | ICD-10-CM

## 2024-07-16 RX ORDER — ROSUVASTATIN CALCIUM 20 MG/1
20 TABLET, COATED ORAL DAILY
Qty: 90 TABLET | Refills: 0 | Status: SHIPPED | OUTPATIENT
Start: 2024-07-16

## 2024-07-24 ENCOUNTER — OFFICE VISIT (OUTPATIENT)
Dept: PRIMARY CARE CLINIC | Age: 59
End: 2024-07-24

## 2024-07-24 VITALS
TEMPERATURE: 98.6 F | HEIGHT: 67 IN | BODY MASS INDEX: 35.72 KG/M2 | DIASTOLIC BLOOD PRESSURE: 80 MMHG | WEIGHT: 227.6 LBS | HEART RATE: 92 BPM | SYSTOLIC BLOOD PRESSURE: 116 MMHG | OXYGEN SATURATION: 97 %

## 2024-07-24 DIAGNOSIS — R20.2 NUMBNESS AND TINGLING IN BOTH HANDS: ICD-10-CM

## 2024-07-24 DIAGNOSIS — Z00.00 MEDICARE ANNUAL WELLNESS VISIT, SUBSEQUENT: Primary | ICD-10-CM

## 2024-07-24 DIAGNOSIS — R20.0 NUMBNESS AND TINGLING IN BOTH HANDS: ICD-10-CM

## 2024-07-24 NOTE — PROGRESS NOTES
and at bedtime Yes Provider, MD German   HYDROcodone-acetaminophen (NORCO) 7.5-325 MG per tablet Take 1 tablet by mouth 3 times daily as needed for Pain (Dr. Antonio). Yes Provider, MD German   gabapentin (NEURONTIN) 600 MG tablet TAKE 1 TABLET BY MOUTH ONCE DAILY IN THE MORNING AND 1/2 (ONE-HALF) TAB ONCE DAILY AT NOON AND 1 TAB ONCE DAILY IN THE EVENING  Anastasia Kline APRN - CNP       CareTeam (Including outside providers/suppliers regularly involved in providing care):   Patient Care Team:  Anastasia Kline APRN - CNP as PCP - General (Nurse Practitioner Family)  Anastasia Kline APRN - CNP as PCP - Empaneled Provider  Wolfgang Rhodes MD as Referring Physician (Hematology and Oncology)  Paul Childers MD as Consulting Physician (Gastroenterology)  Erinn Zamorano APRN (Family Nurse Practitioner)  Quinn Liriano MD as Referring Physician (Family Medicine)      Reviewed and updated this visit:  Tobacco  Allergies  Meds  Med Hx  Surg Hx  Soc Hx  Fam Hx

## 2024-07-24 NOTE — PATIENT INSTRUCTIONS
Week 5- take ozempic 0.5 mg    Preventing Falls: Care Instructions  Injuries and health problems such as trouble walking or poor eyesight can increase your risk of falling. So can some medicines. But there are things you can do to help prevent falls. You can exercise to get stronger. You can also arrange your home to make it safer.    Talk to your doctor about the medicines you take. Ask if any of them increase the risk of falls and whether they can be changed or stopped.   Try to exercise regularly. It can help improve your strength and balance. This can help lower your risk of falling.         Practice fall safety and prevention.   Wear low-heeled shoes that fit well and give your feet good support. Talk to your doctor if you have foot problems that make this hard.  Carry a cellphone or wear a medical alert device that you can use to call for help.  Use stepladders instead of chairs to reach high objects. Don't climb if you're at risk for falls. Ask for help, if needed.  Wear the correct eyeglasses, if you need them.        Make your home safer.   Remove rugs, cords, clutter, and furniture from walkways.  Keep your house well lit. Use night-lights in hallways and bathrooms.  Install and use sturdy handrails on stairways.  Wear nonskid footwear, even inside. Don't walk barefoot or in socks without shoes.        Be safe outside.   Use handrails, curb cuts, and ramps whenever possible.  Keep your hands free by using a shoulder bag or backpack.  Try to walk in well-lit areas. Watch out for uneven ground, changes in pavement, and debris.  Be careful in the winter. Walk on the grass or gravel when sidewalks are slippery. Use de-icer on steps and walkways. Add non-slip devices to shoes.    Put grab bars and nonskid mats in your shower or tub and near the toilet. Try to use a shower chair or bath bench when bathing.   Get into a tub or shower by putting in your weaker leg first. Get out with your strong side first. Have a

## 2024-07-31 DIAGNOSIS — G62.0 CHEMOTHERAPY-INDUCED NEUROPATHY (HCC): ICD-10-CM

## 2024-07-31 DIAGNOSIS — T45.1X5A CHEMOTHERAPY-INDUCED NEUROPATHY (HCC): ICD-10-CM

## 2024-07-31 RX ORDER — GABAPENTIN 600 MG/1
TABLET ORAL
Qty: 75 TABLET | Refills: 0 | Status: SHIPPED | OUTPATIENT
Start: 2024-07-31 | End: 2024-08-30

## 2024-07-31 NOTE — TELEPHONE ENCOUNTER
Joyce Faye called to request a refill on her medication.      Last office visit : 7/24/2024   Next office visit : 10/28/2024     Last UDS:   Benzodiazepine Screen, Urine   Date Value Ref Range Status   08/21/2023 neg  Final     Buprenorphine Urine   Date Value Ref Range Status   08/21/2023 neg  Final     Cocaine Metabolite Screen, Urine   Date Value Ref Range Status   08/21/2023 neg  Final     Gabapentin Screen, Urine   Date Value Ref Range Status   08/21/2023 neg  Final     Comment:     not checked on UDS     MDMA, Urine   Date Value Ref Range Status   08/21/2023 neg  Final     Oxycodone Screen, Ur   Date Value Ref Range Status   08/21/2023 neg  Final     Propoxyphene Screen, Urine   Date Value Ref Range Status   08/21/2023 neg  Final     THC Screen, Urine   Date Value Ref Range Status   08/21/2023 neg  Final     Tricyclic Antidepressants, Urine   Date Value Ref Range Status   08/21/2023 neg  Final       Last Paul: 5/2024  Medication Contract: 4/23/24   Last Fill: 6/24/2024    Requested Prescriptions     Pending Prescriptions Disp Refills    gabapentin (NEURONTIN) 600 MG tablet [Pharmacy Med Name: Gabapentin 600 MG Oral Tablet] 75 tablet 0     Sig: TAKE 1 TABLET BY MOUTH IN THE MORNING, 1/2 TABLET AT NOON, AND 1 TABLET IN THE EVENING                           Please approve or refuse this medication.   Francine Cid LPN

## 2024-08-13 ENCOUNTER — HOSPITAL ENCOUNTER (OUTPATIENT)
Dept: NEUROLOGY | Age: 59
Discharge: HOME | End: 2024-08-13
Payer: MEDICARE

## 2024-08-13 DIAGNOSIS — R20.0 NUMBNESS AND TINGLING IN BOTH HANDS: ICD-10-CM

## 2024-08-13 DIAGNOSIS — R20.2 NUMBNESS AND TINGLING IN BOTH HANDS: ICD-10-CM

## 2024-08-13 PROCEDURE — 95886 MUSC TEST DONE W/N TEST COMP: CPT | Performed by: PSYCHIATRY & NEUROLOGY

## 2024-08-13 PROCEDURE — 95911 NRV CNDJ TEST 9-10 STUDIES: CPT | Performed by: PSYCHIATRY & NEUROLOGY

## 2024-08-13 PROCEDURE — 95886 MUSC TEST DONE W/N TEST COMP: CPT

## 2024-08-13 PROCEDURE — 95911 NRV CNDJ TEST 9-10 STUDIES: CPT

## 2024-08-15 DIAGNOSIS — G56.03 BILATERAL CARPAL TUNNEL SYNDROME: Primary | ICD-10-CM

## 2024-08-29 DIAGNOSIS — E11.00 TYPE 2 DIABETES MELLITUS WITH HYPEROSMOLARITY WITHOUT COMA, WITHOUT LONG-TERM CURRENT USE OF INSULIN (HCC): ICD-10-CM

## 2024-08-29 RX ORDER — DAPAGLIFLOZIN 10 MG/1
10 TABLET, FILM COATED ORAL EVERY MORNING
Qty: 90 TABLET | Refills: 1 | Status: SHIPPED | OUTPATIENT
Start: 2024-08-29

## 2024-08-29 NOTE — TELEPHONE ENCOUNTER
Joyce Faye called to request a refill on her medication.      Last office visit : 7/24/2024   Next office visit : 10/28/2024     Requested Prescriptions     Pending Prescriptions Disp Refills    FARXIGA 10 MG tablet [Pharmacy Med Name: Farxiga 10 MG Oral Tablet] 90 tablet 0     Sig: TAKE 1 TABLET BY MOUTH ONCE DAILY IN THE MORNING            Teodora Gabriel LPN

## 2024-09-04 DIAGNOSIS — G62.0 CHEMOTHERAPY-INDUCED NEUROPATHY (HCC): ICD-10-CM

## 2024-09-04 DIAGNOSIS — T45.1X5A CHEMOTHERAPY-INDUCED NEUROPATHY (HCC): ICD-10-CM

## 2024-09-04 NOTE — TELEPHONE ENCOUNTER
Joyce Faye called to request a refill on her medication.      Last office visit : 7/24/2024   Next office visit : 10/28/2024     Last UDS:   Benzodiazepine Screen, Urine   Date Value Ref Range Status   08/21/2023 neg  Final     Buprenorphine Urine   Date Value Ref Range Status   08/21/2023 neg  Final     Cocaine Metabolite Screen, Urine   Date Value Ref Range Status   08/21/2023 neg  Final     Gabapentin Screen, Urine   Date Value Ref Range Status   08/21/2023 neg  Final     Comment:     not checked on UDS     Oxycodone Screen, Ur   Date Value Ref Range Status   08/21/2023 neg  Final     Propoxyphene Screen, Urine   Date Value Ref Range Status   08/21/2023 neg  Final     THC Screen, Urine   Date Value Ref Range Status   08/21/2023 neg  Final     Tricyclic Antidepressants, Urine   Date Value Ref Range Status   08/21/2023 neg  Final       Last Paul: 09-, scanned into the chart on 09-  Medication Contract: 04-   Last Fill: 07-    Requested Prescriptions     Pending Prescriptions Disp Refills    gabapentin (NEURONTIN) 600 MG tablet 75 tablet 0     Sig: TAKE 1 TABLET BY MOUTH IN THE MORNING, 1/2 TABLET AT NOON, AND 1 TABLET IN THE EVENING         Please approve or refuse this medication.   Marcelo Larios MA

## 2024-09-05 RX ORDER — GABAPENTIN 600 MG/1
TABLET ORAL
Qty: 75 TABLET | Refills: 0 | Status: SHIPPED | OUTPATIENT
Start: 2024-09-05 | End: 2024-10-04

## 2024-09-06 NOTE — TELEPHONE ENCOUNTER
Called patient,informed need to come to office and do a UDS. Patient reports it will either be a Monday or Wednesday.

## 2024-09-09 ENCOUNTER — TELEPHONE (OUTPATIENT)
Dept: PRIMARY CARE CLINIC | Age: 59
End: 2024-09-09

## 2024-09-18 ENCOUNTER — PREP FOR PROCEDURE (OUTPATIENT)
Dept: NEUROSURGERY | Age: 59
End: 2024-09-18

## 2024-09-18 ENCOUNTER — TELEPHONE (OUTPATIENT)
Dept: NEUROSURGERY | Age: 59
End: 2024-09-18

## 2024-09-18 ENCOUNTER — OFFICE VISIT (OUTPATIENT)
Dept: NEUROSURGERY | Age: 59
End: 2024-09-18
Payer: MEDICARE

## 2024-09-18 VITALS
HEART RATE: 70 BPM | BODY MASS INDEX: 35.71 KG/M2 | WEIGHT: 227.51 LBS | HEIGHT: 67 IN | SYSTOLIC BLOOD PRESSURE: 138 MMHG | DIASTOLIC BLOOD PRESSURE: 88 MMHG

## 2024-09-18 DIAGNOSIS — G56.02 CARPAL TUNNEL SYNDROME ON LEFT: Primary | ICD-10-CM

## 2024-09-18 DIAGNOSIS — G56.01 CARPAL TUNNEL SYNDROME ON RIGHT: ICD-10-CM

## 2024-09-18 PROCEDURE — 3075F SYST BP GE 130 - 139MM HG: CPT | Performed by: NEUROLOGICAL SURGERY

## 2024-09-18 PROCEDURE — 3079F DIAST BP 80-89 MM HG: CPT | Performed by: NEUROLOGICAL SURGERY

## 2024-09-18 PROCEDURE — 99204 OFFICE O/P NEW MOD 45 MIN: CPT | Performed by: NEUROLOGICAL SURGERY

## 2024-09-18 ASSESSMENT — ENCOUNTER SYMPTOMS
RESPIRATORY NEGATIVE: 1
GASTROINTESTINAL NEGATIVE: 1
EYES NEGATIVE: 1

## 2024-09-19 ENCOUNTER — HOSPITAL ENCOUNTER (OUTPATIENT)
Dept: PREADMISSION TESTING | Age: 59
Discharge: HOME | End: 2024-09-23

## 2024-09-26 ENCOUNTER — ANESTHESIA (OUTPATIENT)
Dept: OPERATING ROOM | Age: 59
End: 2024-09-26
Payer: MEDICARE

## 2024-09-26 ENCOUNTER — HOSPITAL ENCOUNTER (OUTPATIENT)
Age: 59
Setting detail: OUTPATIENT SURGERY
Discharge: HOME | End: 2024-09-26
Attending: NEUROLOGICAL SURGERY | Admitting: NEUROLOGICAL SURGERY
Payer: MEDICARE

## 2024-09-26 ENCOUNTER — ANESTHESIA EVENT (OUTPATIENT)
Dept: OPERATING ROOM | Age: 59
End: 2024-09-26
Payer: MEDICARE

## 2024-09-26 VITALS
OXYGEN SATURATION: 94 % | DIASTOLIC BLOOD PRESSURE: 67 MMHG | TEMPERATURE: 92 F | RESPIRATION RATE: 16 BRPM | HEART RATE: 102 BPM | WEIGHT: 227 LBS | HEIGHT: 67 IN | BODY MASS INDEX: 35.63 KG/M2 | SYSTOLIC BLOOD PRESSURE: 120 MMHG

## 2024-09-26 DIAGNOSIS — G56.02 CARPAL TUNNEL SYNDROME ON LEFT: Primary | ICD-10-CM

## 2024-09-26 LAB
GLUCOSE BLD-MCNC: 182 MG/DL (ref 70–99)
GLUCOSE BLD-MCNC: 242 MG/DL (ref 70–99)
PERFORMED ON: ABNORMAL
PERFORMED ON: ABNORMAL

## 2024-09-26 PROCEDURE — 2500000003 HC RX 250 WO HCPCS

## 2024-09-26 PROCEDURE — 2580000003 HC RX 258

## 2024-09-26 PROCEDURE — 7100000011 HC PHASE II RECOVERY - ADDTL 15 MIN: Performed by: NEUROLOGICAL SURGERY

## 2024-09-26 PROCEDURE — 6370000000 HC RX 637 (ALT 250 FOR IP): Performed by: NEUROLOGICAL SURGERY

## 2024-09-26 PROCEDURE — 6360000002 HC RX W HCPCS: Performed by: NEUROLOGICAL SURGERY

## 2024-09-26 PROCEDURE — 6360000002 HC RX W HCPCS

## 2024-09-26 PROCEDURE — 7100000001 HC PACU RECOVERY - ADDTL 15 MIN: Performed by: NEUROLOGICAL SURGERY

## 2024-09-26 PROCEDURE — 3600000013 HC SURGERY LEVEL 3 ADDTL 15MIN: Performed by: NEUROLOGICAL SURGERY

## 2024-09-26 PROCEDURE — 7100000000 HC PACU RECOVERY - FIRST 15 MIN: Performed by: NEUROLOGICAL SURGERY

## 2024-09-26 PROCEDURE — 3700000001 HC ADD 15 MINUTES (ANESTHESIA): Performed by: NEUROLOGICAL SURGERY

## 2024-09-26 PROCEDURE — 3600000003 HC SURGERY LEVEL 3 BASE: Performed by: NEUROLOGICAL SURGERY

## 2024-09-26 PROCEDURE — 3700000000 HC ANESTHESIA ATTENDED CARE: Performed by: NEUROLOGICAL SURGERY

## 2024-09-26 PROCEDURE — 7100000010 HC PHASE II RECOVERY - FIRST 15 MIN: Performed by: NEUROLOGICAL SURGERY

## 2024-09-26 PROCEDURE — 64721 CARPAL TUNNEL SURGERY: CPT | Performed by: NEUROLOGICAL SURGERY

## 2024-09-26 PROCEDURE — 2709999900 HC NON-CHARGEABLE SUPPLY: Performed by: NEUROLOGICAL SURGERY

## 2024-09-26 PROCEDURE — 82962 GLUCOSE BLOOD TEST: CPT

## 2024-09-26 PROCEDURE — 2500000003 HC RX 250 WO HCPCS: Performed by: NEUROLOGICAL SURGERY

## 2024-09-26 RX ORDER — HYDROMORPHONE HYDROCHLORIDE 1 MG/ML
0.5 INJECTION, SOLUTION INTRAMUSCULAR; INTRAVENOUS; SUBCUTANEOUS EVERY 5 MIN PRN
Status: DISCONTINUED | OUTPATIENT
Start: 2024-09-26 | End: 2024-09-26 | Stop reason: HOSPADM

## 2024-09-26 RX ORDER — PROPOFOL 10 MG/ML
INJECTION, EMULSION INTRAVENOUS
Status: DISCONTINUED | OUTPATIENT
Start: 2024-09-26 | End: 2024-09-26 | Stop reason: SDUPTHER

## 2024-09-26 RX ORDER — LIDOCAINE HYDROCHLORIDE 20 MG/ML
INJECTION, SOLUTION EPIDURAL; INFILTRATION; INTRACAUDAL; PERINEURAL PRN
Status: DISCONTINUED | OUTPATIENT
Start: 2024-09-26 | End: 2024-09-26 | Stop reason: ALTCHOICE

## 2024-09-26 RX ORDER — SODIUM CHLORIDE 0.9 % (FLUSH) 0.9 %
5-40 SYRINGE (ML) INJECTION PRN
Status: CANCELLED | OUTPATIENT
Start: 2024-09-26

## 2024-09-26 RX ORDER — IPRATROPIUM BROMIDE AND ALBUTEROL SULFATE 2.5; .5 MG/3ML; MG/3ML
1 SOLUTION RESPIRATORY (INHALATION)
Status: DISCONTINUED | OUTPATIENT
Start: 2024-09-26 | End: 2024-09-26 | Stop reason: HOSPADM

## 2024-09-26 RX ORDER — NALOXONE HYDROCHLORIDE 0.4 MG/ML
INJECTION, SOLUTION INTRAMUSCULAR; INTRAVENOUS; SUBCUTANEOUS PRN
Status: CANCELLED | OUTPATIENT
Start: 2024-09-26

## 2024-09-26 RX ORDER — ONDANSETRON 2 MG/ML
INJECTION INTRAMUSCULAR; INTRAVENOUS
Status: DISCONTINUED | OUTPATIENT
Start: 2024-09-26 | End: 2024-09-26 | Stop reason: SDUPTHER

## 2024-09-26 RX ORDER — SODIUM CHLORIDE 0.9 % (FLUSH) 0.9 %
5-40 SYRINGE (ML) INJECTION EVERY 12 HOURS SCHEDULED
Status: CANCELLED | OUTPATIENT
Start: 2024-09-26

## 2024-09-26 RX ORDER — PROCHLORPERAZINE EDISYLATE 5 MG/ML
5 INJECTION INTRAMUSCULAR; INTRAVENOUS
Status: CANCELLED | OUTPATIENT
Start: 2024-09-26 | End: 2024-09-27

## 2024-09-26 RX ORDER — BISMUTH TRIBROMOPH/PETROLATUM 5"X9"
BANDAGE TOPICAL PRN
Status: DISCONTINUED | OUTPATIENT
Start: 2024-09-26 | End: 2024-09-26 | Stop reason: ALTCHOICE

## 2024-09-26 RX ORDER — SODIUM CHLORIDE 0.9 % (FLUSH) 0.9 %
5-40 SYRINGE (ML) INJECTION PRN
Status: DISCONTINUED | OUTPATIENT
Start: 2024-09-26 | End: 2024-09-26 | Stop reason: HOSPADM

## 2024-09-26 RX ORDER — HYDROMORPHONE HYDROCHLORIDE 1 MG/ML
0.25 INJECTION, SOLUTION INTRAMUSCULAR; INTRAVENOUS; SUBCUTANEOUS EVERY 5 MIN PRN
Status: DISCONTINUED | OUTPATIENT
Start: 2024-09-26 | End: 2024-09-26 | Stop reason: HOSPADM

## 2024-09-26 RX ORDER — LIDOCAINE HYDROCHLORIDE 10 MG/ML
1 INJECTION, SOLUTION EPIDURAL; INFILTRATION; INTRACAUDAL; PERINEURAL
Status: DISCONTINUED | OUTPATIENT
Start: 2024-09-26 | End: 2024-09-26 | Stop reason: HOSPADM

## 2024-09-26 RX ORDER — MEPERIDINE HYDROCHLORIDE 25 MG/ML
12.5 INJECTION INTRAMUSCULAR; INTRAVENOUS; SUBCUTANEOUS
Status: DISCONTINUED | OUTPATIENT
Start: 2024-09-26 | End: 2024-09-26 | Stop reason: HOSPADM

## 2024-09-26 RX ORDER — LABETALOL HYDROCHLORIDE 5 MG/ML
10 INJECTION, SOLUTION INTRAVENOUS
Status: DISCONTINUED | OUTPATIENT
Start: 2024-09-26 | End: 2024-09-26 | Stop reason: HOSPADM

## 2024-09-26 RX ORDER — SCOLOPAMINE TRANSDERMAL SYSTEM 1 MG/1
1 PATCH, EXTENDED RELEASE TRANSDERMAL
Status: DISCONTINUED | OUTPATIENT
Start: 2024-09-26 | End: 2024-09-26 | Stop reason: HOSPADM

## 2024-09-26 RX ORDER — SODIUM CHLORIDE 9 MG/ML
INJECTION, SOLUTION INTRAVENOUS PRN
Status: CANCELLED | OUTPATIENT
Start: 2024-09-26

## 2024-09-26 RX ORDER — BUPIVACAINE HYDROCHLORIDE AND EPINEPHRINE 5; 5 MG/ML; UG/ML
INJECTION, SOLUTION EPIDURAL; INTRACAUDAL; PERINEURAL PRN
Status: DISCONTINUED | OUTPATIENT
Start: 2024-09-26 | End: 2024-09-26 | Stop reason: ALTCHOICE

## 2024-09-26 RX ORDER — LIDOCAINE HYDROCHLORIDE 10 MG/ML
INJECTION, SOLUTION EPIDURAL; INFILTRATION; INTRACAUDAL; PERINEURAL
Status: DISCONTINUED | OUTPATIENT
Start: 2024-09-26 | End: 2024-09-26 | Stop reason: SDUPTHER

## 2024-09-26 RX ORDER — SODIUM CHLORIDE, SODIUM LACTATE, POTASSIUM CHLORIDE, CALCIUM CHLORIDE 600; 310; 30; 20 MG/100ML; MG/100ML; MG/100ML; MG/100ML
INJECTION, SOLUTION INTRAVENOUS CONTINUOUS
Status: DISCONTINUED | OUTPATIENT
Start: 2024-09-26 | End: 2024-09-26 | Stop reason: HOSPADM

## 2024-09-26 RX ORDER — FENTANYL CITRATE 50 UG/ML
INJECTION, SOLUTION INTRAMUSCULAR; INTRAVENOUS
Status: DISCONTINUED | OUTPATIENT
Start: 2024-09-26 | End: 2024-09-26 | Stop reason: SDUPTHER

## 2024-09-26 RX ORDER — SODIUM CHLORIDE 0.9 % (FLUSH) 0.9 %
5-40 SYRINGE (ML) INJECTION EVERY 12 HOURS SCHEDULED
Status: DISCONTINUED | OUTPATIENT
Start: 2024-09-26 | End: 2024-09-26 | Stop reason: HOSPADM

## 2024-09-26 RX ORDER — HYDROCODONE BITARTRATE AND ACETAMINOPHEN 10; 325 MG/1; MG/1
1 TABLET ORAL EVERY 4 HOURS PRN
Qty: 18 TABLET | Refills: 0 | Status: SHIPPED | OUTPATIENT
Start: 2024-09-26 | End: 2024-09-29

## 2024-09-26 RX ORDER — SODIUM CHLORIDE 9 MG/ML
INJECTION, SOLUTION INTRAVENOUS PRN
Status: DISCONTINUED | OUTPATIENT
Start: 2024-09-26 | End: 2024-09-26 | Stop reason: HOSPADM

## 2024-09-26 RX ORDER — HYDRALAZINE HYDROCHLORIDE 20 MG/ML
10 INJECTION INTRAMUSCULAR; INTRAVENOUS
Status: DISCONTINUED | OUTPATIENT
Start: 2024-09-26 | End: 2024-09-26 | Stop reason: HOSPADM

## 2024-09-26 RX ORDER — DIPHENHYDRAMINE HYDROCHLORIDE 50 MG/ML
12.5 INJECTION INTRAMUSCULAR; INTRAVENOUS
Status: CANCELLED | OUTPATIENT
Start: 2024-09-26 | End: 2024-09-27

## 2024-09-26 RX ORDER — MIDAZOLAM HYDROCHLORIDE 2 MG/2ML
2 INJECTION, SOLUTION INTRAMUSCULAR; INTRAVENOUS
Status: DISCONTINUED | OUTPATIENT
Start: 2024-09-26 | End: 2024-09-26 | Stop reason: HOSPADM

## 2024-09-26 RX ORDER — DEXAMETHASONE SODIUM PHOSPHATE 4 MG/ML
INJECTION, SOLUTION INTRA-ARTICULAR; INTRALESIONAL; INTRAMUSCULAR; INTRAVENOUS; SOFT TISSUE
Status: DISCONTINUED | OUTPATIENT
Start: 2024-09-26 | End: 2024-09-26 | Stop reason: SDUPTHER

## 2024-09-26 RX ORDER — SODIUM CHLORIDE, SODIUM LACTATE, POTASSIUM CHLORIDE, CALCIUM CHLORIDE 600; 310; 30; 20 MG/100ML; MG/100ML; MG/100ML; MG/100ML
INJECTION, SOLUTION INTRAVENOUS
Status: DISCONTINUED | OUTPATIENT
Start: 2024-09-26 | End: 2024-09-26 | Stop reason: SDUPTHER

## 2024-09-26 RX ORDER — FAMOTIDINE 20 MG/1
20 TABLET, FILM COATED ORAL ONCE
Status: DISCONTINUED | OUTPATIENT
Start: 2024-09-26 | End: 2024-09-26 | Stop reason: HOSPADM

## 2024-09-26 RX ADMIN — PROPOFOL 200 MG: 10 INJECTION, EMULSION INTRAVENOUS at 08:45

## 2024-09-26 RX ADMIN — ONDANSETRON 4 MG: 2 INJECTION INTRAMUSCULAR; INTRAVENOUS at 09:31

## 2024-09-26 RX ADMIN — SODIUM CHLORIDE, SODIUM LACTATE, POTASSIUM CHLORIDE, AND CALCIUM CHLORIDE: 600; 310; 30; 20 INJECTION, SOLUTION INTRAVENOUS at 08:40

## 2024-09-26 RX ADMIN — PHENYLEPHRINE HYDROCHLORIDE 100 MCG: 10 INJECTION INTRAVENOUS at 09:08

## 2024-09-26 RX ADMIN — FENTANYL CITRATE 50 MCG: 0.05 INJECTION, SOLUTION INTRAMUSCULAR; INTRAVENOUS at 08:49

## 2024-09-26 RX ADMIN — PHENYLEPHRINE HYDROCHLORIDE 100 MCG: 10 INJECTION INTRAVENOUS at 09:18

## 2024-09-26 RX ADMIN — Medication 2000 MG: at 08:57

## 2024-09-26 RX ADMIN — PHENYLEPHRINE HYDROCHLORIDE 100 MCG: 10 INJECTION INTRAVENOUS at 09:24

## 2024-09-26 RX ADMIN — LIDOCAINE HYDROCHLORIDE 50 MG: 10 INJECTION, SOLUTION EPIDURAL; INFILTRATION; INTRACAUDAL; PERINEURAL at 08:45

## 2024-09-26 RX ADMIN — DEXAMETHASONE SODIUM PHOSPHATE 10 MG: 4 INJECTION, SOLUTION INTRAMUSCULAR; INTRAVENOUS at 09:12

## 2024-09-26 ASSESSMENT — PAIN - FUNCTIONAL ASSESSMENT
PAIN_FUNCTIONAL_ASSESSMENT: NONE - DENIES PAIN
PAIN_FUNCTIONAL_ASSESSMENT: 0-10

## 2024-09-26 ASSESSMENT — LIFESTYLE VARIABLES: SMOKING_STATUS: 0

## 2024-10-11 ENCOUNTER — OFFICE VISIT (OUTPATIENT)
Dept: NEUROSURGERY | Age: 59
End: 2024-10-11

## 2024-10-11 VITALS
HEART RATE: 119 BPM | DIASTOLIC BLOOD PRESSURE: 74 MMHG | BODY MASS INDEX: 35.63 KG/M2 | RESPIRATION RATE: 18 BRPM | SYSTOLIC BLOOD PRESSURE: 114 MMHG | OXYGEN SATURATION: 97 % | WEIGHT: 227 LBS | HEIGHT: 67 IN

## 2024-10-11 DIAGNOSIS — G56.02 CARPAL TUNNEL SYNDROME ON LEFT: Primary | ICD-10-CM

## 2024-10-11 PROCEDURE — 99024 POSTOP FOLLOW-UP VISIT: CPT | Performed by: NEUROLOGICAL SURGERY

## 2024-10-11 NOTE — PROGRESS NOTES
NEUROSURGERY POSTOPERATIVE FOLLOW UP NOTE      Chief Complaint:   Chief Complaint   Patient presents with    Wound Check     Follow up after LEFT CARPAL TUNNEL RELEASE         Date of Surgery: 9/26/2024    Procedure Performed: Left carpal tunnel release      Interval Update:    Patient returns for her first postoperative visit after surgery.  She is doing well.  She reports improvement in the numbness in her left hand.  She is experiencing the expected postoperative soreness.      HPI:     The patient is a 59 y.o. right-handed female  who presents for neurosurgical evaluation of bilateral carpal tunnel syndrome.  She states her left hand is worse than her right.  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  She describes pain and numbness primarily in her thumb, middle and index fingers that will awaken her at night.  Her hands will also go numb when she drives and uses her phone.  She does have a history of peripheral neuropathy due to prior chemotherapy for breast cancer which is in remission.  She recently underwent an EMG/NCS that revealed moderately-severe bilateral carpal tunnel syndrome superimposed on a moderate generalized peripheral neuropathy.       Objective:    /74   Pulse (!) 119   Resp 18   Ht 1.702 m (5' 7\")   Wt 103 kg (227 lb)   SpO2 97%   BMI 35.55 kg/m²         Physical Exam:    General: alert, cooperative, no distress  Cardiorespiratory: unlabored breathing  Wound: C/D/I with nylon sutures, sutures removed      Neurologic Exam:    Mental Status: Alert, oriented, thought content appropriate  Cranial Nerves: PERRL, EOMI, symmetric facies, tongue midline  Motor: Motor exam is symmetrical 5 out of 5 all extremities bilaterally  Somatosensory: normal light touch sensation          Assessment and Plan:    59 y.o. F returns for her first postoperative visit after left carpal tunnel release on 9/26/2024.  She is following the

## 2024-10-14 ENCOUNTER — TELEPHONE (OUTPATIENT)
Dept: PRIMARY CARE CLINIC | Age: 59
End: 2024-10-14

## 2024-10-14 DIAGNOSIS — E78.2 MIXED HYPERLIPIDEMIA: ICD-10-CM

## 2024-10-14 DIAGNOSIS — G62.0 CHEMOTHERAPY-INDUCED NEUROPATHY (HCC): ICD-10-CM

## 2024-10-14 DIAGNOSIS — T45.1X5A CHEMOTHERAPY-INDUCED NEUROPATHY (HCC): ICD-10-CM

## 2024-10-14 NOTE — TELEPHONE ENCOUNTER
Joyce Faye called to request a refill on her medication.      Last office visit : 7/24/2024   Next office visit : 10/28/2024     Last UDS:   Benzodiazepine Screen, Urine   Date Value Ref Range Status   08/21/2023 neg  Final     Buprenorphine Urine   Date Value Ref Range Status   08/21/2023 neg  Final     Cocaine Metabolite Screen, Urine   Date Value Ref Range Status   08/21/2023 neg  Final     Gabapentin Screen, Urine   Date Value Ref Range Status   08/21/2023 neg  Final     Comment:     not checked on UDS     Oxycodone Screen, Ur   Date Value Ref Range Status   08/21/2023 neg  Final     Propoxyphene Screen, Urine   Date Value Ref Range Status   08/21/2023 neg  Final     THC Screen, Urine   Date Value Ref Range Status   08/21/2023 neg  Final     Tricyclic Antidepressants, Urine   Date Value Ref Range Status   08/21/2023 neg  Final       Last Paul: 9/4/24  Medication Contract: 4/3/24   Last Fill: 9/5/24    Requested Prescriptions     Pending Prescriptions Disp Refills    gabapentin (NEURONTIN) 600 MG tablet 75 tablet 0     Sig: TAKE 1 TABLET BY MOUTH IN THE MORNING, 1/2 TABLET AT NOON, AND 1 TABLET IN THE EVENING                           Please approve or refuse this medication.   Nuvia Nunez LPN

## 2024-10-14 NOTE — TELEPHONE ENCOUNTER
Patient calls stating she increased the Ozempic as she was told to 0.5 mg. She stated she is not tolerating the increase. It is causing stomach pain, diarrhea and vomiting.    She is requesting to stay on the Ozempic 0.25 mg as she tolerated that dose. She is also needing a refill sent to her pharmacy. Please advise.

## 2024-10-15 RX ORDER — GABAPENTIN 600 MG/1
TABLET ORAL
Qty: 75 TABLET | Refills: 0 | Status: SHIPPED | OUTPATIENT
Start: 2024-10-15 | End: 2024-11-12

## 2024-10-16 DIAGNOSIS — E11.9 TYPE 2 DIABETES MELLITUS WITHOUT COMPLICATION, WITHOUT LONG-TERM CURRENT USE OF INSULIN (HCC): ICD-10-CM

## 2024-10-16 RX ORDER — ROSUVASTATIN CALCIUM 20 MG/1
20 TABLET, COATED ORAL DAILY
Qty: 90 TABLET | Refills: 0 | Status: SHIPPED | OUTPATIENT
Start: 2024-10-16

## 2024-10-16 RX ORDER — SEMAGLUTIDE 0.68 MG/ML
0.25 INJECTION, SOLUTION SUBCUTANEOUS WEEKLY
Qty: 3 ML | Refills: 0 | Status: SHIPPED | OUTPATIENT
Start: 2024-10-16

## 2024-10-16 NOTE — TELEPHONE ENCOUNTER
Please let patient know Ozempic 0.25 mg has been sent to [facility] for patient.  Please have patient report any further concerns.

## 2024-10-16 NOTE — TELEPHONE ENCOUNTER
Called patient,informed of provider recommendations and patient VU.  Request Crestor Rx be sent to pharmacy  [facility].

## 2024-10-28 ENCOUNTER — OFFICE VISIT (OUTPATIENT)
Dept: PRIMARY CARE CLINIC | Age: 59
End: 2024-10-28
Payer: MEDICARE

## 2024-10-28 VITALS
TEMPERATURE: 97.6 F | DIASTOLIC BLOOD PRESSURE: 80 MMHG | BODY MASS INDEX: 35.67 KG/M2 | HEART RATE: 124 BPM | HEIGHT: 67 IN | SYSTOLIC BLOOD PRESSURE: 133 MMHG | WEIGHT: 227.3 LBS

## 2024-10-28 DIAGNOSIS — I10 HYPERTENSION, ESSENTIAL, BENIGN: ICD-10-CM

## 2024-10-28 DIAGNOSIS — G43.009 MIGRAINE WITHOUT AURA AND WITHOUT STATUS MIGRAINOSUS, NOT INTRACTABLE: ICD-10-CM

## 2024-10-28 DIAGNOSIS — F41.9 ANXIETY: ICD-10-CM

## 2024-10-28 DIAGNOSIS — E11.00 TYPE 2 DIABETES MELLITUS WITH HYPEROSMOLARITY WITHOUT COMA, WITHOUT LONG-TERM CURRENT USE OF INSULIN (HCC): Primary | ICD-10-CM

## 2024-10-28 DIAGNOSIS — E78.2 MIXED HYPERLIPIDEMIA: ICD-10-CM

## 2024-10-28 DIAGNOSIS — Z51.81 THERAPEUTIC DRUG MONITORING: ICD-10-CM

## 2024-10-28 DIAGNOSIS — J30.89 ALLERGIC RHINITIS DUE TO OTHER ALLERGIC TRIGGER, UNSPECIFIED SEASONALITY: ICD-10-CM

## 2024-10-28 LAB
ALCOHOL URINE: NORMAL
AMPHETAMINE SCREEN URINE: NORMAL
BARBITURATE SCREEN URINE: NORMAL
BENZODIAZEPINE SCREEN, URINE: NORMAL
BUPRENORPHINE URINE: NORMAL
COCAINE METABOLITE SCREEN URINE: NORMAL
FENTANYL SCREEN, URINE: NORMAL
GABAPENTIN SCREEN, URINE: NORMAL
HBA1C MFR BLD: 9.5 %
MDMA, URINE: NORMAL
METHADONE SCREEN, URINE: NORMAL
METHAMPHETAMINE, URINE: NORMAL
OPIATE SCREEN URINE: NORMAL
OXYCODONE SCREEN URINE: NORMAL
PHENCYCLIDINE SCREEN URINE: NORMAL
PROPOXYPHENE SCREEN, URINE: NORMAL
SYNTHETIC CANNABINOIDS(K2) SCREEN, URINE: NORMAL
THC SCREEN, URINE: NORMAL
TRAMADOL SCREEN URINE: NORMAL
TRICYCLIC ANTIDEPRESSANTS, UR: NORMAL

## 2024-10-28 PROCEDURE — 3046F HEMOGLOBIN A1C LEVEL >9.0%: CPT | Performed by: NURSE PRACTITIONER

## 2024-10-28 PROCEDURE — 80305 DRUG TEST PRSMV DIR OPT OBS: CPT | Performed by: NURSE PRACTITIONER

## 2024-10-28 PROCEDURE — 99214 OFFICE O/P EST MOD 30 MIN: CPT | Performed by: NURSE PRACTITIONER

## 2024-10-28 PROCEDURE — 3079F DIAST BP 80-89 MM HG: CPT | Performed by: NURSE PRACTITIONER

## 2024-10-28 PROCEDURE — 83036 HEMOGLOBIN GLYCOSYLATED A1C: CPT | Performed by: NURSE PRACTITIONER

## 2024-10-28 PROCEDURE — 3075F SYST BP GE 130 - 139MM HG: CPT | Performed by: NURSE PRACTITIONER

## 2024-10-28 RX ORDER — RIZATRIPTAN BENZOATE 10 MG/1
10 TABLET ORAL DAILY PRN
Qty: 12 TABLET | Refills: 5 | Status: SHIPPED | OUTPATIENT
Start: 2024-10-28

## 2024-10-28 RX ORDER — FLUTICASONE PROPIONATE 50 MCG
SPRAY, SUSPENSION (ML) NASAL
Qty: 48 G | Refills: 2 | Status: SHIPPED | OUTPATIENT
Start: 2024-10-28

## 2024-10-28 NOTE — PROGRESS NOTES
instructed to continue prior meds, diet, and exercise plans as instructed. Patient agrees to follow up as instructed and sooner if needed.  Patient agrees to go to ER if condition becomes emergent.      EMR Dragon/transcription disclaimer: Some of this encounter note is an electronic transcription/translation of spoken language to printed text. The electronic translation of spoken language may permit erroneous, or at times, nonsensical words or phrases to be inadvertently transcribed. Although I have reviewed the note for such errors, some may still exist.    Electronically signed by RADHA Brown CNP on 10/30/2024 at 3:11 PM

## 2024-10-29 RX ORDER — BUPROPION HYDROCHLORIDE 300 MG/1
300 TABLET ORAL EVERY MORNING
COMMUNITY
Start: 2024-09-10

## 2024-10-30 ENCOUNTER — TELEPHONE (OUTPATIENT)
Dept: PRIMARY CARE CLINIC | Age: 59
End: 2024-10-30

## 2024-10-30 RX ORDER — HYDROXYZINE HYDROCHLORIDE 25 MG/1
25 TABLET, FILM COATED ORAL NIGHTLY
COMMUNITY

## 2024-10-30 ASSESSMENT — ENCOUNTER SYMPTOMS
VOMITING: 0
ABDOMINAL PAIN: 0
SORE THROAT: 0
COUGH: 0
CHEST TIGHTNESS: 0
SHORTNESS OF BREATH: 0
NAUSEA: 0
COLOR CHANGE: 0
DIARRHEA: 0

## 2024-10-30 NOTE — TELEPHONE ENCOUNTER
GIANNA---Patient calls stating she was to call regarding her medications. She states Dr. Antonio prescribes her Hydroxyzine 25 mg @ [facility].

## 2024-11-11 ENCOUNTER — TELEPHONE (OUTPATIENT)
Dept: PRIMARY CARE CLINIC | Age: 59
End: 2024-11-11

## 2024-11-11 ENCOUNTER — OFFICE VISIT (OUTPATIENT)
Dept: NEUROSURGERY | Age: 59
End: 2024-11-11

## 2024-11-11 VITALS
HEIGHT: 67 IN | SYSTOLIC BLOOD PRESSURE: 110 MMHG | WEIGHT: 227.29 LBS | DIASTOLIC BLOOD PRESSURE: 70 MMHG | BODY MASS INDEX: 35.67 KG/M2 | HEART RATE: 60 BPM

## 2024-11-11 DIAGNOSIS — G62.0 CHEMOTHERAPY-INDUCED NEUROPATHY (HCC): ICD-10-CM

## 2024-11-11 DIAGNOSIS — T45.1X5A CHEMOTHERAPY-INDUCED NEUROPATHY (HCC): ICD-10-CM

## 2024-11-11 DIAGNOSIS — G56.02 CARPAL TUNNEL SYNDROME ON LEFT: Primary | ICD-10-CM

## 2024-11-11 PROCEDURE — 99024 POSTOP FOLLOW-UP VISIT: CPT | Performed by: NEUROLOGICAL SURGERY

## 2024-11-11 RX ORDER — GABAPENTIN 600 MG/1
TABLET ORAL
Qty: 75 TABLET | Refills: 0 | Status: SHIPPED | OUTPATIENT
Start: 2024-11-15 | End: 2024-12-14

## 2024-11-11 NOTE — TELEPHONE ENCOUNTER
Joyce Faye called to request a refill on her medication.      Last office visit : 10/28/2024   Next office visit : 1/28/2025     Last UDS:   Benzodiazepine Screen, Urine   Date Value Ref Range Status   10/28/2024 neg  Final     Buprenorphine Urine   Date Value Ref Range Status   10/28/2024 neg  Final     Cocaine Metabolite Screen, Urine   Date Value Ref Range Status   10/28/2024 neg  Final     Gabapentin Screen, Urine   Date Value Ref Range Status   10/28/2024 neg  Final     Oxycodone Screen, Ur   Date Value Ref Range Status   10/28/2024 neg  Final     Propoxyphene Screen, Urine   Date Value Ref Range Status   10/28/2024 neg  Final     THC Screen, Urine   Date Value Ref Range Status   10/28/2024 neg  Final     Tricyclic Antidepressants, Urine   Date Value Ref Range Status   10/28/2024 pos  Final       Last Paul: 09-  Medication Contract: 04-   Last Fill: 10-    Requested Prescriptions     Pending Prescriptions Disp Refills    gabapentin (NEURONTIN) 600 MG tablet 75 tablet 0     Sig: TAKE 1 TABLET BY MOUTH IN THE MORNING, 1/2 TABLET AT NOON, AND 1 TABLET IN THE EVENING         Please approve or refuse this medication.   Marcelo Larios MA

## 2024-11-11 NOTE — PROGRESS NOTES
NEUROSURGERY POSTOPERATIVE FOLLOW UP NOTE      Chief Complaint:   Chief Complaint   Patient presents with    Follow-up     Patient states that she fell yesterday and landed on the palms on her hands. She states that she is sore today.         Date of Surgery: 9/26/2024    Procedure Performed: Left carpal tunnel release      Interval Update:    Patient returns for her second postoperative visit after surgery.  She is doing well overall.  She reports that she fell yesterday, slipping on wet steps, catching herself with her hands.  She endorses pain but denies any new numbness or neurologic symptoms, which have improved considerably since surgery.  Her incision remains intact.      HPI:     The patient is a 59 y.o. right-handed female  who presents for neurosurgical evaluation of bilateral carpal tunnel syndrome.  She states her left hand is worse than her right.  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  She describes pain and numbness primarily in her thumb, middle and index fingers that will awaken her at night.  Her hands will also go numb when she drives and uses her phone.  She does have a history of peripheral neuropathy due to prior chemotherapy for breast cancer which is in remission.  She recently underwent an EMG/NCS that revealed moderately-severe bilateral carpal tunnel syndrome superimposed on a moderate generalized peripheral neuropathy.       Objective:    /70   Pulse 60   Ht 1.702 m (5' 7.01\")   Wt 103.1 kg (227 lb 4.7 oz)   BMI 35.59 kg/m²         Physical Exam:    General: alert, cooperative, no distress  Cardiorespiratory: unlabored breathing  Wound: C/D/I, healing well, no swelling, erythema or fluctuance.    Neurologic Exam:    Mental Status: Alert, oriented, thought content appropriate  Cranial Nerves: PERRL, EOMI, symmetric facies, tongue midline  Motor: Motor exam is symmetrical 5 out of 5 all extremities

## 2024-11-11 NOTE — TELEPHONE ENCOUNTER
The patient called today 11- to report she has not heard any thing about the referral to Pulmonology.

## 2024-11-12 NOTE — TELEPHONE ENCOUNTER
Called patient related to I do not see a referral and need to verify her concern with pulmonary referral.   Patient \"not available.\"

## 2024-11-12 NOTE — TELEPHONE ENCOUNTER
Called patient regarding request for Pulmonology referral and patient reports she has \" I had a sleep study done in the past it's been years ago and I have a cpap and can not tolerate it at all and I want to have the surgery done that will help me breath during the night.\"

## 2024-11-13 DIAGNOSIS — G47.33 OBSTRUCTIVE SLEEP APNEA: Primary | ICD-10-CM

## 2024-12-19 DIAGNOSIS — G62.0 CHEMOTHERAPY-INDUCED NEUROPATHY (HCC): ICD-10-CM

## 2024-12-19 DIAGNOSIS — T45.1X5A CHEMOTHERAPY-INDUCED NEUROPATHY (HCC): ICD-10-CM

## 2024-12-19 DIAGNOSIS — E11.9 TYPE 2 DIABETES MELLITUS WITHOUT COMPLICATION, WITHOUT LONG-TERM CURRENT USE OF INSULIN (HCC): ICD-10-CM

## 2024-12-19 RX ORDER — SEMAGLUTIDE 0.68 MG/ML
0.5 INJECTION, SOLUTION SUBCUTANEOUS WEEKLY
Qty: 3 ML | Refills: 0 | Status: SHIPPED | OUTPATIENT
Start: 2024-12-19

## 2024-12-19 NOTE — TELEPHONE ENCOUNTER
Joyce Faye called to request a refill on her medication.  Patient is almost out of medication    Last office visit : 10/28/2024   Next office visit : 1/28/2025     Last UDS:   Benzodiazepine Screen, Urine   Date Value Ref Range Status   10/28/2024 neg  Final     Buprenorphine Urine   Date Value Ref Range Status   10/28/2024 neg  Final     Cocaine Metabolite Screen, Urine   Date Value Ref Range Status   10/28/2024 neg  Final     Gabapentin Screen, Urine   Date Value Ref Range Status   10/28/2024 neg  Final     Oxycodone Screen, Ur   Date Value Ref Range Status   10/28/2024 neg  Final     Propoxyphene Screen, Urine   Date Value Ref Range Status   10/28/2024 neg  Final     THC Screen, Urine   Date Value Ref Range Status   10/28/2024 neg  Final     Tricyclic Antidepressants, Urine   Date Value Ref Range Status   10/28/2024 pos  Final       Last Paul: 12/19/24  Medication Contract: 4/3/24   Last Fill: 11/15/24    Requested Prescriptions     Pending Prescriptions Disp Refills    gabapentin (NEURONTIN) 600 MG tablet 75 tablet 0     Sig: TAKE 1 TABLET BY MOUTH IN THE MORNING, 1/2 TABLET AT NOON, AND 1 TABLET IN THE EVENING    Semaglutide,0.25 or 0.5MG/DOS, (OZEMPIC, 0.25 OR 0.5 MG/DOSE,) 2 MG/3ML SOPN 3 mL 0     Sig: Inject 0.25 mg into the skin once a week                           Please approve or refuse this medication.   Nuvia Nunez, MARYN

## 2024-12-20 RX ORDER — GABAPENTIN 600 MG/1
TABLET ORAL
Qty: 75 TABLET | Refills: 0 | Status: SHIPPED | OUTPATIENT
Start: 2024-12-20 | End: 2025-01-17

## 2024-12-23 ENCOUNTER — OFFICE VISIT (OUTPATIENT)
Dept: PULMONOLOGY | Age: 59
End: 2024-12-23
Payer: MEDICARE

## 2024-12-23 VITALS
TEMPERATURE: 97.1 F | OXYGEN SATURATION: 94 % | WEIGHT: 225 LBS | SYSTOLIC BLOOD PRESSURE: 128 MMHG | HEART RATE: 116 BPM | RESPIRATION RATE: 20 BRPM | DIASTOLIC BLOOD PRESSURE: 82 MMHG | BODY MASS INDEX: 35.31 KG/M2 | HEIGHT: 67 IN

## 2024-12-23 DIAGNOSIS — G47.33 OSA (OBSTRUCTIVE SLEEP APNEA): Primary | ICD-10-CM

## 2024-12-23 DIAGNOSIS — G47.8 NON-RESTORATIVE SLEEP: ICD-10-CM

## 2024-12-23 DIAGNOSIS — Z87.891 HISTORY OF SMOKING: ICD-10-CM

## 2024-12-23 DIAGNOSIS — G47.10 HYPERSOMNIA: ICD-10-CM

## 2024-12-23 DIAGNOSIS — E66.9 OBESITY (BMI 30-39.9): ICD-10-CM

## 2024-12-23 PROCEDURE — 99203 OFFICE O/P NEW LOW 30 MIN: CPT | Performed by: INTERNAL MEDICINE

## 2024-12-23 PROCEDURE — 3074F SYST BP LT 130 MM HG: CPT | Performed by: INTERNAL MEDICINE

## 2024-12-23 PROCEDURE — 3079F DIAST BP 80-89 MM HG: CPT | Performed by: INTERNAL MEDICINE

## 2024-12-23 RX ORDER — BUSPIRONE HYDROCHLORIDE 30 MG/1
30 TABLET ORAL EVERY MORNING
COMMUNITY
Start: 2024-12-13

## 2024-12-23 ASSESSMENT — ENCOUNTER SYMPTOMS
CHEST TIGHTNESS: 0
BACK PAIN: 0
WHEEZING: 0
APNEA: 1
RHINORRHEA: 0
SHORTNESS OF BREATH: 0
ANAL BLEEDING: 0
ABDOMINAL PAIN: 0
ABDOMINAL DISTENTION: 0
COUGH: 0

## 2024-12-23 NOTE — PROGRESS NOTES
Pulmonary and Sleep Medicine    Joyce Faye (:  1965) is a 59 y.o. female,Established patient, here for evaluation of the following chief complaint(s):  New Patient (Np - TESSA/Pt is not compliant. She has a fear of it. )      Referring physician:  Anastasia Kline Aprn - Cnp  [address] [place] [address]  [address]  [place] [address]     ASSESSMENT/PLAN:  1. TESSA (obstructive sleep apnea). Not on CPAP  -     Ambulatory referral to Sleep Medicine  -     Sleep Study with PAP Titration; Future  2. Obesity (BMI 30-39.9)  3. Non-restorative sleep  4. Hypersomnia  5. History of smoking. Quit smoking over 10 years ago. Smoked 0.25ppd for 20 years.        The patient does have a history of obstructive sleep apnea requiring CPAP therapy but she could not tolerate the CPAP.  Will refer back to the sleep lab for a sleep study and further diagnostic and therapeutic options after the above.       Stacia Barragan MD, [facility], [facility]    Return in about 6 weeks (around 2/3/2025).    SUBJECTIVE/OBJECTIVE:        Patient is here for evaluation of obstructive sleep apnea.  She was diagnosed with obstructive sleep apnea over 10 years ago.  She had a sleep study done in the sleep lab.  She thinks it was done at this institution.  I cannot find a copy of her sleep study.  She was prescribed CPAP but she says she could never tolerate the CPAP machine.  She continues to have witnessed apneas.  She continues to have symptoms of sleep apnea with hypersomnia and fatigue.  She would like to try the inspire device.          Continue the following medications as reported by the patient:    Prior to Visit Medications    Medication Sig Taking? Authorizing Provider   busPIRone (BUSPAR) 30 MG tablet Take 30 mg by mouth every morning Yes Provider, MD German   gabapentin (NEURONTIN) 600 MG tablet TAKE 1 TABLET BY MOUTH IN THE MORNING, 1/2 TABLET AT NOON, AND 1 TABLET IN THE EVENING Yes Maria De Jesus Fam APRN - CNP   Semaglutide,0.25

## 2025-01-13 DIAGNOSIS — T45.1X5A CHEMOTHERAPY-INDUCED NEUROPATHY (HCC): ICD-10-CM

## 2025-01-13 DIAGNOSIS — G62.0 CHEMOTHERAPY-INDUCED NEUROPATHY (HCC): ICD-10-CM

## 2025-01-13 DIAGNOSIS — E78.2 MIXED HYPERLIPIDEMIA: ICD-10-CM

## 2025-01-13 RX ORDER — ROSUVASTATIN CALCIUM 20 MG/1
20 TABLET, COATED ORAL DAILY
Qty: 90 TABLET | Refills: 0 | Status: SHIPPED | OUTPATIENT
Start: 2025-01-16

## 2025-01-13 RX ORDER — GABAPENTIN 600 MG/1
TABLET ORAL
Qty: 75 TABLET | Refills: 0 | Status: SHIPPED | OUTPATIENT
Start: 2025-01-19 | End: 2025-02-18

## 2025-01-13 NOTE — TELEPHONE ENCOUNTER
Joyce Faye called to request a refill on her medication.      Last office visit : 10/28/2024   Next office visit : 1/28/2025     Last UDS: 10-  Benzodiazepine Screen, Urine   Date Value Ref Range Status   10/28/2024 neg  Final     Buprenorphine Urine   Date Value Ref Range Status   10/28/2024 neg  Final     Cocaine Metabolite Screen, Urine   Date Value Ref Range Status   10/28/2024 neg  Final     Gabapentin Screen, Urine   Date Value Ref Range Status   10/28/2024 neg  Final     Oxycodone Screen, Ur   Date Value Ref Range Status   10/28/2024 neg  Final     Propoxyphene Screen, Urine   Date Value Ref Range Status   10/28/2024 neg  Final     THC Screen, Urine   Date Value Ref Range Status   10/28/2024 neg  Final     Tricyclic Antidepressants, Urine   Date Value Ref Range Status   10/28/2024 pos  Final       Last Paul: 12-  Medication Contract: 04-   Last Fill: 12-    Requested Prescriptions     Pending Prescriptions Disp Refills    rosuvastatin (CRESTOR) 20 MG tablet 90 tablet 0     Sig: Take 1 tablet by mouth daily    gabapentin (NEURONTIN) 600 MG tablet 75 tablet 0     Sig: TAKE 1 TABLET BY MOUTH IN THE MORNING, 1/2 TABLET AT NOON, AND 1 TABLET IN THE EVENING    omeprazole (PRILOSEC) 20 MG delayed release capsule 180 capsule 0     Sig: Take 1 capsule by mouth 2 times daily         Please approve or refuse this medication.   Marcelo Larios MA

## 2025-01-16 DIAGNOSIS — E11.9 TYPE 2 DIABETES MELLITUS WITHOUT COMPLICATION, WITHOUT LONG-TERM CURRENT USE OF INSULIN (HCC): ICD-10-CM

## 2025-01-16 RX ORDER — SEMAGLUTIDE 0.68 MG/ML
0.5 INJECTION, SOLUTION SUBCUTANEOUS WEEKLY
Qty: 3 ML | Refills: 1 | Status: SHIPPED | OUTPATIENT
Start: 2025-01-16

## 2025-01-28 ENCOUNTER — OFFICE VISIT (OUTPATIENT)
Dept: PRIMARY CARE CLINIC | Age: 60
End: 2025-01-28

## 2025-01-28 VITALS
HEART RATE: 111 BPM | WEIGHT: 224.6 LBS | HEIGHT: 67 IN | DIASTOLIC BLOOD PRESSURE: 80 MMHG | SYSTOLIC BLOOD PRESSURE: 116 MMHG | TEMPERATURE: 97.2 F | OXYGEN SATURATION: 96 % | BODY MASS INDEX: 35.25 KG/M2

## 2025-01-28 DIAGNOSIS — F41.9 ANXIETY: ICD-10-CM

## 2025-01-28 DIAGNOSIS — K21.9 GASTROESOPHAGEAL REFLUX DISEASE WITHOUT ESOPHAGITIS: ICD-10-CM

## 2025-01-28 DIAGNOSIS — E78.2 MIXED HYPERLIPIDEMIA: ICD-10-CM

## 2025-01-28 DIAGNOSIS — I10 HYPERTENSION, ESSENTIAL, BENIGN: ICD-10-CM

## 2025-01-28 DIAGNOSIS — Z51.81 THERAPEUTIC DRUG MONITORING: ICD-10-CM

## 2025-01-28 DIAGNOSIS — F33.1 MAJOR DEPRESSIVE DISORDER, RECURRENT EPISODE, MODERATE (HCC): ICD-10-CM

## 2025-01-28 DIAGNOSIS — E11.9 TYPE 2 DIABETES MELLITUS WITHOUT COMPLICATION, WITHOUT LONG-TERM CURRENT USE OF INSULIN (HCC): Primary | ICD-10-CM

## 2025-01-28 DIAGNOSIS — J41.0 SIMPLE CHRONIC BRONCHITIS (HCC): ICD-10-CM

## 2025-01-28 LAB
ALCOHOL URINE: NORMAL
AMPHETAMINE SCREEN URINE: NORMAL
BARBITURATE SCREEN URINE: NORMAL
BENZODIAZEPINE SCREEN, URINE: NORMAL
BUPRENORPHINE URINE: NORMAL
COCAINE METABOLITE SCREEN URINE: NORMAL
FENTANYL SCREEN, URINE: NORMAL
GABAPENTIN SCREEN, URINE: NORMAL
MDMA, URINE: NORMAL
METHADONE SCREEN, URINE: NORMAL
METHAMPHETAMINE, URINE: NORMAL
OPIATE SCREEN URINE: NORMAL
OXYCODONE SCREEN URINE: NORMAL
PHENCYCLIDINE SCREEN URINE: NORMAL
PROPOXYPHENE SCREEN, URINE: NORMAL
SYNTHETIC CANNABINOIDS(K2) SCREEN, URINE: NORMAL
THC SCREEN, URINE: NORMAL
TRAMADOL SCREEN URINE: NORMAL
TRICYCLIC ANTIDEPRESSANTS, UR: NORMAL

## 2025-01-28 SDOH — ECONOMIC STABILITY: FOOD INSECURITY: WITHIN THE PAST 12 MONTHS, THE FOOD YOU BOUGHT JUST DIDN'T LAST AND YOU DIDN'T HAVE MONEY TO GET MORE.: NEVER TRUE

## 2025-01-28 SDOH — ECONOMIC STABILITY: FOOD INSECURITY: WITHIN THE PAST 12 MONTHS, YOU WORRIED THAT YOUR FOOD WOULD RUN OUT BEFORE YOU GOT MONEY TO BUY MORE.: NEVER TRUE

## 2025-01-28 ASSESSMENT — PATIENT HEALTH QUESTIONNAIRE - PHQ9
3. TROUBLE FALLING OR STAYING ASLEEP: SEVERAL DAYS
5. POOR APPETITE OR OVEREATING: NOT AT ALL
9. THOUGHTS THAT YOU WOULD BE BETTER OFF DEAD, OR OF HURTING YOURSELF: NOT AT ALL
SUM OF ALL RESPONSES TO PHQ QUESTIONS 1-9: 6
2. FEELING DOWN, DEPRESSED OR HOPELESS: SEVERAL DAYS
8. MOVING OR SPEAKING SO SLOWLY THAT OTHER PEOPLE COULD HAVE NOTICED. OR THE OPPOSITE, BEING SO FIGETY OR RESTLESS THAT YOU HAVE BEEN MOVING AROUND A LOT MORE THAN USUAL: NOT AT ALL
7. TROUBLE CONCENTRATING ON THINGS, SUCH AS READING THE NEWSPAPER OR WATCHING TELEVISION: SEVERAL DAYS
10. IF YOU CHECKED OFF ANY PROBLEMS, HOW DIFFICULT HAVE THESE PROBLEMS MADE IT FOR YOU TO DO YOUR WORK, TAKE CARE OF THINGS AT HOME, OR GET ALONG WITH OTHER PEOPLE: VERY DIFFICULT
SUM OF ALL RESPONSES TO PHQ QUESTIONS 1-9: 6
SUM OF ALL RESPONSES TO PHQ9 QUESTIONS 1 & 2: 2
SUM OF ALL RESPONSES TO PHQ QUESTIONS 1-9: 6
1. LITTLE INTEREST OR PLEASURE IN DOING THINGS: SEVERAL DAYS
4. FEELING TIRED OR HAVING LITTLE ENERGY: SEVERAL DAYS
6. FEELING BAD ABOUT YOURSELF - OR THAT YOU ARE A FAILURE OR HAVE LET YOURSELF OR YOUR FAMILY DOWN: SEVERAL DAYS
SUM OF ALL RESPONSES TO PHQ QUESTIONS 1-9: 6

## 2025-01-28 ASSESSMENT — ENCOUNTER SYMPTOMS
SHORTNESS OF BREATH: 0
SORE THROAT: 0
CHEST TIGHTNESS: 0
VOMITING: 0
ABDOMINAL PAIN: 0
DIARRHEA: 0
NAUSEA: 0
COUGH: 0
COLOR CHANGE: 0

## 2025-01-28 NOTE — PROGRESS NOTES
[address] [place] [address] Drive   [address] [facility], [address]     Phone:  [phone]  Fax:  [phone]      Joyce Faye is a 59 y.o. female who presents today for her medical conditions/complaints as noted below.  Joyce Faye is c/o of Follow-up, Diabetes, Hypertension, and Hyperlipidemia      Chief Complaint   Patient presents with    Follow-up    Diabetes    Hypertension    Hyperlipidemia       HPI:     Patient presents for a 3 month follow up for diabetes mellitus type 2, GERD, hypertension, hyperlipidemia, anxiety and depression and COPD. Blood sugars have been elevated in the 300's. Blood pressure is well controlled. Anxiety and depression are stable and is seeing  [facility]. Is seeing [facility] for her back pain. Requests next wave tens unit. Has GERD and takes omeprazole, but does get nauseated at times. Denies any shortness of breath or productive cough.     Past Medical History:   Diagnosis Date    Allergic rhinitis     Anxiety     Breast cancer (HCC) 10/2010    Left  history of chemo and radiation    Bronchitis     Carpal tunnel syndrome     R and L    Chest pain     Chronic back pain     Chronic bronchitis (HCC)     Depression     Diabetes mellitus (HCC)     GERD (gastroesophageal reflux disease)     Headache(784.0)     Heart palpitations     Hypertension     Mononeuropathy     Osteoarthritis     Sleep apnea     doesn't use cpap    Wears glasses     Wears partial dentures     uppers        Past Surgical History:   Procedure Laterality Date    ANKLE FRACTURE SURGERY Right 2014    BACK SURGERY  1993    BREAST SURGERY  06/2011    emergency removal of Left breast expander    CARPAL TUNNEL RELEASE Left 9/26/2024    LEFT CARPAL TUNNEL RELEASE performed by Jean-Paul Dunn MD at [facility] OR    COLONOSCOPY N/A 02/05/2016    Dr RONY Childers-HP x 1, serrated AP x 1, 12 month recall    COLONOSCOPY N/A 01/06/2021    Dr RONY Childers-5 yr recall    EGD COLONOSCOPY N/A 06/03/2016    w/empiric

## 2025-01-31 ASSESSMENT — ENCOUNTER SYMPTOMS: BACK PAIN: 1

## 2025-02-22 DIAGNOSIS — E11.00 TYPE 2 DIABETES MELLITUS WITH HYPEROSMOLARITY WITHOUT COMA, WITHOUT LONG-TERM CURRENT USE OF INSULIN (HCC): ICD-10-CM

## 2025-02-24 RX ORDER — DAPAGLIFLOZIN 10 MG/1
10 TABLET, FILM COATED ORAL EVERY MORNING
Qty: 90 TABLET | Refills: 0 | Status: SHIPPED | OUTPATIENT
Start: 2025-02-24

## 2025-03-03 ENCOUNTER — OFFICE VISIT (OUTPATIENT)
Dept: PRIMARY CARE CLINIC | Age: 60
End: 2025-03-03

## 2025-03-03 VITALS
BODY MASS INDEX: 34.44 KG/M2 | HEIGHT: 67 IN | DIASTOLIC BLOOD PRESSURE: 76 MMHG | OXYGEN SATURATION: 96 % | TEMPERATURE: 97.5 F | WEIGHT: 219.4 LBS | SYSTOLIC BLOOD PRESSURE: 112 MMHG | HEART RATE: 83 BPM

## 2025-03-03 DIAGNOSIS — M54.42 CHRONIC BILATERAL LOW BACK PAIN WITH BILATERAL SCIATICA: ICD-10-CM

## 2025-03-03 DIAGNOSIS — Z00.00 MEDICARE ANNUAL WELLNESS VISIT, SUBSEQUENT: Primary | ICD-10-CM

## 2025-03-03 DIAGNOSIS — T45.1X5A CHEMOTHERAPY-INDUCED NEUROPATHY: ICD-10-CM

## 2025-03-03 DIAGNOSIS — M54.41 CHRONIC BILATERAL LOW BACK PAIN WITH BILATERAL SCIATICA: ICD-10-CM

## 2025-03-03 DIAGNOSIS — G62.0 CHEMOTHERAPY-INDUCED NEUROPATHY: ICD-10-CM

## 2025-03-03 DIAGNOSIS — E11.00 TYPE 2 DIABETES MELLITUS WITH HYPEROSMOLARITY WITHOUT COMA, WITHOUT LONG-TERM CURRENT USE OF INSULIN (HCC): ICD-10-CM

## 2025-03-03 DIAGNOSIS — G47.30 SLEEP APNEA, UNSPECIFIED TYPE: ICD-10-CM

## 2025-03-03 DIAGNOSIS — G89.29 CHRONIC BILATERAL LOW BACK PAIN WITH BILATERAL SCIATICA: ICD-10-CM

## 2025-03-03 LAB — HBA1C MFR BLD: 11 %

## 2025-03-03 RX ORDER — GABAPENTIN 600 MG/1
TABLET ORAL
Qty: 75 TABLET | Refills: 0 | Status: SHIPPED | OUTPATIENT
Start: 2025-03-03 | End: 2025-04-02

## 2025-03-03 ASSESSMENT — PATIENT HEALTH QUESTIONNAIRE - PHQ9
9. THOUGHTS THAT YOU WOULD BE BETTER OFF DEAD, OR OF HURTING YOURSELF: NOT AT ALL
6. FEELING BAD ABOUT YOURSELF - OR THAT YOU ARE A FAILURE OR HAVE LET YOURSELF OR YOUR FAMILY DOWN: SEVERAL DAYS
SUM OF ALL RESPONSES TO PHQ QUESTIONS 1-9: 11
1. LITTLE INTEREST OR PLEASURE IN DOING THINGS: NEARLY EVERY DAY
5. POOR APPETITE OR OVEREATING: NOT AT ALL
3. TROUBLE FALLING OR STAYING ASLEEP: MORE THAN HALF THE DAYS
SUM OF ALL RESPONSES TO PHQ QUESTIONS 1-9: 11
2. FEELING DOWN, DEPRESSED OR HOPELESS: MORE THAN HALF THE DAYS
4. FEELING TIRED OR HAVING LITTLE ENERGY: MORE THAN HALF THE DAYS
7. TROUBLE CONCENTRATING ON THINGS, SUCH AS READING THE NEWSPAPER OR WATCHING TELEVISION: SEVERAL DAYS
SUM OF ALL RESPONSES TO PHQ QUESTIONS 1-9: 11
SUM OF ALL RESPONSES TO PHQ QUESTIONS 1-9: 11
8. MOVING OR SPEAKING SO SLOWLY THAT OTHER PEOPLE COULD HAVE NOTICED. OR THE OPPOSITE, BEING SO FIGETY OR RESTLESS THAT YOU HAVE BEEN MOVING AROUND A LOT MORE THAN USUAL: NOT AT ALL
10. IF YOU CHECKED OFF ANY PROBLEMS, HOW DIFFICULT HAVE THESE PROBLEMS MADE IT FOR YOU TO DO YOUR WORK, TAKE CARE OF THINGS AT HOME, OR GET ALONG WITH OTHER PEOPLE: VERY DIFFICULT

## 2025-03-03 NOTE — PROGRESS NOTES
Medicare Annual Wellness Visit    Joyce Faye is here for Follow-up and Medicare AWV    Assessment & Plan  1. Diabetes Mellitus.  Her A1c level is currently at 11, indicating poorly controlled diabetes. She will discontinue Ozempic and initiate Mounjaro therapy starting this Thursday. A one-month supply will be provided, and the dosage will be escalated on a monthly basis. The prescription will be sent to [facility], and a prior authorization will be completed.    2. Chemotherapy induced neuropathy.  She recently received pain management injections, which have provided some relief. She reports no recent falls and has non-slip surfaces at home. Her gabapentin prescription will be refilled, and the medication contract will be updated today. She will continue to follow up with pain management as needed.    3. Sleep Apnea  She is scheduled for a sleep study tomorrow night. The results will be reviewed to determine the next steps, including the potential use of an Inspire device if indicated.    4. Chronic bilateral low back pain with bilateral sciatica  Continue to follow with pain management    Medicare annual wellness visit, subsequent  Type 2 diabetes mellitus with hyperosmolarity without coma, without long-term current use of insulin (HCC)  -     POCT glycosylated hemoglobin (Hb A1C)  -     Tirzepatide 2.5 MG/0.5ML SOAJ; Inject 2.5 mg into the skin every 7 days, Disp-2 mL, R-0Normal  Chemotherapy-induced neuropathy  -     gabapentin (NEURONTIN) 600 MG tablet; TAKE 1 TABLET BY MOUTH IN THE MORNING, 1/2 TABLET AT NOON, AND 1 TABLET IN THE EVENING, Disp-75 tablet, R-0Normal  Sleep apnea, unspecified type  Chronic bilateral low back pain with bilateral sciatica    Results  Laboratory Studies  A1c was 11.       Return in 3 months (on 6/3/2025) for dm, htn, gerd, neuropathy.     Subjective     History of Present Illness  The patient is here for an annual wellness visit and follow-up on diabetes.    She has expressed

## 2025-03-04 ENCOUNTER — HOSPITAL ENCOUNTER (OUTPATIENT)
Dept: SLEEP CENTER | Age: 60
Discharge: HOME | End: 2025-03-06
Payer: MEDICARE

## 2025-03-04 PROCEDURE — 95810 POLYSOM 6/> YRS 4/> PARAM: CPT

## 2025-03-05 NOTE — PROGRESS NOTES
[facility]  [facility] [place]  [address]  Phone [phone] Fax [phone]     Sleep Study Technician Review    Patient Name:  Joyce Faye  :   1965  Referring Provider: Stacia Barragan *    [facility] Fall Risk Assessment    Have you fallen in the past year? YES[x] NO[]  Do you feel unsteady when standing or walking? YES[x] NO[]  Are you worried about falling? YES[x] NO[]     aFall Risk screening requirement has been met    At risk due to:  Past history of falls.    Brief History:  Joyce Faye is a 59 y.o. female with a history of anxiety, back pain, COPD, depression, diabetes, fatigue, GERD, HTN, headache, hyperlipidemia, hypersomnia, TESSA with CPAP intolerance who presented for a split-night PSG. Pt reported that she had a sleep study years ago, and that she still has an old CPAP machine, but does not use it. Pt has expressed interest in the Inspire device.       Height:   67\"  Weight: 224lbs  BMI:  35.2  Neck Circ: 16.5\"  Mallampati  3  ESS:  9    Type of Study: Split Night PSG  Time Stage Position Snore Hypopnea Obs Apnea Dave Apnea PAP O2   2200 N2 Supine Yes Yes No No N/A RA   2300 N2 Right Yes Yes No No N/A RA   2400 N2 Right Yes Yes No No N/A RA   0100 N2/W Right Yes Yes No No 6/NA RA   0200 N2 Right Yes Yes No No N/A RA   0300 N2/REM Right Yes Yes No No N/A RA   0400 REM/N2 Right Yes Yes No No N/A RA     Summary: Pt arrived at the sleep center on time. Tech introduced self, verified pt's name and , and escorted pt to room. Tech explained procedure and answered questions. Pt was instructed in supine sleep. Pt verbalized understanding of procedure. Pt was prepared for PSG per protocol without complication. Sleep latency was within normal limits. The estimated AHI is likely in the severe range. At 0024, after adequate sleep was accrued a CPAP trial was attempted. Pt was sized and fitted with a medium [email or web address] Vitera full-face mask.

## 2025-03-07 NOTE — PROGRESS NOTES
[facility]  [facility] [place] [address]  Phone [phone] Fax [phone]       Polysomnography Report  Patient Name Joyce Faye Account Number [id number]    1965 Referring Provider Stacia Barragan M.D., FLORA KAHN   Age/ Gender 59 years/F Interpreting physician Stacia Barragan M.D., [facility]   Neck circumference/  Mallampati classification 16.5 in/class 3 Night Technician Alfonso Aj, CRT, RPSGT   [place] score  Scoring Technician Emmie Isbell, CRT, RPSGT   Height 67.0 in Indications for the test excessive daytime somnolence   Weight 224.0 lbs Test Diagnostic Polysomnogram   BMI 35.1 Date of test 3/4/2025     Procedure  A Diagnostic Polysomnogram was conducted on the night of 3/4/2025.  The study was performed and scored per AASM guidelines.  The following were monitored: frontal, central, and occipital EEG, electrooculogram (EOG), submentalis EMG, nasal and oral airflow, intranasal pressure, thoracic plethysmography, abdominal plethysmography, anterior tibialis EMG, electrocardiogram, body position, and positive airway pressure (PAP).  Arterial oxygen saturation was monitored with a pulse oximeter.  The study was scored utilizing 30 second epochs.  Hypopneas were scored using per AASM definition VIII, D, 1B.    Sleep Scoring Data  Lights out 9:10:18 PM Sleep latency 7.3 min Time in N1 31.0 min N1% 7.9%   Lights on 4:17:36 AM WASO 25.5 min Time in N2 313.5 min N2% 79.5%   TIB/.3 min Sleep efficiency 94.6% Time in N3 0.0 min N3% 0.0%   .5 min REM latency 330.0 min Time in R 50.0 min R% 12.7%     Respiratory Events Summary   NREM REM Total   Hypopnea index 33.6 49.2 35.6   Apnea index 0.2 0.0 0.2   RERA index 0.0 0.0 0.0   AHI 33.8 49.2 35.7   RDI (AHI + RERA index) 33.8 49.2 35.7     Respiratory Events by Sleep Stage   Obstructive Apneas OA Index Central Apneas CA Index Mixed Apneas MA Index   Hypopneas H Index

## 2025-03-08 DIAGNOSIS — G47.33 OSA (OBSTRUCTIVE SLEEP APNEA): Primary | ICD-10-CM

## 2025-03-10 ENCOUNTER — FOLLOWUP TELEPHONE ENCOUNTER (OUTPATIENT)
Dept: SLEEP CENTER | Age: 60
End: 2025-03-10

## 2025-03-10 DIAGNOSIS — G47.33 OSA (OBSTRUCTIVE SLEEP APNEA): ICD-10-CM

## 2025-03-10 NOTE — TELEPHONE ENCOUNTER
Called and spoke to patient and discussed the results of her sleep study.  Explained the order for auto cpap.  Pt states she is interested in the inspire device and wanted to discuss alternative options with Dr. Garcia.  Pt states she had a sleep study years ago and was intolerant to wearing a mask on her face.  Pt has follow up on 3/11/2025 with Dr. Garcia.  If pt decides to try PAP therapy, she will call with the DME of her choice.

## 2025-03-11 ENCOUNTER — OFFICE VISIT (OUTPATIENT)
Dept: PULMONOLOGY | Age: 60
End: 2025-03-11
Payer: MEDICARE

## 2025-03-11 VITALS
HEIGHT: 67 IN | OXYGEN SATURATION: 95 % | HEART RATE: 126 BPM | SYSTOLIC BLOOD PRESSURE: 137 MMHG | BODY MASS INDEX: 34.53 KG/M2 | DIASTOLIC BLOOD PRESSURE: 92 MMHG | RESPIRATION RATE: 20 BRPM | WEIGHT: 220 LBS | TEMPERATURE: 97.1 F

## 2025-03-11 DIAGNOSIS — G47.8 NON-RESTORATIVE SLEEP: ICD-10-CM

## 2025-03-11 DIAGNOSIS — G47.33 SEVERE OBSTRUCTIVE SLEEP APNEA: Primary | ICD-10-CM

## 2025-03-11 DIAGNOSIS — F99 INSOMNIA DUE TO OTHER MENTAL DISORDER: ICD-10-CM

## 2025-03-11 DIAGNOSIS — E66.9 OBESITY (BMI 30-39.9): ICD-10-CM

## 2025-03-11 DIAGNOSIS — F51.05 INSOMNIA DUE TO OTHER MENTAL DISORDER: ICD-10-CM

## 2025-03-11 DIAGNOSIS — G47.10 HYPERSOMNIA: ICD-10-CM

## 2025-03-11 PROCEDURE — 99214 OFFICE O/P EST MOD 30 MIN: CPT | Performed by: INTERNAL MEDICINE

## 2025-03-11 PROCEDURE — 3080F DIAST BP >= 90 MM HG: CPT | Performed by: INTERNAL MEDICINE

## 2025-03-11 PROCEDURE — 3075F SYST BP GE 130 - 139MM HG: CPT | Performed by: INTERNAL MEDICINE

## 2025-03-11 ASSESSMENT — ENCOUNTER SYMPTOMS
SHORTNESS OF BREATH: 0
CHEST TIGHTNESS: 0
COUGH: 0
WHEEZING: 0
ANAL BLEEDING: 0
APNEA: 1
BACK PAIN: 0
ABDOMINAL PAIN: 0
ABDOMINAL DISTENTION: 0
RHINORRHEA: 0

## 2025-03-11 NOTE — PROGRESS NOTES
Pulmonary and Sleep Medicine    Joyce Faye (:  1965) is a 60 y.o. female,Established patient, here for evaluation of the following chief complaint(s):  Follow-up (6 WEEK FOLLOW UP- TESSA/Sleep study-3/4/2015/Pt is not using her DME. )      Referring physician:  No referring provider defined for this encounter.     ASSESSMENT/PLAN:  1. Severe obstructive sleep apnea. AHI 35 events per hour on PSG done 3/4/2025  -     DME Order for CPAP as OP  2. Non-restorative sleep  3. Obesity (BMI 30-39.9)  4. Hypersomnia  5. Insomnia due to other mental disorder        Will order auto CPAP.  We will provide the patient with a CPAP mask.  Reevaluate again in about 6 weeks.  She is willing to try the machine.  She complains of insomnia which is most likely secondary to her severe anxiety.  She is under the care of behavioral health.  We instructed the patient to discuss her insomnia with her behavioral health provider.       Stacia Barragan MD, [facility], [facility]    No follow-ups on file.    SUBJECTIVE/OBJECTIVE:        She is here for follow up on sleep apnea. She had a Sleep study done and it did show severe TESSA. She does not feel that she cannot tolerate the CPAP.  She is willing to try however.          Continue the following medications as reported by the patient:    Prior to Visit Medications    Medication Sig Taking? Authorizing Provider   Tirzepatide 2.5 MG/0.5ML SOAJ Inject 2.5 mg into the skin every 7 days Yes Anastasia Kline APRN - CNP   gabapentin (NEURONTIN) 600 MG tablet TAKE 1 TABLET BY MOUTH IN THE MORNING, 1/2 TABLET AT NOON, AND 1 TABLET IN THE EVENING Yes Anastasia Kline APRN - CNP   FARXIGA 10 MG tablet TAKE 1 TABLET BY MOUTH ONCE DAILY IN THE MORNING Yes Anastasia Kline APRN - CNP   rosuvastatin (CRESTOR) 20 MG tablet Take 1 tablet by mouth daily Yes Anastasia Kline APRN - CNP   omeprazole (PRILOSEC) 20 MG delayed release capsule Take 1 capsule by mouth 2 times daily Yes Anastasia Kline APRN - CNP   busPIRone

## 2025-03-12 ENCOUNTER — TELEPHONE (OUTPATIENT)
Dept: PRIMARY CARE CLINIC | Age: 60
End: 2025-03-12

## 2025-03-12 NOTE — TELEPHONE ENCOUNTER
Pt called and stated they need the acid reflux medication  and the gabapentin (NEURONTIN) 600 MG tablet. Sent to the [facility].

## 2025-03-12 NOTE — TELEPHONE ENCOUNTER
The patient called in the office on 03-. She reports she was seen 03- for a rash on her forehead after using a facial wash. She is using the lotion samples for day and night. She reports it's worse it is itching and has not gone away.     I offered a appointment for her be seen. She said she was just seen and wants the message to be sent to her PCP.

## 2025-03-13 ENCOUNTER — TELEPHONE (OUTPATIENT)
Dept: PRIMARY CARE CLINIC | Age: 60
End: 2025-03-13

## 2025-03-13 RX ORDER — OMEPRAZOLE 20 MG/1
20 CAPSULE, DELAYED RELEASE ORAL 2 TIMES DAILY
Qty: 180 CAPSULE | Refills: 0 | OUTPATIENT
Start: 2025-03-13

## 2025-03-13 NOTE — TELEPHONE ENCOUNTER
Called and let pt know apt is needed, tried to schedule and she said she would need to call back she wasn't sure when she could come at the moment.

## 2025-03-13 NOTE — TELEPHONE ENCOUNTER
Called pt with questions on med refill request, needs apt next week before refills. Pt to call back with when she can come in.

## 2025-03-13 NOTE — TELEPHONE ENCOUNTER
Joyce Faye called to request a refill on her medication.      Last office visit : 3/3/2025   Next office visit : 3/12/2025     Requested Prescriptions     Pending Prescriptions Disp Refills    omeprazole (PRILOSEC) 20 MG delayed release capsule [Pharmacy Med Name: OMEPRAZOLE 20MG CAP] 180 capsule 0     Sig: Take 1 capsule by mouth twice daily            Hammad Zamora MA

## 2025-03-19 ENCOUNTER — TELEPHONE (OUTPATIENT)
Dept: PRIMARY CARE CLINIC | Age: 60
End: 2025-03-19

## 2025-03-19 DIAGNOSIS — K21.9 GASTROESOPHAGEAL REFLUX DISEASE WITHOUT ESOPHAGITIS: Primary | ICD-10-CM

## 2025-03-19 RX ORDER — OMEPRAZOLE 40 MG/1
40 CAPSULE, DELAYED RELEASE ORAL
Qty: 30 CAPSULE | Refills: 5 | Status: SHIPPED | OUTPATIENT
Start: 2025-03-19

## 2025-03-19 NOTE — TELEPHONE ENCOUNTER
Joyce reported at her appointment on 3/3/25 RADHA Brown had told her to increase the Omeprazole 20 mg 2 capsules 2 times daily. The patient is out of medication and the refill request has been denied. I spoke with the patient explained she would need an appointment, she stated she didn't understand why she is only doing what she was told and she can't come to the office until next week.

## 2025-03-19 NOTE — TELEPHONE ENCOUNTER
Called patient,informed of provider sending Omeprazole 40 mg daily to pharmacy.  Patient reports \"those people whoever answer the phone were rude to me and you can never get anybody that speaks English.\" Offered patient advocate phone number and declined \"I have called before and they are never very helpful.Just let Anastasia know.\"

## 2025-03-31 ENCOUNTER — TELEPHONE (OUTPATIENT)
Dept: PRIMARY CARE CLINIC | Age: 60
End: 2025-03-31

## 2025-03-31 DIAGNOSIS — G62.0 CHEMOTHERAPY-INDUCED NEUROPATHY: ICD-10-CM

## 2025-03-31 DIAGNOSIS — E11.00 TYPE 2 DIABETES MELLITUS WITH HYPEROSMOLARITY WITHOUT COMA, WITHOUT LONG-TERM CURRENT USE OF INSULIN (HCC): Primary | ICD-10-CM

## 2025-03-31 DIAGNOSIS — T45.1X5A CHEMOTHERAPY-INDUCED NEUROPATHY: ICD-10-CM

## 2025-03-31 NOTE — TELEPHONE ENCOUNTER
Please let patient know I have sent Ozempic to [facility].  She should finish the tirzepatide and then switch to Ozempic.

## 2025-03-31 NOTE — TELEPHONE ENCOUNTER
The patient called today about her medication Tirepatide 2.5 mg. She is not able to get it.     I called the pharmacy the mediation is not on her insurance plan.    The patient has one  shot left.

## 2025-04-01 RX ORDER — GABAPENTIN 600 MG/1
TABLET ORAL
Qty: 75 TABLET | Refills: 0 | Status: SHIPPED | OUTPATIENT
Start: 2025-04-02 | End: 2025-05-01

## 2025-04-01 NOTE — TELEPHONE ENCOUNTER
Joyce Faye called to request a refill on her medication.      Last office visit : 3/3/2025   Next office visit : 6/3/2025     Last UDS:   Benzodiazepine Screen, Urine   Date Value Ref Range Status   01/28/2025 neg  Final     Buprenorphine Urine   Date Value Ref Range Status   01/28/2025 neg  Final     Cocaine Metabolite Screen, Urine   Date Value Ref Range Status   01/28/2025 neg  Final     Gabapentin Screen, Urine   Date Value Ref Range Status   01/28/2025 neg  Final     Oxycodone Screen, Ur   Date Value Ref Range Status   01/28/2025 neg  Final     Propoxyphene Screen, Urine   Date Value Ref Range Status   01/28/2025 neg  Final     THC Screen, Urine   Date Value Ref Range Status   01/28/2025 neg  Final     Tricyclic Antidepressants, Urine   Date Value Ref Range Status   01/28/2025 pos  Final       Last Paul: 12/19/2024  Medication Contract: 3/3/2025   Last Fill: 3/3/2025    Requested Prescriptions     Pending Prescriptions Disp Refills    gabapentin (NEURONTIN) 600 MG tablet [Pharmacy Med Name: Gabapentin 600 MG Oral Tablet] 75 tablet 0     Sig: TAKE 1 TABLET BY MOUTH IN THE MORNING TAKE 1/2 TABLET AT NOON AND TAKE 1 TABLET IN THE EVENING                           Please approve or refuse this medication.   Francine Cid LPN

## 2025-04-24 ENCOUNTER — TELEPHONE (OUTPATIENT)
Dept: PHARMACY | Facility: CLINIC | Age: 60
End: 2025-04-24

## 2025-04-24 DIAGNOSIS — E78.2 MIXED HYPERLIPIDEMIA: ICD-10-CM

## 2025-04-24 RX ORDER — ROSUVASTATIN CALCIUM 20 MG/1
20 TABLET, COATED ORAL DAILY
Qty: 90 TABLET | Refills: 0 | Status: SHIPPED | OUTPATIENT
Start: 2025-04-24

## 2025-04-24 NOTE — TELEPHONE ENCOUNTER
[facility] CLINICAL PHARMACY: ADHERENCE REVIEW  Identified care gap per [place]: fills at [facility]: Statin adherence    Patient also appears to be prescribed: Diabetes        ASSESSMENT    STATIN ADHERENCE   Insurance Records claims through  2025  (Prior Year PDC = N/A;  YTD PDC = N/A%; Potential Fail Date: 2025):     Rosuvastatin 20mg last filled on 2025 for 90 day supply. Next refill due: 2025  Fill History: 24, 10/16/24, 2025 (90ds each)  Prescribed sig:  Take 1 (ONE) (WHOLE) tablet by mouth ONCE daily   Last ordered on 2025 #90, 0RF        DIABETES ADHERENCE (not targeted med; active in EMR)  Farxiga 10mg last filled on 2025 for 90 day supply. Anticipated next refill due: 2025  Fill History: 3/7/24, , 24, 24, 25 (90ds each)  Prescribed sig:  Take 1 (ONE) (WHOLE) tablet by mouth ONCE daily in the morning  Last ordered on 2025 #90, 0RF      Ozempic 1mg last filled on 25 for 28 day supply. Anticipated next refill due: 2025  Fill History: 24, 24, 10/16/24, 24, 25 (0.25/0.5mg Rx, 28ds each); 2025 (1mg Rx, 28ds)  Ordered strength in EMR: Ozempic 0.25mg/0.5mg  Prescribed sig:  Inject 0.5mg subQ every 7 days  Last ordered on 3/31/2025 #3, 0RF          PLAN  The following are interventions that have been identified:   Patient OVERDUE refilling Rosuvastatin and Ozempic and active on home medication list.   Rosuvastatin Rx at [facility]: Rx #[id number]  but pharmacy should have new Rx on file > will confirm with pharmacy active Rx on file  Need to confirm if patient is taking Ozempic differently than prescribed and need to obtain an updated order if so  Mounjaro on file - list clean up needed         Last Visit: 3/3/2025 (PCP)   Next Visit: 6/3/2025 (PCP)         Consuelo Siegel, PharmD., BCACP  Population Health Pharmacist  [facility] Clinical Pharmacy  Department, toll free: [phone], option 1

## 2025-04-24 NOTE — TELEPHONE ENCOUNTER
Attempting to reach patient to review - unable to leave message.  Will try again.      Patient OVERDUE refilling Rosuvastatin and Ozempic and active on home medication list.   Rosuvastatin Rx at [facility]: Rx #[id number]  but pharmacy should have new Rx on file > will confirm with pharmacy active Rx on file  Need to confirm if patient is taking Ozempic differently than prescribed and need to obtain an updated order if so  Mounjaro on file - list clean up needed       Consuelo Siegel, PharmD., BCACP  Population Health Pharmacist  [facility] Clinical Pharmacy  Department, toll free: [phone], option 1

## 2025-04-25 NOTE — TELEPHONE ENCOUNTER
Second attempt:  Attempting to reach patient to review - unable to leave message.      Unable to contact letter sent for mailing.       NOTE:   Patient OVERDUE refilling Rosuvastatin and Ozempic and active on home medication list.   Rosuvastatin Rx renewed by PCP yesterday    Need to confirm if patient is taking Ozempic differently than prescribed and need to obtain an updated order if so    Mounjaro on file - list clean up needed            Consuelo Siegel, PharmD., BCACP  Population Health Pharmacist  [facility] Clinical Pharmacy  Department, toll free: [phone], option 1   =======================================================  For Pharmacy Admin Tracking Only    Program: [facility]  CPA in place:  No  Recommendation Provided To: Pharmacy: 1  Intervention Detail: Adherence Monitorin  Intervention Accepted By: Pharmacy: 1  Gap Closed?: Yes   Time Spent (min): 15

## 2025-04-28 DIAGNOSIS — G43.009 MIGRAINE WITHOUT AURA AND WITHOUT STATUS MIGRAINOSUS, NOT INTRACTABLE: ICD-10-CM

## 2025-04-28 DIAGNOSIS — T45.1X5A CHEMOTHERAPY-INDUCED NEUROPATHY: ICD-10-CM

## 2025-04-28 DIAGNOSIS — G62.0 CHEMOTHERAPY-INDUCED NEUROPATHY: ICD-10-CM

## 2025-04-28 RX ORDER — RIZATRIPTAN BENZOATE 10 MG/1
10 TABLET ORAL DAILY PRN
Qty: 12 TABLET | Refills: 5 | Status: SHIPPED | OUTPATIENT
Start: 2025-04-28

## 2025-04-28 RX ORDER — GABAPENTIN 600 MG/1
TABLET ORAL
Qty: 75 TABLET | Refills: 0 | Status: SHIPPED | OUTPATIENT
Start: 2025-05-01 | End: 2025-05-30

## 2025-04-28 NOTE — TELEPHONE ENCOUNTER
Joyce Faye called to request a refill on her medication.      Last office visit : 3/3/2025   Next office visit : 6/3/2025     Last UDS:   Benzodiazepine Screen, Urine   Date Value Ref Range Status   01/28/2025 neg  Final     Buprenorphine Urine   Date Value Ref Range Status   01/28/2025 neg  Final     Cocaine Metabolite Screen, Urine   Date Value Ref Range Status   01/28/2025 neg  Final     Gabapentin Screen, Urine   Date Value Ref Range Status   01/28/2025 neg  Final     Oxycodone Screen, Ur   Date Value Ref Range Status   01/28/2025 neg  Final     Propoxyphene Screen, Urine   Date Value Ref Range Status   01/28/2025 neg  Final     THC Screen, Urine   Date Value Ref Range Status   01/28/2025 neg  Final     Tricyclic Antidepressants, Urine   Date Value Ref Range Status   01/28/2025 pos  Final       Last Paul: 12/19/2024  Medication Contract: 3/3/2025   Last Fill: 4/2/2025    Requested Prescriptions     Pending Prescriptions Disp Refills    gabapentin (NEURONTIN) 600 MG tablet [Pharmacy Med Name: GABAPENTIN 600MG    TAB] 75 tablet 0     Sig: TAKE 1 TABLET BY MOUTH ONCE DAILY IN THE MORNING AND 1/2 (ONE-HALF) TAB AT NOON AND 1 TAB ONCE DAILY IN THE EVENING                           Please approve or refuse this medication.   Francine Cid LPN

## 2025-05-19 ENCOUNTER — OFFICE VISIT (OUTPATIENT)
Dept: PULMONOLOGY | Age: 60
End: 2025-05-19
Payer: MEDICARE

## 2025-05-19 VITALS
TEMPERATURE: 97.8 F | OXYGEN SATURATION: 92 % | HEIGHT: 67 IN | HEART RATE: 109 BPM | BODY MASS INDEX: 34.81 KG/M2 | DIASTOLIC BLOOD PRESSURE: 71 MMHG | SYSTOLIC BLOOD PRESSURE: 104 MMHG | WEIGHT: 221.8 LBS

## 2025-05-19 DIAGNOSIS — G47.8 NON-RESTORATIVE SLEEP: ICD-10-CM

## 2025-05-19 DIAGNOSIS — G47.10 HYPERSOMNIA: ICD-10-CM

## 2025-05-19 DIAGNOSIS — E66.9 OBESITY (BMI 30-39.9): ICD-10-CM

## 2025-05-19 DIAGNOSIS — F99 INSOMNIA DUE TO OTHER MENTAL DISORDER: ICD-10-CM

## 2025-05-19 DIAGNOSIS — F51.05 INSOMNIA DUE TO OTHER MENTAL DISORDER: ICD-10-CM

## 2025-05-19 DIAGNOSIS — G47.33 SEVERE OBSTRUCTIVE SLEEP APNEA: Primary | ICD-10-CM

## 2025-05-19 PROCEDURE — 3078F DIAST BP <80 MM HG: CPT | Performed by: NURSE PRACTITIONER

## 2025-05-19 PROCEDURE — 99214 OFFICE O/P EST MOD 30 MIN: CPT | Performed by: NURSE PRACTITIONER

## 2025-05-19 PROCEDURE — 3074F SYST BP LT 130 MM HG: CPT | Performed by: NURSE PRACTITIONER

## 2025-05-19 ASSESSMENT — ENCOUNTER SYMPTOMS
EYES NEGATIVE: 1
GASTROINTESTINAL NEGATIVE: 1
ALLERGIC/IMMUNOLOGIC NEGATIVE: 1
APNEA: 1

## 2025-05-19 NOTE — PROGRESS NOTES
Pulmonary and Sleep Medicine    Joyce Faye (:  1965) is a 60 y.o. female,Established patient, here for evaluation of the following chief complaint(s):  Follow-up (6 week f/u, alysia on cpap, \"feels like she is not getting enough air through it sometimes\"/Dme report )      Referring physician:  No referring provider defined for this encounter.     ASSESSMENT/PLAN:  1. Severe obstructive sleep apnea. AHI 35 events per hour on PSG done 3/4/2025  2. Non-restorative sleep  3. Obesity (BMI 30-39.9)  4. Hypersomnia  5. Insomnia due to other mental disorder        Continue management with CPAP as she does feel it helps. Adjust humidity to level 5 to help with dryness. Encourage her to continue increasing use for compliance but also to improve sleep quality.        Return in about 2 months (around 2025).    SUBJECTIVE/OBJECTIVE:      HPI    Patient presents for follow up for obstructive sleep apnea. My interpretation of compliance download is that she is using CPAP an average of 4 hours and 40 minutes per night. Apnea-hypopnea index with CPAP is 3.4 events per hour. She does have dry mouth. She does find it hard to sleep with but is doing her best to wear most nights. She continues treatment for anxiety and depression.     Continue the following medications as reported by the patient:    Prior to Visit Medications    Medication Sig Taking? Authorizing Provider   gabapentin (NEURONTIN) 600 MG tablet TAKE 1 TABLET BY MOUTH ONCE DAILY IN THE MORNING AND 1/2 (ONE-HALF) TAB AT NOON AND 1 TAB ONCE DAILY IN THE EVENING Yes Anastasia Kline APRN - CNP   rizatriptan (MAXALT) 10 MG tablet Take 1 tablet by mouth daily as needed for Migraine May repeat in 2 hours if needed Yes Anastasia Kline APRN - CNP   rosuvastatin (CRESTOR) 20 MG tablet Take 1 tablet by mouth once daily Yes Anastasia Kline APRN - CNP   Semaglutide,0.25 or 0.5MG/DOS, 2 MG/3ML SOPN Inject 0.5 mg into the skin every 7 days Yes Anastasia Kline APRN - CNP

## 2025-05-21 DIAGNOSIS — E11.00 TYPE 2 DIABETES MELLITUS WITH HYPEROSMOLARITY WITHOUT COMA, WITHOUT LONG-TERM CURRENT USE OF INSULIN (HCC): ICD-10-CM

## 2025-05-21 RX ORDER — DAPAGLIFLOZIN 10 MG/1
10 TABLET, FILM COATED ORAL EVERY MORNING
Qty: 90 TABLET | Refills: 0 | Status: SHIPPED | OUTPATIENT
Start: 2025-05-21

## 2025-06-03 ENCOUNTER — TELEPHONE (OUTPATIENT)
Dept: PRIMARY CARE CLINIC | Age: 60
End: 2025-06-03

## 2025-06-03 DIAGNOSIS — G62.0 CHEMOTHERAPY-INDUCED NEUROPATHY: ICD-10-CM

## 2025-06-03 DIAGNOSIS — T45.1X5A CHEMOTHERAPY-INDUCED NEUROPATHY: ICD-10-CM

## 2025-06-03 RX ORDER — GABAPENTIN 600 MG/1
TABLET ORAL
Qty: 75 TABLET | Refills: 0 | Status: SHIPPED | OUTPATIENT
Start: 2025-06-03 | End: 2025-07-02

## 2025-06-03 RX ORDER — GABAPENTIN 600 MG/1
TABLET ORAL
Qty: 75 TABLET | Refills: 0 | OUTPATIENT
Start: 2025-06-03 | End: 2025-07-02

## 2025-06-03 NOTE — TELEPHONE ENCOUNTER
Patient called requesting medication for reflux to take in the evening. She states she is still having a lot of reflux that is not controlled with the prilosec. Please advise.

## 2025-06-03 NOTE — TELEPHONE ENCOUNTER
Joyce Faye called to request a refill on her medication.      Last office visit : 3/3/2025   Next office visit : Visit date not found     Last UDS:   Benzodiazepine Screen, Urine   Date Value Ref Range Status   01/28/2025 neg  Final     Buprenorphine Urine   Date Value Ref Range Status   01/28/2025 neg  Final     Cocaine Metabolite Screen, Urine   Date Value Ref Range Status   01/28/2025 neg  Final     Gabapentin Screen, Urine   Date Value Ref Range Status   01/28/2025 neg  Final     Oxycodone Screen, Ur   Date Value Ref Range Status   01/28/2025 neg  Final     Propoxyphene Screen, Urine   Date Value Ref Range Status   01/28/2025 neg  Final     THC Screen, Urine   Date Value Ref Range Status   01/28/2025 neg  Final     Tricyclic Antidepressants, Urine   Date Value Ref Range Status   01/28/2025 pos  Final       Last Paul: 6/3/25  Medication Contract: 3/3/25   Last Fill: 5/4/25    Requested Prescriptions     Pending Prescriptions Disp Refills    gabapentin (NEURONTIN) 600 MG tablet 75 tablet 0     Sig: TAKE 1 TABLET BY MOUTH ONCE DAILY IN THE MORNING AND 1/2 (ONE-HALF) TAB AT NOON AND 1 TAB ONCE DAILY IN THE EVENING                           Please approve or refuse this medication.   Nuvia Nunez LPN

## 2025-06-03 NOTE — TELEPHONE ENCOUNTER
Called patient,informed will need appointment to to discuss medication. Patient reports I have one on the 23rd. What about my gabapentin do I have to wait until then also?

## 2025-06-03 NOTE — TELEPHONE ENCOUNTER
Provider needs to review PDMP    PDMP Monitoring:    Last PDMP August as Reviewed ([place]):  Review User Review Instant Review Result   CAROLINA JONES 3/3/2025 11:49 AM Reviewed PDMP [1]     Last filled date: 05/01/25  Last office visit for requested medication : 03/03/25    Next office visit : 6/23/2025     Last UDS:   Benzodiazepine Screen, Urine   Date Value Ref Range Status   01/28/2025 neg  Final     Buprenorphine Urine   Date Value Ref Range Status   01/28/2025 neg  Final     Cocaine Metabolite Screen, Urine   Date Value Ref Range Status   01/28/2025 neg  Final     Gabapentin Screen, Urine   Date Value Ref Range Status   01/28/2025 neg  Final     Oxycodone Screen, Ur   Date Value Ref Range Status   01/28/2025 neg  Final     Propoxyphene Screen, Urine   Date Value Ref Range Status   01/28/2025 neg  Final     THC Screen, Urine   Date Value Ref Range Status   01/28/2025 neg  Final     Tricyclic Antidepressants, Urine   Date Value Ref Range Status   01/28/2025 pos  Final       Medication Contract and Consent for Opioid Use Documents Filed       Patient Documents       Type of Document Status Date Received Received By Description    Medication Contract Signed 12/15/2016  3:11 PM BAILEY WYATT     Medication Contract Received 4/12/2022 12:23 PM MAHESH ALMANZAR medication contract 4/12/22    Medication Contract Received 4/3/2024 12:00 PM DORYS JAMES Medication Contract 4/3/24    Medication Contract Received 3/3/2025  5:04 PM ALLAN DELUNA MEDICATION CONTRACT 3/3/2025                    Requested Prescriptions     Pending Prescriptions Disp Refills    gabapentin (NEURONTIN) 600 MG tablet [Pharmacy Med Name: GABAPENTIN 600MG    TAB] 75 tablet 0     Sig: TAKE 1 TABLET BY MOUTH IN THE MORNING AND 1/2 (ONE-HALF) AT NOON AND 1 IN THE EVENING         Please approve or refuse this medication.   Stephanie Campos LPN

## 2025-06-03 NOTE — TELEPHONE ENCOUNTER
Called patient in regards to no show today, states she was not aware, lost the patient on the phone, tried to call her back and she did not answer--no voicemail to leave a msg, nor does she have my chart    No show x 1

## 2025-06-04 ENCOUNTER — TELEPHONE (OUTPATIENT)
Dept: PULMONOLOGY | Age: 60
End: 2025-06-04

## 2025-06-04 NOTE — TELEPHONE ENCOUNTER
The patient called & stated that she woke up after wearing her cpap all night and was very sob.  She states that she was so sob that she was weak.      The patient will be coming in for evaluation by APRN on 6/9

## 2025-06-18 DIAGNOSIS — E11.00 TYPE 2 DIABETES MELLITUS WITH HYPEROSMOLARITY WITHOUT COMA, WITHOUT LONG-TERM CURRENT USE OF INSULIN (HCC): ICD-10-CM

## 2025-06-19 RX ORDER — SEMAGLUTIDE 0.68 MG/ML
INJECTION, SOLUTION SUBCUTANEOUS
Qty: 3 ML | Refills: 0 | Status: SHIPPED | OUTPATIENT
Start: 2025-06-19

## 2025-06-19 NOTE — TELEPHONE ENCOUNTER
Joyce Faye called to request a refill on her medication.      Last office visit : 3/3/2025   Next office visit : 6/23/2025     Requested Prescriptions     Pending Prescriptions Disp Refills    OZEMPIC, 0.25 OR 0.5 MG/DOSE, 2 MG/3ML SOPN [Pharmacy Med Name: Ozempic (0.25 or 0.5 MG/DOSE) 2 MG/3ML Subcutaneous Solution Pen-injector] 3 mL 0     Sig: INJECT 0.5MG  SUBCUTANEOUSLY ONCE A WEEK            Shona Yu MA

## 2025-06-23 ENCOUNTER — RESULTS FOLLOW-UP (OUTPATIENT)
Dept: PRIMARY CARE CLINIC | Age: 60
End: 2025-06-23

## 2025-06-23 ENCOUNTER — OFFICE VISIT (OUTPATIENT)
Dept: PRIMARY CARE CLINIC | Age: 60
End: 2025-06-23

## 2025-06-23 DIAGNOSIS — G43.009 MIGRAINE WITHOUT AURA AND WITHOUT STATUS MIGRAINOSUS, NOT INTRACTABLE: ICD-10-CM

## 2025-06-23 DIAGNOSIS — G47.33 SEVERE OBSTRUCTIVE SLEEP APNEA: Primary | ICD-10-CM

## 2025-06-23 DIAGNOSIS — K21.9 GASTROESOPHAGEAL REFLUX DISEASE WITHOUT ESOPHAGITIS: ICD-10-CM

## 2025-06-23 DIAGNOSIS — E11.00 TYPE 2 DIABETES MELLITUS WITH HYPEROSMOLARITY WITHOUT COMA, WITHOUT LONG-TERM CURRENT USE OF INSULIN (HCC): ICD-10-CM

## 2025-06-23 LAB — HBA1C MFR BLD: 11.8 %

## 2025-06-23 RX ORDER — PANTOPRAZOLE SODIUM 40 MG/1
40 TABLET, DELAYED RELEASE ORAL
Qty: 90 TABLET | Refills: 0 | Status: SHIPPED | OUTPATIENT
Start: 2025-06-23

## 2025-06-23 NOTE — PROGRESS NOTES
[address] [place] [address] Drive   [address] [facility], [address]     Phone:  [phone]  Fax:  [phone]      Joyce Faye is a 60 y.o. female who presents today for her medical conditions/complaints as noted below.  Joyce Faye is c/o of Follow-up, Gastroesophageal Reflux, and Other (Having CPAP issues, feels like she snot getting enough air.)      Chief Complaint   Patient presents with    Follow-up    Gastroesophageal Reflux    Other     Having CPAP issues, feels like she snot getting enough air.       HPI:     History of Present Illness  The patient presents for a 3 month follow up for sleep apnea, diabetes mellitus, acid reflux, and migraine.    She reports ongoing issues with her CPAP machine, specifically the mask, which she believes is not providing adequate air. She has been informed that she will be contacted by a representative on Wednesday. Additionally, she mentions that her oxygen levels have significantly decreased, to the point where she was advised that she would not be able to receive her injections if the levels did not improve. She has been practicing breathing exercises, inhaling through her nose and exhaling through her mouth. She follows with pulmonology every 2 months.     Her blood glucose levels have been inconsistent, ranging from 500 one day to 300 the next. Despite efforts to limit sugar intake, she has a fondness for sugary foods and fruits. She does not monitor her fasting blood glucose levels. She has been consuming CarbSmart oatmeal but notes that it is no longer available at her local store. She has tried sugar-free oatmeal but found it to be high in carbohydrates. Her breakfast today consisted of two eggs, two strips of duque, and a cup of coffee. She has not taken any of her medications today. She took Ozempic 0.5 yesterday. She is currently on Farxiga.    She experiences acid reflux, which is not adequately controlled by her current medication regimen. By dinner time,

## 2025-06-28 VITALS
DIASTOLIC BLOOD PRESSURE: 86 MMHG | SYSTOLIC BLOOD PRESSURE: 128 MMHG | HEART RATE: 85 BPM | HEIGHT: 67 IN | OXYGEN SATURATION: 97 % | TEMPERATURE: 97.6 F | WEIGHT: 216.6 LBS | BODY MASS INDEX: 34 KG/M2

## 2025-06-28 ASSESSMENT — ENCOUNTER SYMPTOMS
ABDOMINAL PAIN: 0
CHEST TIGHTNESS: 0
VOMITING: 0
DIARRHEA: 0
COLOR CHANGE: 0
NAUSEA: 0
SHORTNESS OF BREATH: 0
SORE THROAT: 0
COUGH: 0

## 2025-07-01 DIAGNOSIS — T45.1X5A CHEMOTHERAPY-INDUCED NEUROPATHY: ICD-10-CM

## 2025-07-01 DIAGNOSIS — G62.0 CHEMOTHERAPY-INDUCED NEUROPATHY: ICD-10-CM

## 2025-07-01 RX ORDER — GABAPENTIN 600 MG/1
TABLET ORAL
Qty: 75 TABLET | Refills: 0 | Status: SHIPPED | OUTPATIENT
Start: 2025-07-01 | End: 2025-07-30

## 2025-07-01 NOTE — TELEPHONE ENCOUNTER
Provider needs to review PDMP    PDMP Monitoring:    Last PDMP August as Reviewed ([place]):  Review User Review Instant Review Result   CAROLINA JONES 6/3/2025  4:55 PM Reviewed PDMP [1]     Last filled date: 06/03/25  Last office visit for requested medication : 06/23/25    Paul: 06/03/25  Next office visit : 9/23/2025     Last UDS:   Benzodiazepine Screen, Urine   Date Value Ref Range Status   01/28/2025 neg  Final     Buprenorphine Urine   Date Value Ref Range Status   01/28/2025 neg  Final     Cocaine Metabolite Screen, Urine   Date Value Ref Range Status   01/28/2025 neg  Final     Gabapentin Screen, Urine   Date Value Ref Range Status   01/28/2025 neg  Final     Oxycodone Screen, Ur   Date Value Ref Range Status   01/28/2025 neg  Final     Propoxyphene Screen, Urine   Date Value Ref Range Status   01/28/2025 neg  Final     THC Screen, Urine   Date Value Ref Range Status   01/28/2025 neg  Final     Tricyclic Antidepressants, Urine   Date Value Ref Range Status   01/28/2025 pos  Final       Medication Contract and Consent for Opioid Use Documents Filed       Patient Documents       Type of Document Status Date Received Received By Description    Medication Contract Signed 12/15/2016  3:11 PM BAILEY WYATT     Medication Contract Received 4/12/2022 12:23 PM MAHESH ALMANZAR medication contract 4/12/22    Medication Contract Received 4/3/2024 12:00 PM DORYS JAMES Medication Contract 4/3/24    Medication Contract Received 3/3/2025  5:04 PM ALLAN DELUNA MEDICATION CONTRACT 3/3/2025                    Requested Prescriptions     Pending Prescriptions Disp Refills    gabapentin (NEURONTIN) 600 MG tablet [Pharmacy Med Name: GABAPENTIN 600MG    TAB] 75 tablet 0     Sig: TAKE 1 TABLET BY MOUTH ONCE DAILY IN THE MORNING AND 1/2 (ONE-HALF) TAB AT NOON AND 1 TAB ONCE DAILY IN THE EVENING         Please approve or refuse this medication.   Stephanie Campos LPN

## 2025-07-20 DIAGNOSIS — E11.00 TYPE 2 DIABETES MELLITUS WITH HYPEROSMOLARITY WITHOUT COMA, WITHOUT LONG-TERM CURRENT USE OF INSULIN (HCC): Primary | ICD-10-CM

## 2025-07-21 ENCOUNTER — OFFICE VISIT (OUTPATIENT)
Dept: PULMONOLOGY | Age: 60
End: 2025-07-21
Payer: MEDICARE

## 2025-07-21 VITALS
DIASTOLIC BLOOD PRESSURE: 75 MMHG | SYSTOLIC BLOOD PRESSURE: 112 MMHG | BODY MASS INDEX: 34.28 KG/M2 | HEIGHT: 67 IN | TEMPERATURE: 98.2 F | HEART RATE: 114 BPM | WEIGHT: 218.4 LBS | OXYGEN SATURATION: 91 %

## 2025-07-21 DIAGNOSIS — G47.33 SEVERE OBSTRUCTIVE SLEEP APNEA: Primary | ICD-10-CM

## 2025-07-21 DIAGNOSIS — Z59.6 LOW INCOME: ICD-10-CM

## 2025-07-21 DIAGNOSIS — K21.9 GASTROESOPHAGEAL REFLUX DISEASE WITHOUT ESOPHAGITIS: ICD-10-CM

## 2025-07-21 DIAGNOSIS — R06.09 DOE (DYSPNEA ON EXERTION): ICD-10-CM

## 2025-07-21 DIAGNOSIS — E66.9 OBESITY (BMI 30-39.9): ICD-10-CM

## 2025-07-21 DIAGNOSIS — G47.10 HYPERSOMNIA: ICD-10-CM

## 2025-07-21 DIAGNOSIS — F99 INSOMNIA DUE TO OTHER MENTAL DISORDER: ICD-10-CM

## 2025-07-21 DIAGNOSIS — F51.05 INSOMNIA DUE TO OTHER MENTAL DISORDER: ICD-10-CM

## 2025-07-21 DIAGNOSIS — R06.2 WHEEZING: ICD-10-CM

## 2025-07-21 DIAGNOSIS — G47.8 NON-RESTORATIVE SLEEP: ICD-10-CM

## 2025-07-21 PROCEDURE — 99214 OFFICE O/P EST MOD 30 MIN: CPT | Performed by: NURSE PRACTITIONER

## 2025-07-21 PROCEDURE — 3074F SYST BP LT 130 MM HG: CPT | Performed by: NURSE PRACTITIONER

## 2025-07-21 PROCEDURE — 3078F DIAST BP <80 MM HG: CPT | Performed by: NURSE PRACTITIONER

## 2025-07-21 RX ORDER — ALBUTEROL SULFATE 90 UG/1
2 INHALANT RESPIRATORY (INHALATION) 4 TIMES DAILY PRN
Qty: 18 G | Refills: 5 | Status: SHIPPED | OUTPATIENT
Start: 2025-07-21

## 2025-07-21 SDOH — ECONOMIC STABILITY - INCOME SECURITY: LOW INCOME: Z59.6

## 2025-07-21 ASSESSMENT — ENCOUNTER SYMPTOMS
ALLERGIC/IMMUNOLOGIC NEGATIVE: 1
EYES NEGATIVE: 1
COUGH: 1
APNEA: 1
WHEEZING: 1
SHORTNESS OF BREATH: 1
GASTROINTESTINAL NEGATIVE: 1

## 2025-07-21 NOTE — PROGRESS NOTES
Pulmonary and Sleep Medicine    Joyce Faye (:  1965) is a 60 y.o. female,Established patient, here for evaluation of the following chief complaint(s):  Follow-up (alysia on cpap, dme report , increased humidification at last appointment, states it is a little better and not noticing as much dryness)      Referring physician:  No referring provider defined for this encounter.     ASSESSMENT/PLAN:  1. Severe obstructive sleep apnea. AHI 35 events per hour on PSG done 3/4/2025  2. Non-restorative sleep  3. Obesity (BMI 30-39.9)  4. Hypersomnia  5. Insomnia due to other mental disorder  6. Gastroesophageal reflux disease without esophagitis  7. Wheezing  -     albuterol sulfate HFA (VENTOLIN HFA) 108 (90 Base) MCG/ACT inhaler; Inhale 2 puffs into the lungs 4 times daily as needed for Wheezing, Disp-18 g, R-5Normal  -     Full PFT Study With Bronchodilator; Future  8. NAVA (dyspnea on exertion)  -     Full PFT Study With Bronchodilator; Future  9. Low income        Increase humidification to level 6. Continue management with CPAP as it helps and she is compliant. She will discuss anxiety with her provider at [facility] to improve sleep duration. I recommended adding Pepcid at night as needed. We will obtain PFT and start albuterol as needed to help respiratory symptoms.        Return in about 3 months (around 10/21/2025).    SUBJECTIVE/OBJECTIVE:        HPI    Patient presents for follow up for obstructive sleep apnea. My interpretation of compliance download is that she is using CPAP an average of 3 hours and 52 minutes per night. Apnea-hypopnea index with CPAP is 2.9 events per hour. She does feel humidification has helped. She continues to have issues with anxiety and does not sleep for long due to this. She takes atarax at night which may also contribute to mouth dryness. PPI was recently changed and she continues to have reflux at night. She also mentions coughing, wheezing and shortness of breath

## 2025-07-25 DIAGNOSIS — G62.0 CHEMOTHERAPY-INDUCED NEUROPATHY: ICD-10-CM

## 2025-07-25 DIAGNOSIS — T45.1X5A CHEMOTHERAPY-INDUCED NEUROPATHY: ICD-10-CM

## 2025-07-25 NOTE — TELEPHONE ENCOUNTER
Joyce Faye called to request a refill on her medication.      Last office visit : 6/23/2025   Next office visit : 9/23/2025     Last UDS:   Benzodiazepine Screen, Urine   Date Value Ref Range Status   01/28/2025 neg  Final     Buprenorphine Urine   Date Value Ref Range Status   01/28/2025 neg  Final     Cocaine Metabolite Screen, Urine   Date Value Ref Range Status   01/28/2025 neg  Final     Gabapentin Screen, Urine   Date Value Ref Range Status   01/28/2025 neg  Final     Oxycodone Screen, Ur   Date Value Ref Range Status   01/28/2025 neg  Final     Propoxyphene Screen, Urine   Date Value Ref Range Status   01/28/2025 neg  Final     THC Screen, Urine   Date Value Ref Range Status   01/28/2025 neg  Final     Tricyclic Antidepressants, Urine   Date Value Ref Range Status   01/28/2025 pos  Final       Last Paul: 6/3/2025  Medication Contract: 3/3/25   Last Fill: 7/1/2025    Requested Prescriptions     Pending Prescriptions Disp Refills    gabapentin (NEURONTIN) 600 MG tablet [Pharmacy Med Name: GABAPENTIN 600MG    TAB] 75 tablet 0     Sig: TAKE 1 TABLET BY MOUTH ONCE DAILY IN THE MORNING AND 1/2 (ONE-HALF) TAB AT NOON AND 1 TAB ONCE DAILY IN THE EVENING                           Please approve or refuse this medication.   Francine Cid LPN

## 2025-07-28 RX ORDER — GABAPENTIN 600 MG/1
TABLET ORAL
Qty: 75 TABLET | Refills: 0 | Status: SHIPPED | OUTPATIENT
Start: 2025-07-30 | End: 2025-08-29

## 2025-08-01 DIAGNOSIS — E78.2 MIXED HYPERLIPIDEMIA: ICD-10-CM

## 2025-08-01 RX ORDER — ROSUVASTATIN CALCIUM 20 MG/1
20 TABLET, COATED ORAL DAILY
Qty: 90 TABLET | Refills: 0 | Status: SHIPPED | OUTPATIENT
Start: 2025-08-01

## 2025-08-13 ENCOUNTER — TELEPHONE (OUTPATIENT)
Dept: PRIMARY CARE CLINIC | Age: 60
End: 2025-08-13

## 2025-08-16 DIAGNOSIS — E11.00 TYPE 2 DIABETES MELLITUS WITH HYPEROSMOLARITY WITHOUT COMA, WITHOUT LONG-TERM CURRENT USE OF INSULIN (HCC): ICD-10-CM

## 2025-08-18 RX ORDER — DAPAGLIFLOZIN 10 MG/1
10 TABLET, FILM COATED ORAL EVERY MORNING
Qty: 90 TABLET | Refills: 0 | Status: SHIPPED | OUTPATIENT
Start: 2025-08-18

## 2025-09-04 DIAGNOSIS — J30.89 ALLERGIC RHINITIS DUE TO OTHER ALLERGIC TRIGGER, UNSPECIFIED SEASONALITY: ICD-10-CM

## 2025-09-04 DIAGNOSIS — T45.1X5A CHEMOTHERAPY-INDUCED NEUROPATHY: ICD-10-CM

## 2025-09-04 DIAGNOSIS — G62.0 CHEMOTHERAPY-INDUCED NEUROPATHY: ICD-10-CM

## 2025-09-04 RX ORDER — GABAPENTIN 600 MG/1
TABLET ORAL
Qty: 75 TABLET | Refills: 0 | Status: SHIPPED | OUTPATIENT
Start: 2025-09-04 | End: 2025-10-03

## 2025-09-04 RX ORDER — FLUTICASONE PROPIONATE 50 MCG
SPRAY, SUSPENSION (ML) NASAL
Qty: 48 G | Refills: 0 | Status: SHIPPED | OUTPATIENT
Start: 2025-09-04

## (undated) DEVICE — GOWN, ORBIS, XLONG/XLARGE, STERILE: Brand: MEDLINE

## (undated) DEVICE — GLOVE SURG SZ 8 CRM LTX FREE POLYISOPRENE POLYMER BEAD ANTI

## (undated) DEVICE — 3M™ IOBAN™ 2 ANTIMICROBIAL INCISE DRAPE 6650EZ: Brand: IOBAN™ 2

## (undated) DEVICE — TROCAR: Brand: KII FIOS FIRST ENTRY

## (undated) DEVICE — ARM DRAPE

## (undated) DEVICE — BINDER ABD 2XL H12XL60 75IN UNISX STD E 4 PNL DISPOSABLE

## (undated) DEVICE — ANTIBACTERIAL UNDYED BRAIDED (POLYGLACTIN 910), SYNTHETIC ABSORBABLE SUTURE: Brand: COATED VICRYL

## (undated) DEVICE — TUBE ET 7.5MM NSL ORAL BASIC CUF INTMED MURPHY EYE RADPQ

## (undated) DEVICE — STRATAFIX SPRL PDS + 2-0 23CM CT-2

## (undated) DEVICE — AIRSEAL 12 MM ACCESS PORT AND PALM GRIP OBTURATOR WITH BLADELESS OPTICAL TIP, 120 MM LENGTH: Brand: AIRSEAL

## (undated) DEVICE — DRESSING TRNSPAR W5XL4.5IN FLM SHT SEMIPERMEABLE WIND

## (undated) DEVICE — COLUMN DRAPE

## (undated) DEVICE — GAUZE,SPONGE,4"X4",8PLY,STRL,LF,10/TRAY: Brand: MEDLINE

## (undated) DEVICE — SUTURE STRATAFIX SYMMETRIC PDS + SZ 0 L9IN ABSRB VIO L36MM SXPP1A425

## (undated) DEVICE — SUTURE MONOCRYL + SZ 4-0 L18IN ABSRB UD L19MM PS-2 3/8 CIR MCP496G

## (undated) DEVICE — DISPOSABLE BIPOLAR FORCEPS 5" (12.7CM) ADSON, INSULATED 1MM TIP AND 12FT. (3.6M) CABLE: Brand: ADVANCED MED-SURG CONCEPTS

## (undated) DEVICE — BLADELESS OBTURATOR: Brand: WECK VISTA

## (undated) DEVICE — TRAP FLD MINIVAC MEGADYNE 100ML

## (undated) DEVICE — TIP COVER ACCESSORY

## (undated) DEVICE — BANDAGE,GAUZE,4.5"X4.1YD,STERILE,LF: Brand: MEDLINE

## (undated) DEVICE — PAD,NON-ADHERENT,3X8,STERILE,LF,1/PK: Brand: MEDLINE

## (undated) DEVICE — BANDAGE COMPR W4INXL15FT BGE E SGL LAYERED CLP CLSR

## (undated) DEVICE — SEAL

## (undated) DEVICE — GLV SURG BIOGEL LTX PF 6 1/2

## (undated) DEVICE — DRAPE,EXTREMITY,89X128,STERILE: Brand: MEDLINE

## (undated) DEVICE — SUTURE MONOCRYL SZ 4-0 L18IN ABSRB UD L19MM PS-2 3/8 CIR PRIM Y496G

## (undated) DEVICE — PAD MINOR UNIVERSAL: Brand: MEDLINE INDUSTRIES, INC.

## (undated) DEVICE — GAUZE,SPONGE,FLUFF,6"X6.75",STRL,10/TRAY: Brand: MEDLINE

## (undated) DEVICE — ENDO KIT,LOURDES HOSPITAL: Brand: MEDLINE INDUSTRIES, INC.

## (undated) DEVICE — BAPTIST TURNOVER KIT: Brand: MEDLINE INDUSTRIES, INC.

## (undated) DEVICE — TRI-LUMEN FILTERED TUBE SET WITH ACTIVATED CHARCOAL FILTER: Brand: AIRSEAL

## (undated) DEVICE — MINOR CDS: Brand: MEDLINE INDUSTRIES, INC.

## (undated) DEVICE — VAGINAL PREP TRAY: Brand: MEDLINE INDUSTRIES, INC.

## (undated) DEVICE — DRESSING GZ PETRO ST OVERWRAP 5X9IN XRFRM CURAD

## (undated) DEVICE — SUTURE VICRYL CTD ANTBCTRL 54 IN TI PLU VLT

## (undated) DEVICE — LARYNGOSCOPE EXAM MAC 2 ALL MTL BLDE VIVID LED AND HNDL DISP

## (undated) DEVICE — COVER,MAYO STAND,STERILE: Brand: MEDLINE

## (undated) DEVICE — GLOVE SURG SZ 7 CRM LTX FREE POLYISOPRENE POLYMER BEAD ANTI

## (undated) DEVICE — COVER LT HNDL BLU PLAS

## (undated) DEVICE — 4-PORT MANIFOLD: Brand: NEPTUNE 2

## (undated) DEVICE — STRIP,CLOSURE,WOUND,MEDI-STRIP,1/2X4: Brand: MEDLINE

## (undated) DEVICE — UNDERGLOVE SURG SZ 8 FNGR THK0.21MIL GRN LTX BEAD CUF

## (undated) DEVICE — SUTURE ETHILON SZ 3-0 L18IN NONABSORBABLE BLK L24MM FS-1 3/8 663G

## (undated) DEVICE — ELECTRD BLD EZ CLN MOD XLNG 2.75IN

## (undated) DEVICE — GENERAL LAP CDS

## (undated) DEVICE — SUTURE VICRYL SZ 3-0 L18IN ABSRB UD L26MM SH 1/2 CIR J864D

## (undated) DEVICE — DRAPE,U/ SHT,SPLIT,PLAS,STERIL: Brand: MEDLINE

## (undated) DEVICE — ADHS LIQ MASTISOL 2/3ML

## (undated) DEVICE — LIQUIBAND RAPID ADHESIVE 36/CS 0.8ML: Brand: MEDLINE

## (undated) DEVICE — SOLUTION ANTIFOG VIS SYS CLEARIFY LAPSCP